# Patient Record
Sex: FEMALE | Race: ASIAN | NOT HISPANIC OR LATINO | URBAN - METROPOLITAN AREA
[De-identification: names, ages, dates, MRNs, and addresses within clinical notes are randomized per-mention and may not be internally consistent; named-entity substitution may affect disease eponyms.]

---

## 2021-01-11 RX ORDER — NYSTATIN 100000 [USP'U]/ML
100000 SUSPENSION ORAL 3 TIMES DAILY
Qty: 1 | Refills: 0 | Status: ACTIVE | COMMUNITY
Start: 2021-01-11 | End: 1900-01-01

## 2021-10-07 ENCOUNTER — OUTPATIENT (OUTPATIENT)
Dept: OUTPATIENT SERVICES | Facility: HOSPITAL | Age: 56
LOS: 1 days | End: 2021-10-07
Payer: COMMERCIAL

## 2021-10-07 VITALS
HEART RATE: 68 BPM | TEMPERATURE: 98 F | OXYGEN SATURATION: 99 % | WEIGHT: 143.96 LBS | RESPIRATION RATE: 20 BRPM | DIASTOLIC BLOOD PRESSURE: 83 MMHG | HEIGHT: 64 IN | SYSTOLIC BLOOD PRESSURE: 138 MMHG

## 2021-10-07 DIAGNOSIS — C55 MALIGNANT NEOPLASM OF UTERUS, PART UNSPECIFIED: ICD-10-CM

## 2021-10-07 DIAGNOSIS — N60.99 UNSPECIFIED BENIGN MAMMARY DYSPLASIA OF UNSPECIFIED BREAST: ICD-10-CM

## 2021-10-07 DIAGNOSIS — N60.91 UNSPECIFIED BENIGN MAMMARY DYSPLASIA OF RIGHT BREAST: ICD-10-CM

## 2021-10-07 DIAGNOSIS — Z98.89 OTHER SPECIFIED POSTPROCEDURAL STATES: Chronic | ICD-10-CM

## 2021-10-07 DIAGNOSIS — Z90.49 ACQUIRED ABSENCE OF OTHER SPECIFIED PARTS OF DIGESTIVE TRACT: Chronic | ICD-10-CM

## 2021-10-07 DIAGNOSIS — Z01.818 ENCOUNTER FOR OTHER PREPROCEDURAL EXAMINATION: ICD-10-CM

## 2021-10-07 DIAGNOSIS — R92.8 OTHER ABNORMAL AND INCONCLUSIVE FINDINGS ON DIAGNOSTIC IMAGING OF BREAST: ICD-10-CM

## 2021-10-07 DIAGNOSIS — Z90.710 ACQUIRED ABSENCE OF BOTH CERVIX AND UTERUS: Chronic | ICD-10-CM

## 2021-10-07 LAB
ALBUMIN SERPL ELPH-MCNC: 4.7 G/DL — SIGNIFICANT CHANGE UP (ref 3.3–5)
ALP SERPL-CCNC: 79 U/L — SIGNIFICANT CHANGE UP (ref 40–120)
ALT FLD-CCNC: 32 U/L — SIGNIFICANT CHANGE UP (ref 10–45)
ANION GAP SERPL CALC-SCNC: 13 MMOL/L — SIGNIFICANT CHANGE UP (ref 5–17)
AST SERPL-CCNC: 34 U/L — SIGNIFICANT CHANGE UP (ref 10–40)
BILIRUB SERPL-MCNC: 0.2 MG/DL — SIGNIFICANT CHANGE UP (ref 0.2–1.2)
BUN SERPL-MCNC: 12 MG/DL — SIGNIFICANT CHANGE UP (ref 7–23)
CALCIUM SERPL-MCNC: 9.9 MG/DL — SIGNIFICANT CHANGE UP (ref 8.4–10.5)
CHLORIDE SERPL-SCNC: 101 MMOL/L — SIGNIFICANT CHANGE UP (ref 96–108)
CO2 SERPL-SCNC: 26 MMOL/L — SIGNIFICANT CHANGE UP (ref 22–31)
CREAT SERPL-MCNC: 0.62 MG/DL — SIGNIFICANT CHANGE UP (ref 0.5–1.3)
GLUCOSE SERPL-MCNC: 96 MG/DL — SIGNIFICANT CHANGE UP (ref 70–99)
HCT VFR BLD CALC: 35.1 % — SIGNIFICANT CHANGE UP (ref 34.5–45)
HGB BLD-MCNC: 11.6 G/DL — SIGNIFICANT CHANGE UP (ref 11.5–15.5)
MCHC RBC-ENTMCNC: 30.6 PG — SIGNIFICANT CHANGE UP (ref 27–34)
MCHC RBC-ENTMCNC: 33 GM/DL — SIGNIFICANT CHANGE UP (ref 32–36)
MCV RBC AUTO: 92.6 FL — SIGNIFICANT CHANGE UP (ref 80–100)
NRBC # BLD: 0 /100 WBCS — SIGNIFICANT CHANGE UP (ref 0–0)
PLATELET # BLD AUTO: 187 K/UL — SIGNIFICANT CHANGE UP (ref 150–400)
POTASSIUM SERPL-MCNC: 3.7 MMOL/L — SIGNIFICANT CHANGE UP (ref 3.5–5.3)
POTASSIUM SERPL-SCNC: 3.7 MMOL/L — SIGNIFICANT CHANGE UP (ref 3.5–5.3)
PROT SERPL-MCNC: 7.7 G/DL — SIGNIFICANT CHANGE UP (ref 6–8.3)
RBC # BLD: 3.79 M/UL — LOW (ref 3.8–5.2)
RBC # FLD: 13.2 % — SIGNIFICANT CHANGE UP (ref 10.3–14.5)
SODIUM SERPL-SCNC: 140 MMOL/L — SIGNIFICANT CHANGE UP (ref 135–145)
WBC # BLD: 4.5 K/UL — SIGNIFICANT CHANGE UP (ref 3.8–10.5)
WBC # FLD AUTO: 4.5 K/UL — SIGNIFICANT CHANGE UP (ref 3.8–10.5)

## 2021-10-07 PROCEDURE — 85027 COMPLETE CBC AUTOMATED: CPT

## 2021-10-07 PROCEDURE — 80053 COMPREHEN METABOLIC PANEL: CPT

## 2021-10-07 PROCEDURE — G0463: CPT

## 2021-10-07 RX ORDER — SODIUM CHLORIDE 9 MG/ML
3 INJECTION INTRAMUSCULAR; INTRAVENOUS; SUBCUTANEOUS EVERY 8 HOURS
Refills: 0 | Status: DISCONTINUED | OUTPATIENT
Start: 2021-10-14 | End: 2021-10-28

## 2021-10-07 RX ORDER — LIDOCAINE HCL 20 MG/ML
0.2 VIAL (ML) INJECTION ONCE
Refills: 0 | Status: DISCONTINUED | OUTPATIENT
Start: 2021-10-14 | End: 2021-10-28

## 2021-10-07 NOTE — H&P PST ADULT - NSICDXPASTSURGICALHX_GEN_ALL_CORE_FT
PAST SURGICAL HISTORY:  H/O abdominal hysterectomy 1/2021 @ [facility]    H/O ovarian cystectomy 2004    History of cholecystectomy 217

## 2021-10-07 NOTE — H&P PST ADULT - NSICDXFAMILYHX_GEN_ALL_CORE_FT
FAMILY HISTORY:  Family history of breast cancer in sister  Family history of diabetes mellitus  Family history of hyperlipidemia  Family history of hypertension

## 2021-10-07 NOTE — H&P PST ADULT - NSICDXPASTMEDICALHX_GEN_ALL_CORE_FT
PAST MEDICAL HISTORY:  Acid reflux     Endometriosis     Hypothyroid     Uterine cancer 1/2021    UTI (urinary tract infection)

## 2021-10-07 NOTE — H&P PST ADULT - HISTORY OF PRESENT ILLNESS
57 y/o female with PMH of hypothyroidism, GERD, uterine cancer dx in 2021 s/p hysterectomy s/p chemotherapy currently on Herceptin every 3 week ( [facility] ) . Pt had a abdominal  mammogram s/p  biopsy . Presents to PST for scheduled Excisional Biopsy Right Breast Post Marimar  Reflector Placement on 10/14/21.    Next dose of Herceptin on 11/12/21     Covid test 10/11/21

## 2021-10-07 NOTE — H&P PST ADULT - PROBLEM SELECTOR PLAN 1
Excisional Biopsy Right Breast Post Marimar  Reflector Placement on 10/14/21  Pre- Op Instructions discussed   Labs sent   Covid test 10/11/21  medical eval

## 2021-10-09 PROBLEM — C55 MALIGNANT NEOPLASM OF UTERUS, PART UNSPECIFIED: Chronic | Status: ACTIVE | Noted: 2021-10-07

## 2021-10-11 ENCOUNTER — OUTPATIENT (OUTPATIENT)
Dept: OUTPATIENT SERVICES | Facility: HOSPITAL | Age: 56
LOS: 1 days | End: 2021-10-11
Payer: COMMERCIAL

## 2021-10-11 DIAGNOSIS — Z90.710 ACQUIRED ABSENCE OF BOTH CERVIX AND UTERUS: Chronic | ICD-10-CM

## 2021-10-11 DIAGNOSIS — Z11.52 ENCOUNTER FOR SCREENING FOR COVID-19: ICD-10-CM

## 2021-10-11 DIAGNOSIS — Z98.89 OTHER SPECIFIED POSTPROCEDURAL STATES: Chronic | ICD-10-CM

## 2021-10-11 DIAGNOSIS — Z90.49 ACQUIRED ABSENCE OF OTHER SPECIFIED PARTS OF DIGESTIVE TRACT: Chronic | ICD-10-CM

## 2021-10-11 LAB — SARS-COV-2 RNA SPEC QL NAA+PROBE: SIGNIFICANT CHANGE UP

## 2021-10-11 PROCEDURE — C9803: CPT

## 2021-10-11 PROCEDURE — U0005: CPT

## 2021-10-11 PROCEDURE — U0003: CPT

## 2021-10-12 ENCOUNTER — RESULT REVIEW (OUTPATIENT)
Age: 56
End: 2021-10-12

## 2021-10-12 ENCOUNTER — OUTPATIENT (OUTPATIENT)
Dept: OUTPATIENT SERVICES | Facility: HOSPITAL | Age: 56
LOS: 1 days | End: 2021-10-12
Payer: COMMERCIAL

## 2021-10-12 DIAGNOSIS — Z90.49 ACQUIRED ABSENCE OF OTHER SPECIFIED PARTS OF DIGESTIVE TRACT: Chronic | ICD-10-CM

## 2021-10-12 DIAGNOSIS — C80.1 MALIGNANT (PRIMARY) NEOPLASM, UNSPECIFIED: ICD-10-CM

## 2021-10-12 DIAGNOSIS — Z98.89 OTHER SPECIFIED POSTPROCEDURAL STATES: Chronic | ICD-10-CM

## 2021-10-12 DIAGNOSIS — Z90.710 ACQUIRED ABSENCE OF BOTH CERVIX AND UTERUS: Chronic | ICD-10-CM

## 2021-10-12 PROCEDURE — 88321 CONSLTJ&REPRT SLD PREP ELSWR: CPT

## 2021-10-13 ENCOUNTER — TRANSCRIPTION ENCOUNTER (OUTPATIENT)
Age: 56
End: 2021-10-13

## 2021-10-13 ENCOUNTER — APPOINTMENT (OUTPATIENT)
Dept: MAMMOGRAPHY | Facility: IMAGING CENTER | Age: 56
End: 2021-10-13
Payer: COMMERCIAL

## 2021-10-13 ENCOUNTER — OUTPATIENT (OUTPATIENT)
Dept: OUTPATIENT SERVICES | Facility: HOSPITAL | Age: 56
LOS: 1 days | End: 2021-10-13
Payer: COMMERCIAL

## 2021-10-13 DIAGNOSIS — Z90.49 ACQUIRED ABSENCE OF OTHER SPECIFIED PARTS OF DIGESTIVE TRACT: Chronic | ICD-10-CM

## 2021-10-13 DIAGNOSIS — Z98.89 OTHER SPECIFIED POSTPROCEDURAL STATES: Chronic | ICD-10-CM

## 2021-10-13 DIAGNOSIS — Z90.710 ACQUIRED ABSENCE OF BOTH CERVIX AND UTERUS: Chronic | ICD-10-CM

## 2021-10-13 DIAGNOSIS — Z00.8 ENCOUNTER FOR OTHER GENERAL EXAMINATION: ICD-10-CM

## 2021-10-13 LAB — SURGICAL PATHOLOGY STUDY: SIGNIFICANT CHANGE UP

## 2021-10-13 PROCEDURE — 19281 PERQ DEVICE BREAST 1ST IMAG: CPT

## 2021-10-13 PROCEDURE — C1739: CPT

## 2021-10-13 PROCEDURE — 19281 PERQ DEVICE BREAST 1ST IMAG: CPT | Mod: RT

## 2021-10-14 ENCOUNTER — OUTPATIENT (OUTPATIENT)
Dept: OUTPATIENT SERVICES | Facility: HOSPITAL | Age: 56
LOS: 1 days | End: 2021-10-14
Payer: COMMERCIAL

## 2021-10-14 ENCOUNTER — RESULT REVIEW (OUTPATIENT)
Age: 56
End: 2021-10-14

## 2021-10-14 VITALS
HEART RATE: 84 BPM | OXYGEN SATURATION: 99 % | DIASTOLIC BLOOD PRESSURE: 64 MMHG | SYSTOLIC BLOOD PRESSURE: 133 MMHG | RESPIRATION RATE: 16 BRPM

## 2021-10-14 VITALS
WEIGHT: 143.96 LBS | HEART RATE: 70 BPM | TEMPERATURE: 98 F | OXYGEN SATURATION: 100 % | HEIGHT: 64 IN | SYSTOLIC BLOOD PRESSURE: 149 MMHG | RESPIRATION RATE: 16 BRPM | DIASTOLIC BLOOD PRESSURE: 72 MMHG

## 2021-10-14 DIAGNOSIS — Z90.49 ACQUIRED ABSENCE OF OTHER SPECIFIED PARTS OF DIGESTIVE TRACT: Chronic | ICD-10-CM

## 2021-10-14 DIAGNOSIS — N60.99 UNSPECIFIED BENIGN MAMMARY DYSPLASIA OF UNSPECIFIED BREAST: ICD-10-CM

## 2021-10-14 DIAGNOSIS — Z98.89 OTHER SPECIFIED POSTPROCEDURAL STATES: Chronic | ICD-10-CM

## 2021-10-14 DIAGNOSIS — C55 MALIGNANT NEOPLASM OF UTERUS, PART UNSPECIFIED: ICD-10-CM

## 2021-10-14 DIAGNOSIS — R92.8 OTHER ABNORMAL AND INCONCLUSIVE FINDINGS ON DIAGNOSTIC IMAGING OF BREAST: ICD-10-CM

## 2021-10-14 DIAGNOSIS — Z90.710 ACQUIRED ABSENCE OF BOTH CERVIX AND UTERUS: Chronic | ICD-10-CM

## 2021-10-14 PROCEDURE — 19125 EXCISION BREAST LESION: CPT | Mod: RT

## 2021-10-14 PROCEDURE — 76098 X-RAY EXAM SURGICAL SPECIMEN: CPT

## 2021-10-14 PROCEDURE — 88307 TISSUE EXAM BY PATHOLOGIST: CPT

## 2021-10-14 PROCEDURE — 88307 TISSUE EXAM BY PATHOLOGIST: CPT | Mod: 26

## 2021-10-14 PROCEDURE — 76098 X-RAY EXAM SURGICAL SPECIMEN: CPT | Mod: 26

## 2021-10-14 RX ORDER — CHLORHEXIDINE GLUCONATE 213 G/1000ML
1 SOLUTION TOPICAL ONCE
Refills: 0 | Status: COMPLETED | OUTPATIENT
Start: 2021-10-14 | End: 2021-10-14

## 2021-10-14 RX ORDER — FAMOTIDINE 10 MG/ML
20 INJECTION INTRAVENOUS ONCE
Refills: 0 | Status: COMPLETED | OUTPATIENT
Start: 2021-10-14 | End: 2021-10-14

## 2021-10-14 RX ORDER — FENTANYL CITRATE 50 UG/ML
25 INJECTION INTRAVENOUS
Refills: 0 | Status: DISCONTINUED | OUTPATIENT
Start: 2021-10-14 | End: 2021-10-14

## 2021-10-14 RX ORDER — OXYCODONE HYDROCHLORIDE 5 MG/1
1 TABLET ORAL
Qty: 15 | Refills: 0
Start: 2021-10-14

## 2021-10-14 RX ORDER — OXYCODONE HYDROCHLORIDE 5 MG/1
5 TABLET ORAL ONCE
Refills: 0 | Status: DISCONTINUED | OUTPATIENT
Start: 2021-10-14 | End: 2021-10-14

## 2021-10-14 RX ORDER — ONDANSETRON 8 MG/1
4 TABLET, FILM COATED ORAL ONCE
Refills: 0 | Status: COMPLETED | OUTPATIENT
Start: 2021-10-14 | End: 2021-10-14

## 2021-10-14 RX ORDER — MORPHINE SULFATE 50 MG/1
2 CAPSULE, EXTENDED RELEASE ORAL ONCE
Refills: 0 | Status: DISCONTINUED | OUTPATIENT
Start: 2021-10-14 | End: 2021-10-14

## 2021-10-14 RX ADMIN — ONDANSETRON 4 MILLIGRAM(S): 8 TABLET, FILM COATED ORAL at 12:04

## 2021-10-14 RX ADMIN — MORPHINE SULFATE 2 MILLIGRAM(S): 50 CAPSULE, EXTENDED RELEASE ORAL at 13:58

## 2021-10-14 RX ADMIN — CHLORHEXIDINE GLUCONATE 1 APPLICATION(S): 213 SOLUTION TOPICAL at 09:18

## 2021-10-14 RX ADMIN — FAMOTIDINE 20 MILLIGRAM(S): 10 INJECTION INTRAVENOUS at 13:58

## 2021-10-14 NOTE — ASU DISCHARGE PLAN (ADULT/PEDIATRIC) - CARE PROVIDER_API CALL
Miriam Pompa)  Surgery  [address], [address]  [place] [address]  Phone: [phone]  Fax: [phone]  Follow Up Time:

## 2021-10-14 NOTE — ASU DISCHARGE PLAN (ADULT/PEDIATRIC) - NURSING INSTRUCTIONS
Next dose of Tylenol will be on or after 4:45 pm ,today/tonight, If needed for pain/cramps. Your first dose of Tylenol was given at 10:45 am_. Do not exceed more than 4000mg of Tylenol in one 24 hour setting.

## 2021-10-14 NOTE — ASU PATIENT PROFILE, ADULT - NS PRO TALK SOMEONE YN
Surgical Oncology Follow Up       [address]  [facility] [place]  [address]  [facility] [id number]    Layla Torres Mock  1962  [id number]  [address]  [facility]  [address] [id number]    Chief Complaint   Patient presents with    Pre-op Exam          Assessment & Plan:   Patient presents for preoperative visit  Her MRIs demonstrated essentially come pleat resolution of her left breast cancer  However because she has lupus erythematosus Radiation Oncology has discourage breast conservation therapy  Consequently I have discussed a mastectomy with her telephonically  We reviewed this today and went through the process of informed consent for left mastectomy in conjunction with lymphatic mapping using blue and radioactive dye as well as sentinel lymph node biopsy and possible axillary dissection  The patient does not want reconstruction  This is consistent with her previous discussions  Cancer History:        Malignant neoplasm of upper-inner quadrant of left breast in female, estrogen receptor negative ([facility] [address] )    2/12/2020 Biopsy     A  & B  Right breast stereotactic biopsy with and without calcs; 11 o'clock, 10 cm from nipple  Benign breast glandular parenchyma with fibroadenomatoid stromal hyperplasia  No atypia and no evidence of malignancy     C  Left breast ultrasound-guided biopsy; 10 o'clock, 5 cm from nipple  Benign breast glandular tissue  No atypia and no evidence of malignancy    D  Left breast ultrasound-guided biopsy; 10 o'clock, 4 cm from nipple  Invasive breast carcinoma of no special type (ductal NST)  Grade 3  ER 0  OR 0  HER2 2+; FISH positive    Concordant  Right breast clear  Malignant mass measures 2 3 cm on mammo  Left axilla US negative  Mammographic abnormality with no US correlate  MRI recommended to further evaluate this finding        2/26/2020 Genetic Testing The following genes were evaluated: LIZZETTE, BRCA1, BRCA2, CDH1, CHEK2, PALB2, PTEN, STK11, TP53  Negative result   No pathogenic sequence variants or deletions/dupllications identified  [employer]      3/24/2020 - 5/25/2020 Chemotherapy     pegfilgrastim (NEULASTA ONPRO) subcutaneous injection kit 6 mg, 6 mg, Subcutaneous, Once, 4 of 6 cycles  Administration: 6 mg (3/24/2020), 6 mg (4/14/2020), 6 mg (5/5/2020)  fosaprepitant (EMEND) 150 mg in sodium chloride 0 9 % 250 mL IVPB, 150 mg, Intravenous, Once, 4 of 6 cycles  Administration: 150 mg (3/24/2020), 150 mg (4/14/2020), 150 mg (5/5/2020)  pertuzumab (PERJETA) 840 mg in sodium chloride 0 9 % 250 mL IVPB, 840 mg, Intravenous, Once, 4 of 6 cycles  Administration: 840 mg (3/24/2020), 420 mg (4/14/2020), 420 mg (5/5/2020)  CARBOplatin (PARAPLATIN) 547 8 mg in sodium chloride 0 9 % 250 mL IVPB, 547 8 mg, Intravenous, Once, 4 of 6 cycles  Administration: 547 8 mg (3/24/2020), 575 4 mg (4/14/2020), 625 8 mg (5/5/2020)  DOCEtaxel (TAXOTERE) 122 2 mg in sodium chloride 0 9 % 250 mL chemo infusion, 75 mg/m2 = 122 2 mg, Intravenous, Once, 4 of 6 cycles  Administration: 122 2 mg (3/24/2020), 122 2 mg (4/14/2020), 122 2 mg (5/5/2020)  trastuzumab (HERCEPTIN) 486 mg in sodium chloride 0 9 % 250 mL chemo infusion, 8 mg/kg = 486 mg, Intravenous, Once, 4 of 18 cycles  Administration: 486 mg (3/24/2020), 364 mg (4/14/2020), 364 mg (5/5/2020)      6/3/2020 -  Chemotherapy     pertuzumab (PERJETA) 420 mg in sodium chloride 0 9 % 250 mL IVPB, 420 mg (50 % of original dose 840 mg), Intravenous, Once, 2 of 6 cycles  Dose modification: 420 mg (original dose 840 mg, Cycle 1, Reason: Dose Not Tolerated)  Administration: 420 mg (6/3/2020), 420 mg (6/25/2020)  PACLitaxel (TAXOL) 127 2 mg in sodium chloride 0 9 % 250 mL chemo IVPB, 80 mg/m2 = 127 2 mg, Intravenous, Once, 2 of 2 cycles  Administration: 127 2 mg (6/3/2020), 127 2 mg (6/10/2020), 127 2 mg (6/17/2020), 127 2 mg (6/25/2020), 127 2 mg (7/1/2020), 127 2 mg (7/7/2020)  trastuzumab (HERCEPTIN) 340 mg in sodium chloride 0 9 % 250 mL chemo infusion, 6 mg/kg = 340 mg (75 % of original dose 8 mg/kg), Intravenous, Once, 2 of 6 cycles  Dose modification: 6 mg/kg (original dose 8 mg/kg, Cycle 1, Reason: Dose Not Tolerated)  Administration: 340 mg (6/3/2020), 340 mg (6/25/2020)      6/4/2020 -  Cancer Staged     Staging form: Breast, AJCC 8th Edition  - Clinical: Stage IIA (cT2, cN0, cM0, G3, ER-, NC-, HER2+) - Signed by Luba Benavidez MD on 6/4/2020  Laterality: Left  Histologic grading system: 3 grade system             Interval History:   Patient is completed her chemotherapy  Her MRI demonstrated resolution of her    Review of Systems:   Review of Systems   Constitutional: Positive for fatigue  All other systems reviewed and are negative        Past Medical History     Patient Active Problem List   Diagnosis    Urinary tract infection    Hypertension    Systemic lupus erythematosus ([address] )    Hypothyroidism    Posttraumatic stress disorder    Adult celiac disease    Anxiety    Depression    Esophagitis    Nephrolithiasis    Vitamin D deficiency    Malignant neoplasm of upper-inner quadrant of left breast in female, estrogen receptor negative ([address] )    Chemotherapy induced neutropenia (HCC)    Leukocytosis    Increased anion gap metabolic acidosis    Port-A-Cath in place    Intractable nausea and vomiting    SIRS (systemic inflammatory response syndrome) (HCC)    JEFERSON (acute kidney injury) ([address] )    Hyponatremia    Hypokalemia     Past Medical History:   Diagnosis Date    Disease of thyroid gland     Hypertension     Lupus ([address] )     Malignant neoplasm of upper-inner quadrant of left breast in female, estrogen receptor negative ([address] ) 2/25/2020    Nephrolithiasis     PTSD (post-traumatic stress disorder)      Past Surgical History:   Procedure Laterality Date    FEMUR FRACTURE SURGERY      FL GUIDED CENTRAL VENOUS ACCESS DEVICE INSERTION  3/17/2020    HYSTERECTOMY      LEFT OOPHORECTOMY      due to torsion     MAMMO STEREOTACTIC BREAST BIOPSY RIGHT (ALL INC) Right 2/12/2020    MRI BREAST BIOPSY LEFT (ALL INCLUSIVE) Left 3/20/2020    MA INSJ TUNNELED CTR VAD W/SUBQ PORT AGE 5 YR/> N/A 3/17/2020    Procedure: INSERTION VENOUS PORT ( PORT-A-CATH) IR;  Surgeon: Kevin Lemus DO;  Location: [facility];  Service: Interventional Radiology    US GUIDANCE BREAST BIOPSY LEFT EACH ADDITIONAL Left 2/12/2020    US GUIDED BREAST BIOPSY LEFT COMPLETE Left 2/12/2020    VAGINA SURGERY       Family History   Problem Relation Age of Onset    Diabetes Mother     Hypertension Mother     Nephrolithiasis Mother     Breast cancer Mother 79    Heart disease Father     Hypertension Father     Diabetes Brother     Nephrolithiasis Brother     Breast cancer Maternal Aunt     No Known Problems Maternal Grandmother     No Known Problems Maternal Grandfather     No Known Problems Paternal Grandmother     No Known Problems Paternal Grandfather      Social History     Socioeconomic History    Marital status: /Civil Union     Spouse name: Not on file    Number of children: Not on file    Years of education: Not on file    Highest education level: Not on file   Occupational History    Not on file   Social Needs    Financial resource strain: Not on file    Food insecurity:     Worry: Not on file     Inability: Not on file    Transportation needs:     Medical: Not on file     Non-medical: Not on file   Tobacco Use    Smoking status: Current Every Day Smoker     Packs/day: 0 25     Types: Cigarettes     Start date: 1990    Smokeless tobacco: Never Used    Tobacco comment:  5 ppd per [employer]   Substance and Sexual Activity    Alcohol use: Not Currently     Alcohol/week: 21 0 standard drinks     Types: 21 Cans of beer per week     Frequency: Never     Comment: pt states she had her last beer 3/22/2020    Drug use: No    Sexual activity: Not on file   Lifestyle    Physical activity:     Days per week: Not on file     Minutes per session: Not on file    Stress: Not on file   Relationships    Social connections:     Talks on phone: Not on file     Gets together: Not on file     Attends Shinto service: Not on file     Active member of club or organization: Not on file     Attends meetings of clubs or organizations: Not on file     Relationship status: Not on file    Intimate partner violence:     Fear of current or ex partner: Not on file     Emotionally abused: Not on file     Physically abused: Not on file     Forced sexual activity: Not on file   Other Topics Concern    Not on file   Social History Narrative    Not on file       Current Outpatient Medications:     ALPRAZolam (XANAX) 1 mg tablet, TAKE ONE TABLET THREE TIMES A DAY AS NEEDED FOR ANXIETY, Disp: 90 tablet, Rfl: 1    Cholecalciferol (VITAMIN D3) 90168 units CAPS, Take 50,000 Units by mouth once a week, Disp: , Rfl:     CRANIAL PROSTHESIS, RX,, One wig as needed, Disp: 1 Piece, Rfl: 0    cyclobenzaprine (FLEXERIL) 10 mg tablet, Take 10 mg by mouth daily at bedtime , Disp: , Rfl:     dexamethasone (DECADRON) 1 mg tablet, Take 1 tablet (1 mg total) by mouth daily with breakfast, Disp: 30 tablet, Rfl: 0    diphenoxylate-atropine (LOMOTIL) 2 5-0 025 mg per tablet, Take 1 tablet by mouth 4 (four) times a day as needed for diarrhea, Disp: 30 tablet, Rfl: 0    hydroxychloroquine (PLAQUENIL) 200 mg tablet, Take 1 tablet in morning and 1/2 tablet in evening, Disp: , Rfl:     levothyroxine 88 mcg tablet, Take 1 tablet (88 mcg total) by mouth daily, Disp: 30 tablet, Rfl: 5    loperamide (IMODIUM) 2 mg capsule, Take 1 capsule (2 mg total) by mouth every 4 (four) hours as needed for diarrhea, Disp: 30 capsule, Rfl: 0    moexipril (UNIVASC) 15 MG tablet, Take 1 tablet (15 mg total) by mouth daily, Disp: 30 tablet, Rfl: 5    Nutritional Supplements (OSTEOPOROSIS SUPPORT PO), Take by mouth, Disp: , Rfl:     ondansetron (ZOFRAN) 4 mg tablet, TAKE ONE TABLET BY MOUTH EVERY 8 HOURS AS NEEDED FOR NAUSEA OR VOMITING, Disp: 30 tablet, Rfl: 1    oxyCODONE (ROXICODONE) 5 mg immediate release tablet, Take 1-2 tablets (5-10 mg total) by mouth every 4 (four) hours as needed (cancer pain  Max of 6 tabs / day  )Max Daily Amount: 30 mg, Disp: 180 tablet, Rfl: 0    potassium chloride (K-DUR,KLOR-CON) 20 mEq tablet, Take 1 tablet (20 mEq total) by mouth every other day, Disp: 20 tablet, Rfl: 0    predniSONE 1 mg tablet, Take 1 mg by mouth 2 (two) times a day as needed (Lupus exacerbations), Disp: , Rfl:     prochlorperazine (COMPAZINE) 5 mg tablet, Take 1 tablet (5 mg total) by mouth every 6 (six) hours as needed for nausea or vomiting, Disp: 30 tablet, Rfl: 0    sertraline (ZOLOFT) 100 mg tablet, TAKE 1 & 1/2 TABLETS BY MOUTH ONCE DAILY, Disp: 45 tablet, Rfl: 5    dicyclomine (BENTYL) 20 mg tablet, Take 1 tablet (20 mg total) by mouth every 6 (six) hours as needed (abdominal pain) for up to 10 days, Disp: 30 tablet, Rfl: 0    magnesium oxide (MAG-OX) 400 mg, Take 1 tablet (400 mg total) by mouth 2 (two) times a day, Disp: 60 tablet, Rfl: 0  No current facility-administered medications for this visit  Allergies   Allergen Reactions    Mobic [Meloxicam] Eye Swelling     Reaction Date: 12Aug2011;     Methotrexate Rash    Sulfa Antibiotics Rash       Physical Exam:     Vitals:    07/08/20 1032   BP: 112/70   Pulse: (!) 112   Resp: 16   Temp: 98 2 °F (36 8 °C)     Physical Exam   Constitutional: She is oriented to person, place, and time  She appears well-developed and well-nourished  HENT:   Head: Normocephalic and atraumatic  Mouth/Throat: Oropharynx is clear and moist    Eyes: Pupils are equal, round, and reactive to light  EOM are normal    Neck: Normal range of motion  Neck supple  No JVD present  No tracheal deviation present  No thyromegaly present     Cardiovascular: Normal rate, regular rhythm, normal heart sounds and intact distal pulses  Exam reveals no gallop and no friction rub  No murmur heard  Pulmonary/Chest: Effort normal and breath sounds normal  No respiratory distress  She has no wheezes  She has no rales  Both breasts were examined in the sitting and supine position  There are no worrisome skin lesions, no nipple retraction and no nipple discharge  There are no dominant masses, axillary adenopathy or supraclavicular adenopathy on either side  Abdominal: Soft  She exhibits no distension and no mass  There is no hepatomegaly  There is no tenderness  There is no rebound and no guarding  Musculoskeletal: Normal range of motion  She exhibits no edema or tenderness  Lymphadenopathy:     She has no cervical adenopathy  Neurological: She is alert and oriented to person, place, and time  No cranial nerve deficit  Skin: Skin is warm and dry  No rash noted  No erythema  Psychiatric: She has a normal mood and affect  Her behavior is normal    Vitals reviewed  Results & Discussion:   The patient's MRI showed no abnormalities on the right side  Her left breast cancer has essentially resolved  Her original ultrasound of the axilla showed no worrisome findings  We went through the process of informed consent for a left mastectomy in conjunction with lymphatic mapping using blue and radioactive dye, sentinel lymph node biopsy and possible axillary dissection  All questions were answered the patient's satisfaction  We will coordinate the surgery next mutual convenience  Advance Care Planning/Advance Directives:  I discussed the disease status, treatment plans and follow-up with the patient  no

## 2021-10-14 NOTE — ASU PATIENT PROFILE, ADULT - NSICDXPASTSURGICALHX_GEN_ALL_CORE_FT
PAST SURGICAL HISTORY:  H/O abdominal hysterectomy 1/2021 @ [facility]    H/O ovarian cystectomy 2004    History of cholecystectomy 217

## 2021-10-14 NOTE — ASU DISCHARGE PLAN (ADULT/PEDIATRIC) - ASU DC SPECIAL INSTRUCTIONSFT
Breast Biopsy/Lumpectomy Discharge Instructions    1.	Follow-up Appointment- Please call the office to schedule your appointment ([phone]) for 2 weeks after surgery. Please call upon discharge in order to set up this appointment.     2.	Bruising/Bleeding/ Swelling – It is normal for there to be some bruising and swelling at the breast biopsy site. Some discomfort at the surgical site is also normal. If your symptoms seem excessive, or if you have any questions or concerns, please call the office.    3.	Supportive Bra- Please wear the bra and binder provided continuously, apart from showering, for 2 weeks/until your follow up appointment. The bra will provide support, decrease the amount of swelling at the biopsy site and make your recovery more comfortable.    4.	Wound Dressing – The incision(s) will be covered with a special type of surgery glue called Dermabond. It has a purplish hue and looks like plastic. It should stay on the skin until it flakes off naturally over the following 1-2 weeks. Please do NOT peel off the Dermabond. All of the stitches are “internal” and will dissolve naturally.     5.	Ice- You may apply ice to the surgical sites off and on as needed for symptomatic relief.     6.	Showering/Bathing- You may shower 72 hours after surgery, at which point you may removed outer dressings. Please sponge bathe until then.     7.	Activity Level- You may resume most normal daily activity as tolerated, but avoid strenuous activities such as aerobics, jogging, exercising, or heavy lifting for at least 1 week after surgery. You may return to work in 1-2 days after surgery. You may drive as long as you are not taking any prescription pain medication.     8.	Pain Medication- The office has sent pain prescriptions for you already to your pharmacy. You may take the prescribed medication, or you may take extra strength Tylenol as needed. Please do NOT take aspirin, Motrin, Advil, or other anti-inflammatory medications, as these medications may cause bleeding and bruising.

## 2021-10-25 LAB — SURGICAL PATHOLOGY STUDY: SIGNIFICANT CHANGE UP

## 2022-10-21 ENCOUNTER — APPOINTMENT (OUTPATIENT)
Dept: HEPATOLOGY | Facility: CLINIC | Age: 57
End: 2022-10-21

## 2022-11-02 ENCOUNTER — OUTPATIENT (OUTPATIENT)
Dept: OUTPATIENT SERVICES | Facility: HOSPITAL | Age: 57
LOS: 1 days | Discharge: HOME | End: 2022-11-02

## 2022-11-02 DIAGNOSIS — Z98.89 OTHER SPECIFIED POSTPROCEDURAL STATES: Chronic | ICD-10-CM

## 2022-11-02 DIAGNOSIS — C55 MALIGNANT NEOPLASM OF UTERUS, PART UNSPECIFIED: ICD-10-CM

## 2022-11-02 DIAGNOSIS — Z90.49 ACQUIRED ABSENCE OF OTHER SPECIFIED PARTS OF DIGESTIVE TRACT: Chronic | ICD-10-CM

## 2022-11-02 DIAGNOSIS — Z90.710 ACQUIRED ABSENCE OF BOTH CERVIX AND UTERUS: Chronic | ICD-10-CM

## 2022-11-08 ENCOUNTER — APPOINTMENT (OUTPATIENT)
Dept: HEMATOLOGY ONCOLOGY | Facility: CLINIC | Age: 57
End: 2022-11-08
Payer: COMMERCIAL

## 2022-11-08 VITALS
HEART RATE: 75 BPM | RESPIRATION RATE: 16 BRPM | WEIGHT: 147.93 LBS | BODY MASS INDEX: 25.25 KG/M2 | OXYGEN SATURATION: 99 % | HEIGHT: 63.98 IN | TEMPERATURE: 98.1 F | SYSTOLIC BLOOD PRESSURE: 161 MMHG | DIASTOLIC BLOOD PRESSURE: 80 MMHG

## 2022-11-08 DIAGNOSIS — Z78.9 OTHER SPECIFIED HEALTH STATUS: ICD-10-CM

## 2022-11-08 PROCEDURE — 99204 OFFICE O/P NEW MOD 45 MIN: CPT

## 2022-11-08 PROCEDURE — 99205 OFFICE O/P NEW HI 60 MIN: CPT

## 2022-11-09 PROBLEM — Z78.9 DENIES ALCOHOL CONSUMPTION: Status: ACTIVE | Noted: 2022-11-09

## 2022-11-09 NOTE — ASSESSMENT
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 \par \par - Previously was receiving are at [facility] and [facility]. She underwent Carbo/Taxol, but had a reaction to Taxol after the first cycle so was switched to Abraxane. This was in combo with Herceptin. After completion of 6 cycles of chemo she continued on Herceptin maintenance. \par - CT Scan of the abdomen revealed a large solitary liver lesion concerning for metastatic disease. Will refer to IR for bx of the lesion to confirm metastasis. \par - Recommended PET/CT to assess for other areas that may be involved with cancer, Rx given\par - Await tissue results to see if she qualifies for the folate receptor trial at [facility]. If she does, then would recommend pursuing this trial. If she does not qualify, then will start Pembrolizumab and Lenvatinib.

## 2022-11-09 NOTE — HISTORY OF PRESENT ILLNESS
[Disease: _____________________] : Disease: [unfilled] [AJCC Stage: ____] : AJCC Stage: [unfilled] [de-identified] : Patient diagnosed with stage III MMT  in 1/2021. She saw gyn and had imaging. She was seen by Dr. Bowen, who did the surgery. Final path showed STAGE III MMT.\par Dr. Nichols at [place]. She was treated with Carbo/ Taxol- s/p one cycle , had reaction and switched to Carbo/ doxil x one does. She transferred her care to Dr. Nichols at [place].and then Carbo/ Abraxane/ Herceptin x 6 completed, last dose was in August, 2021\par She was started on Herceptin maintenance, last dose was in Sept, 2022.\par Scan showed liver lesion, consistent with POD.\par Ct C/A/P: 10/6/2022:  A new 4.6x5.7 cm low attenuation lesion in seen in the liver , concerning for metastatic disease.\par \par She has some RUQ pain, no nausea or vomiting. She has some constipation. Takes prune juice, on laxatives. No bleeding. No RT in the past.\par \par PMH: NONE\par PSH: WINNIE, BSO, GB, Endometriosis surgery in 2004, right breast biopsy.( Dr. Chasity Pompa)\par All: sulfa, metronidazole, taxol\par FH: Sister breast cancer 40. 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 Abdominal pain.

## 2022-11-27 ENCOUNTER — APPOINTMENT (OUTPATIENT)
Dept: NUCLEAR MEDICINE | Facility: IMAGING CENTER | Age: 57
End: 2022-11-27

## 2023-01-25 ENCOUNTER — OUTPATIENT (OUTPATIENT)
Dept: OUTPATIENT SERVICES | Facility: HOSPITAL | Age: 58
LOS: 1 days | Discharge: HOME | End: 2023-01-25
Payer: COMMERCIAL

## 2023-01-25 DIAGNOSIS — Z90.49 ACQUIRED ABSENCE OF OTHER SPECIFIED PARTS OF DIGESTIVE TRACT: Chronic | ICD-10-CM

## 2023-01-25 DIAGNOSIS — Z90.710 ACQUIRED ABSENCE OF BOTH CERVIX AND UTERUS: Chronic | ICD-10-CM

## 2023-01-25 DIAGNOSIS — Z98.89 OTHER SPECIFIED POSTPROCEDURAL STATES: Chronic | ICD-10-CM

## 2023-01-25 DIAGNOSIS — C55 MALIGNANT NEOPLASM OF UTERUS, PART UNSPECIFIED: ICD-10-CM

## 2023-02-01 ENCOUNTER — APPOINTMENT (OUTPATIENT)
Dept: HEMATOLOGY ONCOLOGY | Facility: CLINIC | Age: 58
End: 2023-02-01
Payer: COMMERCIAL

## 2023-02-01 VITALS
OXYGEN SATURATION: 97 % | WEIGHT: 141.51 LBS | DIASTOLIC BLOOD PRESSURE: 84 MMHG | RESPIRATION RATE: 16 BRPM | HEART RATE: 88 BPM | BODY MASS INDEX: 24.16 KG/M2 | SYSTOLIC BLOOD PRESSURE: 135 MMHG | TEMPERATURE: 97.3 F | HEIGHT: 63.98 IN

## 2023-02-01 PROCEDURE — 99214 OFFICE O/P EST MOD 30 MIN: CPT

## 2023-02-02 NOTE — HISTORY OF PRESENT ILLNESS
[Disease: _____________________] : Disease: [unfilled] [AJCC Stage: ____] : AJCC Stage: [unfilled] [de-identified] : Patient diagnosed with stage III MMT  in 1/2021. She saw gyn and had imaging. She was seen by Dr. Bowen, who did the surgery. Final path showed STAGE III MMT.\par Dr. Nichols at [place]. She was treated with Carbo/ Taxol- s/p one cycle , had reaction and switched to Carbo/ doxil x one does. She transferred her care to Dr. Nichols at [place].and then Carbo/ Abraxane/ Herceptin x 6 completed, last dose was in August, 2021\par She was started on Herceptin maintenance, last dose was in Sept, 2022.\par Scan showed liver lesion, consistent with POD.\par Ct C/A/P: 10/6/2022:  A new 4.6x5.7 cm low attenuation lesion in seen in the liver , concerning for metastatic disease.\par \par She has some RUQ pain, no nausea or vomiting. She has some constipation. Takes prune juice, on laxatives. No bleeding. No RT in the past.\par \par PMH: NONE\par PSH: WINNIE, BSO, GB, Endometriosis surgery in 2004, right breast biopsy.( Dr. Chasity Pompa)\par All: sulfa, metronidazole, taxol\par FH: Sister breast cancer 40. 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  After four cycles, scan from 1/10/23 showed POD.\par \par 1/10/2023: ct C/A/P :  No evidence of metastatic disease in the thorax. New subtle ground-glass opacities which may be infectious or inflammatory in etiology.\par Progression of disease in the liver , 5.8 x 5.1 cm mass slightly increased previously measuring 5.7 x 4.6 cm\par  [de-identified] : [facility] [de-identified] : Patient has mild abdominal distension and intermittent RUQ pain.\par She denies any bowel or bladder issues.\par

## 2023-02-06 RX ORDER — PROCHLORPERAZINE MALEATE 10 MG/1
10 TABLET ORAL EVERY 6 HOURS
Qty: 20 | Refills: 6 | Status: ACTIVE | COMMUNITY
Start: 2023-02-06 | End: 1900-01-01

## 2023-02-07 ENCOUNTER — TRANSCRIPTION ENCOUNTER (OUTPATIENT)
Age: 58
End: 2023-02-07

## 2023-02-07 ENCOUNTER — RESULT REVIEW (OUTPATIENT)
Age: 58
End: 2023-02-07

## 2023-02-07 ENCOUNTER — NON-APPOINTMENT (OUTPATIENT)
Age: 58
End: 2023-02-07

## 2023-02-07 ENCOUNTER — APPOINTMENT (OUTPATIENT)
Dept: INFUSION THERAPY | Facility: HOSPITAL | Age: 58
End: 2023-02-07

## 2023-02-07 LAB
ALBUMIN SERPL ELPH-MCNC: 4.7 G/DL — SIGNIFICANT CHANGE UP (ref 3.3–5)
ALP SERPL-CCNC: 91 U/L — SIGNIFICANT CHANGE UP (ref 40–120)
ALT FLD-CCNC: 27 U/L — SIGNIFICANT CHANGE UP (ref 10–45)
ANION GAP SERPL CALC-SCNC: 12 MMOL/L — SIGNIFICANT CHANGE UP (ref 5–17)
APPEARANCE UR: CLEAR — SIGNIFICANT CHANGE UP
AST SERPL-CCNC: 32 U/L — SIGNIFICANT CHANGE UP (ref 10–40)
BASOPHILS # BLD AUTO: 0.02 K/UL — SIGNIFICANT CHANGE UP (ref 0–0.2)
BASOPHILS NFR BLD AUTO: 0.4 % — SIGNIFICANT CHANGE UP (ref 0–2)
BILIRUB SERPL-MCNC: <0.2 MG/DL — SIGNIFICANT CHANGE UP (ref 0.2–1.2)
BILIRUB UR-MCNC: NEGATIVE — SIGNIFICANT CHANGE UP
BUN SERPL-MCNC: 15 MG/DL — SIGNIFICANT CHANGE UP (ref 7–23)
CALCIUM SERPL-MCNC: 9.8 MG/DL — SIGNIFICANT CHANGE UP (ref 8.4–10.5)
CANCER AG125 SERPL-ACNC: 13 U/ML — SIGNIFICANT CHANGE UP
CEA SERPL-MCNC: 16.9 NG/ML — HIGH (ref 0–3.8)
CHLORIDE SERPL-SCNC: 102 MMOL/L — SIGNIFICANT CHANGE UP (ref 96–108)
CO2 SERPL-SCNC: 29 MMOL/L — SIGNIFICANT CHANGE UP (ref 22–31)
COLOR SPEC: SIGNIFICANT CHANGE UP
CREAT SERPL-MCNC: 0.73 MG/DL — SIGNIFICANT CHANGE UP (ref 0.5–1.3)
DIFF PNL FLD: NEGATIVE — SIGNIFICANT CHANGE UP
EGFR: 96 ML/MIN/1.73M2 — SIGNIFICANT CHANGE UP
EOSINOPHIL # BLD AUTO: 0.1 K/UL — SIGNIFICANT CHANGE UP (ref 0–0.5)
EOSINOPHIL NFR BLD AUTO: 1.8 % — SIGNIFICANT CHANGE UP (ref 0–6)
GLUCOSE SERPL-MCNC: 115 MG/DL — HIGH (ref 70–99)
GLUCOSE UR QL: NEGATIVE — SIGNIFICANT CHANGE UP
HCT VFR BLD CALC: 35.9 % — SIGNIFICANT CHANGE UP (ref 34.5–45)
HGB BLD-MCNC: 11.5 G/DL — SIGNIFICANT CHANGE UP (ref 11.5–15.5)
IMM GRANULOCYTES NFR BLD AUTO: 0.2 % — SIGNIFICANT CHANGE UP (ref 0–0.9)
INR BLD: 1 RATIO — SIGNIFICANT CHANGE UP (ref 0.88–1.16)
KETONES UR-MCNC: NEGATIVE — SIGNIFICANT CHANGE UP
LEUKOCYTE ESTERASE UR-ACNC: NEGATIVE — SIGNIFICANT CHANGE UP
LYMPHOCYTES # BLD AUTO: 1.24 K/UL — SIGNIFICANT CHANGE UP (ref 1–3.3)
LYMPHOCYTES # BLD AUTO: 22.6 % — SIGNIFICANT CHANGE UP (ref 13–44)
MAGNESIUM SERPL-MCNC: 2.2 MG/DL — SIGNIFICANT CHANGE UP (ref 1.6–2.6)
MCHC RBC-ENTMCNC: 27.6 PG — SIGNIFICANT CHANGE UP (ref 27–34)
MCHC RBC-ENTMCNC: 32 G/DL — SIGNIFICANT CHANGE UP (ref 32–36)
MCV RBC AUTO: 86.3 FL — SIGNIFICANT CHANGE UP (ref 80–100)
MONOCYTES # BLD AUTO: 0.41 K/UL — SIGNIFICANT CHANGE UP (ref 0–0.9)
MONOCYTES NFR BLD AUTO: 7.5 % — SIGNIFICANT CHANGE UP (ref 2–14)
NEUTROPHILS # BLD AUTO: 3.7 K/UL — SIGNIFICANT CHANGE UP (ref 1.8–7.4)
NEUTROPHILS NFR BLD AUTO: 67.5 % — SIGNIFICANT CHANGE UP (ref 43–77)
NITRITE UR-MCNC: NEGATIVE — SIGNIFICANT CHANGE UP
NRBC # BLD: 0 /100 WBCS — SIGNIFICANT CHANGE UP (ref 0–0)
PH UR: 6.5 — SIGNIFICANT CHANGE UP (ref 5–8)
PLATELET # BLD AUTO: 241 K/UL — SIGNIFICANT CHANGE UP (ref 150–400)
POTASSIUM SERPL-MCNC: 4.8 MMOL/L — SIGNIFICANT CHANGE UP (ref 3.5–5.3)
POTASSIUM SERPL-SCNC: 4.8 MMOL/L — SIGNIFICANT CHANGE UP (ref 3.5–5.3)
PROT SERPL-MCNC: 7.7 G/DL — SIGNIFICANT CHANGE UP (ref 6–8.3)
PROT UR-MCNC: NEGATIVE — SIGNIFICANT CHANGE UP
PROTHROM AB SERPL-ACNC: 11.6 SEC — SIGNIFICANT CHANGE UP (ref 10.5–13.4)
RBC # BLD: 4.16 M/UL — SIGNIFICANT CHANGE UP (ref 3.8–5.2)
RBC # FLD: 13.2 % — SIGNIFICANT CHANGE UP (ref 10.3–14.5)
SODIUM SERPL-SCNC: 143 MMOL/L — SIGNIFICANT CHANGE UP (ref 135–145)
SP GR SPEC: 1 — LOW (ref 1.01–1.02)
UROBILINOGEN FLD QL: SIGNIFICANT CHANGE UP
WBC # BLD: 5.48 K/UL — SIGNIFICANT CHANGE UP (ref 3.8–10.5)
WBC # FLD AUTO: 5.48 K/UL — SIGNIFICANT CHANGE UP (ref 3.8–10.5)

## 2023-02-07 PROCEDURE — 93010 ELECTROCARDIOGRAM REPORT: CPT

## 2023-02-07 RX ORDER — OMEPRAZOLE 10 MG/1
1 CAPSULE, DELAYED RELEASE ORAL
Qty: 0 | Refills: 0 | DISCHARGE

## 2023-02-07 RX ORDER — GABAPENTIN 400 MG/1
1 CAPSULE ORAL
Qty: 0 | Refills: 0 | DISCHARGE

## 2023-02-07 RX ORDER — LINACLOTIDE 145 UG/1
1 CAPSULE, GELATIN COATED ORAL
Qty: 0 | Refills: 0 | DISCHARGE

## 2023-02-08 ENCOUNTER — NON-APPOINTMENT (OUTPATIENT)
Age: 58
End: 2023-02-08

## 2023-02-08 DIAGNOSIS — C54.1 MALIGNANT NEOPLASM OF ENDOMETRIUM: ICD-10-CM

## 2023-02-08 DIAGNOSIS — Z51.11 ENCOUNTER FOR ANTINEOPLASTIC CHEMOTHERAPY: ICD-10-CM

## 2023-02-08 LAB
HAV IGM SER-ACNC: SIGNIFICANT CHANGE UP
HBV CORE AB SER-ACNC: SIGNIFICANT CHANGE UP
HBV CORE IGM SER-ACNC: SIGNIFICANT CHANGE UP
HBV SURFACE AB SER-ACNC: SIGNIFICANT CHANGE UP
HBV SURFACE AG SER-ACNC: SIGNIFICANT CHANGE UP
HBV SURFACE AG SER-ACNC: SIGNIFICANT CHANGE UP
HCV AB S/CO SERPL IA: 0.22 S/CO — SIGNIFICANT CHANGE UP (ref 0–0.99)
HCV AB SERPL-IMP: SIGNIFICANT CHANGE UP
TSH SERPL-MCNC: 1.95 UIU/ML — SIGNIFICANT CHANGE UP (ref 0.27–4.2)

## 2023-02-10 ENCOUNTER — NON-APPOINTMENT (OUTPATIENT)
Age: 58
End: 2023-02-10

## 2023-02-24 ENCOUNTER — NON-APPOINTMENT (OUTPATIENT)
Age: 58
End: 2023-02-24

## 2023-02-28 ENCOUNTER — RESULT REVIEW (OUTPATIENT)
Age: 58
End: 2023-02-28

## 2023-02-28 ENCOUNTER — APPOINTMENT (OUTPATIENT)
Dept: INFUSION THERAPY | Facility: HOSPITAL | Age: 58
End: 2023-02-28

## 2023-02-28 ENCOUNTER — APPOINTMENT (OUTPATIENT)
Dept: HEMATOLOGY ONCOLOGY | Facility: CLINIC | Age: 58
End: 2023-02-28
Payer: COMMERCIAL

## 2023-02-28 VITALS
RESPIRATION RATE: 16 BRPM | BODY MASS INDEX: 24.16 KG/M2 | DIASTOLIC BLOOD PRESSURE: 83 MMHG | HEART RATE: 86 BPM | OXYGEN SATURATION: 98 % | WEIGHT: 140.63 LBS | SYSTOLIC BLOOD PRESSURE: 135 MMHG | TEMPERATURE: 98.1 F

## 2023-02-28 LAB
ALBUMIN SERPL ELPH-MCNC: 4.2 G/DL — SIGNIFICANT CHANGE UP (ref 3.3–5)
ALP SERPL-CCNC: 103 U/L — SIGNIFICANT CHANGE UP (ref 40–120)
ALT FLD-CCNC: 20 U/L — SIGNIFICANT CHANGE UP (ref 10–45)
ANION GAP SERPL CALC-SCNC: 10 MMOL/L — SIGNIFICANT CHANGE UP (ref 5–17)
AST SERPL-CCNC: 52 U/L — HIGH (ref 10–40)
BASOPHILS # BLD AUTO: 0.02 K/UL — SIGNIFICANT CHANGE UP (ref 0–0.2)
BASOPHILS NFR BLD AUTO: 0.3 % — SIGNIFICANT CHANGE UP (ref 0–2)
BILIRUB SERPL-MCNC: 0.2 MG/DL — SIGNIFICANT CHANGE UP (ref 0.2–1.2)
BUN SERPL-MCNC: 9 MG/DL — SIGNIFICANT CHANGE UP (ref 7–23)
CALCIUM SERPL-MCNC: 9.9 MG/DL — SIGNIFICANT CHANGE UP (ref 8.4–10.5)
CANCER AG125 SERPL-ACNC: 15 U/ML — SIGNIFICANT CHANGE UP
CEA SERPL-MCNC: 15.3 NG/ML — HIGH (ref 0–3.8)
CHLORIDE SERPL-SCNC: 103 MMOL/L — SIGNIFICANT CHANGE UP (ref 96–108)
CO2 SERPL-SCNC: 26 MMOL/L — SIGNIFICANT CHANGE UP (ref 22–31)
CREAT SERPL-MCNC: 0.62 MG/DL — SIGNIFICANT CHANGE UP (ref 0.5–1.3)
EGFR: 104 ML/MIN/1.73M2 — SIGNIFICANT CHANGE UP
EOSINOPHIL # BLD AUTO: 0.09 K/UL — SIGNIFICANT CHANGE UP (ref 0–0.5)
EOSINOPHIL NFR BLD AUTO: 1.3 % — SIGNIFICANT CHANGE UP (ref 0–6)
GLUCOSE SERPL-MCNC: 88 MG/DL — SIGNIFICANT CHANGE UP (ref 70–99)
HCT VFR BLD CALC: 36 % — SIGNIFICANT CHANGE UP (ref 34.5–45)
HGB BLD-MCNC: 11.9 G/DL — SIGNIFICANT CHANGE UP (ref 11.5–15.5)
IMM GRANULOCYTES NFR BLD AUTO: 0.3 % — SIGNIFICANT CHANGE UP (ref 0–0.9)
LYMPHOCYTES # BLD AUTO: 1.28 K/UL — SIGNIFICANT CHANGE UP (ref 1–3.3)
LYMPHOCYTES # BLD AUTO: 18.2 % — SIGNIFICANT CHANGE UP (ref 13–44)
MCHC RBC-ENTMCNC: 28 PG — SIGNIFICANT CHANGE UP (ref 27–34)
MCHC RBC-ENTMCNC: 33.1 G/DL — SIGNIFICANT CHANGE UP (ref 32–36)
MCV RBC AUTO: 84.7 FL — SIGNIFICANT CHANGE UP (ref 80–100)
MONOCYTES # BLD AUTO: 0.65 K/UL — SIGNIFICANT CHANGE UP (ref 0–0.9)
MONOCYTES NFR BLD AUTO: 9.2 % — SIGNIFICANT CHANGE UP (ref 2–14)
NEUTROPHILS # BLD AUTO: 4.97 K/UL — SIGNIFICANT CHANGE UP (ref 1.8–7.4)
NEUTROPHILS NFR BLD AUTO: 70.7 % — SIGNIFICANT CHANGE UP (ref 43–77)
NRBC # BLD: 0 /100 WBCS — SIGNIFICANT CHANGE UP (ref 0–0)
PLATELET # BLD AUTO: 211 K/UL — SIGNIFICANT CHANGE UP (ref 150–400)
POTASSIUM SERPL-MCNC: 5.4 MMOL/L — HIGH (ref 3.5–5.3)
POTASSIUM SERPL-SCNC: 5.4 MMOL/L — HIGH (ref 3.5–5.3)
PROT SERPL-MCNC: 7.3 G/DL — SIGNIFICANT CHANGE UP (ref 6–8.3)
RBC # BLD: 4.25 M/UL — SIGNIFICANT CHANGE UP (ref 3.8–5.2)
RBC # FLD: 13.6 % — SIGNIFICANT CHANGE UP (ref 10.3–14.5)
SODIUM SERPL-SCNC: 138 MMOL/L — SIGNIFICANT CHANGE UP (ref 135–145)
TSH SERPL-MCNC: 2.37 UIU/ML — SIGNIFICANT CHANGE UP (ref 0.27–4.2)
WBC # BLD: 7.03 K/UL — SIGNIFICANT CHANGE UP (ref 3.8–10.5)
WBC # FLD AUTO: 7.03 K/UL — SIGNIFICANT CHANGE UP (ref 3.8–10.5)

## 2023-02-28 PROCEDURE — 99213 OFFICE O/P EST LOW 20 MIN: CPT

## 2023-02-28 NOTE — HISTORY OF PRESENT ILLNESS
[Disease: _____________________] : Disease: [unfilled] [AJCC Stage: ____] : AJCC Stage: [unfilled] [de-identified] : Patient diagnosed with stage III MMT  in 1/2021. She saw gyn and had imaging. She was seen by Dr. Bowen, who did the surgery. Final path showed STAGE III MMT.\par Dr. Nichols at [place]. She was treated with Carbo/ Taxol- s/p one cycle , had reaction and switched to Carbo/ doxil x one does. She transferred her care to Dr. Nichols at [place].and then Carbo/ Abraxane/ Herceptin x 6 completed, last dose was in August, 2021\par She was started on Herceptin maintenance, last dose was in Sept, 2022.\par Scan showed liver lesion, consistent with POD.\par Ct C/A/P: 10/6/2022:  A new 4.6x5.7 cm low attenuation lesion in seen in the liver , concerning for metastatic disease.\par \par She has some RUQ pain, no nausea or vomiting. She has some constipation. Takes prune juice, on laxatives. No bleeding. No RT in the past.\par \par PMH: NONE\par PSH: WINNIE, BSO, GB, Endometriosis surgery in 2004, right breast biopsy.( Dr. Chasity Pompa)\par All: sulfa, metronidazole, taxol\par FH: Sister breast cancer 40. 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  After four cycles, scan from 1/10/23 showed POD.\par \par 1/10/2023: ct C/A/P :  No evidence of metastatic disease in the thorax. New subtle ground-glass opacities which may be infectious or inflammatory in etiology.\par Progression of disease in the liver , 5.8 x 5.1 cm mass slightly increased previously measuring 5.7 x 4.6 cm\par  [de-identified] : [facility] [FreeTextEntry1] : Keytruda/Lenvima - cycle #2 today 2/28/23 [de-identified] : Patient is here for follow up and treatment.\par She reported high BP up to 190/110 last week.  She stopped taking Lenvima on Thursday 2/23/22 and BP has slowly decreased to normal.\par She has some right sided pelvic pain which has been ongoing and is stable, not taking any pain medication except she has been taking meloxicam for her feet.\par She has had b/l foot pain since a few days prior to her first Keytruda, was started on gabapentin and took 300mg daily for 2 weeks with no relief then PCP changed to Meloxicam which is given more relief.  Pain is described as burning/numbness and increased with walking, no blisters, some redness.  She has had no falls.  \par She describes increased weakness over past 2 weeks, gets more fatigue with daily activities.  She has chronic mild constipation, not taking any laxatives or stool softeners.\par Denies chest pain, SOB, cough, nausea, vomiting, diarrhea, bleeding, swelling, urinary symptoms.\par \par

## 2023-02-28 NOTE — REASON FOR VISIT
[Follow-Up Visit] : a follow-up [Other: _____] : [unfilled] [Pre-Treatment Visit] : a pre-treatment [FreeTextEntry2] : uterine MMT

## 2023-02-28 NOTE — REVIEW OF SYSTEMS
[Negative] : Allergic/Immunologic [Fatigue] : fatigue [FreeTextEntry7] : right pelvic pain - stable [FreeTextEntry9] : b/l foot pain with walking [de-identified] : numbness to b/l toes

## 2023-03-01 LAB
APPEARANCE: CLEAR
BACTERIA: NEGATIVE
BILIRUBIN URINE: NEGATIVE
BLOOD URINE: NEGATIVE
COLOR: COLORLESS
GLUCOSE QUALITATIVE U: NEGATIVE
HYALINE CASTS: 0 /LPF
KETONES URINE: NEGATIVE
LEUKOCYTE ESTERASE URINE: NEGATIVE
MICROSCOPIC-UA: NORMAL
NITRITE URINE: NEGATIVE
PH URINE: 6.5
PROTEIN URINE: NEGATIVE
RED BLOOD CELLS URINE: 0 /HPF
SPECIFIC GRAVITY URINE: 1
SQUAMOUS EPITHELIAL CELLS: 1 /HPF
UROBILINOGEN URINE: NORMAL
WHITE BLOOD CELLS URINE: 0 /HPF

## 2023-03-21 ENCOUNTER — RESULT REVIEW (OUTPATIENT)
Age: 58
End: 2023-03-21

## 2023-03-21 ENCOUNTER — APPOINTMENT (OUTPATIENT)
Dept: HEMATOLOGY ONCOLOGY | Facility: CLINIC | Age: 58
End: 2023-03-21
Payer: COMMERCIAL

## 2023-03-21 ENCOUNTER — APPOINTMENT (OUTPATIENT)
Dept: INFUSION THERAPY | Facility: HOSPITAL | Age: 58
End: 2023-03-21

## 2023-03-21 VITALS
WEIGHT: 136.66 LBS | RESPIRATION RATE: 16 BRPM | HEART RATE: 66 BPM | DIASTOLIC BLOOD PRESSURE: 93 MMHG | SYSTOLIC BLOOD PRESSURE: 161 MMHG | OXYGEN SATURATION: 97 % | BODY MASS INDEX: 23.33 KG/M2 | TEMPERATURE: 97.2 F | HEIGHT: 63.98 IN

## 2023-03-21 LAB
ALBUMIN SERPL ELPH-MCNC: 4 G/DL — SIGNIFICANT CHANGE UP (ref 3.3–5)
ALP SERPL-CCNC: 100 U/L — SIGNIFICANT CHANGE UP (ref 40–120)
ALT FLD-CCNC: 29 U/L — SIGNIFICANT CHANGE UP (ref 10–45)
ANION GAP SERPL CALC-SCNC: 11 MMOL/L — SIGNIFICANT CHANGE UP (ref 5–17)
AST SERPL-CCNC: 36 U/L — SIGNIFICANT CHANGE UP (ref 10–40)
BASOPHILS # BLD AUTO: 0.04 K/UL — SIGNIFICANT CHANGE UP (ref 0–0.2)
BASOPHILS NFR BLD AUTO: 0.7 % — SIGNIFICANT CHANGE UP (ref 0–2)
BILIRUB SERPL-MCNC: <0.2 MG/DL — SIGNIFICANT CHANGE UP (ref 0.2–1.2)
BUN SERPL-MCNC: 11 MG/DL — SIGNIFICANT CHANGE UP (ref 7–23)
CALCIUM SERPL-MCNC: 9.2 MG/DL — SIGNIFICANT CHANGE UP (ref 8.4–10.5)
CANCER AG125 SERPL-ACNC: 21 U/ML — SIGNIFICANT CHANGE UP
CEA SERPL-MCNC: 14.2 NG/ML — HIGH (ref 0–3.8)
CHLORIDE SERPL-SCNC: 100 MMOL/L — SIGNIFICANT CHANGE UP (ref 96–108)
CO2 SERPL-SCNC: 24 MMOL/L — SIGNIFICANT CHANGE UP (ref 22–31)
CREAT SERPL-MCNC: 0.71 MG/DL — SIGNIFICANT CHANGE UP (ref 0.5–1.3)
EGFR: 100 ML/MIN/1.73M2 — SIGNIFICANT CHANGE UP
EOSINOPHIL # BLD AUTO: 0.16 K/UL — SIGNIFICANT CHANGE UP (ref 0–0.5)
EOSINOPHIL NFR BLD AUTO: 2.7 % — SIGNIFICANT CHANGE UP (ref 0–6)
GLUCOSE SERPL-MCNC: 96 MG/DL — SIGNIFICANT CHANGE UP (ref 70–99)
HCT VFR BLD CALC: 36.9 % — SIGNIFICANT CHANGE UP (ref 34.5–45)
HGB BLD-MCNC: 11.9 G/DL — SIGNIFICANT CHANGE UP (ref 11.5–15.5)
IMM GRANULOCYTES NFR BLD AUTO: 0.2 % — SIGNIFICANT CHANGE UP (ref 0–0.9)
LYMPHOCYTES # BLD AUTO: 1.68 K/UL — SIGNIFICANT CHANGE UP (ref 1–3.3)
LYMPHOCYTES # BLD AUTO: 28.2 % — SIGNIFICANT CHANGE UP (ref 13–44)
MCHC RBC-ENTMCNC: 27.6 PG — SIGNIFICANT CHANGE UP (ref 27–34)
MCHC RBC-ENTMCNC: 32.2 G/DL — SIGNIFICANT CHANGE UP (ref 32–36)
MCV RBC AUTO: 85.6 FL — SIGNIFICANT CHANGE UP (ref 80–100)
MONOCYTES # BLD AUTO: 0.51 K/UL — SIGNIFICANT CHANGE UP (ref 0–0.9)
MONOCYTES NFR BLD AUTO: 8.6 % — SIGNIFICANT CHANGE UP (ref 2–14)
NEUTROPHILS # BLD AUTO: 3.55 K/UL — SIGNIFICANT CHANGE UP (ref 1.8–7.4)
NEUTROPHILS NFR BLD AUTO: 59.6 % — SIGNIFICANT CHANGE UP (ref 43–77)
NRBC # BLD: 0 /100 WBCS — SIGNIFICANT CHANGE UP (ref 0–0)
PLATELET # BLD AUTO: 203 K/UL — SIGNIFICANT CHANGE UP (ref 150–400)
POTASSIUM SERPL-MCNC: 5.2 MMOL/L — SIGNIFICANT CHANGE UP (ref 3.5–5.3)
POTASSIUM SERPL-SCNC: 5.2 MMOL/L — SIGNIFICANT CHANGE UP (ref 3.5–5.3)
PROT SERPL-MCNC: 7 G/DL — SIGNIFICANT CHANGE UP (ref 6–8.3)
RBC # BLD: 4.31 M/UL — SIGNIFICANT CHANGE UP (ref 3.8–5.2)
RBC # FLD: 13 % — SIGNIFICANT CHANGE UP (ref 10.3–14.5)
SODIUM SERPL-SCNC: 136 MMOL/L — SIGNIFICANT CHANGE UP (ref 135–145)
TSH SERPL-MCNC: 0.58 UIU/ML — SIGNIFICANT CHANGE UP (ref 0.27–4.2)
WBC # BLD: 5.95 K/UL — SIGNIFICANT CHANGE UP (ref 3.8–10.5)
WBC # FLD AUTO: 5.95 K/UL — SIGNIFICANT CHANGE UP (ref 3.8–10.5)

## 2023-03-21 PROCEDURE — 99214 OFFICE O/P EST MOD 30 MIN: CPT

## 2023-03-21 NOTE — REASON FOR VISIT
[Follow-Up Visit] : a follow-up [Spouse] : spouse [Other: _____] : [unfilled] [FreeTextEntry2] : Relapsed UPSC

## 2023-03-21 NOTE — HISTORY OF PRESENT ILLNESS
[Disease: _____________________] : Disease: [unfilled] [AJCC Stage: ____] : AJCC Stage: [unfilled] [de-identified] : Patient diagnosed with stage III MMT  in 1/2021. She saw gyn and had imaging. She was seen by Dr. Bowen, who did the surgery. Final path showed STAGE III MMT.\par Dr. Nichols at [place]. She was treated with Carbo/ Taxol- s/p one cycle , had reaction and switched to Carbo/ doxil x one does. She transferred her care to Dr. Nichols at [place].and then Carbo/ Abraxane/ Herceptin x 6 completed, last dose was in August, 2021\par She was started on Herceptin maintenance, last dose was in Sept, 2022.\par Scan showed liver lesion, consistent with POD.\par Ct C/A/P: 10/6/2022:  A new 4.6x5.7 cm low attenuation lesion in seen in the liver , concerning for metastatic disease.\par \par She has some RUQ pain, no nausea or vomiting. She has some constipation. Takes prune juice, on laxatives. No bleeding. No RT in the past.\par \par PMH: NONE\par PSH: WINNIE, BSO, GB, Endometriosis surgery in 2004, right breast biopsy.( Dr. Chasity Pompa)\par All: sulfa, metronidazole, taxol\par FH: Sister breast cancer 40. 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  After four cycles, scan from 1/10/23 showed POD.\par \par 1/10/2023: ct C/A/P :  No evidence of metastatic disease in the thorax. New subtle ground-glass opacities which may be infectious or inflammatory in etiology.\par Progression of disease in the liver , 5.8 x 5.1 cm mass slightly increased previously measuring 5.7 x 4.6 cm\par  [de-identified] : [facility] [de-identified] : Patient is here for a f/u visit. She was started on a BP medication , she did not start it yet.\par She feels well, appetite is normal Denies any bowel or bladder issues.\par She has no bleeding

## 2023-03-22 LAB
APPEARANCE UR: CLEAR — SIGNIFICANT CHANGE UP
BILIRUB UR-MCNC: NEGATIVE — SIGNIFICANT CHANGE UP
COLOR SPEC: COLORLESS — SIGNIFICANT CHANGE UP
DIFF PNL FLD: NEGATIVE — SIGNIFICANT CHANGE UP
GLUCOSE UR QL: NEGATIVE — SIGNIFICANT CHANGE UP
KETONES UR-MCNC: NEGATIVE — SIGNIFICANT CHANGE UP
LEUKOCYTE ESTERASE UR-ACNC: NEGATIVE — SIGNIFICANT CHANGE UP
NITRITE UR-MCNC: NEGATIVE — SIGNIFICANT CHANGE UP
PH UR: 7.5 — SIGNIFICANT CHANGE UP (ref 5–8)
PROT UR-MCNC: NEGATIVE — SIGNIFICANT CHANGE UP
SP GR SPEC: 1 — LOW (ref 1.01–1.02)
UROBILINOGEN FLD QL: SIGNIFICANT CHANGE UP

## 2023-03-23 ENCOUNTER — RESULT REVIEW (OUTPATIENT)
Age: 58
End: 2023-03-23

## 2023-04-04 ENCOUNTER — OUTPATIENT (OUTPATIENT)
Dept: OUTPATIENT SERVICES | Facility: HOSPITAL | Age: 58
LOS: 1 days | Discharge: HOME | End: 2023-04-04

## 2023-04-04 ENCOUNTER — RESULT REVIEW (OUTPATIENT)
Age: 58
End: 2023-04-04

## 2023-04-04 ENCOUNTER — APPOINTMENT (OUTPATIENT)
Dept: HEMATOLOGY ONCOLOGY | Facility: CLINIC | Age: 58
End: 2023-04-04
Payer: COMMERCIAL

## 2023-04-04 ENCOUNTER — OUTPATIENT (OUTPATIENT)
Dept: OUTPATIENT SERVICES | Facility: HOSPITAL | Age: 58
LOS: 1 days | End: 2023-04-04
Payer: COMMERCIAL

## 2023-04-04 ENCOUNTER — APPOINTMENT (OUTPATIENT)
Dept: CT IMAGING | Facility: IMAGING CENTER | Age: 58
End: 2023-04-04
Payer: COMMERCIAL

## 2023-04-04 VITALS
OXYGEN SATURATION: 97 % | BODY MASS INDEX: 23.6 KG/M2 | DIASTOLIC BLOOD PRESSURE: 89 MMHG | SYSTOLIC BLOOD PRESSURE: 162 MMHG | HEIGHT: 63.98 IN | TEMPERATURE: 97.2 F | RESPIRATION RATE: 16 BRPM | WEIGHT: 138.23 LBS | HEART RATE: 63 BPM

## 2023-04-04 DIAGNOSIS — Z90.710 ACQUIRED ABSENCE OF BOTH CERVIX AND UTERUS: Chronic | ICD-10-CM

## 2023-04-04 DIAGNOSIS — Z90.49 ACQUIRED ABSENCE OF OTHER SPECIFIED PARTS OF DIGESTIVE TRACT: Chronic | ICD-10-CM

## 2023-04-04 DIAGNOSIS — Z98.89 OTHER SPECIFIED POSTPROCEDURAL STATES: Chronic | ICD-10-CM

## 2023-04-04 DIAGNOSIS — C55 MALIGNANT NEOPLASM OF UTERUS, PART UNSPECIFIED: ICD-10-CM

## 2023-04-04 PROCEDURE — 71260 CT THORAX DX C+: CPT

## 2023-04-04 PROCEDURE — 71260 CT THORAX DX C+: CPT | Mod: 26

## 2023-04-04 PROCEDURE — 74177 CT ABD & PELVIS W/CONTRAST: CPT | Mod: 26

## 2023-04-04 PROCEDURE — 74177 CT ABD & PELVIS W/CONTRAST: CPT

## 2023-04-04 PROCEDURE — 99213 OFFICE O/P EST LOW 20 MIN: CPT

## 2023-04-04 NOTE — HISTORY OF PRESENT ILLNESS
[Disease: _____________________] : Disease: [unfilled] [AJCC Stage: ____] : AJCC Stage: [unfilled] [de-identified] : Patient diagnosed with stage III MMT  in 1/2021. She saw gyn and had imaging. She was seen by Dr. Bowen, who did the surgery. Final path showed STAGE III MMT.\par Dr. Nichols at [place]. She was treated with Carbo/ Taxol- s/p one cycle , had reaction and switched to Carbo/ doxil x one does. She transferred her care to Dr. Nichols at [place].and then Carbo/ Abraxane/ Herceptin x 6 completed, last dose was in August, 2021\par She was started on Herceptin maintenance, last dose was in Sept, 2022.\par Scan showed liver lesion, consistent with POD.\par Ct C/A/P: 10/6/2022:  A new 4.6x5.7 cm low attenuation lesion in seen in the liver , concerning for metastatic disease.\par \par She has some RUQ pain, no nausea or vomiting. She has some constipation. Takes prune juice, on laxatives. No bleeding. No RT in the past.\par \par PMH: NONE\par PSH: WINNIE, BSO, GB, Endometriosis surgery in 2004, right breast biopsy.( Dr. Chasity Pompa)\par All: sulfa, metronidazole, taxol\par FH: Sister breast cancer 40. 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  After four cycles, scan from 1/10/23 showed POD.\par \par 1/10/2023: ct C/A/P :  No evidence of metastatic disease in the thorax. New subtle ground-glass opacities which may be infectious or inflammatory in etiology.\par Progression of disease in the liver , 5.8 x 5.1 cm mass slightly increased previously measuring 5.7 x 4.6 cm\par  [de-identified] : [facility] [FreeTextEntry1] : Keytruda/Lenvima [de-identified] : Patient is here for follow up to assess hand and foot pain.\par She has had new pain to hands and feet with jose elias tender "bumps" that started about 4 days.\par This is different than the foot pain that she previously had before starting this treatment, that has resolved.\par She has been using gloves on her hands but has difficulty using her hands for different tasks,\par BP is still fluctuating, started losartan last week.\par Otherwise she is feeling well, better energy.

## 2023-04-11 ENCOUNTER — RESULT REVIEW (OUTPATIENT)
Age: 58
End: 2023-04-11

## 2023-04-11 ENCOUNTER — APPOINTMENT (OUTPATIENT)
Dept: HEMATOLOGY ONCOLOGY | Facility: CLINIC | Age: 58
End: 2023-04-11
Payer: COMMERCIAL

## 2023-04-11 ENCOUNTER — APPOINTMENT (OUTPATIENT)
Dept: INFUSION THERAPY | Facility: HOSPITAL | Age: 58
End: 2023-04-11

## 2023-04-11 VITALS
TEMPERATURE: 97.2 F | WEIGHT: 138.89 LBS | SYSTOLIC BLOOD PRESSURE: 148 MMHG | RESPIRATION RATE: 16 BRPM | OXYGEN SATURATION: 98 % | HEART RATE: 71 BPM | BODY MASS INDEX: 23.86 KG/M2 | DIASTOLIC BLOOD PRESSURE: 76 MMHG

## 2023-04-11 LAB
BASOPHILS # BLD AUTO: 0.02 K/UL — SIGNIFICANT CHANGE UP (ref 0–0.2)
BASOPHILS NFR BLD AUTO: 0.4 % — SIGNIFICANT CHANGE UP (ref 0–2)
EOSINOPHIL # BLD AUTO: 0.13 K/UL — SIGNIFICANT CHANGE UP (ref 0–0.5)
EOSINOPHIL NFR BLD AUTO: 2.5 % — SIGNIFICANT CHANGE UP (ref 0–6)
HCT VFR BLD CALC: 34.2 % — LOW (ref 34.5–45)
HGB BLD-MCNC: 11.2 G/DL — LOW (ref 11.5–15.5)
IMM GRANULOCYTES NFR BLD AUTO: 0 % — SIGNIFICANT CHANGE UP (ref 0–0.9)
LYMPHOCYTES # BLD AUTO: 1.58 K/UL — SIGNIFICANT CHANGE UP (ref 1–3.3)
LYMPHOCYTES # BLD AUTO: 30.4 % — SIGNIFICANT CHANGE UP (ref 13–44)
MCHC RBC-ENTMCNC: 28.1 PG — SIGNIFICANT CHANGE UP (ref 27–34)
MCHC RBC-ENTMCNC: 32.7 G/DL — SIGNIFICANT CHANGE UP (ref 32–36)
MCV RBC AUTO: 85.9 FL — SIGNIFICANT CHANGE UP (ref 80–100)
MONOCYTES # BLD AUTO: 0.39 K/UL — SIGNIFICANT CHANGE UP (ref 0–0.9)
MONOCYTES NFR BLD AUTO: 7.5 % — SIGNIFICANT CHANGE UP (ref 2–14)
NEUTROPHILS # BLD AUTO: 3.08 K/UL — SIGNIFICANT CHANGE UP (ref 1.8–7.4)
NEUTROPHILS NFR BLD AUTO: 59.2 % — SIGNIFICANT CHANGE UP (ref 43–77)
NRBC # BLD: 0 /100 WBCS — SIGNIFICANT CHANGE UP (ref 0–0)
PLATELET # BLD AUTO: 215 K/UL — SIGNIFICANT CHANGE UP (ref 150–400)
RBC # BLD: 3.98 M/UL — SIGNIFICANT CHANGE UP (ref 3.8–5.2)
RBC # FLD: 13.7 % — SIGNIFICANT CHANGE UP (ref 10.3–14.5)
WBC # BLD: 5.2 K/UL — SIGNIFICANT CHANGE UP (ref 3.8–10.5)
WBC # FLD AUTO: 5.2 K/UL — SIGNIFICANT CHANGE UP (ref 3.8–10.5)

## 2023-04-11 PROCEDURE — 99214 OFFICE O/P EST MOD 30 MIN: CPT

## 2023-04-12 DIAGNOSIS — Z51.11 ENCOUNTER FOR ANTINEOPLASTIC CHEMOTHERAPY: ICD-10-CM

## 2023-04-12 DIAGNOSIS — C54.1 MALIGNANT NEOPLASM OF ENDOMETRIUM: ICD-10-CM

## 2023-04-12 LAB
ALBUMIN SERPL ELPH-MCNC: 4.2 G/DL — SIGNIFICANT CHANGE UP (ref 3.3–5)
ALP SERPL-CCNC: 89 U/L — SIGNIFICANT CHANGE UP (ref 40–120)
ALT FLD-CCNC: 25 U/L — SIGNIFICANT CHANGE UP (ref 10–45)
ANION GAP SERPL CALC-SCNC: 10 MMOL/L — SIGNIFICANT CHANGE UP (ref 5–17)
APPEARANCE UR: CLEAR — SIGNIFICANT CHANGE UP
AST SERPL-CCNC: 37 U/L — SIGNIFICANT CHANGE UP (ref 10–40)
BILIRUB SERPL-MCNC: <0.2 MG/DL — SIGNIFICANT CHANGE UP (ref 0.2–1.2)
BILIRUB UR-MCNC: NEGATIVE — SIGNIFICANT CHANGE UP
BUN SERPL-MCNC: 13 MG/DL — SIGNIFICANT CHANGE UP (ref 7–23)
CALCIUM SERPL-MCNC: 9.3 MG/DL — SIGNIFICANT CHANGE UP (ref 8.4–10.5)
CANCER AG125 SERPL-ACNC: 12 U/ML — SIGNIFICANT CHANGE UP
CEA SERPL-MCNC: 12 NG/ML — HIGH (ref 0–3.8)
CHLORIDE SERPL-SCNC: 102 MMOL/L — SIGNIFICANT CHANGE UP (ref 96–108)
CO2 SERPL-SCNC: 29 MMOL/L — SIGNIFICANT CHANGE UP (ref 22–31)
COLOR SPEC: COLORLESS — SIGNIFICANT CHANGE UP
CREAT SERPL-MCNC: 0.67 MG/DL — SIGNIFICANT CHANGE UP (ref 0.5–1.3)
DIFF PNL FLD: NEGATIVE — SIGNIFICANT CHANGE UP
EGFR: 103 ML/MIN/1.73M2 — SIGNIFICANT CHANGE UP
GLUCOSE SERPL-MCNC: 88 MG/DL — SIGNIFICANT CHANGE UP (ref 70–99)
GLUCOSE UR QL: NEGATIVE — SIGNIFICANT CHANGE UP
KETONES UR-MCNC: NEGATIVE — SIGNIFICANT CHANGE UP
LEUKOCYTE ESTERASE UR-ACNC: NEGATIVE — SIGNIFICANT CHANGE UP
NITRITE UR-MCNC: NEGATIVE — SIGNIFICANT CHANGE UP
PH UR: 7 — SIGNIFICANT CHANGE UP (ref 5–8)
POTASSIUM SERPL-MCNC: 4.9 MMOL/L — SIGNIFICANT CHANGE UP (ref 3.5–5.3)
POTASSIUM SERPL-SCNC: 4.9 MMOL/L — SIGNIFICANT CHANGE UP (ref 3.5–5.3)
PROT SERPL-MCNC: 7.1 G/DL — SIGNIFICANT CHANGE UP (ref 6–8.3)
PROT UR-MCNC: NEGATIVE — SIGNIFICANT CHANGE UP
SODIUM SERPL-SCNC: 140 MMOL/L — SIGNIFICANT CHANGE UP (ref 135–145)
SP GR SPEC: 1 — LOW (ref 1.01–1.02)
TSH SERPL-MCNC: 2.77 UIU/ML — SIGNIFICANT CHANGE UP (ref 0.27–4.2)
UROBILINOGEN FLD QL: SIGNIFICANT CHANGE UP

## 2023-04-12 NOTE — HISTORY OF PRESENT ILLNESS
[Disease: _____________________] : Disease: [unfilled] [AJCC Stage: ____] : AJCC Stage: [unfilled] [de-identified] : Patient diagnosed with stage III MMT  in 1/2021. She saw gyn and had imaging. She was seen by Dr. Bowen, who did the surgery. Final path showed STAGE III MMT.\par Dr. Nichols at [place]. She was treated with Carbo/ Taxol- s/p one cycle , had reaction and switched to Carbo/ doxil x one does. She transferred her care to Dr. Nichols at [place].and then Carbo/ Abraxane/ Herceptin x 6 completed, last dose was in August, 2021\par She was started on Herceptin maintenance, last dose was in Sept, 2022.\par Scan showed liver lesion, consistent with POD.\par Ct C/A/P: 10/6/2022:  A new 4.6x5.7 cm low attenuation lesion in seen in the liver , concerning for metastatic disease.\par \par She has some RUQ pain, no nausea or vomiting. She has some constipation. Takes prune juice, on laxatives. No bleeding. No RT in the past.\par \par PMH: NONE\par PSH: WINNIE, BSO, GB, Endometriosis surgery in 2004, right breast biopsy.( Dr. Chasity Pompa)\par All: sulfa, metronidazole, taxol\par FH: Sister breast cancer 40. 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)\par \par Mammogram: 11/7/2022\par Colonoscopy: 2 yrs ago\par \par 2/1/23: Scans on 10/6/2022 demonstrated a new lesion on the liver( 4.6x5.7 cm mass consistent with recurrent disease and not present on 4/25/22 scans)\par IHC testing results for TROP-2 and Folic acid done by [facility] are reported to be positive.\par Patient was enrolled in a phase II IMMUNOGEN study, and got Mirvetuximab.  After four cycles, scan from 1/10/23 showed POD.\par \par 1/10/2023: ct C/A/P :  No evidence of metastatic disease in the thorax. New subtle ground-glass opacities which may be infectious or inflammatory in etiology.\par Progression of disease in the liver , 5.8 x 5.1 cm mass slightly increased previously measuring 5.7 x 4.6 cm\par  [de-identified] : [facility] [FreeTextEntry1] : Keytruda and Lenvima [de-identified] : Patient here for a f/u visit. She feels well. Recent scan showed stable disease.\par Lenvima on hold for skin toxicity.

## 2023-05-02 ENCOUNTER — RESULT REVIEW (OUTPATIENT)
Age: 58
End: 2023-05-02

## 2023-05-02 ENCOUNTER — APPOINTMENT (OUTPATIENT)
Dept: INFUSION THERAPY | Facility: HOSPITAL | Age: 58
End: 2023-05-02

## 2023-05-02 ENCOUNTER — APPOINTMENT (OUTPATIENT)
Dept: HEMATOLOGY ONCOLOGY | Facility: CLINIC | Age: 58
End: 2023-05-02
Payer: COMMERCIAL

## 2023-05-02 VITALS
RESPIRATION RATE: 16 BRPM | BODY MASS INDEX: 24.05 KG/M2 | TEMPERATURE: 97.2 F | OXYGEN SATURATION: 99 % | WEIGHT: 139.97 LBS | SYSTOLIC BLOOD PRESSURE: 163 MMHG | HEART RATE: 68 BPM | DIASTOLIC BLOOD PRESSURE: 93 MMHG

## 2023-05-02 LAB
ALBUMIN SERPL ELPH-MCNC: 4.4 G/DL — SIGNIFICANT CHANGE UP (ref 3.3–5)
ALP SERPL-CCNC: 90 U/L — SIGNIFICANT CHANGE UP (ref 40–120)
ALT FLD-CCNC: 17 U/L — SIGNIFICANT CHANGE UP (ref 10–45)
ANION GAP SERPL CALC-SCNC: 13 MMOL/L — SIGNIFICANT CHANGE UP (ref 5–17)
APPEARANCE UR: CLEAR — SIGNIFICANT CHANGE UP
APTT BLD: 31.7 SEC — SIGNIFICANT CHANGE UP (ref 27.5–35.5)
AST SERPL-CCNC: 26 U/L — SIGNIFICANT CHANGE UP (ref 10–40)
BASOPHILS # BLD AUTO: 0.01 K/UL — SIGNIFICANT CHANGE UP (ref 0–0.2)
BASOPHILS NFR BLD AUTO: 0.2 % — SIGNIFICANT CHANGE UP (ref 0–2)
BILIRUB SERPL-MCNC: <0.2 MG/DL — SIGNIFICANT CHANGE UP (ref 0.2–1.2)
BILIRUB UR-MCNC: NEGATIVE — SIGNIFICANT CHANGE UP
BUN SERPL-MCNC: 11 MG/DL — SIGNIFICANT CHANGE UP (ref 7–23)
CALCIUM SERPL-MCNC: 9.6 MG/DL — SIGNIFICANT CHANGE UP (ref 8.4–10.5)
CANCER AG125 SERPL-ACNC: 7 U/ML — SIGNIFICANT CHANGE UP
CEA SERPL-MCNC: 13.6 NG/ML — HIGH (ref 0–3.8)
CHLORIDE SERPL-SCNC: 97 MMOL/L — SIGNIFICANT CHANGE UP (ref 96–108)
CO2 SERPL-SCNC: 25 MMOL/L — SIGNIFICANT CHANGE UP (ref 22–31)
COLOR SPEC: YELLOW — SIGNIFICANT CHANGE UP
CREAT SERPL-MCNC: 0.69 MG/DL — SIGNIFICANT CHANGE UP (ref 0.5–1.3)
DIFF PNL FLD: NEGATIVE — SIGNIFICANT CHANGE UP
EGFR: 102 ML/MIN/1.73M2 — SIGNIFICANT CHANGE UP
EOSINOPHIL # BLD AUTO: 0.06 K/UL — SIGNIFICANT CHANGE UP (ref 0–0.5)
EOSINOPHIL NFR BLD AUTO: 1.3 % — SIGNIFICANT CHANGE UP (ref 0–6)
GLUCOSE SERPL-MCNC: 82 MG/DL — SIGNIFICANT CHANGE UP (ref 70–99)
GLUCOSE UR QL: NEGATIVE MG/DL — SIGNIFICANT CHANGE UP
HCT VFR BLD CALC: 36 % — SIGNIFICANT CHANGE UP (ref 34.5–45)
HGB BLD-MCNC: 12 G/DL — SIGNIFICANT CHANGE UP (ref 11.5–15.5)
IMM GRANULOCYTES NFR BLD AUTO: 0.2 % — SIGNIFICANT CHANGE UP (ref 0–0.9)
INR BLD: 1.09 RATIO — SIGNIFICANT CHANGE UP (ref 0.88–1.16)
KETONES UR-MCNC: NEGATIVE MG/DL — SIGNIFICANT CHANGE UP
LEUKOCYTE ESTERASE UR-ACNC: NEGATIVE — SIGNIFICANT CHANGE UP
LYMPHOCYTES # BLD AUTO: 1.25 K/UL — SIGNIFICANT CHANGE UP (ref 1–3.3)
LYMPHOCYTES # BLD AUTO: 27.2 % — SIGNIFICANT CHANGE UP (ref 13–44)
MCHC RBC-ENTMCNC: 28.1 PG — SIGNIFICANT CHANGE UP (ref 27–34)
MCHC RBC-ENTMCNC: 33.3 G/DL — SIGNIFICANT CHANGE UP (ref 32–36)
MCV RBC AUTO: 84.3 FL — SIGNIFICANT CHANGE UP (ref 80–100)
MONOCYTES # BLD AUTO: 0.4 K/UL — SIGNIFICANT CHANGE UP (ref 0–0.9)
MONOCYTES NFR BLD AUTO: 8.7 % — SIGNIFICANT CHANGE UP (ref 2–14)
NEUTROPHILS # BLD AUTO: 2.87 K/UL — SIGNIFICANT CHANGE UP (ref 1.8–7.4)
NEUTROPHILS NFR BLD AUTO: 62.4 % — SIGNIFICANT CHANGE UP (ref 43–77)
NITRITE UR-MCNC: NEGATIVE — SIGNIFICANT CHANGE UP
NRBC # BLD: 0 /100 WBCS — SIGNIFICANT CHANGE UP (ref 0–0)
PH UR: 7.5 — SIGNIFICANT CHANGE UP (ref 5–8)
PLATELET # BLD AUTO: 208 K/UL — SIGNIFICANT CHANGE UP (ref 150–400)
POTASSIUM SERPL-MCNC: 3.9 MMOL/L — SIGNIFICANT CHANGE UP (ref 3.5–5.3)
POTASSIUM SERPL-SCNC: 3.9 MMOL/L — SIGNIFICANT CHANGE UP (ref 3.5–5.3)
PROT SERPL-MCNC: 7.3 G/DL — SIGNIFICANT CHANGE UP (ref 6–8.3)
PROT UR-MCNC: NEGATIVE MG/DL — SIGNIFICANT CHANGE UP
PROTHROM AB SERPL-ACNC: 12.6 SEC — SIGNIFICANT CHANGE UP (ref 10.5–13.4)
RBC # BLD: 4.27 M/UL — SIGNIFICANT CHANGE UP (ref 3.8–5.2)
RBC # FLD: 13.2 % — SIGNIFICANT CHANGE UP (ref 10.3–14.5)
SODIUM SERPL-SCNC: 135 MMOL/L — SIGNIFICANT CHANGE UP (ref 135–145)
SP GR SPEC: 1 — LOW (ref 1–1.03)
TSH SERPL-MCNC: 9.07 UIU/ML — HIGH (ref 0.27–4.2)
UROBILINOGEN FLD QL: 0.2 MG/DL — SIGNIFICANT CHANGE UP (ref 0.2–1)
WBC # BLD: 4.6 K/UL — SIGNIFICANT CHANGE UP (ref 3.8–10.5)
WBC # FLD AUTO: 4.6 K/UL — SIGNIFICANT CHANGE UP (ref 3.8–10.5)

## 2023-05-02 PROCEDURE — 99213 OFFICE O/P EST LOW 20 MIN: CPT

## 2023-05-02 NOTE — REASON FOR VISIT
[Follow-Up Visit] : a follow-up [Pre-Treatment Visit] : a pre-treatment [Other: _____] : [unfilled] [FreeTextEntry2] : relapsed Uterine MMT

## 2023-05-02 NOTE — REVIEW OF SYSTEMS
[Constipation] : constipation [Negative] : Allergic/Immunologic [FreeTextEntry7] : stable mild LUQ pain, dyspepsia

## 2023-05-02 NOTE — HISTORY OF PRESENT ILLNESS
[Disease: _____________________] : Disease: [unfilled] [AJCC Stage: ____] : AJCC Stage: [unfilled] [de-identified] : Patient diagnosed with stage III MMT  in 1/2021. She saw gyn and had imaging. She was seen by Dr. Bowen, who did the surgery. Final path showed STAGE III MMT.\par Dr. Nichols at [place]. She was treated with Carbo/ Taxol- s/p one cycle , had reaction and switched to Carbo/ doxil x one does. She transferred her care to Dr. Nichols at [place].and then Carbo/ Abraxane/ Herceptin x 6 completed, last dose was in August, 2021\par She was started on Herceptin maintenance, last dose was in Sept, 2022.\par Scan showed liver lesion, consistent with POD.\par Ct C/A/P: 10/6/2022:  A new 4.6x5.7 cm low attenuation lesion in seen in the liver , concerning for metastatic disease.\par \par She has some RUQ pain, no nausea or vomiting. She has some constipation. Takes prune juice, on laxatives. No bleeding. No RT in the past.\par \par PMH: NONE\par PSH: WINNIE, BSO, GB, Endometriosis surgery in 2004, right breast biopsy.( Dr. Chasity Pompa)\par All: sulfa, metronidazole, taxol\par FH: Sister breast cancer 40. 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  After four cycles, scan from 1/10/23 showed POD.\par \par 1/10/2023: ct C/A/P :  No evidence of metastatic disease in the thorax. New subtle ground-glass opacities which may be infectious or inflammatory in etiology.\par Progression of disease in the liver , 5.8 x 5.1 cm mass slightly increased previously measuring 5.7 x 4.6 cm\par  [de-identified] : [facility] [FreeTextEntry1] : Keytruda and Lenvima [de-identified] : Patient is here for follow up and treatment, overall doing well.\par Tolerated reduced dose much better.\par Hand foot syndrome as resolve.\par Still has persistent 3-4/10 RUQ which has been stable since prior to treatment, does not require pain meds.\par Appetite is good, eating well.\par has occasional dyspepsia, using Tums and Pepcid for relief.\par has occasional constipation, no diarrhea. Denies bleeding, swelling.

## 2023-05-02 NOTE — H&P PST ADULT - CARDIOVASCULAR DETAILS
· Continue coreg/spironolactone  · SBP on the lower end; hold ACEi for now in case we will need to increase coreg positive S1/positive S2

## 2023-05-23 ENCOUNTER — RESULT REVIEW (OUTPATIENT)
Age: 58
End: 2023-05-23

## 2023-05-23 ENCOUNTER — APPOINTMENT (OUTPATIENT)
Dept: HEMATOLOGY ONCOLOGY | Facility: CLINIC | Age: 58
End: 2023-05-23
Payer: COMMERCIAL

## 2023-05-23 ENCOUNTER — APPOINTMENT (OUTPATIENT)
Dept: INFUSION THERAPY | Facility: HOSPITAL | Age: 58
End: 2023-05-23

## 2023-05-23 VITALS
HEART RATE: 73 BPM | RESPIRATION RATE: 17 BRPM | HEIGHT: 63.98 IN | WEIGHT: 138.89 LBS | DIASTOLIC BLOOD PRESSURE: 76 MMHG | SYSTOLIC BLOOD PRESSURE: 119 MMHG | BODY MASS INDEX: 23.71 KG/M2 | OXYGEN SATURATION: 97 %

## 2023-05-23 LAB
ALBUMIN SERPL ELPH-MCNC: 4.6 G/DL — SIGNIFICANT CHANGE UP (ref 3.3–5)
ALP SERPL-CCNC: 97 U/L — SIGNIFICANT CHANGE UP (ref 40–120)
ALT FLD-CCNC: 22 U/L — SIGNIFICANT CHANGE UP (ref 10–45)
ANION GAP SERPL CALC-SCNC: 12 MMOL/L — SIGNIFICANT CHANGE UP (ref 5–17)
APPEARANCE UR: CLEAR — SIGNIFICANT CHANGE UP
AST SERPL-CCNC: 30 U/L — SIGNIFICANT CHANGE UP (ref 10–40)
BASOPHILS # BLD AUTO: 0.02 K/UL — SIGNIFICANT CHANGE UP (ref 0–0.2)
BASOPHILS NFR BLD AUTO: 0.4 % — SIGNIFICANT CHANGE UP (ref 0–2)
BILIRUB SERPL-MCNC: 0.2 MG/DL — SIGNIFICANT CHANGE UP (ref 0.2–1.2)
BILIRUB UR-MCNC: NEGATIVE — SIGNIFICANT CHANGE UP
BUN SERPL-MCNC: 15 MG/DL — SIGNIFICANT CHANGE UP (ref 7–23)
CALCIUM SERPL-MCNC: 9.5 MG/DL — SIGNIFICANT CHANGE UP (ref 8.4–10.5)
CANCER AG125 SERPL-ACNC: 7 U/ML — SIGNIFICANT CHANGE UP
CEA SERPL-MCNC: 22.2 NG/ML — HIGH (ref 0–3.8)
CHLORIDE SERPL-SCNC: 100 MMOL/L — SIGNIFICANT CHANGE UP (ref 96–108)
CO2 SERPL-SCNC: 28 MMOL/L — SIGNIFICANT CHANGE UP (ref 22–31)
COLOR SPEC: YELLOW — SIGNIFICANT CHANGE UP
CREAT SERPL-MCNC: 0.86 MG/DL — SIGNIFICANT CHANGE UP (ref 0.5–1.3)
DIFF PNL FLD: NEGATIVE — SIGNIFICANT CHANGE UP
EGFR: 79 ML/MIN/1.73M2 — SIGNIFICANT CHANGE UP
EOSINOPHIL # BLD AUTO: 0.07 K/UL — SIGNIFICANT CHANGE UP (ref 0–0.5)
EOSINOPHIL NFR BLD AUTO: 1.4 % — SIGNIFICANT CHANGE UP (ref 0–6)
GLUCOSE SERPL-MCNC: 90 MG/DL — SIGNIFICANT CHANGE UP (ref 70–99)
GLUCOSE UR QL: NEGATIVE MG/DL — SIGNIFICANT CHANGE UP
HCT VFR BLD CALC: 39.3 % — SIGNIFICANT CHANGE UP (ref 34.5–45)
HGB BLD-MCNC: 12.8 G/DL — SIGNIFICANT CHANGE UP (ref 11.5–15.5)
IMM GRANULOCYTES NFR BLD AUTO: 0.4 % — SIGNIFICANT CHANGE UP (ref 0–0.9)
KETONES UR-MCNC: NEGATIVE MG/DL — SIGNIFICANT CHANGE UP
LEUKOCYTE ESTERASE UR-ACNC: NEGATIVE — SIGNIFICANT CHANGE UP
LYMPHOCYTES # BLD AUTO: 1.43 K/UL — SIGNIFICANT CHANGE UP (ref 1–3.3)
LYMPHOCYTES # BLD AUTO: 29.4 % — SIGNIFICANT CHANGE UP (ref 13–44)
MCHC RBC-ENTMCNC: 27.7 PG — SIGNIFICANT CHANGE UP (ref 27–34)
MCHC RBC-ENTMCNC: 32.6 G/DL — SIGNIFICANT CHANGE UP (ref 32–36)
MCV RBC AUTO: 85.1 FL — SIGNIFICANT CHANGE UP (ref 80–100)
MONOCYTES # BLD AUTO: 0.46 K/UL — SIGNIFICANT CHANGE UP (ref 0–0.9)
MONOCYTES NFR BLD AUTO: 9.4 % — SIGNIFICANT CHANGE UP (ref 2–14)
NEUTROPHILS # BLD AUTO: 2.87 K/UL — SIGNIFICANT CHANGE UP (ref 1.8–7.4)
NEUTROPHILS NFR BLD AUTO: 59 % — SIGNIFICANT CHANGE UP (ref 43–77)
NITRITE UR-MCNC: NEGATIVE — SIGNIFICANT CHANGE UP
NRBC # BLD: 0 /100 WBCS — SIGNIFICANT CHANGE UP (ref 0–0)
PH UR: 7.5 — SIGNIFICANT CHANGE UP (ref 5–8)
PLATELET # BLD AUTO: 227 K/UL — SIGNIFICANT CHANGE UP (ref 150–400)
POTASSIUM SERPL-MCNC: 4.5 MMOL/L — SIGNIFICANT CHANGE UP (ref 3.5–5.3)
POTASSIUM SERPL-SCNC: 4.5 MMOL/L — SIGNIFICANT CHANGE UP (ref 3.5–5.3)
PROT SERPL-MCNC: 7.6 G/DL — SIGNIFICANT CHANGE UP (ref 6–8.3)
PROT UR-MCNC: NEGATIVE MG/DL — SIGNIFICANT CHANGE UP
RBC # BLD: 4.62 M/UL — SIGNIFICANT CHANGE UP (ref 3.8–5.2)
RBC # FLD: 13.2 % — SIGNIFICANT CHANGE UP (ref 10.3–14.5)
SODIUM SERPL-SCNC: 141 MMOL/L — SIGNIFICANT CHANGE UP (ref 135–145)
SP GR SPEC: 1.01 — SIGNIFICANT CHANGE UP (ref 1–1.03)
TSH SERPL-MCNC: 6.66 UIU/ML — HIGH (ref 0.27–4.2)
UROBILINOGEN FLD QL: 0.2 MG/DL — SIGNIFICANT CHANGE UP (ref 0.2–1)
WBC # BLD: 4.87 K/UL — SIGNIFICANT CHANGE UP (ref 3.8–10.5)
WBC # FLD AUTO: 4.87 K/UL — SIGNIFICANT CHANGE UP (ref 3.8–10.5)

## 2023-05-23 PROCEDURE — 99214 OFFICE O/P EST MOD 30 MIN: CPT

## 2023-05-23 NOTE — HISTORY OF PRESENT ILLNESS
[Disease: _____________________] : Disease: [unfilled] [AJCC Stage: ____] : AJCC Stage: [unfilled] [de-identified] : Patient diagnosed with stage III MMT  in 1/2021. She saw gyn and had imaging. She was seen by Dr. Bowen, who did the surgery. Final path showed STAGE III MMT.\par Dr. Nichols at [place]. She was treated with Carbo/ Taxol- s/p one cycle , had reaction and switched to Carbo/ doxil x one does. She transferred her care to Dr. Nichols at [place].and then Carbo/ Abraxane/ Herceptin x 6 completed, last dose was in August, 2021\par She was started on Herceptin maintenance, last dose was in Sept, 2022.\par Scan showed liver lesion, consistent with POD.\par Ct C/A/P: 10/6/2022:  A new 4.6x5.7 cm low attenuation lesion in seen in the liver , concerning for metastatic disease.\par \par She has some RUQ pain, no nausea or vomiting. She has some constipation. Takes prune juice, on laxatives. No bleeding. No RT in the past.\par \par PMH: NONE\par PSH: WINNIE, BSO, GB, Endometriosis surgery in 2004, right breast biopsy.( Dr. Chasity Pompa)\par All: sulfa, metronidazole, taxol\par FH: Sister breast cancer 40. 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  After four cycles, scan from 1/10/23 showed POD.\par \par 1/10/2023: ct C/A/P :  No evidence of metastatic disease in the thorax. New subtle ground-glass opacities which may be infectious or inflammatory in etiology.\par Progression of disease in the liver , 5.8 x 5.1 cm mass slightly increased previously measuring 5.7 x 4.6 cm\par  [de-identified] : [facility] [de-identified] : Patient here for a f/u visit. She feels well. She held Lenvima for two days for joint pain and sore throat. She restarted it and has no new symptoms.\par

## 2023-05-31 ENCOUNTER — OUTPATIENT (OUTPATIENT)
Dept: OUTPATIENT SERVICES | Facility: HOSPITAL | Age: 58
LOS: 1 days | Discharge: HOME | End: 2023-05-31

## 2023-05-31 DIAGNOSIS — Z90.710 ACQUIRED ABSENCE OF BOTH CERVIX AND UTERUS: Chronic | ICD-10-CM

## 2023-05-31 DIAGNOSIS — C55 MALIGNANT NEOPLASM OF UTERUS, PART UNSPECIFIED: ICD-10-CM

## 2023-05-31 DIAGNOSIS — Z98.89 OTHER SPECIFIED POSTPROCEDURAL STATES: Chronic | ICD-10-CM

## 2023-05-31 DIAGNOSIS — Z90.49 ACQUIRED ABSENCE OF OTHER SPECIFIED PARTS OF DIGESTIVE TRACT: Chronic | ICD-10-CM

## 2023-06-02 ENCOUNTER — RESULT REVIEW (OUTPATIENT)
Age: 58
End: 2023-06-02

## 2023-06-05 ENCOUNTER — APPOINTMENT (OUTPATIENT)
Dept: CT IMAGING | Facility: IMAGING CENTER | Age: 58
End: 2023-06-05
Payer: COMMERCIAL

## 2023-06-05 ENCOUNTER — OUTPATIENT (OUTPATIENT)
Dept: OUTPATIENT SERVICES | Facility: HOSPITAL | Age: 58
LOS: 1 days | End: 2023-06-05
Payer: COMMERCIAL

## 2023-06-05 DIAGNOSIS — C55 MALIGNANT NEOPLASM OF UTERUS, PART UNSPECIFIED: ICD-10-CM

## 2023-06-05 DIAGNOSIS — Z90.49 ACQUIRED ABSENCE OF OTHER SPECIFIED PARTS OF DIGESTIVE TRACT: Chronic | ICD-10-CM

## 2023-06-05 DIAGNOSIS — Z90.710 ACQUIRED ABSENCE OF BOTH CERVIX AND UTERUS: Chronic | ICD-10-CM

## 2023-06-05 DIAGNOSIS — Z98.89 OTHER SPECIFIED POSTPROCEDURAL STATES: Chronic | ICD-10-CM

## 2023-06-05 PROCEDURE — 71260 CT THORAX DX C+: CPT | Mod: 26

## 2023-06-05 PROCEDURE — 74177 CT ABD & PELVIS W/CONTRAST: CPT

## 2023-06-05 PROCEDURE — 71260 CT THORAX DX C+: CPT

## 2023-06-05 PROCEDURE — 74177 CT ABD & PELVIS W/CONTRAST: CPT | Mod: 26

## 2023-06-12 ENCOUNTER — NON-APPOINTMENT (OUTPATIENT)
Age: 58
End: 2023-06-12

## 2023-06-13 ENCOUNTER — RESULT REVIEW (OUTPATIENT)
Age: 58
End: 2023-06-13

## 2023-06-13 ENCOUNTER — APPOINTMENT (OUTPATIENT)
Dept: INFUSION THERAPY | Facility: HOSPITAL | Age: 58
End: 2023-06-13

## 2023-06-13 ENCOUNTER — APPOINTMENT (OUTPATIENT)
Dept: HEMATOLOGY ONCOLOGY | Facility: CLINIC | Age: 58
End: 2023-06-13
Payer: COMMERCIAL

## 2023-06-13 LAB
ALBUMIN SERPL ELPH-MCNC: 4.6 G/DL — SIGNIFICANT CHANGE UP (ref 3.3–5)
ALP SERPL-CCNC: 93 U/L — SIGNIFICANT CHANGE UP (ref 40–120)
ALT FLD-CCNC: 21 U/L — SIGNIFICANT CHANGE UP (ref 10–45)
ANION GAP SERPL CALC-SCNC: 10 MMOL/L — SIGNIFICANT CHANGE UP (ref 5–17)
APPEARANCE UR: CLEAR — SIGNIFICANT CHANGE UP
AST SERPL-CCNC: 31 U/L — SIGNIFICANT CHANGE UP (ref 10–40)
BACTERIA # UR AUTO: NEGATIVE /HPF — SIGNIFICANT CHANGE UP
BASOPHILS # BLD AUTO: 0.03 K/UL — SIGNIFICANT CHANGE UP (ref 0–0.2)
BASOPHILS NFR BLD AUTO: 0.5 % — SIGNIFICANT CHANGE UP (ref 0–2)
BILIRUB SERPL-MCNC: 0.2 MG/DL — SIGNIFICANT CHANGE UP (ref 0.2–1.2)
BILIRUB UR-MCNC: NEGATIVE — SIGNIFICANT CHANGE UP
BUN SERPL-MCNC: 11 MG/DL — SIGNIFICANT CHANGE UP (ref 7–23)
CALCIUM SERPL-MCNC: 9.2 MG/DL — SIGNIFICANT CHANGE UP (ref 8.4–10.5)
CANCER AG125 SERPL-ACNC: 7 U/ML — SIGNIFICANT CHANGE UP
CAST: 0 /LPF — SIGNIFICANT CHANGE UP (ref 0–4)
CEA SERPL-MCNC: 37 NG/ML — HIGH (ref 0–3.8)
CHLORIDE SERPL-SCNC: 103 MMOL/L — SIGNIFICANT CHANGE UP (ref 96–108)
CO2 SERPL-SCNC: 28 MMOL/L — SIGNIFICANT CHANGE UP (ref 22–31)
COLOR SPEC: YELLOW — SIGNIFICANT CHANGE UP
CREAT SERPL-MCNC: 0.77 MG/DL — SIGNIFICANT CHANGE UP (ref 0.5–1.3)
DIFF PNL FLD: NEGATIVE — SIGNIFICANT CHANGE UP
EGFR: 90 ML/MIN/1.73M2 — SIGNIFICANT CHANGE UP
EOSINOPHIL # BLD AUTO: 0.05 K/UL — SIGNIFICANT CHANGE UP (ref 0–0.5)
EOSINOPHIL NFR BLD AUTO: 0.8 % — SIGNIFICANT CHANGE UP (ref 0–6)
GLUCOSE SERPL-MCNC: 97 MG/DL — SIGNIFICANT CHANGE UP (ref 70–99)
GLUCOSE UR QL: NEGATIVE MG/DL — SIGNIFICANT CHANGE UP
HCT VFR BLD CALC: 40.4 % — SIGNIFICANT CHANGE UP (ref 34.5–45)
HGB BLD-MCNC: 12.9 G/DL — SIGNIFICANT CHANGE UP (ref 11.5–15.5)
IMM GRANULOCYTES NFR BLD AUTO: 0.2 % — SIGNIFICANT CHANGE UP (ref 0–0.9)
KETONES UR-MCNC: NEGATIVE MG/DL — SIGNIFICANT CHANGE UP
LEUKOCYTE ESTERASE UR-ACNC: ABNORMAL
LYMPHOCYTES # BLD AUTO: 1.53 K/UL — SIGNIFICANT CHANGE UP (ref 1–3.3)
LYMPHOCYTES # BLD AUTO: 25.7 % — SIGNIFICANT CHANGE UP (ref 13–44)
MCHC RBC-ENTMCNC: 27.7 PG — SIGNIFICANT CHANGE UP (ref 27–34)
MCHC RBC-ENTMCNC: 31.9 G/DL — LOW (ref 32–36)
MCV RBC AUTO: 86.7 FL — SIGNIFICANT CHANGE UP (ref 80–100)
MONOCYTES # BLD AUTO: 0.49 K/UL — SIGNIFICANT CHANGE UP (ref 0–0.9)
MONOCYTES NFR BLD AUTO: 8.2 % — SIGNIFICANT CHANGE UP (ref 2–14)
NEUTROPHILS # BLD AUTO: 3.84 K/UL — SIGNIFICANT CHANGE UP (ref 1.8–7.4)
NEUTROPHILS NFR BLD AUTO: 64.6 % — SIGNIFICANT CHANGE UP (ref 43–77)
NITRITE UR-MCNC: NEGATIVE — SIGNIFICANT CHANGE UP
NRBC # BLD: 0 /100 WBCS — SIGNIFICANT CHANGE UP (ref 0–0)
PH UR: 7 — SIGNIFICANT CHANGE UP (ref 5–8)
PLATELET # BLD AUTO: 219 K/UL — SIGNIFICANT CHANGE UP (ref 150–400)
POTASSIUM SERPL-MCNC: 4.3 MMOL/L — SIGNIFICANT CHANGE UP (ref 3.5–5.3)
POTASSIUM SERPL-SCNC: 4.3 MMOL/L — SIGNIFICANT CHANGE UP (ref 3.5–5.3)
PROT SERPL-MCNC: 7.6 G/DL — SIGNIFICANT CHANGE UP (ref 6–8.3)
PROT UR-MCNC: NEGATIVE MG/DL — SIGNIFICANT CHANGE UP
RBC # BLD: 4.66 M/UL — SIGNIFICANT CHANGE UP (ref 3.8–5.2)
RBC # FLD: 13.5 % — SIGNIFICANT CHANGE UP (ref 10.3–14.5)
RBC CASTS # UR COMP ASSIST: 0 /HPF — SIGNIFICANT CHANGE UP (ref 0–4)
SODIUM SERPL-SCNC: 141 MMOL/L — SIGNIFICANT CHANGE UP (ref 135–145)
SP GR SPEC: 1 — LOW (ref 1–1.03)
SQUAMOUS # UR AUTO: 1 /HPF — SIGNIFICANT CHANGE UP (ref 0–5)
TSH SERPL-MCNC: 6.04 UIU/ML — HIGH (ref 0.27–4.2)
UROBILINOGEN FLD QL: 0.2 MG/DL — SIGNIFICANT CHANGE UP (ref 0.2–1)
WBC # BLD: 5.95 K/UL — SIGNIFICANT CHANGE UP (ref 3.8–10.5)
WBC # FLD AUTO: 5.95 K/UL — SIGNIFICANT CHANGE UP (ref 3.8–10.5)
WBC UR QL: 2 /HPF — SIGNIFICANT CHANGE UP (ref 0–5)

## 2023-06-13 PROCEDURE — 99214 OFFICE O/P EST MOD 30 MIN: CPT

## 2023-06-13 NOTE — HISTORY OF PRESENT ILLNESS
[Disease: _____________________] : Disease: [unfilled] [AJCC Stage: ____] : AJCC Stage: [unfilled] [de-identified] : Patient diagnosed with stage III MMT  in 1/2021. She saw gyn and had imaging. She was seen by Dr. Bowen, who did the surgery. Final path showed STAGE III MMT.\par Dr. Nichols at [place]. She was treated with Carbo/ Taxol- s/p one cycle , had reaction and switched to Carbo/ doxil x one does. She transferred her care to Dr. Nichols at [place].and then Carbo/ Abraxane/ Herceptin x 6 completed, last dose was in August, 2021\par She was started on Herceptin maintenance, last dose was in Sept, 2022.\par Scan showed liver lesion, consistent with POD.\par Ct C/A/P: 10/6/2022:  A new 4.6x5.7 cm low attenuation lesion in seen in the liver , concerning for metastatic disease.\par \par She has some RUQ pain, no nausea or vomiting. She has some constipation. Takes prune juice, on laxatives. No bleeding. No RT in the past.\par \par PMH: NONE\par PSH: WINNIE, BSO, GB, Endometriosis surgery in 2004, right breast biopsy.( Dr. Chasity Pompa)\par All: sulfa, metronidazole, taxol\par FH: Sister breast cancer 40. 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  After four cycles, scan from 1/10/23 showed POD.\par \par 1/10/2023: ct C/A/P :  No evidence of metastatic disease in the thorax. New subtle ground-glass opacities which may be infectious or inflammatory in etiology.\par Progression of disease in the liver , 5.8 x 5.1 cm mass slightly increased previously measuring 5.7 x 4.6 cm\par  [de-identified] : [facility] [de-identified] : Patient here for a f/u visit. She feels well. Recent scan showed response to treatment.

## 2023-06-14 DIAGNOSIS — Z51.11 ENCOUNTER FOR ANTINEOPLASTIC CHEMOTHERAPY: ICD-10-CM

## 2023-06-14 DIAGNOSIS — C54.1 MALIGNANT NEOPLASM OF ENDOMETRIUM: ICD-10-CM

## 2023-06-23 ENCOUNTER — NON-APPOINTMENT (OUTPATIENT)
Age: 58
End: 2023-06-23

## 2023-07-03 ENCOUNTER — RESULT REVIEW (OUTPATIENT)
Age: 58
End: 2023-07-03

## 2023-07-03 ENCOUNTER — APPOINTMENT (OUTPATIENT)
Dept: HEMATOLOGY ONCOLOGY | Facility: CLINIC | Age: 58
End: 2023-07-03
Payer: COMMERCIAL

## 2023-07-03 ENCOUNTER — APPOINTMENT (OUTPATIENT)
Dept: INFUSION THERAPY | Facility: HOSPITAL | Age: 58
End: 2023-07-03

## 2023-07-03 LAB
BASOPHILS # BLD AUTO: 0.02 K/UL — SIGNIFICANT CHANGE UP (ref 0–0.2)
BASOPHILS NFR BLD AUTO: 0.5 % — SIGNIFICANT CHANGE UP (ref 0–2)
EOSINOPHIL # BLD AUTO: 0.07 K/UL — SIGNIFICANT CHANGE UP (ref 0–0.5)
EOSINOPHIL NFR BLD AUTO: 1.6 % — SIGNIFICANT CHANGE UP (ref 0–6)
HCT VFR BLD CALC: 34.2 % — LOW (ref 34.5–45)
HGB BLD-MCNC: 11.3 G/DL — LOW (ref 11.5–15.5)
IMM GRANULOCYTES NFR BLD AUTO: 0.2 % — SIGNIFICANT CHANGE UP (ref 0–0.9)
LYMPHOCYTES # BLD AUTO: 1.27 K/UL — SIGNIFICANT CHANGE UP (ref 1–3.3)
LYMPHOCYTES # BLD AUTO: 28.7 % — SIGNIFICANT CHANGE UP (ref 13–44)
MCHC RBC-ENTMCNC: 27.6 PG — SIGNIFICANT CHANGE UP (ref 27–34)
MCHC RBC-ENTMCNC: 33 G/DL — SIGNIFICANT CHANGE UP (ref 32–36)
MCV RBC AUTO: 83.6 FL — SIGNIFICANT CHANGE UP (ref 80–100)
MONOCYTES # BLD AUTO: 0.41 K/UL — SIGNIFICANT CHANGE UP (ref 0–0.9)
MONOCYTES NFR BLD AUTO: 9.3 % — SIGNIFICANT CHANGE UP (ref 2–14)
NEUTROPHILS # BLD AUTO: 2.65 K/UL — SIGNIFICANT CHANGE UP (ref 1.8–7.4)
NEUTROPHILS NFR BLD AUTO: 59.7 % — SIGNIFICANT CHANGE UP (ref 43–77)
NRBC # BLD: 0 /100 WBCS — SIGNIFICANT CHANGE UP (ref 0–0)
PLATELET # BLD AUTO: 180 K/UL — SIGNIFICANT CHANGE UP (ref 150–400)
RBC # BLD: 4.09 M/UL — SIGNIFICANT CHANGE UP (ref 3.8–5.2)
RBC # FLD: 13.2 % — SIGNIFICANT CHANGE UP (ref 10.3–14.5)
WBC # BLD: 4.43 K/UL — SIGNIFICANT CHANGE UP (ref 3.8–10.5)
WBC # FLD AUTO: 4.43 K/UL — SIGNIFICANT CHANGE UP (ref 3.8–10.5)

## 2023-07-03 PROCEDURE — 99213 OFFICE O/P EST LOW 20 MIN: CPT

## 2023-07-03 NOTE — HISTORY OF PRESENT ILLNESS
[Disease: _____________________] : Disease: [unfilled] [AJCC Stage: ____] : AJCC Stage: [unfilled] [de-identified] : Patient diagnosed with stage III MMT  in 1/2021. She saw gyn and had imaging. She was seen by Dr. Bowen, who did the surgery. Final path showed STAGE III MMT.\par Dr. Nichols at [place]. She was treated with Carbo/ Taxol- s/p one cycle , had reaction and switched to Carbo/ doxil x one does. She transferred her care to Dr. Nichols at [place].and then Carbo/ Abraxane/ Herceptin x 6 completed, last dose was in August, 2021\par She was started on Herceptin maintenance, last dose was in Sept, 2022.\par Scan showed liver lesion, consistent with POD.\par Ct C/A/P: 10/6/2022:  A new 4.6x5.7 cm low attenuation lesion in seen in the liver , concerning for metastatic disease.\par \par She has some RUQ pain, no nausea or vomiting. She has some constipation. Takes prune juice, on laxatives. No bleeding. No RT in the past.\par \par PMH: NONE\par PSH: WINNIE, BSO, GB, Endometriosis surgery in 2004, right breast biopsy.( Dr. Chasity Pompa)\par All: sulfa, metronidazole, taxol\par FH: Sister breast cancer 40. 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  After four cycles, scan from 1/10/23 showed POD.\par \par 1/10/2023: ct C/A/P :  No evidence of metastatic disease in the thorax. New subtle ground-glass opacities which may be infectious or inflammatory in etiology.\par Progression of disease in the liver , 5.8 x 5.1 cm mass slightly increased previously measuring 5.7 x 4.6 cm\par  [de-identified] : [facility] [FreeTextEntry1] : Keytruda/Lenvima [de-identified] : Patient is here for follow up and treatment.\par She is feeling well.  She still has occasional hand and foot pain, intermittent and tolerable.\par Appetite is good.\par She has occasional constipation, usually a week after tx.\par Chronic right sided abdominal pain is stable and worsened with constipation.\par She denies fever, chills, chest pain, SOB, nausea, vomiting, diarrhea, bleeding, swelling.

## 2023-07-03 NOTE — REASON FOR VISIT
[Follow-Up Visit] : a follow-up [Pre-Treatment Visit] : a pre-treatment [FreeTextEntry2] : Relapsed EMCA

## 2023-07-04 LAB
ALBUMIN SERPL ELPH-MCNC: 4.2 G/DL — SIGNIFICANT CHANGE UP (ref 3.3–5)
ALP SERPL-CCNC: 91 U/L — SIGNIFICANT CHANGE UP (ref 40–120)
ALT FLD-CCNC: 17 U/L — SIGNIFICANT CHANGE UP (ref 10–45)
ANION GAP SERPL CALC-SCNC: 11 MMOL/L — SIGNIFICANT CHANGE UP (ref 5–17)
APPEARANCE UR: CLEAR — SIGNIFICANT CHANGE UP
AST SERPL-CCNC: 26 U/L — SIGNIFICANT CHANGE UP (ref 10–40)
BILIRUB SERPL-MCNC: <0.2 MG/DL — SIGNIFICANT CHANGE UP (ref 0.2–1.2)
BILIRUB UR-MCNC: NEGATIVE — SIGNIFICANT CHANGE UP
BUN SERPL-MCNC: 18 MG/DL — SIGNIFICANT CHANGE UP (ref 7–23)
CALCIUM SERPL-MCNC: 9.1 MG/DL — SIGNIFICANT CHANGE UP (ref 8.4–10.5)
CANCER AG125 SERPL-ACNC: 7 U/ML — SIGNIFICANT CHANGE UP
CEA SERPL-MCNC: 37.9 NG/ML — HIGH (ref 0–3.8)
CHLORIDE SERPL-SCNC: 101 MMOL/L — SIGNIFICANT CHANGE UP (ref 96–108)
CO2 SERPL-SCNC: 26 MMOL/L — SIGNIFICANT CHANGE UP (ref 22–31)
COLOR SPEC: YELLOW — SIGNIFICANT CHANGE UP
CREAT SERPL-MCNC: 0.78 MG/DL — SIGNIFICANT CHANGE UP (ref 0.5–1.3)
DIFF PNL FLD: NEGATIVE — SIGNIFICANT CHANGE UP
EGFR: 89 ML/MIN/1.73M2 — SIGNIFICANT CHANGE UP
GLUCOSE SERPL-MCNC: 63 MG/DL — LOW (ref 70–99)
GLUCOSE UR QL: NEGATIVE MG/DL — SIGNIFICANT CHANGE UP
KETONES UR-MCNC: NEGATIVE MG/DL — SIGNIFICANT CHANGE UP
LEUKOCYTE ESTERASE UR-ACNC: NEGATIVE — SIGNIFICANT CHANGE UP
NITRITE UR-MCNC: NEGATIVE — SIGNIFICANT CHANGE UP
PH UR: 6.5 — SIGNIFICANT CHANGE UP (ref 5–8)
POTASSIUM SERPL-MCNC: 4 MMOL/L — SIGNIFICANT CHANGE UP (ref 3.5–5.3)
POTASSIUM SERPL-SCNC: 4 MMOL/L — SIGNIFICANT CHANGE UP (ref 3.5–5.3)
PROT SERPL-MCNC: 7.2 G/DL — SIGNIFICANT CHANGE UP (ref 6–8.3)
PROT UR-MCNC: NEGATIVE MG/DL — SIGNIFICANT CHANGE UP
SODIUM SERPL-SCNC: 138 MMOL/L — SIGNIFICANT CHANGE UP (ref 135–145)
SP GR SPEC: 1.01 — SIGNIFICANT CHANGE UP (ref 1–1.03)
TSH SERPL-MCNC: 0.85 UIU/ML — SIGNIFICANT CHANGE UP (ref 0.27–4.2)
UROBILINOGEN FLD QL: 0.2 MG/DL — SIGNIFICANT CHANGE UP (ref 0.2–1)

## 2023-07-25 ENCOUNTER — APPOINTMENT (OUTPATIENT)
Dept: HEMATOLOGY ONCOLOGY | Facility: CLINIC | Age: 58
End: 2023-07-25
Payer: COMMERCIAL

## 2023-07-25 ENCOUNTER — RESULT REVIEW (OUTPATIENT)
Age: 58
End: 2023-07-25

## 2023-07-25 ENCOUNTER — APPOINTMENT (OUTPATIENT)
Dept: INFUSION THERAPY | Facility: HOSPITAL | Age: 58
End: 2023-07-25

## 2023-07-25 VITALS
HEIGHT: 63.98 IN | TEMPERATURE: 97.2 F | OXYGEN SATURATION: 98 % | RESPIRATION RATE: 69 BRPM | DIASTOLIC BLOOD PRESSURE: 80 MMHG | HEART RATE: 70 BPM | WEIGHT: 138.67 LBS | BODY MASS INDEX: 23.67 KG/M2 | SYSTOLIC BLOOD PRESSURE: 127 MMHG

## 2023-07-25 PROCEDURE — 99214 OFFICE O/P EST MOD 30 MIN: CPT

## 2023-07-26 LAB
ALBUMIN SERPL ELPH-MCNC: 4.3 G/DL — SIGNIFICANT CHANGE UP (ref 3.3–5)
ALP SERPL-CCNC: 97 U/L — SIGNIFICANT CHANGE UP (ref 40–120)
ALT FLD-CCNC: 15 U/L — SIGNIFICANT CHANGE UP (ref 10–45)
ANION GAP SERPL CALC-SCNC: 15 MMOL/L — SIGNIFICANT CHANGE UP (ref 5–17)
APPEARANCE UR: CLEAR — SIGNIFICANT CHANGE UP
AST SERPL-CCNC: 22 U/L — SIGNIFICANT CHANGE UP (ref 10–40)
BILIRUB SERPL-MCNC: <0.2 MG/DL — SIGNIFICANT CHANGE UP (ref 0.2–1.2)
BILIRUB UR-MCNC: NEGATIVE — SIGNIFICANT CHANGE UP
BUN SERPL-MCNC: 18 MG/DL — SIGNIFICANT CHANGE UP (ref 7–23)
CALCIUM SERPL-MCNC: 9.7 MG/DL — SIGNIFICANT CHANGE UP (ref 8.4–10.5)
CANCER AG125 SERPL-ACNC: 6 U/ML — SIGNIFICANT CHANGE UP
CEA SERPL-MCNC: 43.2 NG/ML — HIGH (ref 0–3.8)
CHLORIDE SERPL-SCNC: 99 MMOL/L — SIGNIFICANT CHANGE UP (ref 96–108)
CO2 SERPL-SCNC: 24 MMOL/L — SIGNIFICANT CHANGE UP (ref 22–31)
COLOR SPEC: YELLOW — SIGNIFICANT CHANGE UP
CREAT SERPL-MCNC: 0.83 MG/DL — SIGNIFICANT CHANGE UP (ref 0.5–1.3)
DIFF PNL FLD: NEGATIVE — SIGNIFICANT CHANGE UP
EGFR: 83 ML/MIN/1.73M2 — SIGNIFICANT CHANGE UP
GLUCOSE SERPL-MCNC: 71 MG/DL — SIGNIFICANT CHANGE UP (ref 70–99)
GLUCOSE UR QL: NEGATIVE MG/DL — SIGNIFICANT CHANGE UP
HCT VFR BLD CALC: 37 % — SIGNIFICANT CHANGE UP (ref 34.5–45)
HGB BLD-MCNC: 11.9 G/DL — SIGNIFICANT CHANGE UP (ref 11.5–15.5)
KETONES UR-MCNC: NEGATIVE MG/DL — SIGNIFICANT CHANGE UP
LEUKOCYTE ESTERASE UR-ACNC: NEGATIVE — SIGNIFICANT CHANGE UP
MCHC RBC-ENTMCNC: 27.7 PG — SIGNIFICANT CHANGE UP (ref 27–34)
MCHC RBC-ENTMCNC: 32.2 GM/DL — SIGNIFICANT CHANGE UP (ref 32–36)
MCV RBC AUTO: 86 FL — SIGNIFICANT CHANGE UP (ref 80–100)
NITRITE UR-MCNC: NEGATIVE — SIGNIFICANT CHANGE UP
PH UR: 6.5 — SIGNIFICANT CHANGE UP (ref 5–8)
PLATELET # BLD AUTO: 207 K/UL — SIGNIFICANT CHANGE UP (ref 150–400)
POTASSIUM SERPL-MCNC: 3.2 MMOL/L — LOW (ref 3.5–5.3)
POTASSIUM SERPL-SCNC: 3.2 MMOL/L — LOW (ref 3.5–5.3)
PROT SERPL-MCNC: 7.7 G/DL — SIGNIFICANT CHANGE UP (ref 6–8.3)
PROT UR-MCNC: NEGATIVE MG/DL — SIGNIFICANT CHANGE UP
RBC # BLD: 4.3 M/UL — SIGNIFICANT CHANGE UP (ref 3.8–5.2)
RBC # FLD: 13.2 % — SIGNIFICANT CHANGE UP (ref 10.3–14.5)
SODIUM SERPL-SCNC: 138 MMOL/L — SIGNIFICANT CHANGE UP (ref 135–145)
SP GR SPEC: 1.01 — SIGNIFICANT CHANGE UP (ref 1–1.03)
TSH SERPL-MCNC: 0.77 UIU/ML — SIGNIFICANT CHANGE UP (ref 0.27–4.2)
UROBILINOGEN FLD QL: 0.2 MG/DL — SIGNIFICANT CHANGE UP (ref 0.2–1)
WBC # BLD: 4.97 K/UL — SIGNIFICANT CHANGE UP (ref 3.8–10.5)
WBC # FLD AUTO: 4.97 K/UL — SIGNIFICANT CHANGE UP (ref 3.8–10.5)

## 2023-07-26 RX ORDER — POTASSIUM CHLORIDE 1500 MG/1
20 TABLET, FILM COATED, EXTENDED RELEASE ORAL
Qty: 5 | Refills: 0 | Status: ACTIVE | COMMUNITY
Start: 2023-07-26 | End: 1900-01-01

## 2023-07-26 NOTE — HISTORY OF PRESENT ILLNESS
[Disease: _____________________] : Disease: [unfilled] [AJCC Stage: ____] : AJCC Stage: [unfilled] [de-identified] : Patient diagnosed with stage III MMT  in 1/2021. She saw gyn and had imaging. She was seen by Dr. Bowen, who did the surgery. Final path showed STAGE III MMT.\par Dr. Nichols at [place]. She was treated with Carbo/ Taxol- s/p one cycle , had reaction and switched to Carbo/ doxil x one does. She transferred her care to Dr. Nichols at [place].and then Carbo/ Abraxane/ Herceptin x 6 completed, last dose was in August, 2021\par She was started on Herceptin maintenance, last dose was in Sept, 2022.\par Scan showed liver lesion, consistent with POD.\par Ct C/A/P: 10/6/2022:  A new 4.6x5.7 cm low attenuation lesion in seen in the liver , concerning for metastatic disease.\par \par She has some RUQ pain, no nausea or vomiting. She has some constipation. Takes prune juice, on laxatives. No bleeding. No RT in the past.\par \par PMH: NONE\par PSH: WINNIE, BSO, GB, Endometriosis surgery in 2004, right breast biopsy.( Dr. Chasity Pompa)\par All: sulfa, metronidazole, taxol\par FH: Sister breast cancer 40. 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  After four cycles, scan from 1/10/23 showed POD.\par \par 1/10/2023: ct C/A/P :  No evidence of metastatic disease in the thorax. New subtle ground-glass opacities which may be infectious or inflammatory in etiology.\par Progression of disease in the liver , 5.8 x 5.1 cm mass slightly increased previously measuring 5.7 x 4.6 cm\par  [de-identified] : [facility] [FreeTextEntry1] : Keytruda and Lenvima [de-identified] : Patient here for a f/u visit. She feels well. Tolerating treatment well.

## 2023-08-07 ENCOUNTER — OUTPATIENT (OUTPATIENT)
Dept: OUTPATIENT SERVICES | Facility: HOSPITAL | Age: 58
LOS: 1 days | Discharge: HOME | End: 2023-08-07

## 2023-08-07 DIAGNOSIS — Z90.49 ACQUIRED ABSENCE OF OTHER SPECIFIED PARTS OF DIGESTIVE TRACT: Chronic | ICD-10-CM

## 2023-08-07 DIAGNOSIS — Z90.710 ACQUIRED ABSENCE OF BOTH CERVIX AND UTERUS: Chronic | ICD-10-CM

## 2023-08-07 DIAGNOSIS — Z98.89 OTHER SPECIFIED POSTPROCEDURAL STATES: Chronic | ICD-10-CM

## 2023-08-07 DIAGNOSIS — C55 MALIGNANT NEOPLASM OF UTERUS, PART UNSPECIFIED: ICD-10-CM

## 2023-08-15 ENCOUNTER — RESULT REVIEW (OUTPATIENT)
Age: 58
End: 2023-08-15

## 2023-08-15 ENCOUNTER — APPOINTMENT (OUTPATIENT)
Dept: HEMATOLOGY ONCOLOGY | Facility: CLINIC | Age: 58
End: 2023-08-15
Payer: COMMERCIAL

## 2023-08-15 ENCOUNTER — APPOINTMENT (OUTPATIENT)
Dept: INFUSION THERAPY | Facility: HOSPITAL | Age: 58
End: 2023-08-15

## 2023-08-15 LAB
ALBUMIN SERPL ELPH-MCNC: 4.3 G/DL — SIGNIFICANT CHANGE UP (ref 3.3–5)
ALP SERPL-CCNC: 80 U/L — SIGNIFICANT CHANGE UP (ref 40–120)
ALT FLD-CCNC: 15 U/L — SIGNIFICANT CHANGE UP (ref 10–45)
ANION GAP SERPL CALC-SCNC: 12 MMOL/L — SIGNIFICANT CHANGE UP (ref 5–17)
APPEARANCE UR: CLEAR — SIGNIFICANT CHANGE UP
AST SERPL-CCNC: 33 U/L — SIGNIFICANT CHANGE UP (ref 10–40)
BASOPHILS # BLD AUTO: 0.01 K/UL — SIGNIFICANT CHANGE UP (ref 0–0.2)
BASOPHILS NFR BLD AUTO: 0.2 % — SIGNIFICANT CHANGE UP (ref 0–2)
BILIRUB SERPL-MCNC: 0.3 MG/DL — SIGNIFICANT CHANGE UP (ref 0.2–1.2)
BILIRUB UR-MCNC: NEGATIVE — SIGNIFICANT CHANGE UP
BUN SERPL-MCNC: 10 MG/DL — SIGNIFICANT CHANGE UP (ref 7–23)
CALCIUM SERPL-MCNC: 9.2 MG/DL — SIGNIFICANT CHANGE UP (ref 8.4–10.5)
CANCER AG125 SERPL-ACNC: 6 U/ML — SIGNIFICANT CHANGE UP
CEA SERPL-MCNC: 31.9 NG/ML — HIGH (ref 0–3.8)
CHLORIDE SERPL-SCNC: 101 MMOL/L — SIGNIFICANT CHANGE UP (ref 96–108)
CO2 SERPL-SCNC: 26 MMOL/L — SIGNIFICANT CHANGE UP (ref 22–31)
COLOR SPEC: YELLOW — SIGNIFICANT CHANGE UP
CREAT SERPL-MCNC: 0.77 MG/DL — SIGNIFICANT CHANGE UP (ref 0.5–1.3)
DIFF PNL FLD: NEGATIVE — SIGNIFICANT CHANGE UP
EGFR: 90 ML/MIN/1.73M2 — SIGNIFICANT CHANGE UP
EOSINOPHIL # BLD AUTO: 0.06 K/UL — SIGNIFICANT CHANGE UP (ref 0–0.5)
EOSINOPHIL NFR BLD AUTO: 1.3 % — SIGNIFICANT CHANGE UP (ref 0–6)
GLUCOSE SERPL-MCNC: 124 MG/DL — HIGH (ref 70–99)
GLUCOSE UR QL: NEGATIVE MG/DL — SIGNIFICANT CHANGE UP
HCT VFR BLD CALC: 36.3 % — SIGNIFICANT CHANGE UP (ref 34.5–45)
HGB BLD-MCNC: 12.1 G/DL — SIGNIFICANT CHANGE UP (ref 11.5–15.5)
IMM GRANULOCYTES NFR BLD AUTO: 0.2 % — SIGNIFICANT CHANGE UP (ref 0–0.9)
KETONES UR-MCNC: NEGATIVE MG/DL — SIGNIFICANT CHANGE UP
LEUKOCYTE ESTERASE UR-ACNC: NEGATIVE — SIGNIFICANT CHANGE UP
LYMPHOCYTES # BLD AUTO: 1.19 K/UL — SIGNIFICANT CHANGE UP (ref 1–3.3)
LYMPHOCYTES # BLD AUTO: 25.4 % — SIGNIFICANT CHANGE UP (ref 13–44)
MCHC RBC-ENTMCNC: 28.3 PG — SIGNIFICANT CHANGE UP (ref 27–34)
MCHC RBC-ENTMCNC: 33.3 G/DL — SIGNIFICANT CHANGE UP (ref 32–36)
MCV RBC AUTO: 84.8 FL — SIGNIFICANT CHANGE UP (ref 80–100)
MONOCYTES # BLD AUTO: 0.3 K/UL — SIGNIFICANT CHANGE UP (ref 0–0.9)
MONOCYTES NFR BLD AUTO: 6.4 % — SIGNIFICANT CHANGE UP (ref 2–14)
NEUTROPHILS # BLD AUTO: 3.11 K/UL — SIGNIFICANT CHANGE UP (ref 1.8–7.4)
NEUTROPHILS NFR BLD AUTO: 66.5 % — SIGNIFICANT CHANGE UP (ref 43–77)
NITRITE UR-MCNC: NEGATIVE — SIGNIFICANT CHANGE UP
NRBC # BLD: 0 /100 WBCS — SIGNIFICANT CHANGE UP (ref 0–0)
PH UR: 7 — SIGNIFICANT CHANGE UP (ref 5–8)
PLATELET # BLD AUTO: 172 K/UL — SIGNIFICANT CHANGE UP (ref 150–400)
POTASSIUM SERPL-MCNC: 3.8 MMOL/L — SIGNIFICANT CHANGE UP (ref 3.5–5.3)
POTASSIUM SERPL-SCNC: 3.8 MMOL/L — SIGNIFICANT CHANGE UP (ref 3.5–5.3)
PROT SERPL-MCNC: 7.3 G/DL — SIGNIFICANT CHANGE UP (ref 6–8.3)
PROT UR-MCNC: NEGATIVE MG/DL — SIGNIFICANT CHANGE UP
RBC # BLD: 4.28 M/UL — SIGNIFICANT CHANGE UP (ref 3.8–5.2)
RBC # FLD: 13 % — SIGNIFICANT CHANGE UP (ref 10.3–14.5)
SODIUM SERPL-SCNC: 139 MMOL/L — SIGNIFICANT CHANGE UP (ref 135–145)
SP GR SPEC: 1.01 — SIGNIFICANT CHANGE UP (ref 1–1.03)
TSH SERPL-MCNC: 1.88 UIU/ML — SIGNIFICANT CHANGE UP (ref 0.27–4.2)
UROBILINOGEN FLD QL: 0.2 MG/DL — SIGNIFICANT CHANGE UP (ref 0.2–1)
WBC # BLD: 4.68 K/UL — SIGNIFICANT CHANGE UP (ref 3.8–10.5)
WBC # FLD AUTO: 4.68 K/UL — SIGNIFICANT CHANGE UP (ref 3.8–10.5)

## 2023-08-15 PROCEDURE — 99213 OFFICE O/P EST LOW 20 MIN: CPT

## 2023-08-15 NOTE — REVIEW OF SYSTEMS
[Diarrhea: Grade 0] : Diarrhea: Grade 0 [Negative] : Allergic/Immunologic [FreeTextEntry7] : right sided abdominal pain - stable

## 2023-08-15 NOTE — REASON FOR VISIT
[Follow-Up Visit] : a follow-up [Pre-Treatment Visit] : a pre-treatment [FreeTextEntry2] : relapsed uterine cancer.

## 2023-08-16 DIAGNOSIS — Z51.11 ENCOUNTER FOR ANTINEOPLASTIC CHEMOTHERAPY: ICD-10-CM

## 2023-08-16 DIAGNOSIS — C54.1 MALIGNANT NEOPLASM OF ENDOMETRIUM: ICD-10-CM

## 2023-08-24 ENCOUNTER — APPOINTMENT (OUTPATIENT)
Dept: CT IMAGING | Facility: IMAGING CENTER | Age: 58
End: 2023-08-24
Payer: COMMERCIAL

## 2023-08-24 ENCOUNTER — OUTPATIENT (OUTPATIENT)
Dept: OUTPATIENT SERVICES | Facility: HOSPITAL | Age: 58
LOS: 1 days | End: 2023-08-24
Payer: COMMERCIAL

## 2023-08-24 DIAGNOSIS — Z90.710 ACQUIRED ABSENCE OF BOTH CERVIX AND UTERUS: Chronic | ICD-10-CM

## 2023-08-24 DIAGNOSIS — C55 MALIGNANT NEOPLASM OF UTERUS, PART UNSPECIFIED: ICD-10-CM

## 2023-08-24 DIAGNOSIS — Z90.49 ACQUIRED ABSENCE OF OTHER SPECIFIED PARTS OF DIGESTIVE TRACT: Chronic | ICD-10-CM

## 2023-08-24 DIAGNOSIS — Z98.89 OTHER SPECIFIED POSTPROCEDURAL STATES: Chronic | ICD-10-CM

## 2023-08-24 PROCEDURE — 71260 CT THORAX DX C+: CPT | Mod: 26

## 2023-08-24 PROCEDURE — 74177 CT ABD & PELVIS W/CONTRAST: CPT | Mod: 26

## 2023-08-24 PROCEDURE — 74177 CT ABD & PELVIS W/CONTRAST: CPT

## 2023-08-24 PROCEDURE — 71260 CT THORAX DX C+: CPT

## 2023-09-05 ENCOUNTER — RESULT REVIEW (OUTPATIENT)
Age: 58
End: 2023-09-05

## 2023-09-05 ENCOUNTER — APPOINTMENT (OUTPATIENT)
Dept: HEMATOLOGY ONCOLOGY | Facility: CLINIC | Age: 58
End: 2023-09-05
Payer: COMMERCIAL

## 2023-09-05 ENCOUNTER — APPOINTMENT (OUTPATIENT)
Dept: INFUSION THERAPY | Facility: HOSPITAL | Age: 58
End: 2023-09-05

## 2023-09-05 VITALS
DIASTOLIC BLOOD PRESSURE: 96 MMHG | RESPIRATION RATE: 17 BRPM | WEIGHT: 139.77 LBS | OXYGEN SATURATION: 99 % | BODY MASS INDEX: 23.86 KG/M2 | TEMPERATURE: 97.2 F | SYSTOLIC BLOOD PRESSURE: 172 MMHG | HEART RATE: 60 BPM | HEIGHT: 63.98 IN

## 2023-09-05 LAB
ALBUMIN SERPL ELPH-MCNC: 4.6 G/DL — SIGNIFICANT CHANGE UP (ref 3.3–5)
ALP SERPL-CCNC: 83 U/L — SIGNIFICANT CHANGE UP (ref 40–120)
ALT FLD-CCNC: 15 U/L — SIGNIFICANT CHANGE UP (ref 10–45)
ANION GAP SERPL CALC-SCNC: 10 MMOL/L — SIGNIFICANT CHANGE UP (ref 5–17)
APPEARANCE UR: CLEAR — SIGNIFICANT CHANGE UP
AST SERPL-CCNC: 35 U/L — SIGNIFICANT CHANGE UP (ref 10–40)
BASOPHILS # BLD AUTO: 0.03 K/UL — SIGNIFICANT CHANGE UP (ref 0–0.2)
BASOPHILS NFR BLD AUTO: 0.6 % — SIGNIFICANT CHANGE UP (ref 0–2)
BILIRUB SERPL-MCNC: 0.2 MG/DL — SIGNIFICANT CHANGE UP (ref 0.2–1.2)
BILIRUB UR-MCNC: NEGATIVE — SIGNIFICANT CHANGE UP
BUN SERPL-MCNC: 14 MG/DL — SIGNIFICANT CHANGE UP (ref 7–23)
CALCIUM SERPL-MCNC: 9.5 MG/DL — SIGNIFICANT CHANGE UP (ref 8.4–10.5)
CANCER AG125 SERPL-ACNC: 6 U/ML — SIGNIFICANT CHANGE UP
CEA SERPL-MCNC: 21.7 NG/ML — HIGH (ref 0–3.8)
CHLORIDE SERPL-SCNC: 98 MMOL/L — SIGNIFICANT CHANGE UP (ref 96–108)
CO2 SERPL-SCNC: 29 MMOL/L — SIGNIFICANT CHANGE UP (ref 22–31)
COLOR SPEC: YELLOW — SIGNIFICANT CHANGE UP
CREAT SERPL-MCNC: 0.87 MG/DL — SIGNIFICANT CHANGE UP (ref 0.5–1.3)
DIFF PNL FLD: NEGATIVE — SIGNIFICANT CHANGE UP
EGFR: 78 ML/MIN/1.73M2 — SIGNIFICANT CHANGE UP
EOSINOPHIL # BLD AUTO: 0.07 K/UL — SIGNIFICANT CHANGE UP (ref 0–0.5)
EOSINOPHIL NFR BLD AUTO: 1.4 % — SIGNIFICANT CHANGE UP (ref 0–6)
GLUCOSE SERPL-MCNC: 95 MG/DL — SIGNIFICANT CHANGE UP (ref 70–99)
GLUCOSE UR QL: NEGATIVE MG/DL — SIGNIFICANT CHANGE UP
HCT VFR BLD CALC: 36.6 % — SIGNIFICANT CHANGE UP (ref 34.5–45)
HGB BLD-MCNC: 12.2 G/DL — SIGNIFICANT CHANGE UP (ref 11.5–15.5)
IMM GRANULOCYTES NFR BLD AUTO: 0.2 % — SIGNIFICANT CHANGE UP (ref 0–0.9)
KETONES UR-MCNC: NEGATIVE MG/DL — SIGNIFICANT CHANGE UP
LEUKOCYTE ESTERASE UR-ACNC: NEGATIVE — SIGNIFICANT CHANGE UP
LYMPHOCYTES # BLD AUTO: 1.58 K/UL — SIGNIFICANT CHANGE UP (ref 1–3.3)
LYMPHOCYTES # BLD AUTO: 32.2 % — SIGNIFICANT CHANGE UP (ref 13–44)
MCHC RBC-ENTMCNC: 28.4 PG — SIGNIFICANT CHANGE UP (ref 27–34)
MCHC RBC-ENTMCNC: 33.3 G/DL — SIGNIFICANT CHANGE UP (ref 32–36)
MCV RBC AUTO: 85.3 FL — SIGNIFICANT CHANGE UP (ref 80–100)
MONOCYTES # BLD AUTO: 0.44 K/UL — SIGNIFICANT CHANGE UP (ref 0–0.9)
MONOCYTES NFR BLD AUTO: 9 % — SIGNIFICANT CHANGE UP (ref 2–14)
NEUTROPHILS # BLD AUTO: 2.78 K/UL — SIGNIFICANT CHANGE UP (ref 1.8–7.4)
NEUTROPHILS NFR BLD AUTO: 56.6 % — SIGNIFICANT CHANGE UP (ref 43–77)
NITRITE UR-MCNC: NEGATIVE — SIGNIFICANT CHANGE UP
NRBC # BLD: 0 /100 WBCS — SIGNIFICANT CHANGE UP (ref 0–0)
PH UR: 7 — SIGNIFICANT CHANGE UP (ref 5–8)
PLATELET # BLD AUTO: 205 K/UL — SIGNIFICANT CHANGE UP (ref 150–400)
POTASSIUM SERPL-MCNC: 4.3 MMOL/L — SIGNIFICANT CHANGE UP (ref 3.5–5.3)
POTASSIUM SERPL-SCNC: 4.3 MMOL/L — SIGNIFICANT CHANGE UP (ref 3.5–5.3)
PROT SERPL-MCNC: 7.7 G/DL — SIGNIFICANT CHANGE UP (ref 6–8.3)
PROT UR-MCNC: NEGATIVE MG/DL — SIGNIFICANT CHANGE UP
RBC # BLD: 4.29 M/UL — SIGNIFICANT CHANGE UP (ref 3.8–5.2)
RBC # FLD: 13.2 % — SIGNIFICANT CHANGE UP (ref 10.3–14.5)
SODIUM SERPL-SCNC: 137 MMOL/L — SIGNIFICANT CHANGE UP (ref 135–145)
SP GR SPEC: 1.01 — SIGNIFICANT CHANGE UP (ref 1–1.03)
TSH SERPL-MCNC: 21.3 UIU/ML — HIGH (ref 0.27–4.2)
UROBILINOGEN FLD QL: 0.2 MG/DL — SIGNIFICANT CHANGE UP (ref 0.2–1)
WBC # BLD: 4.91 K/UL — SIGNIFICANT CHANGE UP (ref 3.8–10.5)
WBC # FLD AUTO: 4.91 K/UL — SIGNIFICANT CHANGE UP (ref 3.8–10.5)

## 2023-09-05 PROCEDURE — 99214 OFFICE O/P EST MOD 30 MIN: CPT

## 2023-09-06 NOTE — PHYSICAL EXAM
[de-identified] : Several foot corns/calluses, particularly around the heel and the ball of the foot

## 2023-09-06 NOTE — REVIEW OF SYSTEMS
[Fever] : no fever [Chills] : no chills [FreeTextEntry2] : Occasional high blood pressure at home [FreeTextEntry7] : right sided abdominal pain - stable

## 2023-09-06 NOTE — HISTORY OF PRESENT ILLNESS
[de-identified] : Patient diagnosed with stage III MMT  in 1/2021. She saw gyn and had imaging. She was seen by Dr. Bowen, who did the surgery. Final path showed STAGE III MMT. Dr. Nichols at [place]. She was treated with Carbo/ Taxol- s/p one cycle , had reaction and switched to Carbo/ doxil x one does. She transferred her care to Dr. Nichols at [place].and then Carbo/ Abraxane/ Herceptin x 6 completed, last dose was in August, 2021 She was started on Herceptin maintenance, last dose was in Sept, 2022. Scan showed liver lesion, consistent with POD. Ct C/A/P: 10/6/2022:  A new 4.6x5.7 cm low attenuation lesion in seen in the liver , concerning for metastatic disease.  She has some RUQ pain, no nausea or vomiting. She has some constipation. Takes prune juice, on laxatives. No bleeding. No RT in the past.  PMH: NONE PSH: WINNIE, BSO, GB, Endometriosis surgery in 2004, right breast biopsy.( Dr. Chasity Pompa) All: sulfa, metronidazole, taxol FH: Sister breast cancer 40.  SH: , one daughter. 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)  Mammogram: 11/7/2022 Colonoscopy: 2 yrs ago  2/1/23: Scans on 10/6/2022 demonstrated a new lesion on the liver( 4.6x5.7 cm mass consistent with recurrent disease and not present on 4/25/22 scans) IHC testing results for TROP-2 and Folic acid done by [facility] are reported to be positive. Patient was enrolled in a phase II IMMUNOGEN study, and got Mirvetuximab.  After four cycles, scan from 1/10/23 showed POD.  1/10/2023: ct C/A/P :  No evidence of metastatic disease in the thorax. New subtle ground-glass opacities which may be infectious or inflammatory in etiology. Progression of disease in the liver , 5.8 x 5.1 cm mass slightly increased previously measuring 5.7 x 4.6 cm  [de-identified] : [facility] [FreeTextEntry1] : Keytruda and Lenvima [de-identified] : Patient is here for follow up and treatment. She has foot corns/calluses on her right foot. She measures her blood pressure at home and when it gets too high, she holds off on taking the Lenvima. Also reports some mild constipation.

## 2023-09-06 NOTE — ASSESSMENT
[FreeTextEntry1] : Patient seen and examined with Dr. Matos.  Tino Sarah M.D. Hematology/Oncology Fellow PGY-5 [facility]

## 2023-09-26 ENCOUNTER — APPOINTMENT (OUTPATIENT)
Dept: INFUSION THERAPY | Facility: HOSPITAL | Age: 58
End: 2023-09-26

## 2023-09-26 ENCOUNTER — RESULT REVIEW (OUTPATIENT)
Age: 58
End: 2023-09-26

## 2023-09-26 ENCOUNTER — APPOINTMENT (OUTPATIENT)
Dept: HEMATOLOGY ONCOLOGY | Facility: CLINIC | Age: 58
End: 2023-09-26
Payer: COMMERCIAL

## 2023-09-26 DIAGNOSIS — E03.9 HYPOTHYROIDISM, UNSPECIFIED: ICD-10-CM

## 2023-09-26 LAB
ALBUMIN SERPL ELPH-MCNC: 4.4 G/DL — SIGNIFICANT CHANGE UP (ref 3.3–5)
ALP SERPL-CCNC: 81 U/L — SIGNIFICANT CHANGE UP (ref 40–120)
ALT FLD-CCNC: 17 U/L — SIGNIFICANT CHANGE UP (ref 10–45)
ANION GAP SERPL CALC-SCNC: 10 MMOL/L — SIGNIFICANT CHANGE UP (ref 5–17)
APPEARANCE UR: CLEAR — SIGNIFICANT CHANGE UP
AST SERPL-CCNC: 28 U/L — SIGNIFICANT CHANGE UP (ref 10–40)
BASOPHILS # BLD AUTO: 0.03 K/UL — SIGNIFICANT CHANGE UP (ref 0–0.2)
BASOPHILS NFR BLD AUTO: 0.6 % — SIGNIFICANT CHANGE UP (ref 0–2)
BILIRUB SERPL-MCNC: 0.2 MG/DL — SIGNIFICANT CHANGE UP (ref 0.2–1.2)
BILIRUB UR-MCNC: NEGATIVE — SIGNIFICANT CHANGE UP
BUN SERPL-MCNC: 12 MG/DL — SIGNIFICANT CHANGE UP (ref 7–23)
CALCIUM SERPL-MCNC: 9.7 MG/DL — SIGNIFICANT CHANGE UP (ref 8.4–10.5)
CANCER AG125 SERPL-ACNC: 7 U/ML — SIGNIFICANT CHANGE UP
CEA SERPL-MCNC: 16.9 NG/ML — HIGH (ref 0–3.8)
CHLORIDE SERPL-SCNC: 100 MMOL/L — SIGNIFICANT CHANGE UP (ref 96–108)
CO2 SERPL-SCNC: 27 MMOL/L — SIGNIFICANT CHANGE UP (ref 22–31)
COLOR SPEC: YELLOW — SIGNIFICANT CHANGE UP
CREAT SERPL-MCNC: 0.75 MG/DL — SIGNIFICANT CHANGE UP (ref 0.5–1.3)
DIFF PNL FLD: NEGATIVE — SIGNIFICANT CHANGE UP
EGFR: 93 ML/MIN/1.73M2 — SIGNIFICANT CHANGE UP
EOSINOPHIL # BLD AUTO: 0.1 K/UL — SIGNIFICANT CHANGE UP (ref 0–0.5)
EOSINOPHIL NFR BLD AUTO: 2 % — SIGNIFICANT CHANGE UP (ref 0–6)
GLUCOSE SERPL-MCNC: 82 MG/DL — SIGNIFICANT CHANGE UP (ref 70–99)
GLUCOSE UR QL: NEGATIVE MG/DL — SIGNIFICANT CHANGE UP
HCT VFR BLD CALC: 37.1 % — SIGNIFICANT CHANGE UP (ref 34.5–45)
HGB BLD-MCNC: 12 G/DL — SIGNIFICANT CHANGE UP (ref 11.5–15.5)
IMM GRANULOCYTES NFR BLD AUTO: 0.2 % — SIGNIFICANT CHANGE UP (ref 0–0.9)
KETONES UR-MCNC: NEGATIVE MG/DL — SIGNIFICANT CHANGE UP
LEUKOCYTE ESTERASE UR-ACNC: NEGATIVE — SIGNIFICANT CHANGE UP
LYMPHOCYTES # BLD AUTO: 1.19 K/UL — SIGNIFICANT CHANGE UP (ref 1–3.3)
LYMPHOCYTES # BLD AUTO: 23.6 % — SIGNIFICANT CHANGE UP (ref 13–44)
MCHC RBC-ENTMCNC: 28.2 PG — SIGNIFICANT CHANGE UP (ref 27–34)
MCHC RBC-ENTMCNC: 32.3 G/DL — SIGNIFICANT CHANGE UP (ref 32–36)
MCV RBC AUTO: 87.1 FL — SIGNIFICANT CHANGE UP (ref 80–100)
MONOCYTES # BLD AUTO: 0.4 K/UL — SIGNIFICANT CHANGE UP (ref 0–0.9)
MONOCYTES NFR BLD AUTO: 7.9 % — SIGNIFICANT CHANGE UP (ref 2–14)
NEUTROPHILS # BLD AUTO: 3.31 K/UL — SIGNIFICANT CHANGE UP (ref 1.8–7.4)
NEUTROPHILS NFR BLD AUTO: 65.7 % — SIGNIFICANT CHANGE UP (ref 43–77)
NITRITE UR-MCNC: NEGATIVE — SIGNIFICANT CHANGE UP
NRBC # BLD: 0 /100 WBCS — SIGNIFICANT CHANGE UP (ref 0–0)
PH UR: 7 — SIGNIFICANT CHANGE UP (ref 5–8)
PLATELET # BLD AUTO: 205 K/UL — SIGNIFICANT CHANGE UP (ref 150–400)
POTASSIUM SERPL-MCNC: 3.6 MMOL/L — SIGNIFICANT CHANGE UP (ref 3.5–5.3)
POTASSIUM SERPL-SCNC: 3.6 MMOL/L — SIGNIFICANT CHANGE UP (ref 3.5–5.3)
PROT SERPL-MCNC: 7.6 G/DL — SIGNIFICANT CHANGE UP (ref 6–8.3)
PROT UR-MCNC: NEGATIVE MG/DL — SIGNIFICANT CHANGE UP
RBC # BLD: 4.26 M/UL — SIGNIFICANT CHANGE UP (ref 3.8–5.2)
RBC # FLD: 13.3 % — SIGNIFICANT CHANGE UP (ref 10.3–14.5)
SODIUM SERPL-SCNC: 137 MMOL/L — SIGNIFICANT CHANGE UP (ref 135–145)
SP GR SPEC: 1.01 — SIGNIFICANT CHANGE UP (ref 1–1.03)
TSH SERPL-MCNC: 27.3 UIU/ML — HIGH (ref 0.27–4.2)
UROBILINOGEN FLD QL: 0.2 MG/DL — SIGNIFICANT CHANGE UP (ref 0.2–1)
WBC # BLD: 5.04 K/UL — SIGNIFICANT CHANGE UP (ref 3.8–10.5)
WBC # FLD AUTO: 5.04 K/UL — SIGNIFICANT CHANGE UP (ref 3.8–10.5)

## 2023-09-26 PROCEDURE — 99213 OFFICE O/P EST LOW 20 MIN: CPT

## 2023-09-26 RX ORDER — LEVOTHYROXINE SODIUM 88 UG/1
88 CAPSULE ORAL DAILY
Qty: 30 | Refills: 0 | Status: ACTIVE | COMMUNITY
Start: 2023-09-26

## 2023-10-15 ENCOUNTER — OUTPATIENT (OUTPATIENT)
Dept: OUTPATIENT SERVICES | Facility: HOSPITAL | Age: 58
LOS: 1 days | Discharge: HOME | End: 2023-10-15

## 2023-10-15 DIAGNOSIS — Z90.710 ACQUIRED ABSENCE OF BOTH CERVIX AND UTERUS: Chronic | ICD-10-CM

## 2023-10-15 DIAGNOSIS — C55 MALIGNANT NEOPLASM OF UTERUS, PART UNSPECIFIED: ICD-10-CM

## 2023-10-15 DIAGNOSIS — Z90.49 ACQUIRED ABSENCE OF OTHER SPECIFIED PARTS OF DIGESTIVE TRACT: Chronic | ICD-10-CM

## 2023-10-15 DIAGNOSIS — Z98.89 OTHER SPECIFIED POSTPROCEDURAL STATES: Chronic | ICD-10-CM

## 2023-10-17 ENCOUNTER — RESULT REVIEW (OUTPATIENT)
Age: 58
End: 2023-10-17

## 2023-10-17 ENCOUNTER — APPOINTMENT (OUTPATIENT)
Dept: HEMATOLOGY ONCOLOGY | Facility: CLINIC | Age: 58
End: 2023-10-17
Payer: COMMERCIAL

## 2023-10-17 ENCOUNTER — APPOINTMENT (OUTPATIENT)
Dept: INFUSION THERAPY | Facility: HOSPITAL | Age: 58
End: 2023-10-17

## 2023-10-17 LAB
ALBUMIN SERPL ELPH-MCNC: 4.4 G/DL — SIGNIFICANT CHANGE UP (ref 3.3–5)
ALBUMIN SERPL ELPH-MCNC: 4.4 G/DL — SIGNIFICANT CHANGE UP (ref 3.3–5)
ALP SERPL-CCNC: 91 U/L — SIGNIFICANT CHANGE UP (ref 40–120)
ALP SERPL-CCNC: 91 U/L — SIGNIFICANT CHANGE UP (ref 40–120)
ALT FLD-CCNC: 29 U/L — SIGNIFICANT CHANGE UP (ref 10–45)
ALT FLD-CCNC: 29 U/L — SIGNIFICANT CHANGE UP (ref 10–45)
ANION GAP SERPL CALC-SCNC: 9 MMOL/L — SIGNIFICANT CHANGE UP (ref 5–17)
ANION GAP SERPL CALC-SCNC: 9 MMOL/L — SIGNIFICANT CHANGE UP (ref 5–17)
AST SERPL-CCNC: 70 U/L — HIGH (ref 10–40)
AST SERPL-CCNC: 70 U/L — HIGH (ref 10–40)
BASOPHILS # BLD AUTO: 0.02 K/UL — SIGNIFICANT CHANGE UP (ref 0–0.2)
BASOPHILS # BLD AUTO: 0.02 K/UL — SIGNIFICANT CHANGE UP (ref 0–0.2)
BASOPHILS NFR BLD AUTO: 0.4 % — SIGNIFICANT CHANGE UP (ref 0–2)
BASOPHILS NFR BLD AUTO: 0.4 % — SIGNIFICANT CHANGE UP (ref 0–2)
BILIRUB SERPL-MCNC: 0.2 MG/DL — SIGNIFICANT CHANGE UP (ref 0.2–1.2)
BILIRUB SERPL-MCNC: 0.2 MG/DL — SIGNIFICANT CHANGE UP (ref 0.2–1.2)
BUN SERPL-MCNC: 11 MG/DL — SIGNIFICANT CHANGE UP (ref 7–23)
BUN SERPL-MCNC: 11 MG/DL — SIGNIFICANT CHANGE UP (ref 7–23)
CALCIUM SERPL-MCNC: 9.9 MG/DL — SIGNIFICANT CHANGE UP (ref 8.4–10.5)
CALCIUM SERPL-MCNC: 9.9 MG/DL — SIGNIFICANT CHANGE UP (ref 8.4–10.5)
CANCER AG125 SERPL-ACNC: 7 U/ML — SIGNIFICANT CHANGE UP
CANCER AG125 SERPL-ACNC: 7 U/ML — SIGNIFICANT CHANGE UP
CEA SERPL-MCNC: 16.4 NG/ML — HIGH (ref 0–3.8)
CEA SERPL-MCNC: 16.4 NG/ML — HIGH (ref 0–3.8)
CHLORIDE SERPL-SCNC: 101 MMOL/L — SIGNIFICANT CHANGE UP (ref 96–108)
CHLORIDE SERPL-SCNC: 101 MMOL/L — SIGNIFICANT CHANGE UP (ref 96–108)
CO2 SERPL-SCNC: 30 MMOL/L — SIGNIFICANT CHANGE UP (ref 22–31)
CO2 SERPL-SCNC: 30 MMOL/L — SIGNIFICANT CHANGE UP (ref 22–31)
CREAT SERPL-MCNC: 0.79 MG/DL — SIGNIFICANT CHANGE UP (ref 0.5–1.3)
CREAT SERPL-MCNC: 0.79 MG/DL — SIGNIFICANT CHANGE UP (ref 0.5–1.3)
EGFR: 87 ML/MIN/1.73M2 — SIGNIFICANT CHANGE UP
EGFR: 87 ML/MIN/1.73M2 — SIGNIFICANT CHANGE UP
EOSINOPHIL # BLD AUTO: 0.4 K/UL — SIGNIFICANT CHANGE UP (ref 0–0.5)
EOSINOPHIL # BLD AUTO: 0.4 K/UL — SIGNIFICANT CHANGE UP (ref 0–0.5)
EOSINOPHIL NFR BLD AUTO: 7.6 % — HIGH (ref 0–6)
EOSINOPHIL NFR BLD AUTO: 7.6 % — HIGH (ref 0–6)
GLUCOSE SERPL-MCNC: 95 MG/DL — SIGNIFICANT CHANGE UP (ref 70–99)
GLUCOSE SERPL-MCNC: 95 MG/DL — SIGNIFICANT CHANGE UP (ref 70–99)
HCT VFR BLD CALC: 37.4 % — SIGNIFICANT CHANGE UP (ref 34.5–45)
HCT VFR BLD CALC: 37.4 % — SIGNIFICANT CHANGE UP (ref 34.5–45)
HGB BLD-MCNC: 12.3 G/DL — SIGNIFICANT CHANGE UP (ref 11.5–15.5)
HGB BLD-MCNC: 12.3 G/DL — SIGNIFICANT CHANGE UP (ref 11.5–15.5)
IMM GRANULOCYTES NFR BLD AUTO: 0.2 % — SIGNIFICANT CHANGE UP (ref 0–0.9)
IMM GRANULOCYTES NFR BLD AUTO: 0.2 % — SIGNIFICANT CHANGE UP (ref 0–0.9)
LYMPHOCYTES # BLD AUTO: 1.25 K/UL — SIGNIFICANT CHANGE UP (ref 1–3.3)
LYMPHOCYTES # BLD AUTO: 1.25 K/UL — SIGNIFICANT CHANGE UP (ref 1–3.3)
LYMPHOCYTES # BLD AUTO: 23.8 % — SIGNIFICANT CHANGE UP (ref 13–44)
LYMPHOCYTES # BLD AUTO: 23.8 % — SIGNIFICANT CHANGE UP (ref 13–44)
MCHC RBC-ENTMCNC: 28.5 PG — SIGNIFICANT CHANGE UP (ref 27–34)
MCHC RBC-ENTMCNC: 28.5 PG — SIGNIFICANT CHANGE UP (ref 27–34)
MCHC RBC-ENTMCNC: 32.9 G/DL — SIGNIFICANT CHANGE UP (ref 32–36)
MCHC RBC-ENTMCNC: 32.9 G/DL — SIGNIFICANT CHANGE UP (ref 32–36)
MCV RBC AUTO: 86.6 FL — SIGNIFICANT CHANGE UP (ref 80–100)
MCV RBC AUTO: 86.6 FL — SIGNIFICANT CHANGE UP (ref 80–100)
MONOCYTES # BLD AUTO: 0.35 K/UL — SIGNIFICANT CHANGE UP (ref 0–0.9)
MONOCYTES # BLD AUTO: 0.35 K/UL — SIGNIFICANT CHANGE UP (ref 0–0.9)
MONOCYTES NFR BLD AUTO: 6.7 % — SIGNIFICANT CHANGE UP (ref 2–14)
MONOCYTES NFR BLD AUTO: 6.7 % — SIGNIFICANT CHANGE UP (ref 2–14)
NEUTROPHILS # BLD AUTO: 3.22 K/UL — SIGNIFICANT CHANGE UP (ref 1.8–7.4)
NEUTROPHILS # BLD AUTO: 3.22 K/UL — SIGNIFICANT CHANGE UP (ref 1.8–7.4)
NEUTROPHILS NFR BLD AUTO: 61.3 % — SIGNIFICANT CHANGE UP (ref 43–77)
NEUTROPHILS NFR BLD AUTO: 61.3 % — SIGNIFICANT CHANGE UP (ref 43–77)
NRBC # BLD: 0 /100 WBCS — SIGNIFICANT CHANGE UP (ref 0–0)
NRBC # BLD: 0 /100 WBCS — SIGNIFICANT CHANGE UP (ref 0–0)
PLATELET # BLD AUTO: 195 K/UL — SIGNIFICANT CHANGE UP (ref 150–400)
PLATELET # BLD AUTO: 195 K/UL — SIGNIFICANT CHANGE UP (ref 150–400)
POTASSIUM SERPL-MCNC: 4.7 MMOL/L — SIGNIFICANT CHANGE UP (ref 3.5–5.3)
POTASSIUM SERPL-MCNC: 4.7 MMOL/L — SIGNIFICANT CHANGE UP (ref 3.5–5.3)
POTASSIUM SERPL-SCNC: 4.7 MMOL/L — SIGNIFICANT CHANGE UP (ref 3.5–5.3)
POTASSIUM SERPL-SCNC: 4.7 MMOL/L — SIGNIFICANT CHANGE UP (ref 3.5–5.3)
PROT SERPL-MCNC: 8 G/DL — SIGNIFICANT CHANGE UP (ref 6–8.3)
PROT SERPL-MCNC: 8 G/DL — SIGNIFICANT CHANGE UP (ref 6–8.3)
RBC # BLD: 4.32 M/UL — SIGNIFICANT CHANGE UP (ref 3.8–5.2)
RBC # BLD: 4.32 M/UL — SIGNIFICANT CHANGE UP (ref 3.8–5.2)
RBC # FLD: 13.2 % — SIGNIFICANT CHANGE UP (ref 10.3–14.5)
RBC # FLD: 13.2 % — SIGNIFICANT CHANGE UP (ref 10.3–14.5)
SODIUM SERPL-SCNC: 139 MMOL/L — SIGNIFICANT CHANGE UP (ref 135–145)
SODIUM SERPL-SCNC: 139 MMOL/L — SIGNIFICANT CHANGE UP (ref 135–145)
TSH SERPL-MCNC: 3.15 UIU/ML — SIGNIFICANT CHANGE UP (ref 0.27–4.2)
TSH SERPL-MCNC: 3.15 UIU/ML — SIGNIFICANT CHANGE UP (ref 0.27–4.2)
WBC # BLD: 5.25 K/UL — SIGNIFICANT CHANGE UP (ref 3.8–10.5)
WBC # BLD: 5.25 K/UL — SIGNIFICANT CHANGE UP (ref 3.8–10.5)
WBC # FLD AUTO: 5.25 K/UL — SIGNIFICANT CHANGE UP (ref 3.8–10.5)
WBC # FLD AUTO: 5.25 K/UL — SIGNIFICANT CHANGE UP (ref 3.8–10.5)

## 2023-10-17 PROCEDURE — 99214 OFFICE O/P EST MOD 30 MIN: CPT

## 2023-10-18 DIAGNOSIS — Z51.11 ENCOUNTER FOR ANTINEOPLASTIC CHEMOTHERAPY: ICD-10-CM

## 2023-10-18 DIAGNOSIS — C54.1 MALIGNANT NEOPLASM OF ENDOMETRIUM: ICD-10-CM

## 2023-10-18 DIAGNOSIS — Z23 ENCOUNTER FOR IMMUNIZATION: ICD-10-CM

## 2023-10-18 LAB
APPEARANCE UR: CLEAR — SIGNIFICANT CHANGE UP
APPEARANCE UR: CLEAR — SIGNIFICANT CHANGE UP
BACTERIA # UR AUTO: NEGATIVE /HPF — SIGNIFICANT CHANGE UP
BACTERIA # UR AUTO: NEGATIVE /HPF — SIGNIFICANT CHANGE UP
BILIRUB UR-MCNC: NEGATIVE — SIGNIFICANT CHANGE UP
BILIRUB UR-MCNC: NEGATIVE — SIGNIFICANT CHANGE UP
CAST: 0 /LPF — SIGNIFICANT CHANGE UP (ref 0–4)
CAST: 0 /LPF — SIGNIFICANT CHANGE UP (ref 0–4)
COLOR SPEC: YELLOW — SIGNIFICANT CHANGE UP
COLOR SPEC: YELLOW — SIGNIFICANT CHANGE UP
DIFF PNL FLD: NEGATIVE — SIGNIFICANT CHANGE UP
DIFF PNL FLD: NEGATIVE — SIGNIFICANT CHANGE UP
GLUCOSE UR QL: NEGATIVE MG/DL — SIGNIFICANT CHANGE UP
GLUCOSE UR QL: NEGATIVE MG/DL — SIGNIFICANT CHANGE UP
KETONES UR-MCNC: NEGATIVE MG/DL — SIGNIFICANT CHANGE UP
KETONES UR-MCNC: NEGATIVE MG/DL — SIGNIFICANT CHANGE UP
LEUKOCYTE ESTERASE UR-ACNC: ABNORMAL
LEUKOCYTE ESTERASE UR-ACNC: ABNORMAL
NITRITE UR-MCNC: NEGATIVE — SIGNIFICANT CHANGE UP
NITRITE UR-MCNC: NEGATIVE — SIGNIFICANT CHANGE UP
PH UR: 7.5 — SIGNIFICANT CHANGE UP (ref 5–8)
PH UR: 7.5 — SIGNIFICANT CHANGE UP (ref 5–8)
PROT UR-MCNC: NEGATIVE MG/DL — SIGNIFICANT CHANGE UP
PROT UR-MCNC: NEGATIVE MG/DL — SIGNIFICANT CHANGE UP
RBC CASTS # UR COMP ASSIST: 0 /HPF — SIGNIFICANT CHANGE UP (ref 0–4)
RBC CASTS # UR COMP ASSIST: 0 /HPF — SIGNIFICANT CHANGE UP (ref 0–4)
SP GR SPEC: 1 — SIGNIFICANT CHANGE UP (ref 1–1.03)
SP GR SPEC: 1 — SIGNIFICANT CHANGE UP (ref 1–1.03)
SQUAMOUS # UR AUTO: 0 /HPF — SIGNIFICANT CHANGE UP (ref 0–5)
SQUAMOUS # UR AUTO: 0 /HPF — SIGNIFICANT CHANGE UP (ref 0–5)
UROBILINOGEN FLD QL: 0.2 MG/DL — SIGNIFICANT CHANGE UP (ref 0.2–1)
UROBILINOGEN FLD QL: 0.2 MG/DL — SIGNIFICANT CHANGE UP (ref 0.2–1)
WBC UR QL: 0 /HPF — SIGNIFICANT CHANGE UP (ref 0–5)
WBC UR QL: 0 /HPF — SIGNIFICANT CHANGE UP (ref 0–5)

## 2023-11-07 ENCOUNTER — APPOINTMENT (OUTPATIENT)
Dept: INFUSION THERAPY | Facility: HOSPITAL | Age: 58
End: 2023-11-07

## 2023-11-07 ENCOUNTER — APPOINTMENT (OUTPATIENT)
Dept: HEMATOLOGY ONCOLOGY | Facility: CLINIC | Age: 58
End: 2023-11-07
Payer: COMMERCIAL

## 2023-11-07 ENCOUNTER — RESULT REVIEW (OUTPATIENT)
Age: 58
End: 2023-11-07

## 2023-11-07 LAB
ALBUMIN SERPL ELPH-MCNC: 4.2 G/DL — SIGNIFICANT CHANGE UP (ref 3.3–5)
ALBUMIN SERPL ELPH-MCNC: 4.2 G/DL — SIGNIFICANT CHANGE UP (ref 3.3–5)
ALP SERPL-CCNC: 78 U/L — SIGNIFICANT CHANGE UP (ref 40–120)
ALP SERPL-CCNC: 78 U/L — SIGNIFICANT CHANGE UP (ref 40–120)
ALT FLD-CCNC: 23 U/L — SIGNIFICANT CHANGE UP (ref 10–45)
ALT FLD-CCNC: 23 U/L — SIGNIFICANT CHANGE UP (ref 10–45)
ANION GAP SERPL CALC-SCNC: 8 MMOL/L — SIGNIFICANT CHANGE UP (ref 5–17)
ANION GAP SERPL CALC-SCNC: 8 MMOL/L — SIGNIFICANT CHANGE UP (ref 5–17)
APPEARANCE UR: CLEAR — SIGNIFICANT CHANGE UP
APPEARANCE UR: CLEAR — SIGNIFICANT CHANGE UP
AST SERPL-CCNC: 32 U/L — SIGNIFICANT CHANGE UP (ref 10–40)
AST SERPL-CCNC: 32 U/L — SIGNIFICANT CHANGE UP (ref 10–40)
BASOPHILS # BLD AUTO: 0.02 K/UL — SIGNIFICANT CHANGE UP (ref 0–0.2)
BASOPHILS # BLD AUTO: 0.02 K/UL — SIGNIFICANT CHANGE UP (ref 0–0.2)
BASOPHILS NFR BLD AUTO: 0.5 % — SIGNIFICANT CHANGE UP (ref 0–2)
BASOPHILS NFR BLD AUTO: 0.5 % — SIGNIFICANT CHANGE UP (ref 0–2)
BILIRUB SERPL-MCNC: 0.2 MG/DL — SIGNIFICANT CHANGE UP (ref 0.2–1.2)
BILIRUB SERPL-MCNC: 0.2 MG/DL — SIGNIFICANT CHANGE UP (ref 0.2–1.2)
BILIRUB UR-MCNC: NEGATIVE — SIGNIFICANT CHANGE UP
BILIRUB UR-MCNC: NEGATIVE — SIGNIFICANT CHANGE UP
BUN SERPL-MCNC: 12 MG/DL — SIGNIFICANT CHANGE UP (ref 7–23)
BUN SERPL-MCNC: 12 MG/DL — SIGNIFICANT CHANGE UP (ref 7–23)
CALCIUM SERPL-MCNC: 9.5 MG/DL — SIGNIFICANT CHANGE UP (ref 8.4–10.5)
CALCIUM SERPL-MCNC: 9.5 MG/DL — SIGNIFICANT CHANGE UP (ref 8.4–10.5)
CANCER AG125 SERPL-ACNC: 5 U/ML — SIGNIFICANT CHANGE UP
CANCER AG125 SERPL-ACNC: 5 U/ML — SIGNIFICANT CHANGE UP
CEA SERPL-MCNC: 13 NG/ML — HIGH (ref 0–3.8)
CEA SERPL-MCNC: 13 NG/ML — HIGH (ref 0–3.8)
CHLORIDE SERPL-SCNC: 104 MMOL/L — SIGNIFICANT CHANGE UP (ref 96–108)
CHLORIDE SERPL-SCNC: 104 MMOL/L — SIGNIFICANT CHANGE UP (ref 96–108)
CO2 SERPL-SCNC: 29 MMOL/L — SIGNIFICANT CHANGE UP (ref 22–31)
CO2 SERPL-SCNC: 29 MMOL/L — SIGNIFICANT CHANGE UP (ref 22–31)
COLOR SPEC: YELLOW — SIGNIFICANT CHANGE UP
COLOR SPEC: YELLOW — SIGNIFICANT CHANGE UP
CREAT SERPL-MCNC: 0.76 MG/DL — SIGNIFICANT CHANGE UP (ref 0.5–1.3)
CREAT SERPL-MCNC: 0.76 MG/DL — SIGNIFICANT CHANGE UP (ref 0.5–1.3)
DIFF PNL FLD: NEGATIVE — SIGNIFICANT CHANGE UP
DIFF PNL FLD: NEGATIVE — SIGNIFICANT CHANGE UP
EGFR: 91 ML/MIN/1.73M2 — SIGNIFICANT CHANGE UP
EGFR: 91 ML/MIN/1.73M2 — SIGNIFICANT CHANGE UP
EOSINOPHIL # BLD AUTO: 0.28 K/UL — SIGNIFICANT CHANGE UP (ref 0–0.5)
EOSINOPHIL # BLD AUTO: 0.28 K/UL — SIGNIFICANT CHANGE UP (ref 0–0.5)
EOSINOPHIL NFR BLD AUTO: 6.5 % — HIGH (ref 0–6)
EOSINOPHIL NFR BLD AUTO: 6.5 % — HIGH (ref 0–6)
GLUCOSE SERPL-MCNC: 106 MG/DL — HIGH (ref 70–99)
GLUCOSE SERPL-MCNC: 106 MG/DL — HIGH (ref 70–99)
GLUCOSE UR QL: NEGATIVE MG/DL — SIGNIFICANT CHANGE UP
GLUCOSE UR QL: NEGATIVE MG/DL — SIGNIFICANT CHANGE UP
HCT VFR BLD CALC: 35.9 % — SIGNIFICANT CHANGE UP (ref 34.5–45)
HCT VFR BLD CALC: 35.9 % — SIGNIFICANT CHANGE UP (ref 34.5–45)
HGB BLD-MCNC: 12.2 G/DL — SIGNIFICANT CHANGE UP (ref 11.5–15.5)
HGB BLD-MCNC: 12.2 G/DL — SIGNIFICANT CHANGE UP (ref 11.5–15.5)
IMM GRANULOCYTES NFR BLD AUTO: 0.2 % — SIGNIFICANT CHANGE UP (ref 0–0.9)
IMM GRANULOCYTES NFR BLD AUTO: 0.2 % — SIGNIFICANT CHANGE UP (ref 0–0.9)
KETONES UR-MCNC: NEGATIVE MG/DL — SIGNIFICANT CHANGE UP
KETONES UR-MCNC: NEGATIVE MG/DL — SIGNIFICANT CHANGE UP
LEUKOCYTE ESTERASE UR-ACNC: NEGATIVE — SIGNIFICANT CHANGE UP
LEUKOCYTE ESTERASE UR-ACNC: NEGATIVE — SIGNIFICANT CHANGE UP
LYMPHOCYTES # BLD AUTO: 1.47 K/UL — SIGNIFICANT CHANGE UP (ref 1–3.3)
LYMPHOCYTES # BLD AUTO: 1.47 K/UL — SIGNIFICANT CHANGE UP (ref 1–3.3)
LYMPHOCYTES # BLD AUTO: 33.9 % — SIGNIFICANT CHANGE UP (ref 13–44)
LYMPHOCYTES # BLD AUTO: 33.9 % — SIGNIFICANT CHANGE UP (ref 13–44)
MCHC RBC-ENTMCNC: 29.2 PG — SIGNIFICANT CHANGE UP (ref 27–34)
MCHC RBC-ENTMCNC: 29.2 PG — SIGNIFICANT CHANGE UP (ref 27–34)
MCHC RBC-ENTMCNC: 34 G/DL — SIGNIFICANT CHANGE UP (ref 32–36)
MCHC RBC-ENTMCNC: 34 G/DL — SIGNIFICANT CHANGE UP (ref 32–36)
MCV RBC AUTO: 85.9 FL — SIGNIFICANT CHANGE UP (ref 80–100)
MCV RBC AUTO: 85.9 FL — SIGNIFICANT CHANGE UP (ref 80–100)
MONOCYTES # BLD AUTO: 0.29 K/UL — SIGNIFICANT CHANGE UP (ref 0–0.9)
MONOCYTES # BLD AUTO: 0.29 K/UL — SIGNIFICANT CHANGE UP (ref 0–0.9)
MONOCYTES NFR BLD AUTO: 6.7 % — SIGNIFICANT CHANGE UP (ref 2–14)
MONOCYTES NFR BLD AUTO: 6.7 % — SIGNIFICANT CHANGE UP (ref 2–14)
NEUTROPHILS # BLD AUTO: 2.27 K/UL — SIGNIFICANT CHANGE UP (ref 1.8–7.4)
NEUTROPHILS # BLD AUTO: 2.27 K/UL — SIGNIFICANT CHANGE UP (ref 1.8–7.4)
NEUTROPHILS NFR BLD AUTO: 52.2 % — SIGNIFICANT CHANGE UP (ref 43–77)
NEUTROPHILS NFR BLD AUTO: 52.2 % — SIGNIFICANT CHANGE UP (ref 43–77)
NITRITE UR-MCNC: NEGATIVE — SIGNIFICANT CHANGE UP
NITRITE UR-MCNC: NEGATIVE — SIGNIFICANT CHANGE UP
NRBC # BLD: 0 /100 WBCS — SIGNIFICANT CHANGE UP (ref 0–0)
NRBC # BLD: 0 /100 WBCS — SIGNIFICANT CHANGE UP (ref 0–0)
PH UR: 7 — SIGNIFICANT CHANGE UP (ref 5–8)
PH UR: 7 — SIGNIFICANT CHANGE UP (ref 5–8)
PLATELET # BLD AUTO: 222 K/UL — SIGNIFICANT CHANGE UP (ref 150–400)
PLATELET # BLD AUTO: 222 K/UL — SIGNIFICANT CHANGE UP (ref 150–400)
POTASSIUM SERPL-MCNC: 4.2 MMOL/L — SIGNIFICANT CHANGE UP (ref 3.5–5.3)
POTASSIUM SERPL-MCNC: 4.2 MMOL/L — SIGNIFICANT CHANGE UP (ref 3.5–5.3)
POTASSIUM SERPL-SCNC: 4.2 MMOL/L — SIGNIFICANT CHANGE UP (ref 3.5–5.3)
POTASSIUM SERPL-SCNC: 4.2 MMOL/L — SIGNIFICANT CHANGE UP (ref 3.5–5.3)
PROT SERPL-MCNC: 7.3 G/DL — SIGNIFICANT CHANGE UP (ref 6–8.3)
PROT SERPL-MCNC: 7.3 G/DL — SIGNIFICANT CHANGE UP (ref 6–8.3)
PROT UR-MCNC: NEGATIVE MG/DL — SIGNIFICANT CHANGE UP
PROT UR-MCNC: NEGATIVE MG/DL — SIGNIFICANT CHANGE UP
RBC # BLD: 4.18 M/UL — SIGNIFICANT CHANGE UP (ref 3.8–5.2)
RBC # BLD: 4.18 M/UL — SIGNIFICANT CHANGE UP (ref 3.8–5.2)
RBC # FLD: 13.1 % — SIGNIFICANT CHANGE UP (ref 10.3–14.5)
RBC # FLD: 13.1 % — SIGNIFICANT CHANGE UP (ref 10.3–14.5)
SODIUM SERPL-SCNC: 141 MMOL/L — SIGNIFICANT CHANGE UP (ref 135–145)
SODIUM SERPL-SCNC: 141 MMOL/L — SIGNIFICANT CHANGE UP (ref 135–145)
SP GR SPEC: 1.01 — SIGNIFICANT CHANGE UP (ref 1–1.03)
SP GR SPEC: 1.01 — SIGNIFICANT CHANGE UP (ref 1–1.03)
TSH SERPL-MCNC: 0.28 UIU/ML — SIGNIFICANT CHANGE UP (ref 0.27–4.2)
TSH SERPL-MCNC: 0.28 UIU/ML — SIGNIFICANT CHANGE UP (ref 0.27–4.2)
UROBILINOGEN FLD QL: 0.2 MG/DL — SIGNIFICANT CHANGE UP (ref 0.2–1)
UROBILINOGEN FLD QL: 0.2 MG/DL — SIGNIFICANT CHANGE UP (ref 0.2–1)
WBC # BLD: 4.34 K/UL — SIGNIFICANT CHANGE UP (ref 3.8–10.5)
WBC # BLD: 4.34 K/UL — SIGNIFICANT CHANGE UP (ref 3.8–10.5)
WBC # FLD AUTO: 4.34 K/UL — SIGNIFICANT CHANGE UP (ref 3.8–10.5)
WBC # FLD AUTO: 4.34 K/UL — SIGNIFICANT CHANGE UP (ref 3.8–10.5)

## 2023-11-07 PROCEDURE — 99213 OFFICE O/P EST LOW 20 MIN: CPT

## 2023-11-28 ENCOUNTER — APPOINTMENT (OUTPATIENT)
Dept: INFUSION THERAPY | Facility: HOSPITAL | Age: 58
End: 2023-11-28

## 2023-11-28 ENCOUNTER — RESULT REVIEW (OUTPATIENT)
Age: 58
End: 2023-11-28

## 2023-11-28 ENCOUNTER — APPOINTMENT (OUTPATIENT)
Dept: HEMATOLOGY ONCOLOGY | Facility: CLINIC | Age: 58
End: 2023-11-28
Payer: COMMERCIAL

## 2023-11-28 LAB
ALBUMIN SERPL ELPH-MCNC: 4.2 G/DL — SIGNIFICANT CHANGE UP (ref 3.3–5)
ALBUMIN SERPL ELPH-MCNC: 4.2 G/DL — SIGNIFICANT CHANGE UP (ref 3.3–5)
ALP SERPL-CCNC: 77 U/L — SIGNIFICANT CHANGE UP (ref 40–120)
ALP SERPL-CCNC: 77 U/L — SIGNIFICANT CHANGE UP (ref 40–120)
ALT FLD-CCNC: 19 U/L — SIGNIFICANT CHANGE UP (ref 10–45)
ALT FLD-CCNC: 19 U/L — SIGNIFICANT CHANGE UP (ref 10–45)
ANION GAP SERPL CALC-SCNC: 9 MMOL/L — SIGNIFICANT CHANGE UP (ref 5–17)
ANION GAP SERPL CALC-SCNC: 9 MMOL/L — SIGNIFICANT CHANGE UP (ref 5–17)
APPEARANCE UR: CLEAR — SIGNIFICANT CHANGE UP
APPEARANCE UR: CLEAR — SIGNIFICANT CHANGE UP
AST SERPL-CCNC: 26 U/L — SIGNIFICANT CHANGE UP (ref 10–40)
AST SERPL-CCNC: 26 U/L — SIGNIFICANT CHANGE UP (ref 10–40)
BASOPHILS # BLD AUTO: 0.04 K/UL — SIGNIFICANT CHANGE UP (ref 0–0.2)
BASOPHILS # BLD AUTO: 0.04 K/UL — SIGNIFICANT CHANGE UP (ref 0–0.2)
BASOPHILS NFR BLD AUTO: 0.7 % — SIGNIFICANT CHANGE UP (ref 0–2)
BASOPHILS NFR BLD AUTO: 0.7 % — SIGNIFICANT CHANGE UP (ref 0–2)
BILIRUB SERPL-MCNC: 0.2 MG/DL — SIGNIFICANT CHANGE UP (ref 0.2–1.2)
BILIRUB SERPL-MCNC: 0.2 MG/DL — SIGNIFICANT CHANGE UP (ref 0.2–1.2)
BILIRUB UR-MCNC: NEGATIVE — SIGNIFICANT CHANGE UP
BILIRUB UR-MCNC: NEGATIVE — SIGNIFICANT CHANGE UP
BUN SERPL-MCNC: 14 MG/DL — SIGNIFICANT CHANGE UP (ref 7–23)
BUN SERPL-MCNC: 14 MG/DL — SIGNIFICANT CHANGE UP (ref 7–23)
CALCIUM SERPL-MCNC: 9.6 MG/DL — SIGNIFICANT CHANGE UP (ref 8.4–10.5)
CALCIUM SERPL-MCNC: 9.6 MG/DL — SIGNIFICANT CHANGE UP (ref 8.4–10.5)
CANCER AG125 SERPL-ACNC: 5 U/ML — SIGNIFICANT CHANGE UP
CANCER AG125 SERPL-ACNC: 5 U/ML — SIGNIFICANT CHANGE UP
CEA SERPL-MCNC: 14.1 NG/ML — HIGH (ref 0–3.8)
CEA SERPL-MCNC: 14.1 NG/ML — HIGH (ref 0–3.8)
CHLORIDE SERPL-SCNC: 103 MMOL/L — SIGNIFICANT CHANGE UP (ref 96–108)
CHLORIDE SERPL-SCNC: 103 MMOL/L — SIGNIFICANT CHANGE UP (ref 96–108)
CO2 SERPL-SCNC: 29 MMOL/L — SIGNIFICANT CHANGE UP (ref 22–31)
CO2 SERPL-SCNC: 29 MMOL/L — SIGNIFICANT CHANGE UP (ref 22–31)
COLOR SPEC: YELLOW — SIGNIFICANT CHANGE UP
COLOR SPEC: YELLOW — SIGNIFICANT CHANGE UP
CREAT SERPL-MCNC: 0.73 MG/DL — SIGNIFICANT CHANGE UP (ref 0.5–1.3)
CREAT SERPL-MCNC: 0.73 MG/DL — SIGNIFICANT CHANGE UP (ref 0.5–1.3)
DIFF PNL FLD: NEGATIVE — SIGNIFICANT CHANGE UP
DIFF PNL FLD: NEGATIVE — SIGNIFICANT CHANGE UP
EGFR: 96 ML/MIN/1.73M2 — SIGNIFICANT CHANGE UP
EGFR: 96 ML/MIN/1.73M2 — SIGNIFICANT CHANGE UP
EOSINOPHIL # BLD AUTO: 0.44 K/UL — SIGNIFICANT CHANGE UP (ref 0–0.5)
EOSINOPHIL # BLD AUTO: 0.44 K/UL — SIGNIFICANT CHANGE UP (ref 0–0.5)
EOSINOPHIL NFR BLD AUTO: 7.7 % — HIGH (ref 0–6)
EOSINOPHIL NFR BLD AUTO: 7.7 % — HIGH (ref 0–6)
GLUCOSE SERPL-MCNC: 101 MG/DL — HIGH (ref 70–99)
GLUCOSE SERPL-MCNC: 101 MG/DL — HIGH (ref 70–99)
GLUCOSE UR QL: NEGATIVE MG/DL — SIGNIFICANT CHANGE UP
GLUCOSE UR QL: NEGATIVE MG/DL — SIGNIFICANT CHANGE UP
HCT VFR BLD CALC: 38.4 % — SIGNIFICANT CHANGE UP (ref 34.5–45)
HCT VFR BLD CALC: 38.4 % — SIGNIFICANT CHANGE UP (ref 34.5–45)
HGB BLD-MCNC: 12.7 G/DL — SIGNIFICANT CHANGE UP (ref 11.5–15.5)
HGB BLD-MCNC: 12.7 G/DL — SIGNIFICANT CHANGE UP (ref 11.5–15.5)
IMM GRANULOCYTES NFR BLD AUTO: 0.2 % — SIGNIFICANT CHANGE UP (ref 0–0.9)
IMM GRANULOCYTES NFR BLD AUTO: 0.2 % — SIGNIFICANT CHANGE UP (ref 0–0.9)
KETONES UR-MCNC: NEGATIVE MG/DL — SIGNIFICANT CHANGE UP
KETONES UR-MCNC: NEGATIVE MG/DL — SIGNIFICANT CHANGE UP
LEUKOCYTE ESTERASE UR-ACNC: NEGATIVE — SIGNIFICANT CHANGE UP
LEUKOCYTE ESTERASE UR-ACNC: NEGATIVE — SIGNIFICANT CHANGE UP
LYMPHOCYTES # BLD AUTO: 1.37 K/UL — SIGNIFICANT CHANGE UP (ref 1–3.3)
LYMPHOCYTES # BLD AUTO: 1.37 K/UL — SIGNIFICANT CHANGE UP (ref 1–3.3)
LYMPHOCYTES # BLD AUTO: 24.1 % — SIGNIFICANT CHANGE UP (ref 13–44)
LYMPHOCYTES # BLD AUTO: 24.1 % — SIGNIFICANT CHANGE UP (ref 13–44)
MCHC RBC-ENTMCNC: 29.2 PG — SIGNIFICANT CHANGE UP (ref 27–34)
MCHC RBC-ENTMCNC: 29.2 PG — SIGNIFICANT CHANGE UP (ref 27–34)
MCHC RBC-ENTMCNC: 33.1 G/DL — SIGNIFICANT CHANGE UP (ref 32–36)
MCHC RBC-ENTMCNC: 33.1 G/DL — SIGNIFICANT CHANGE UP (ref 32–36)
MCV RBC AUTO: 88.3 FL — SIGNIFICANT CHANGE UP (ref 80–100)
MCV RBC AUTO: 88.3 FL — SIGNIFICANT CHANGE UP (ref 80–100)
MONOCYTES # BLD AUTO: 0.36 K/UL — SIGNIFICANT CHANGE UP (ref 0–0.9)
MONOCYTES # BLD AUTO: 0.36 K/UL — SIGNIFICANT CHANGE UP (ref 0–0.9)
MONOCYTES NFR BLD AUTO: 6.3 % — SIGNIFICANT CHANGE UP (ref 2–14)
MONOCYTES NFR BLD AUTO: 6.3 % — SIGNIFICANT CHANGE UP (ref 2–14)
NEUTROPHILS # BLD AUTO: 3.46 K/UL — SIGNIFICANT CHANGE UP (ref 1.8–7.4)
NEUTROPHILS # BLD AUTO: 3.46 K/UL — SIGNIFICANT CHANGE UP (ref 1.8–7.4)
NEUTROPHILS NFR BLD AUTO: 61 % — SIGNIFICANT CHANGE UP (ref 43–77)
NEUTROPHILS NFR BLD AUTO: 61 % — SIGNIFICANT CHANGE UP (ref 43–77)
NITRITE UR-MCNC: NEGATIVE — SIGNIFICANT CHANGE UP
NITRITE UR-MCNC: NEGATIVE — SIGNIFICANT CHANGE UP
NRBC # BLD: 0 /100 WBCS — SIGNIFICANT CHANGE UP (ref 0–0)
NRBC # BLD: 0 /100 WBCS — SIGNIFICANT CHANGE UP (ref 0–0)
PH UR: 6.5 — SIGNIFICANT CHANGE UP (ref 5–8)
PH UR: 6.5 — SIGNIFICANT CHANGE UP (ref 5–8)
PLATELET # BLD AUTO: 208 K/UL — SIGNIFICANT CHANGE UP (ref 150–400)
PLATELET # BLD AUTO: 208 K/UL — SIGNIFICANT CHANGE UP (ref 150–400)
POTASSIUM SERPL-MCNC: 4.1 MMOL/L — SIGNIFICANT CHANGE UP (ref 3.5–5.3)
POTASSIUM SERPL-MCNC: 4.1 MMOL/L — SIGNIFICANT CHANGE UP (ref 3.5–5.3)
POTASSIUM SERPL-SCNC: 4.1 MMOL/L — SIGNIFICANT CHANGE UP (ref 3.5–5.3)
POTASSIUM SERPL-SCNC: 4.1 MMOL/L — SIGNIFICANT CHANGE UP (ref 3.5–5.3)
PROT SERPL-MCNC: 7.2 G/DL — SIGNIFICANT CHANGE UP (ref 6–8.3)
PROT SERPL-MCNC: 7.2 G/DL — SIGNIFICANT CHANGE UP (ref 6–8.3)
PROT UR-MCNC: NEGATIVE MG/DL — SIGNIFICANT CHANGE UP
PROT UR-MCNC: NEGATIVE MG/DL — SIGNIFICANT CHANGE UP
RBC # BLD: 4.35 M/UL — SIGNIFICANT CHANGE UP (ref 3.8–5.2)
RBC # BLD: 4.35 M/UL — SIGNIFICANT CHANGE UP (ref 3.8–5.2)
RBC # FLD: 12.7 % — SIGNIFICANT CHANGE UP (ref 10.3–14.5)
RBC # FLD: 12.7 % — SIGNIFICANT CHANGE UP (ref 10.3–14.5)
SODIUM SERPL-SCNC: 141 MMOL/L — SIGNIFICANT CHANGE UP (ref 135–145)
SODIUM SERPL-SCNC: 141 MMOL/L — SIGNIFICANT CHANGE UP (ref 135–145)
SP GR SPEC: 1.01 — SIGNIFICANT CHANGE UP (ref 1–1.03)
SP GR SPEC: 1.01 — SIGNIFICANT CHANGE UP (ref 1–1.03)
TSH SERPL-MCNC: 1.91 UIU/ML — SIGNIFICANT CHANGE UP (ref 0.27–4.2)
TSH SERPL-MCNC: 1.91 UIU/ML — SIGNIFICANT CHANGE UP (ref 0.27–4.2)
UROBILINOGEN FLD QL: 0.2 MG/DL — SIGNIFICANT CHANGE UP (ref 0.2–1)
UROBILINOGEN FLD QL: 0.2 MG/DL — SIGNIFICANT CHANGE UP (ref 0.2–1)
WBC # BLD: 5.68 K/UL — SIGNIFICANT CHANGE UP (ref 3.8–10.5)
WBC # BLD: 5.68 K/UL — SIGNIFICANT CHANGE UP (ref 3.8–10.5)
WBC # FLD AUTO: 5.68 K/UL — SIGNIFICANT CHANGE UP (ref 3.8–10.5)
WBC # FLD AUTO: 5.68 K/UL — SIGNIFICANT CHANGE UP (ref 3.8–10.5)

## 2023-11-28 PROCEDURE — 99214 OFFICE O/P EST MOD 30 MIN: CPT

## 2023-11-29 ENCOUNTER — NON-APPOINTMENT (OUTPATIENT)
Age: 58
End: 2023-11-29

## 2023-11-29 ENCOUNTER — APPOINTMENT (OUTPATIENT)
Dept: CT IMAGING | Facility: IMAGING CENTER | Age: 58
End: 2023-11-29

## 2023-12-12 ENCOUNTER — OUTPATIENT (OUTPATIENT)
Dept: OUTPATIENT SERVICES | Facility: HOSPITAL | Age: 58
LOS: 1 days | Discharge: HOME | End: 2023-12-12

## 2023-12-12 DIAGNOSIS — Z98.89 OTHER SPECIFIED POSTPROCEDURAL STATES: Chronic | ICD-10-CM

## 2023-12-12 DIAGNOSIS — Z90.710 ACQUIRED ABSENCE OF BOTH CERVIX AND UTERUS: Chronic | ICD-10-CM

## 2023-12-12 DIAGNOSIS — C55 MALIGNANT NEOPLASM OF UTERUS, PART UNSPECIFIED: ICD-10-CM

## 2023-12-12 DIAGNOSIS — Z90.49 ACQUIRED ABSENCE OF OTHER SPECIFIED PARTS OF DIGESTIVE TRACT: Chronic | ICD-10-CM

## 2023-12-19 ENCOUNTER — RESULT REVIEW (OUTPATIENT)
Age: 58
End: 2023-12-19

## 2023-12-19 ENCOUNTER — APPOINTMENT (OUTPATIENT)
Dept: INFUSION THERAPY | Facility: HOSPITAL | Age: 58
End: 2023-12-19

## 2023-12-19 ENCOUNTER — APPOINTMENT (OUTPATIENT)
Dept: HEMATOLOGY ONCOLOGY | Facility: CLINIC | Age: 58
End: 2023-12-19
Payer: COMMERCIAL

## 2023-12-19 LAB
ALBUMIN SERPL ELPH-MCNC: 4.5 G/DL — SIGNIFICANT CHANGE UP (ref 3.3–5)
ALBUMIN SERPL ELPH-MCNC: 4.5 G/DL — SIGNIFICANT CHANGE UP (ref 3.3–5)
ALP SERPL-CCNC: 73 U/L — SIGNIFICANT CHANGE UP (ref 40–120)
ALP SERPL-CCNC: 73 U/L — SIGNIFICANT CHANGE UP (ref 40–120)
ALT FLD-CCNC: 15 U/L — SIGNIFICANT CHANGE UP (ref 10–45)
ALT FLD-CCNC: 15 U/L — SIGNIFICANT CHANGE UP (ref 10–45)
ANION GAP SERPL CALC-SCNC: 13 MMOL/L — SIGNIFICANT CHANGE UP (ref 5–17)
ANION GAP SERPL CALC-SCNC: 13 MMOL/L — SIGNIFICANT CHANGE UP (ref 5–17)
APPEARANCE UR: ABNORMAL
APPEARANCE UR: ABNORMAL
AST SERPL-CCNC: 31 U/L — SIGNIFICANT CHANGE UP (ref 10–40)
AST SERPL-CCNC: 31 U/L — SIGNIFICANT CHANGE UP (ref 10–40)
BACTERIA # UR AUTO: NEGATIVE /HPF — SIGNIFICANT CHANGE UP
BACTERIA # UR AUTO: NEGATIVE /HPF — SIGNIFICANT CHANGE UP
BASOPHILS # BLD AUTO: 0.02 K/UL — SIGNIFICANT CHANGE UP (ref 0–0.2)
BASOPHILS # BLD AUTO: 0.02 K/UL — SIGNIFICANT CHANGE UP (ref 0–0.2)
BASOPHILS NFR BLD AUTO: 0.4 % — SIGNIFICANT CHANGE UP (ref 0–2)
BASOPHILS NFR BLD AUTO: 0.4 % — SIGNIFICANT CHANGE UP (ref 0–2)
BILIRUB SERPL-MCNC: 0.3 MG/DL — SIGNIFICANT CHANGE UP (ref 0.2–1.2)
BILIRUB SERPL-MCNC: 0.3 MG/DL — SIGNIFICANT CHANGE UP (ref 0.2–1.2)
BILIRUB UR-MCNC: NEGATIVE — SIGNIFICANT CHANGE UP
BILIRUB UR-MCNC: NEGATIVE — SIGNIFICANT CHANGE UP
BUN SERPL-MCNC: 14 MG/DL — SIGNIFICANT CHANGE UP (ref 7–23)
BUN SERPL-MCNC: 14 MG/DL — SIGNIFICANT CHANGE UP (ref 7–23)
CALCIUM SERPL-MCNC: 9.8 MG/DL — SIGNIFICANT CHANGE UP (ref 8.4–10.5)
CALCIUM SERPL-MCNC: 9.8 MG/DL — SIGNIFICANT CHANGE UP (ref 8.4–10.5)
CANCER AG125 SERPL-ACNC: 6 U/ML — SIGNIFICANT CHANGE UP
CANCER AG125 SERPL-ACNC: 6 U/ML — SIGNIFICANT CHANGE UP
CAST: 0 /LPF — SIGNIFICANT CHANGE UP (ref 0–4)
CAST: 0 /LPF — SIGNIFICANT CHANGE UP (ref 0–4)
CEA SERPL-MCNC: 18.3 NG/ML — HIGH (ref 0–3.8)
CEA SERPL-MCNC: 18.3 NG/ML — HIGH (ref 0–3.8)
CHLORIDE SERPL-SCNC: 100 MMOL/L — SIGNIFICANT CHANGE UP (ref 96–108)
CHLORIDE SERPL-SCNC: 100 MMOL/L — SIGNIFICANT CHANGE UP (ref 96–108)
CO2 SERPL-SCNC: 30 MMOL/L — SIGNIFICANT CHANGE UP (ref 22–31)
CO2 SERPL-SCNC: 30 MMOL/L — SIGNIFICANT CHANGE UP (ref 22–31)
COLOR SPEC: YELLOW — SIGNIFICANT CHANGE UP
COLOR SPEC: YELLOW — SIGNIFICANT CHANGE UP
CREAT SERPL-MCNC: 0.78 MG/DL — SIGNIFICANT CHANGE UP (ref 0.5–1.3)
CREAT SERPL-MCNC: 0.78 MG/DL — SIGNIFICANT CHANGE UP (ref 0.5–1.3)
DIFF PNL FLD: NEGATIVE — SIGNIFICANT CHANGE UP
DIFF PNL FLD: NEGATIVE — SIGNIFICANT CHANGE UP
EGFR: 89 ML/MIN/1.73M2 — SIGNIFICANT CHANGE UP
EGFR: 89 ML/MIN/1.73M2 — SIGNIFICANT CHANGE UP
EOSINOPHIL # BLD AUTO: 0.2 K/UL — SIGNIFICANT CHANGE UP (ref 0–0.5)
EOSINOPHIL # BLD AUTO: 0.2 K/UL — SIGNIFICANT CHANGE UP (ref 0–0.5)
EOSINOPHIL NFR BLD AUTO: 4.3 % — SIGNIFICANT CHANGE UP (ref 0–6)
EOSINOPHIL NFR BLD AUTO: 4.3 % — SIGNIFICANT CHANGE UP (ref 0–6)
GLUCOSE SERPL-MCNC: 101 MG/DL — HIGH (ref 70–99)
GLUCOSE SERPL-MCNC: 101 MG/DL — HIGH (ref 70–99)
GLUCOSE UR QL: NEGATIVE MG/DL — SIGNIFICANT CHANGE UP
GLUCOSE UR QL: NEGATIVE MG/DL — SIGNIFICANT CHANGE UP
HCT VFR BLD CALC: 38.1 % — SIGNIFICANT CHANGE UP (ref 34.5–45)
HCT VFR BLD CALC: 38.1 % — SIGNIFICANT CHANGE UP (ref 34.5–45)
HGB BLD-MCNC: 12.5 G/DL — SIGNIFICANT CHANGE UP (ref 11.5–15.5)
HGB BLD-MCNC: 12.5 G/DL — SIGNIFICANT CHANGE UP (ref 11.5–15.5)
IMM GRANULOCYTES NFR BLD AUTO: 0.2 % — SIGNIFICANT CHANGE UP (ref 0–0.9)
IMM GRANULOCYTES NFR BLD AUTO: 0.2 % — SIGNIFICANT CHANGE UP (ref 0–0.9)
KETONES UR-MCNC: NEGATIVE MG/DL — SIGNIFICANT CHANGE UP
KETONES UR-MCNC: NEGATIVE MG/DL — SIGNIFICANT CHANGE UP
LEUKOCYTE ESTERASE UR-ACNC: NEGATIVE — SIGNIFICANT CHANGE UP
LEUKOCYTE ESTERASE UR-ACNC: NEGATIVE — SIGNIFICANT CHANGE UP
LYMPHOCYTES # BLD AUTO: 1.23 K/UL — SIGNIFICANT CHANGE UP (ref 1–3.3)
LYMPHOCYTES # BLD AUTO: 1.23 K/UL — SIGNIFICANT CHANGE UP (ref 1–3.3)
LYMPHOCYTES # BLD AUTO: 26.5 % — SIGNIFICANT CHANGE UP (ref 13–44)
LYMPHOCYTES # BLD AUTO: 26.5 % — SIGNIFICANT CHANGE UP (ref 13–44)
MCHC RBC-ENTMCNC: 29.1 PG — SIGNIFICANT CHANGE UP (ref 27–34)
MCHC RBC-ENTMCNC: 29.1 PG — SIGNIFICANT CHANGE UP (ref 27–34)
MCHC RBC-ENTMCNC: 32.8 G/DL — SIGNIFICANT CHANGE UP (ref 32–36)
MCHC RBC-ENTMCNC: 32.8 G/DL — SIGNIFICANT CHANGE UP (ref 32–36)
MCV RBC AUTO: 88.8 FL — SIGNIFICANT CHANGE UP (ref 80–100)
MCV RBC AUTO: 88.8 FL — SIGNIFICANT CHANGE UP (ref 80–100)
MONOCYTES # BLD AUTO: 0.33 K/UL — SIGNIFICANT CHANGE UP (ref 0–0.9)
MONOCYTES # BLD AUTO: 0.33 K/UL — SIGNIFICANT CHANGE UP (ref 0–0.9)
MONOCYTES NFR BLD AUTO: 7.1 % — SIGNIFICANT CHANGE UP (ref 2–14)
MONOCYTES NFR BLD AUTO: 7.1 % — SIGNIFICANT CHANGE UP (ref 2–14)
NEUTROPHILS # BLD AUTO: 2.86 K/UL — SIGNIFICANT CHANGE UP (ref 1.8–7.4)
NEUTROPHILS # BLD AUTO: 2.86 K/UL — SIGNIFICANT CHANGE UP (ref 1.8–7.4)
NEUTROPHILS NFR BLD AUTO: 61.5 % — SIGNIFICANT CHANGE UP (ref 43–77)
NEUTROPHILS NFR BLD AUTO: 61.5 % — SIGNIFICANT CHANGE UP (ref 43–77)
NITRITE UR-MCNC: NEGATIVE — SIGNIFICANT CHANGE UP
NITRITE UR-MCNC: NEGATIVE — SIGNIFICANT CHANGE UP
NRBC # BLD: 0 /100 WBCS — SIGNIFICANT CHANGE UP (ref 0–0)
NRBC # BLD: 0 /100 WBCS — SIGNIFICANT CHANGE UP (ref 0–0)
PH UR: 7.5 — SIGNIFICANT CHANGE UP (ref 5–8)
PH UR: 7.5 — SIGNIFICANT CHANGE UP (ref 5–8)
PLATELET # BLD AUTO: 188 K/UL — SIGNIFICANT CHANGE UP (ref 150–400)
PLATELET # BLD AUTO: 188 K/UL — SIGNIFICANT CHANGE UP (ref 150–400)
POTASSIUM SERPL-MCNC: 3.9 MMOL/L — SIGNIFICANT CHANGE UP (ref 3.5–5.3)
POTASSIUM SERPL-MCNC: 3.9 MMOL/L — SIGNIFICANT CHANGE UP (ref 3.5–5.3)
POTASSIUM SERPL-SCNC: 3.9 MMOL/L — SIGNIFICANT CHANGE UP (ref 3.5–5.3)
POTASSIUM SERPL-SCNC: 3.9 MMOL/L — SIGNIFICANT CHANGE UP (ref 3.5–5.3)
PROT SERPL-MCNC: 7.5 G/DL — SIGNIFICANT CHANGE UP (ref 6–8.3)
PROT SERPL-MCNC: 7.5 G/DL — SIGNIFICANT CHANGE UP (ref 6–8.3)
PROT UR-MCNC: NEGATIVE MG/DL — SIGNIFICANT CHANGE UP
PROT UR-MCNC: NEGATIVE MG/DL — SIGNIFICANT CHANGE UP
RBC # BLD: 4.29 M/UL — SIGNIFICANT CHANGE UP (ref 3.8–5.2)
RBC # BLD: 4.29 M/UL — SIGNIFICANT CHANGE UP (ref 3.8–5.2)
RBC # FLD: 12.8 % — SIGNIFICANT CHANGE UP (ref 10.3–14.5)
RBC # FLD: 12.8 % — SIGNIFICANT CHANGE UP (ref 10.3–14.5)
RBC CASTS # UR COMP ASSIST: 0 /HPF — SIGNIFICANT CHANGE UP (ref 0–4)
RBC CASTS # UR COMP ASSIST: 0 /HPF — SIGNIFICANT CHANGE UP (ref 0–4)
SODIUM SERPL-SCNC: 143 MMOL/L — SIGNIFICANT CHANGE UP (ref 135–145)
SODIUM SERPL-SCNC: 143 MMOL/L — SIGNIFICANT CHANGE UP (ref 135–145)
SP GR SPEC: 1 — SIGNIFICANT CHANGE UP (ref 1–1.03)
SP GR SPEC: 1 — SIGNIFICANT CHANGE UP (ref 1–1.03)
SQUAMOUS # UR AUTO: 1 /HPF — SIGNIFICANT CHANGE UP (ref 0–5)
SQUAMOUS # UR AUTO: 1 /HPF — SIGNIFICANT CHANGE UP (ref 0–5)
TSH SERPL-MCNC: 12.3 UIU/ML — HIGH (ref 0.27–4.2)
TSH SERPL-MCNC: 12.3 UIU/ML — HIGH (ref 0.27–4.2)
UROBILINOGEN FLD QL: 0.2 MG/DL — SIGNIFICANT CHANGE UP (ref 0.2–1)
UROBILINOGEN FLD QL: 0.2 MG/DL — SIGNIFICANT CHANGE UP (ref 0.2–1)
WBC # BLD: 4.65 K/UL — SIGNIFICANT CHANGE UP (ref 3.8–10.5)
WBC # BLD: 4.65 K/UL — SIGNIFICANT CHANGE UP (ref 3.8–10.5)
WBC # FLD AUTO: 4.65 K/UL — SIGNIFICANT CHANGE UP (ref 3.8–10.5)
WBC # FLD AUTO: 4.65 K/UL — SIGNIFICANT CHANGE UP (ref 3.8–10.5)
WBC UR QL: 0 /HPF — SIGNIFICANT CHANGE UP (ref 0–5)
WBC UR QL: 0 /HPF — SIGNIFICANT CHANGE UP (ref 0–5)

## 2023-12-19 PROCEDURE — 99213 OFFICE O/P EST LOW 20 MIN: CPT

## 2023-12-19 NOTE — HISTORY OF PRESENT ILLNESS
[Disease: _____________________] : Disease: [unfilled] [AJCC Stage: ____] : AJCC Stage: [unfilled] [de-identified] : Patient diagnosed with stage III MMT  in 1/2021. She saw gyn and had imaging. She was seen by Dr. Bowen, who did the surgery. Final path showed STAGE III MMT.\par  Dr. Nichols at [place]. She was treated with Carbo/ Taxol- s/p one cycle , had reaction and switched to Carbo/ doxil x one does. She transferred her care to Dr. Nichols at [place].and then Carbo/ Abraxane/ Herceptin x 6 completed, last dose was in August, 2021\par  She was started on Herceptin maintenance, last dose was in Sept, 2022.\par  Scan showed liver lesion, consistent with POD.\par  Ct C/A/P: 10/6/2022:  A new 4.6x5.7 cm low attenuation lesion in seen in the liver , concerning for metastatic disease.\par  \par  She has some RUQ pain, no nausea or vomiting. She has some constipation. Takes prune juice, on laxatives. No bleeding. No RT in the past.\par  \par  PMH: NONE\par  PSH: WINNIE, BSO, GB, Endometriosis surgery in 2004, right breast biopsy.( Dr. Chasity Pompa)\par  All: sulfa, metronidazole, taxol\par  FH: Sister breast cancer 40. 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  After four cycles, scan from 1/10/23 showed POD.\par  \par  1/10/2023: ct C/A/P :  No evidence of metastatic disease in the thorax. New subtle ground-glass opacities which may be infectious or inflammatory in etiology.\par  Progression of disease in the liver , 5.8 x 5.1 cm mass slightly increased previously measuring 5.7 x 4.6 cm\par   [de-identified] : [facility] [FreeTextEntry1] : Keytruda/Lenvima [de-identified] : Patient is here for follow up and treatment. No new complaints. Saw ophtho for eye symptoms, diagnosed with dry eyes, got plugs and now using drops, feeling better. Skin is good, no rash,  Appetite is good. Abdominal pain is much better and intermittent. Constipation well controlled with bowel regimen. Denies chest pain, SOB, abdominal pain, nausea, vomiting, diarrhea, bleeding, swelling.

## 2023-12-20 DIAGNOSIS — Z51.11 ENCOUNTER FOR ANTINEOPLASTIC CHEMOTHERAPY: ICD-10-CM

## 2023-12-20 DIAGNOSIS — C54.1 MALIGNANT NEOPLASM OF ENDOMETRIUM: ICD-10-CM

## 2024-01-09 ENCOUNTER — APPOINTMENT (OUTPATIENT)
Dept: INFUSION THERAPY | Facility: HOSPITAL | Age: 59
End: 2024-01-09

## 2024-01-09 ENCOUNTER — APPOINTMENT (OUTPATIENT)
Dept: HEMATOLOGY ONCOLOGY | Facility: CLINIC | Age: 59
End: 2024-01-09
Payer: COMMERCIAL

## 2024-01-09 ENCOUNTER — RESULT REVIEW (OUTPATIENT)
Age: 59
End: 2024-01-09

## 2024-01-09 VITALS
HEART RATE: 75 BPM | RESPIRATION RATE: 16 BRPM | OXYGEN SATURATION: 98 % | DIASTOLIC BLOOD PRESSURE: 84 MMHG | TEMPERATURE: 97.1 F | BODY MASS INDEX: 23.48 KG/M2 | WEIGHT: 136.66 LBS | SYSTOLIC BLOOD PRESSURE: 135 MMHG

## 2024-01-09 LAB
ALBUMIN SERPL ELPH-MCNC: 4.2 G/DL — SIGNIFICANT CHANGE UP (ref 3.3–5)
ALBUMIN SERPL ELPH-MCNC: 4.2 G/DL — SIGNIFICANT CHANGE UP (ref 3.3–5)
ALP SERPL-CCNC: 67 U/L — SIGNIFICANT CHANGE UP (ref 40–120)
ALP SERPL-CCNC: 67 U/L — SIGNIFICANT CHANGE UP (ref 40–120)
ALT FLD-CCNC: 15 U/L — SIGNIFICANT CHANGE UP (ref 10–45)
ALT FLD-CCNC: 15 U/L — SIGNIFICANT CHANGE UP (ref 10–45)
ANION GAP SERPL CALC-SCNC: 9 MMOL/L — SIGNIFICANT CHANGE UP (ref 5–17)
ANION GAP SERPL CALC-SCNC: 9 MMOL/L — SIGNIFICANT CHANGE UP (ref 5–17)
AST SERPL-CCNC: 42 U/L — HIGH (ref 10–40)
AST SERPL-CCNC: 42 U/L — HIGH (ref 10–40)
BASOPHILS # BLD AUTO: 0.02 K/UL — SIGNIFICANT CHANGE UP (ref 0–0.2)
BASOPHILS # BLD AUTO: 0.02 K/UL — SIGNIFICANT CHANGE UP (ref 0–0.2)
BASOPHILS NFR BLD AUTO: 0.5 % — SIGNIFICANT CHANGE UP (ref 0–2)
BASOPHILS NFR BLD AUTO: 0.5 % — SIGNIFICANT CHANGE UP (ref 0–2)
BILIRUB SERPL-MCNC: 0.2 MG/DL — SIGNIFICANT CHANGE UP (ref 0.2–1.2)
BILIRUB SERPL-MCNC: 0.2 MG/DL — SIGNIFICANT CHANGE UP (ref 0.2–1.2)
BUN SERPL-MCNC: 13 MG/DL — SIGNIFICANT CHANGE UP (ref 7–23)
BUN SERPL-MCNC: 13 MG/DL — SIGNIFICANT CHANGE UP (ref 7–23)
CALCIUM SERPL-MCNC: 9.7 MG/DL — SIGNIFICANT CHANGE UP (ref 8.4–10.5)
CALCIUM SERPL-MCNC: 9.7 MG/DL — SIGNIFICANT CHANGE UP (ref 8.4–10.5)
CANCER AG125 SERPL-ACNC: 5 U/ML — SIGNIFICANT CHANGE UP
CANCER AG125 SERPL-ACNC: 5 U/ML — SIGNIFICANT CHANGE UP
CEA SERPL-MCNC: 19 NG/ML — HIGH (ref 0–3.8)
CEA SERPL-MCNC: 19 NG/ML — HIGH (ref 0–3.8)
CHLORIDE SERPL-SCNC: 101 MMOL/L — SIGNIFICANT CHANGE UP (ref 96–108)
CHLORIDE SERPL-SCNC: 101 MMOL/L — SIGNIFICANT CHANGE UP (ref 96–108)
CO2 SERPL-SCNC: 27 MMOL/L — SIGNIFICANT CHANGE UP (ref 22–31)
CO2 SERPL-SCNC: 27 MMOL/L — SIGNIFICANT CHANGE UP (ref 22–31)
CREAT SERPL-MCNC: 0.75 MG/DL — SIGNIFICANT CHANGE UP (ref 0.5–1.3)
CREAT SERPL-MCNC: 0.75 MG/DL — SIGNIFICANT CHANGE UP (ref 0.5–1.3)
EGFR: 93 ML/MIN/1.73M2 — SIGNIFICANT CHANGE UP
EGFR: 93 ML/MIN/1.73M2 — SIGNIFICANT CHANGE UP
EOSINOPHIL # BLD AUTO: 0.15 K/UL — SIGNIFICANT CHANGE UP (ref 0–0.5)
EOSINOPHIL # BLD AUTO: 0.15 K/UL — SIGNIFICANT CHANGE UP (ref 0–0.5)
EOSINOPHIL NFR BLD AUTO: 3.5 % — SIGNIFICANT CHANGE UP (ref 0–6)
EOSINOPHIL NFR BLD AUTO: 3.5 % — SIGNIFICANT CHANGE UP (ref 0–6)
GLUCOSE SERPL-MCNC: 70 MG/DL — SIGNIFICANT CHANGE UP (ref 70–99)
GLUCOSE SERPL-MCNC: 70 MG/DL — SIGNIFICANT CHANGE UP (ref 70–99)
HCT VFR BLD CALC: 35.9 % — SIGNIFICANT CHANGE UP (ref 34.5–45)
HCT VFR BLD CALC: 35.9 % — SIGNIFICANT CHANGE UP (ref 34.5–45)
HGB BLD-MCNC: 12.1 G/DL — SIGNIFICANT CHANGE UP (ref 11.5–15.5)
HGB BLD-MCNC: 12.1 G/DL — SIGNIFICANT CHANGE UP (ref 11.5–15.5)
IMM GRANULOCYTES NFR BLD AUTO: 0.2 % — SIGNIFICANT CHANGE UP (ref 0–0.9)
IMM GRANULOCYTES NFR BLD AUTO: 0.2 % — SIGNIFICANT CHANGE UP (ref 0–0.9)
LYMPHOCYTES # BLD AUTO: 1.24 K/UL — SIGNIFICANT CHANGE UP (ref 1–3.3)
LYMPHOCYTES # BLD AUTO: 1.24 K/UL — SIGNIFICANT CHANGE UP (ref 1–3.3)
LYMPHOCYTES # BLD AUTO: 29.2 % — SIGNIFICANT CHANGE UP (ref 13–44)
LYMPHOCYTES # BLD AUTO: 29.2 % — SIGNIFICANT CHANGE UP (ref 13–44)
MCHC RBC-ENTMCNC: 29.2 PG — SIGNIFICANT CHANGE UP (ref 27–34)
MCHC RBC-ENTMCNC: 29.2 PG — SIGNIFICANT CHANGE UP (ref 27–34)
MCHC RBC-ENTMCNC: 33.7 G/DL — SIGNIFICANT CHANGE UP (ref 32–36)
MCHC RBC-ENTMCNC: 33.7 G/DL — SIGNIFICANT CHANGE UP (ref 32–36)
MCV RBC AUTO: 86.7 FL — SIGNIFICANT CHANGE UP (ref 80–100)
MCV RBC AUTO: 86.7 FL — SIGNIFICANT CHANGE UP (ref 80–100)
MONOCYTES # BLD AUTO: 0.37 K/UL — SIGNIFICANT CHANGE UP (ref 0–0.9)
MONOCYTES # BLD AUTO: 0.37 K/UL — SIGNIFICANT CHANGE UP (ref 0–0.9)
MONOCYTES NFR BLD AUTO: 8.7 % — SIGNIFICANT CHANGE UP (ref 2–14)
MONOCYTES NFR BLD AUTO: 8.7 % — SIGNIFICANT CHANGE UP (ref 2–14)
NEUTROPHILS # BLD AUTO: 2.46 K/UL — SIGNIFICANT CHANGE UP (ref 1.8–7.4)
NEUTROPHILS # BLD AUTO: 2.46 K/UL — SIGNIFICANT CHANGE UP (ref 1.8–7.4)
NEUTROPHILS NFR BLD AUTO: 57.9 % — SIGNIFICANT CHANGE UP (ref 43–77)
NEUTROPHILS NFR BLD AUTO: 57.9 % — SIGNIFICANT CHANGE UP (ref 43–77)
NRBC # BLD: 0 /100 WBCS — SIGNIFICANT CHANGE UP (ref 0–0)
NRBC # BLD: 0 /100 WBCS — SIGNIFICANT CHANGE UP (ref 0–0)
PLATELET # BLD AUTO: 197 K/UL — SIGNIFICANT CHANGE UP (ref 150–400)
PLATELET # BLD AUTO: 197 K/UL — SIGNIFICANT CHANGE UP (ref 150–400)
POTASSIUM SERPL-MCNC: 4.3 MMOL/L — SIGNIFICANT CHANGE UP (ref 3.5–5.3)
POTASSIUM SERPL-MCNC: 4.3 MMOL/L — SIGNIFICANT CHANGE UP (ref 3.5–5.3)
POTASSIUM SERPL-SCNC: 4.3 MMOL/L — SIGNIFICANT CHANGE UP (ref 3.5–5.3)
POTASSIUM SERPL-SCNC: 4.3 MMOL/L — SIGNIFICANT CHANGE UP (ref 3.5–5.3)
PROT SERPL-MCNC: 7.2 G/DL — SIGNIFICANT CHANGE UP (ref 6–8.3)
PROT SERPL-MCNC: 7.2 G/DL — SIGNIFICANT CHANGE UP (ref 6–8.3)
RBC # BLD: 4.14 M/UL — SIGNIFICANT CHANGE UP (ref 3.8–5.2)
RBC # BLD: 4.14 M/UL — SIGNIFICANT CHANGE UP (ref 3.8–5.2)
RBC # FLD: 12.5 % — SIGNIFICANT CHANGE UP (ref 10.3–14.5)
RBC # FLD: 12.5 % — SIGNIFICANT CHANGE UP (ref 10.3–14.5)
SODIUM SERPL-SCNC: 138 MMOL/L — SIGNIFICANT CHANGE UP (ref 135–145)
SODIUM SERPL-SCNC: 138 MMOL/L — SIGNIFICANT CHANGE UP (ref 135–145)
TSH SERPL-MCNC: 0.49 UIU/ML — SIGNIFICANT CHANGE UP (ref 0.27–4.2)
TSH SERPL-MCNC: 0.49 UIU/ML — SIGNIFICANT CHANGE UP (ref 0.27–4.2)
WBC # BLD: 4.25 K/UL — SIGNIFICANT CHANGE UP (ref 3.8–10.5)
WBC # BLD: 4.25 K/UL — SIGNIFICANT CHANGE UP (ref 3.8–10.5)
WBC # FLD AUTO: 4.25 K/UL — SIGNIFICANT CHANGE UP (ref 3.8–10.5)
WBC # FLD AUTO: 4.25 K/UL — SIGNIFICANT CHANGE UP (ref 3.8–10.5)

## 2024-01-09 PROCEDURE — 99214 OFFICE O/P EST MOD 30 MIN: CPT

## 2024-01-10 LAB
APPEARANCE UR: CLEAR — SIGNIFICANT CHANGE UP
APPEARANCE UR: CLEAR — SIGNIFICANT CHANGE UP
BACTERIA # UR AUTO: NEGATIVE /HPF — SIGNIFICANT CHANGE UP
BACTERIA # UR AUTO: NEGATIVE /HPF — SIGNIFICANT CHANGE UP
BILIRUB UR-MCNC: NEGATIVE — SIGNIFICANT CHANGE UP
BILIRUB UR-MCNC: NEGATIVE — SIGNIFICANT CHANGE UP
CAST: 0 /LPF — SIGNIFICANT CHANGE UP (ref 0–4)
CAST: 0 /LPF — SIGNIFICANT CHANGE UP (ref 0–4)
COLOR SPEC: YELLOW — SIGNIFICANT CHANGE UP
COLOR SPEC: YELLOW — SIGNIFICANT CHANGE UP
DIFF PNL FLD: NEGATIVE — SIGNIFICANT CHANGE UP
DIFF PNL FLD: NEGATIVE — SIGNIFICANT CHANGE UP
GLUCOSE UR QL: NEGATIVE MG/DL — SIGNIFICANT CHANGE UP
GLUCOSE UR QL: NEGATIVE MG/DL — SIGNIFICANT CHANGE UP
KETONES UR-MCNC: NEGATIVE MG/DL — SIGNIFICANT CHANGE UP
KETONES UR-MCNC: NEGATIVE MG/DL — SIGNIFICANT CHANGE UP
LEUKOCYTE ESTERASE UR-ACNC: ABNORMAL
LEUKOCYTE ESTERASE UR-ACNC: ABNORMAL
NITRITE UR-MCNC: NEGATIVE — SIGNIFICANT CHANGE UP
NITRITE UR-MCNC: NEGATIVE — SIGNIFICANT CHANGE UP
PH UR: 7.5 — SIGNIFICANT CHANGE UP (ref 5–8)
PH UR: 7.5 — SIGNIFICANT CHANGE UP (ref 5–8)
PROT UR-MCNC: NEGATIVE MG/DL — SIGNIFICANT CHANGE UP
PROT UR-MCNC: NEGATIVE MG/DL — SIGNIFICANT CHANGE UP
RBC CASTS # UR COMP ASSIST: 0 /HPF — SIGNIFICANT CHANGE UP (ref 0–4)
RBC CASTS # UR COMP ASSIST: 0 /HPF — SIGNIFICANT CHANGE UP (ref 0–4)
SP GR SPEC: 1.01 — SIGNIFICANT CHANGE UP (ref 1–1.03)
SP GR SPEC: 1.01 — SIGNIFICANT CHANGE UP (ref 1–1.03)
SQUAMOUS # UR AUTO: 2 /HPF — SIGNIFICANT CHANGE UP (ref 0–5)
SQUAMOUS # UR AUTO: 2 /HPF — SIGNIFICANT CHANGE UP (ref 0–5)
UROBILINOGEN FLD QL: 0.2 MG/DL — SIGNIFICANT CHANGE UP (ref 0.2–1)
UROBILINOGEN FLD QL: 0.2 MG/DL — SIGNIFICANT CHANGE UP (ref 0.2–1)
WBC UR QL: 4 /HPF — SIGNIFICANT CHANGE UP (ref 0–5)
WBC UR QL: 4 /HPF — SIGNIFICANT CHANGE UP (ref 0–5)

## 2024-01-11 NOTE — HISTORY OF PRESENT ILLNESS
[Disease: _____________________] : Disease: [unfilled] [AJCC Stage: ____] : AJCC Stage: [unfilled] [de-identified] : Patient diagnosed with stage III MMT  in 1/2021. She saw gyn and had imaging. She was seen by Dr. Bowen, who did the surgery. Final path showed STAGE III MMT.\par  Dr. Nichols at [place]. She was treated with Carbo/ Taxol- s/p one cycle , had reaction and switched to Carbo/ doxil x one does. She transferred her care to Dr. Nichols at [place].and then Carbo/ Abraxane/ Herceptin x 6 completed, last dose was in August, 2021\par  She was started on Herceptin maintenance, last dose was in Sept, 2022.\par  Scan showed liver lesion, consistent with POD.\par  Ct C/A/P: 10/6/2022:  A new 4.6x5.7 cm low attenuation lesion in seen in the liver , concerning for metastatic disease.\par  \par  She has some RUQ pain, no nausea or vomiting. She has some constipation. Takes prune juice, on laxatives. No bleeding. No RT in the past.\par  \par  PMH: NONE\par  PSH: WINNIE, BSO, GB, Endometriosis surgery in 2004, right breast biopsy.( Dr. Chasity Pompa)\par  All: sulfa, metronidazole, taxol\par  FH: Sister breast cancer 40. 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  After four cycles, scan from 1/10/23 showed POD.\par  \par  1/10/2023: ct C/A/P :  No evidence of metastatic disease in the thorax. New subtle ground-glass opacities which may be infectious or inflammatory in etiology.\par  Progression of disease in the liver , 5.8 x 5.1 cm mass slightly increased previously measuring 5.7 x 4.6 cm\par   [de-identified] : [facility] [de-identified] : Patient here for a follow up. She feels well. She has chronic constipation

## 2024-01-30 ENCOUNTER — APPOINTMENT (OUTPATIENT)
Dept: INFUSION THERAPY | Facility: HOSPITAL | Age: 59
End: 2024-01-30

## 2024-01-30 ENCOUNTER — RESULT REVIEW (OUTPATIENT)
Age: 59
End: 2024-01-30

## 2024-01-30 ENCOUNTER — APPOINTMENT (OUTPATIENT)
Dept: HEMATOLOGY ONCOLOGY | Facility: CLINIC | Age: 59
End: 2024-01-30
Payer: COMMERCIAL

## 2024-01-30 LAB
ALBUMIN SERPL ELPH-MCNC: 4.4 G/DL — SIGNIFICANT CHANGE UP (ref 3.3–5)
ALP SERPL-CCNC: 66 U/L — SIGNIFICANT CHANGE UP (ref 40–120)
ALT FLD-CCNC: 19 U/L — SIGNIFICANT CHANGE UP (ref 10–45)
ANION GAP SERPL CALC-SCNC: 9 MMOL/L — SIGNIFICANT CHANGE UP (ref 5–17)
APPEARANCE UR: CLEAR — SIGNIFICANT CHANGE UP
AST SERPL-CCNC: 27 U/L — SIGNIFICANT CHANGE UP (ref 10–40)
BASOPHILS # BLD AUTO: 0.02 K/UL — SIGNIFICANT CHANGE UP (ref 0–0.2)
BASOPHILS NFR BLD AUTO: 0.5 % — SIGNIFICANT CHANGE UP (ref 0–2)
BILIRUB SERPL-MCNC: 0.2 MG/DL — SIGNIFICANT CHANGE UP (ref 0.2–1.2)
BILIRUB UR-MCNC: NEGATIVE — SIGNIFICANT CHANGE UP
BUN SERPL-MCNC: 12 MG/DL — SIGNIFICANT CHANGE UP (ref 7–23)
CALCIUM SERPL-MCNC: 9.4 MG/DL — SIGNIFICANT CHANGE UP (ref 8.4–10.5)
CANCER AG125 SERPL-ACNC: 5 U/ML — SIGNIFICANT CHANGE UP
CEA SERPL-MCNC: 22 NG/ML — HIGH (ref 0–3.8)
CHLORIDE SERPL-SCNC: 102 MMOL/L — SIGNIFICANT CHANGE UP (ref 96–108)
CO2 SERPL-SCNC: 28 MMOL/L — SIGNIFICANT CHANGE UP (ref 22–31)
COLOR SPEC: YELLOW — SIGNIFICANT CHANGE UP
CREAT SERPL-MCNC: 0.69 MG/DL — SIGNIFICANT CHANGE UP (ref 0.5–1.3)
DIFF PNL FLD: NEGATIVE — SIGNIFICANT CHANGE UP
EGFR: 101 ML/MIN/1.73M2 — SIGNIFICANT CHANGE UP
EOSINOPHIL # BLD AUTO: 0.24 K/UL — SIGNIFICANT CHANGE UP (ref 0–0.5)
EOSINOPHIL NFR BLD AUTO: 6.6 % — HIGH (ref 0–6)
GLUCOSE SERPL-MCNC: 96 MG/DL — SIGNIFICANT CHANGE UP (ref 70–99)
GLUCOSE UR QL: NEGATIVE MG/DL — SIGNIFICANT CHANGE UP
HCT VFR BLD CALC: 36.8 % — SIGNIFICANT CHANGE UP (ref 34.5–45)
HGB BLD-MCNC: 12 G/DL — SIGNIFICANT CHANGE UP (ref 11.5–15.5)
IMM GRANULOCYTES NFR BLD AUTO: 0.3 % — SIGNIFICANT CHANGE UP (ref 0–0.9)
KETONES UR-MCNC: NEGATIVE MG/DL — SIGNIFICANT CHANGE UP
LEUKOCYTE ESTERASE UR-ACNC: NEGATIVE — SIGNIFICANT CHANGE UP
LYMPHOCYTES # BLD AUTO: 1.02 K/UL — SIGNIFICANT CHANGE UP (ref 1–3.3)
LYMPHOCYTES # BLD AUTO: 27.9 % — SIGNIFICANT CHANGE UP (ref 13–44)
MCHC RBC-ENTMCNC: 29 PG — SIGNIFICANT CHANGE UP (ref 27–34)
MCHC RBC-ENTMCNC: 32.6 G/DL — SIGNIFICANT CHANGE UP (ref 32–36)
MCV RBC AUTO: 88.9 FL — SIGNIFICANT CHANGE UP (ref 80–100)
MONOCYTES # BLD AUTO: 0.3 K/UL — SIGNIFICANT CHANGE UP (ref 0–0.9)
MONOCYTES NFR BLD AUTO: 8.2 % — SIGNIFICANT CHANGE UP (ref 2–14)
NEUTROPHILS # BLD AUTO: 2.06 K/UL — SIGNIFICANT CHANGE UP (ref 1.8–7.4)
NEUTROPHILS NFR BLD AUTO: 56.5 % — SIGNIFICANT CHANGE UP (ref 43–77)
NITRITE UR-MCNC: NEGATIVE — SIGNIFICANT CHANGE UP
NRBC # BLD: 0 /100 WBCS — SIGNIFICANT CHANGE UP (ref 0–0)
PH UR: 7 — SIGNIFICANT CHANGE UP (ref 5–8)
PLATELET # BLD AUTO: 195 K/UL — SIGNIFICANT CHANGE UP (ref 150–400)
POTASSIUM SERPL-MCNC: 4.6 MMOL/L — SIGNIFICANT CHANGE UP (ref 3.5–5.3)
POTASSIUM SERPL-SCNC: 4.6 MMOL/L — SIGNIFICANT CHANGE UP (ref 3.5–5.3)
PROT SERPL-MCNC: 7.3 G/DL — SIGNIFICANT CHANGE UP (ref 6–8.3)
PROT UR-MCNC: NEGATIVE MG/DL — SIGNIFICANT CHANGE UP
RBC # BLD: 4.14 M/UL — SIGNIFICANT CHANGE UP (ref 3.8–5.2)
RBC # FLD: 12.3 % — SIGNIFICANT CHANGE UP (ref 10.3–14.5)
SODIUM SERPL-SCNC: 139 MMOL/L — SIGNIFICANT CHANGE UP (ref 135–145)
SP GR SPEC: 1.01 — SIGNIFICANT CHANGE UP (ref 1–1.03)
TSH SERPL-MCNC: 1.24 UIU/ML — SIGNIFICANT CHANGE UP (ref 0.27–4.2)
UROBILINOGEN FLD QL: 0.2 MG/DL — SIGNIFICANT CHANGE UP (ref 0.2–1)
WBC # BLD: 3.65 K/UL — LOW (ref 3.8–10.5)
WBC # FLD AUTO: 3.65 K/UL — LOW (ref 3.8–10.5)

## 2024-01-30 PROCEDURE — 99213 OFFICE O/P EST LOW 20 MIN: CPT

## 2024-01-30 NOTE — HISTORY OF PRESENT ILLNESS
[Disease: _____________________] : Disease: [unfilled] [AJCC Stage: ____] : AJCC Stage: [unfilled] [de-identified] : Patient diagnosed with stage III MMT  in 1/2021. She saw gyn and had imaging. She was seen by Dr. Bowen, who did the surgery. Final path showed STAGE III MMT.\par  Dr. Nichols at [place]. She was treated with Carbo/ Taxol- s/p one cycle , had reaction and switched to Carbo/ doxil x one does. She transferred her care to Dr. Nichols at [place].and then Carbo/ Abraxane/ Herceptin x 6 completed, last dose was in August, 2021\par  She was started on Herceptin maintenance, last dose was in Sept, 2022.\par  Scan showed liver lesion, consistent with POD.\par  Ct C/A/P: 10/6/2022:  A new 4.6x5.7 cm low attenuation lesion in seen in the liver , concerning for metastatic disease.\par  \par  She has some RUQ pain, no nausea or vomiting. She has some constipation. Takes prune juice, on laxatives. No bleeding. No RT in the past.\par  \par  PMH: NONE\par  PSH: WINNIE, BSO, GB, Endometriosis surgery in 2004, right breast biopsy.( Dr. Chasity Pompa)\par  All: sulfa, metronidazole, taxol\par  FH: Sister breast cancer 40. 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  After four cycles, scan from 1/10/23 showed POD.\par  \par  1/10/2023: ct C/A/P :  No evidence of metastatic disease in the thorax. New subtle ground-glass opacities which may be infectious or inflammatory in etiology.\par  Progression of disease in the liver , 5.8 x 5.1 cm mass slightly increased previously measuring 5.7 x 4.6 cm\par   [de-identified] : [facility] [FreeTextEntry1] : Keytruda/Lenvima  started 2/7/23 [de-identified] : Patient is here for follow up and treatment.  She continues to tolerate treatment well.  She complains of intermittent 4/10 periumbilical pain for about 5 days, worst at night.  Has had this before but usually lasts a day or two. She does have chronic constipation which has been worse and thinks it may be related, takes miralax as needed. Denies chest pain, SOB, nausea, vomiting, diarrhea, rash, bleeding.

## 2024-02-07 ENCOUNTER — OUTPATIENT (OUTPATIENT)
Dept: OUTPATIENT SERVICES | Facility: HOSPITAL | Age: 59
LOS: 1 days | Discharge: HOME | End: 2024-02-07

## 2024-02-07 DIAGNOSIS — Z90.710 ACQUIRED ABSENCE OF BOTH CERVIX AND UTERUS: Chronic | ICD-10-CM

## 2024-02-07 DIAGNOSIS — Z98.89 OTHER SPECIFIED POSTPROCEDURAL STATES: Chronic | ICD-10-CM

## 2024-02-07 DIAGNOSIS — Z90.49 ACQUIRED ABSENCE OF OTHER SPECIFIED PARTS OF DIGESTIVE TRACT: Chronic | ICD-10-CM

## 2024-02-07 DIAGNOSIS — C55 MALIGNANT NEOPLASM OF UTERUS, PART UNSPECIFIED: ICD-10-CM

## 2024-02-20 ENCOUNTER — RESULT REVIEW (OUTPATIENT)
Age: 59
End: 2024-02-20

## 2024-02-20 ENCOUNTER — APPOINTMENT (OUTPATIENT)
Dept: INFUSION THERAPY | Facility: HOSPITAL | Age: 59
End: 2024-02-20

## 2024-02-20 ENCOUNTER — APPOINTMENT (OUTPATIENT)
Dept: HEMATOLOGY ONCOLOGY | Facility: CLINIC | Age: 59
End: 2024-02-20
Payer: COMMERCIAL

## 2024-02-20 LAB
ALBUMIN SERPL ELPH-MCNC: 4.5 G/DL — SIGNIFICANT CHANGE UP (ref 3.3–5)
ALP SERPL-CCNC: 76 U/L — SIGNIFICANT CHANGE UP (ref 40–120)
ALT FLD-CCNC: 20 U/L — SIGNIFICANT CHANGE UP (ref 10–45)
ANION GAP SERPL CALC-SCNC: 7 MMOL/L — SIGNIFICANT CHANGE UP (ref 5–17)
APPEARANCE UR: CLEAR — SIGNIFICANT CHANGE UP
AST SERPL-CCNC: 30 U/L — SIGNIFICANT CHANGE UP (ref 10–40)
BASOPHILS # BLD AUTO: 0.02 K/UL — SIGNIFICANT CHANGE UP (ref 0–0.2)
BASOPHILS NFR BLD AUTO: 0.4 % — SIGNIFICANT CHANGE UP (ref 0–2)
BILIRUB SERPL-MCNC: 0.2 MG/DL — SIGNIFICANT CHANGE UP (ref 0.2–1.2)
BILIRUB UR-MCNC: NEGATIVE — SIGNIFICANT CHANGE UP
BUN SERPL-MCNC: 13 MG/DL — SIGNIFICANT CHANGE UP (ref 7–23)
CALCIUM SERPL-MCNC: 10 MG/DL — SIGNIFICANT CHANGE UP (ref 8.4–10.5)
CANCER AG125 SERPL-ACNC: 6 U/ML — SIGNIFICANT CHANGE UP
CEA SERPL-MCNC: 26 NG/ML — HIGH (ref 0–3.8)
CHLORIDE SERPL-SCNC: 101 MMOL/L — SIGNIFICANT CHANGE UP (ref 96–108)
CO2 SERPL-SCNC: 32 MMOL/L — HIGH (ref 22–31)
COLOR SPEC: YELLOW — SIGNIFICANT CHANGE UP
CREAT SERPL-MCNC: 0.79 MG/DL — SIGNIFICANT CHANGE UP (ref 0.5–1.3)
DIFF PNL FLD: NEGATIVE — SIGNIFICANT CHANGE UP
EGFR: 87 ML/MIN/1.73M2 — SIGNIFICANT CHANGE UP
EOSINOPHIL # BLD AUTO: 0.12 K/UL — SIGNIFICANT CHANGE UP (ref 0–0.5)
EOSINOPHIL NFR BLD AUTO: 2.4 % — SIGNIFICANT CHANGE UP (ref 0–6)
GLUCOSE SERPL-MCNC: 72 MG/DL — SIGNIFICANT CHANGE UP (ref 70–99)
GLUCOSE UR QL: NEGATIVE MG/DL — SIGNIFICANT CHANGE UP
HCT VFR BLD CALC: 39 % — SIGNIFICANT CHANGE UP (ref 34.5–45)
HGB BLD-MCNC: 12.8 G/DL — SIGNIFICANT CHANGE UP (ref 11.5–15.5)
IMM GRANULOCYTES NFR BLD AUTO: 0.2 % — SIGNIFICANT CHANGE UP (ref 0–0.9)
KETONES UR-MCNC: NEGATIVE MG/DL — SIGNIFICANT CHANGE UP
LEUKOCYTE ESTERASE UR-ACNC: NEGATIVE — SIGNIFICANT CHANGE UP
LYMPHOCYTES # BLD AUTO: 1.31 K/UL — SIGNIFICANT CHANGE UP (ref 1–3.3)
LYMPHOCYTES # BLD AUTO: 26.6 % — SIGNIFICANT CHANGE UP (ref 13–44)
MCHC RBC-ENTMCNC: 28.8 PG — SIGNIFICANT CHANGE UP (ref 27–34)
MCHC RBC-ENTMCNC: 32.8 G/DL — SIGNIFICANT CHANGE UP (ref 32–36)
MCV RBC AUTO: 87.8 FL — SIGNIFICANT CHANGE UP (ref 80–100)
MONOCYTES # BLD AUTO: 0.41 K/UL — SIGNIFICANT CHANGE UP (ref 0–0.9)
MONOCYTES NFR BLD AUTO: 8.3 % — SIGNIFICANT CHANGE UP (ref 2–14)
NEUTROPHILS # BLD AUTO: 3.06 K/UL — SIGNIFICANT CHANGE UP (ref 1.8–7.4)
NEUTROPHILS NFR BLD AUTO: 62.1 % — SIGNIFICANT CHANGE UP (ref 43–77)
NITRITE UR-MCNC: NEGATIVE — SIGNIFICANT CHANGE UP
NRBC # BLD: 0 /100 WBCS — SIGNIFICANT CHANGE UP (ref 0–0)
PH UR: 7 — SIGNIFICANT CHANGE UP (ref 5–8)
PLATELET # BLD AUTO: 196 K/UL — SIGNIFICANT CHANGE UP (ref 150–400)
POTASSIUM SERPL-MCNC: 4.8 MMOL/L — SIGNIFICANT CHANGE UP (ref 3.5–5.3)
POTASSIUM SERPL-SCNC: 4.8 MMOL/L — SIGNIFICANT CHANGE UP (ref 3.5–5.3)
PROT SERPL-MCNC: 7.5 G/DL — SIGNIFICANT CHANGE UP (ref 6–8.3)
PROT UR-MCNC: NEGATIVE MG/DL — SIGNIFICANT CHANGE UP
RBC # BLD: 4.44 M/UL — SIGNIFICANT CHANGE UP (ref 3.8–5.2)
RBC # FLD: 12.6 % — SIGNIFICANT CHANGE UP (ref 10.3–14.5)
SODIUM SERPL-SCNC: 139 MMOL/L — SIGNIFICANT CHANGE UP (ref 135–145)
SP GR SPEC: <1.005 — LOW (ref 1–1.03)
TSH SERPL-MCNC: 21.4 UIU/ML — HIGH (ref 0.27–4.2)
UROBILINOGEN FLD QL: 0.2 MG/DL — SIGNIFICANT CHANGE UP (ref 0.2–1)
WBC # BLD: 4.93 K/UL — SIGNIFICANT CHANGE UP (ref 3.8–10.5)
WBC # FLD AUTO: 4.93 K/UL — SIGNIFICANT CHANGE UP (ref 3.8–10.5)

## 2024-02-20 PROCEDURE — 99214 OFFICE O/P EST MOD 30 MIN: CPT

## 2024-02-21 DIAGNOSIS — Z51.11 ENCOUNTER FOR ANTINEOPLASTIC CHEMOTHERAPY: ICD-10-CM

## 2024-02-21 DIAGNOSIS — C54.1 MALIGNANT NEOPLASM OF ENDOMETRIUM: ICD-10-CM

## 2024-02-22 NOTE — HISTORY OF PRESENT ILLNESS
[Disease: _____________________] : Disease: [unfilled] [AJCC Stage: ____] : AJCC Stage: [unfilled] [de-identified] : [facility] [de-identified] : Patient diagnosed with stage III MMT  in 1/2021. She saw gyn and had imaging. She was seen by Dr. Bowen, who did the surgery. Final path showed STAGE III MMT.\par  Dr. Nichols at [place]. She was treated with Carbo/ Taxol- s/p one cycle , had reaction and switched to Carbo/ doxil x one does. She transferred her care to Dr. Nichols at [place].and then Carbo/ Abraxane/ Herceptin x 6 completed, last dose was in August, 2021\par  She was started on Herceptin maintenance, last dose was in Sept, 2022.\par  Scan showed liver lesion, consistent with POD.\par  Ct C/A/P: 10/6/2022:  A new 4.6x5.7 cm low attenuation lesion in seen in the liver , concerning for metastatic disease.\par  \par  She has some RUQ pain, no nausea or vomiting. She has some constipation. Takes prune juice, on laxatives. No bleeding. No RT in the past.\par  \par  PMH: NONE\par  PSH: WINNIE, BSO, GB, Endometriosis surgery in 2004, right breast biopsy.( Dr. Chasity Pompa)\par  All: sulfa, metronidazole, taxol\par  FH: Sister breast cancer 40. 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  After four cycles, scan from 1/10/23 showed POD.\par  \par  1/10/2023: ct C/A/P :  No evidence of metastatic disease in the thorax. New subtle ground-glass opacities which may be infectious or inflammatory in etiology.\par  Progression of disease in the liver , 5.8 x 5.1 cm mass slightly increased previously measuring 5.7 x 4.6 cm\par   [de-identified] : Patient here for a follow up. She feels well. She has skin rash and itching. When she develops rash , she hold Lenvima and when rash resolved, she starts back on the Lenvima. She has chronic constipation. Appetite is good.

## 2024-03-05 ENCOUNTER — NON-APPOINTMENT (OUTPATIENT)
Age: 59
End: 2024-03-05

## 2024-03-12 ENCOUNTER — RESULT REVIEW (OUTPATIENT)
Age: 59
End: 2024-03-12

## 2024-03-12 ENCOUNTER — APPOINTMENT (OUTPATIENT)
Dept: INFUSION THERAPY | Facility: HOSPITAL | Age: 59
End: 2024-03-12

## 2024-03-12 LAB
ALBUMIN SERPL ELPH-MCNC: 4.4 G/DL — SIGNIFICANT CHANGE UP (ref 3.3–5)
ALP SERPL-CCNC: 72 U/L — SIGNIFICANT CHANGE UP (ref 40–120)
ALT FLD-CCNC: 14 U/L — SIGNIFICANT CHANGE UP (ref 10–45)
ANION GAP SERPL CALC-SCNC: 14 MMOL/L — SIGNIFICANT CHANGE UP (ref 5–17)
APPEARANCE UR: CLEAR — SIGNIFICANT CHANGE UP
AST SERPL-CCNC: 30 U/L — SIGNIFICANT CHANGE UP (ref 10–40)
BASOPHILS # BLD AUTO: 0.01 K/UL — SIGNIFICANT CHANGE UP (ref 0–0.2)
BASOPHILS NFR BLD AUTO: 0.2 % — SIGNIFICANT CHANGE UP (ref 0–2)
BILIRUB SERPL-MCNC: 0.2 MG/DL — SIGNIFICANT CHANGE UP (ref 0.2–1.2)
BILIRUB UR-MCNC: NEGATIVE — SIGNIFICANT CHANGE UP
BUN SERPL-MCNC: 10 MG/DL — SIGNIFICANT CHANGE UP (ref 7–23)
CALCIUM SERPL-MCNC: 9.6 MG/DL — SIGNIFICANT CHANGE UP (ref 8.4–10.5)
CHLORIDE SERPL-SCNC: 100 MMOL/L — SIGNIFICANT CHANGE UP (ref 96–108)
CO2 SERPL-SCNC: 25 MMOL/L — SIGNIFICANT CHANGE UP (ref 22–31)
COLOR SPEC: YELLOW — SIGNIFICANT CHANGE UP
CREAT SERPL-MCNC: 0.68 MG/DL — SIGNIFICANT CHANGE UP (ref 0.5–1.3)
DIFF PNL FLD: NEGATIVE — SIGNIFICANT CHANGE UP
EGFR: 102 ML/MIN/1.73M2 — SIGNIFICANT CHANGE UP
EOSINOPHIL # BLD AUTO: 0.14 K/UL — SIGNIFICANT CHANGE UP (ref 0–0.5)
EOSINOPHIL NFR BLD AUTO: 3.1 % — SIGNIFICANT CHANGE UP (ref 0–6)
GLUCOSE SERPL-MCNC: 121 MG/DL — HIGH (ref 70–99)
GLUCOSE UR QL: NEGATIVE MG/DL — SIGNIFICANT CHANGE UP
HCT VFR BLD CALC: 35.8 % — SIGNIFICANT CHANGE UP (ref 34.5–45)
HGB BLD-MCNC: 11.8 G/DL — SIGNIFICANT CHANGE UP (ref 11.5–15.5)
IMM GRANULOCYTES NFR BLD AUTO: 0.2 % — SIGNIFICANT CHANGE UP (ref 0–0.9)
KETONES UR-MCNC: NEGATIVE MG/DL — SIGNIFICANT CHANGE UP
LEUKOCYTE ESTERASE UR-ACNC: NEGATIVE — SIGNIFICANT CHANGE UP
LYMPHOCYTES # BLD AUTO: 1.09 K/UL — SIGNIFICANT CHANGE UP (ref 1–3.3)
LYMPHOCYTES # BLD AUTO: 24.2 % — SIGNIFICANT CHANGE UP (ref 13–44)
MCHC RBC-ENTMCNC: 29.1 PG — SIGNIFICANT CHANGE UP (ref 27–34)
MCHC RBC-ENTMCNC: 33 G/DL — SIGNIFICANT CHANGE UP (ref 32–36)
MCV RBC AUTO: 88.4 FL — SIGNIFICANT CHANGE UP (ref 80–100)
MONOCYTES # BLD AUTO: 0.32 K/UL — SIGNIFICANT CHANGE UP (ref 0–0.9)
MONOCYTES NFR BLD AUTO: 7.1 % — SIGNIFICANT CHANGE UP (ref 2–14)
NEUTROPHILS # BLD AUTO: 2.94 K/UL — SIGNIFICANT CHANGE UP (ref 1.8–7.4)
NEUTROPHILS NFR BLD AUTO: 65.2 % — SIGNIFICANT CHANGE UP (ref 43–77)
NITRITE UR-MCNC: NEGATIVE — SIGNIFICANT CHANGE UP
NRBC # BLD: 0 /100 WBCS — SIGNIFICANT CHANGE UP (ref 0–0)
PH UR: 7.5 — SIGNIFICANT CHANGE UP (ref 5–8)
PLATELET # BLD AUTO: 186 K/UL — SIGNIFICANT CHANGE UP (ref 150–400)
POTASSIUM SERPL-MCNC: 3.9 MMOL/L — SIGNIFICANT CHANGE UP (ref 3.5–5.3)
POTASSIUM SERPL-SCNC: 3.9 MMOL/L — SIGNIFICANT CHANGE UP (ref 3.5–5.3)
PROT SERPL-MCNC: 7.2 G/DL — SIGNIFICANT CHANGE UP (ref 6–8.3)
PROT UR-MCNC: NEGATIVE MG/DL — SIGNIFICANT CHANGE UP
RBC # BLD: 4.05 M/UL — SIGNIFICANT CHANGE UP (ref 3.8–5.2)
RBC # FLD: 12.7 % — SIGNIFICANT CHANGE UP (ref 10.3–14.5)
SODIUM SERPL-SCNC: 139 MMOL/L — SIGNIFICANT CHANGE UP (ref 135–145)
SP GR SPEC: <1.005 — LOW (ref 1–1.03)
UROBILINOGEN FLD QL: 0.2 MG/DL — SIGNIFICANT CHANGE UP (ref 0.2–1)
WBC # BLD: 4.51 K/UL — SIGNIFICANT CHANGE UP (ref 3.8–10.5)
WBC # FLD AUTO: 4.51 K/UL — SIGNIFICANT CHANGE UP (ref 3.8–10.5)

## 2024-03-13 LAB
CANCER AG125 SERPL-ACNC: 6 U/ML — SIGNIFICANT CHANGE UP
CEA SERPL-MCNC: 26.9 NG/ML — HIGH (ref 0–3.8)
TSH SERPL-MCNC: 4.41 UIU/ML — HIGH (ref 0.27–4.2)

## 2024-04-02 ENCOUNTER — RESULT REVIEW (OUTPATIENT)
Age: 59
End: 2024-04-02

## 2024-04-02 ENCOUNTER — APPOINTMENT (OUTPATIENT)
Dept: HEMATOLOGY ONCOLOGY | Facility: CLINIC | Age: 59
End: 2024-04-02
Payer: COMMERCIAL

## 2024-04-02 ENCOUNTER — APPOINTMENT (OUTPATIENT)
Dept: INFUSION THERAPY | Facility: HOSPITAL | Age: 59
End: 2024-04-02

## 2024-04-02 LAB
ALBUMIN SERPL ELPH-MCNC: 4.7 G/DL — SIGNIFICANT CHANGE UP (ref 3.3–5)
ALP SERPL-CCNC: 76 U/L — SIGNIFICANT CHANGE UP (ref 40–120)
ALT FLD-CCNC: 10 U/L — SIGNIFICANT CHANGE UP (ref 10–45)
ANION GAP SERPL CALC-SCNC: 7 MMOL/L — SIGNIFICANT CHANGE UP (ref 5–17)
APPEARANCE UR: CLEAR — SIGNIFICANT CHANGE UP
AST SERPL-CCNC: 29 U/L — SIGNIFICANT CHANGE UP (ref 10–40)
BASOPHILS # BLD AUTO: 0.02 K/UL — SIGNIFICANT CHANGE UP (ref 0–0.2)
BASOPHILS NFR BLD AUTO: 0.4 % — SIGNIFICANT CHANGE UP (ref 0–2)
BILIRUB SERPL-MCNC: 0.3 MG/DL — SIGNIFICANT CHANGE UP (ref 0.2–1.2)
BILIRUB UR-MCNC: NEGATIVE — SIGNIFICANT CHANGE UP
BUN SERPL-MCNC: 15 MG/DL — SIGNIFICANT CHANGE UP (ref 7–23)
CALCIUM SERPL-MCNC: 9.9 MG/DL — SIGNIFICANT CHANGE UP (ref 8.4–10.5)
CANCER AG125 SERPL-ACNC: 6 U/ML — SIGNIFICANT CHANGE UP
CEA SERPL-MCNC: 29.4 NG/ML — HIGH (ref 0–3.8)
CHLORIDE SERPL-SCNC: 105 MMOL/L — SIGNIFICANT CHANGE UP (ref 96–108)
CO2 SERPL-SCNC: 30 MMOL/L — SIGNIFICANT CHANGE UP (ref 22–31)
COLOR SPEC: YELLOW — SIGNIFICANT CHANGE UP
CREAT SERPL-MCNC: 0.75 MG/DL — SIGNIFICANT CHANGE UP (ref 0.5–1.3)
DIFF PNL FLD: NEGATIVE — SIGNIFICANT CHANGE UP
EGFR: 93 ML/MIN/1.73M2 — SIGNIFICANT CHANGE UP
EOSINOPHIL # BLD AUTO: 0.23 K/UL — SIGNIFICANT CHANGE UP (ref 0–0.5)
EOSINOPHIL NFR BLD AUTO: 4.4 % — SIGNIFICANT CHANGE UP (ref 0–6)
GLUCOSE SERPL-MCNC: 90 MG/DL — SIGNIFICANT CHANGE UP (ref 70–99)
GLUCOSE UR QL: NEGATIVE MG/DL — SIGNIFICANT CHANGE UP
HCT VFR BLD CALC: 37.8 % — SIGNIFICANT CHANGE UP (ref 34.5–45)
HGB BLD-MCNC: 12.6 G/DL — SIGNIFICANT CHANGE UP (ref 11.5–15.5)
IMM GRANULOCYTES NFR BLD AUTO: 0.2 % — SIGNIFICANT CHANGE UP (ref 0–0.9)
KETONES UR-MCNC: NEGATIVE MG/DL — SIGNIFICANT CHANGE UP
LEUKOCYTE ESTERASE UR-ACNC: NEGATIVE — SIGNIFICANT CHANGE UP
LYMPHOCYTES # BLD AUTO: 1.3 K/UL — SIGNIFICANT CHANGE UP (ref 1–3.3)
LYMPHOCYTES # BLD AUTO: 24.9 % — SIGNIFICANT CHANGE UP (ref 13–44)
MCHC RBC-ENTMCNC: 29.3 PG — SIGNIFICANT CHANGE UP (ref 27–34)
MCHC RBC-ENTMCNC: 33.3 G/DL — SIGNIFICANT CHANGE UP (ref 32–36)
MCV RBC AUTO: 87.9 FL — SIGNIFICANT CHANGE UP (ref 80–100)
MONOCYTES # BLD AUTO: 0.44 K/UL — SIGNIFICANT CHANGE UP (ref 0–0.9)
MONOCYTES NFR BLD AUTO: 8.4 % — SIGNIFICANT CHANGE UP (ref 2–14)
NEUTROPHILS # BLD AUTO: 3.23 K/UL — SIGNIFICANT CHANGE UP (ref 1.8–7.4)
NEUTROPHILS NFR BLD AUTO: 61.7 % — SIGNIFICANT CHANGE UP (ref 43–77)
NITRITE UR-MCNC: NEGATIVE — SIGNIFICANT CHANGE UP
NRBC # BLD: 0 /100 WBCS — SIGNIFICANT CHANGE UP (ref 0–0)
PH UR: 7.5 — SIGNIFICANT CHANGE UP (ref 5–8)
PLATELET # BLD AUTO: 195 K/UL — SIGNIFICANT CHANGE UP (ref 150–400)
POTASSIUM SERPL-MCNC: 5 MMOL/L — SIGNIFICANT CHANGE UP (ref 3.5–5.3)
POTASSIUM SERPL-SCNC: 5 MMOL/L — SIGNIFICANT CHANGE UP (ref 3.5–5.3)
PROT SERPL-MCNC: 7.9 G/DL — SIGNIFICANT CHANGE UP (ref 6–8.3)
PROT UR-MCNC: NEGATIVE MG/DL — SIGNIFICANT CHANGE UP
RBC # BLD: 4.3 M/UL — SIGNIFICANT CHANGE UP (ref 3.8–5.2)
RBC # FLD: 12.6 % — SIGNIFICANT CHANGE UP (ref 10.3–14.5)
SODIUM SERPL-SCNC: 142 MMOL/L — SIGNIFICANT CHANGE UP (ref 135–145)
SP GR SPEC: <1.005 — LOW (ref 1–1.03)
TSH SERPL-MCNC: 0.81 UIU/ML — SIGNIFICANT CHANGE UP (ref 0.27–4.2)
UROBILINOGEN FLD QL: 0.2 MG/DL — SIGNIFICANT CHANGE UP (ref 0.2–1)
WBC # BLD: 5.23 K/UL — SIGNIFICANT CHANGE UP (ref 3.8–10.5)
WBC # FLD AUTO: 5.23 K/UL — SIGNIFICANT CHANGE UP (ref 3.8–10.5)

## 2024-04-02 PROCEDURE — 99213 OFFICE O/P EST LOW 20 MIN: CPT

## 2024-04-02 PROCEDURE — G2211 COMPLEX E/M VISIT ADD ON: CPT

## 2024-04-02 NOTE — HISTORY OF PRESENT ILLNESS
[Disease: _____________________] : Disease: [unfilled] [AJCC Stage: ____] : AJCC Stage: [unfilled] [de-identified] : Patient diagnosed with stage III MMT  in 1/2021. She saw gyn and had imaging. She was seen by Dr. Bowen, who did the surgery. Final path showed STAGE III MMT.\par  Dr. Nichols at [place]. She was treated with Carbo/ Taxol- s/p one cycle , had reaction and switched to Carbo/ doxil x one does. She transferred her care to Dr. Nichols at [place].and then Carbo/ Abraxane/ Herceptin x 6 completed, last dose was in August, 2021\par  She was started on Herceptin maintenance, last dose was in Sept, 2022.\par  Scan showed liver lesion, consistent with POD.\par  Ct C/A/P: 10/6/2022:  A new 4.6x5.7 cm low attenuation lesion in seen in the liver , concerning for metastatic disease.\par  \par  She has some RUQ pain, no nausea or vomiting. She has some constipation. Takes prune juice, on laxatives. No bleeding. No RT in the past.\par  \par  PMH: NONE\par  PSH: WINNIE, BSO, GB, Endometriosis surgery in 2004, right breast biopsy.( Dr. Chasity Pompa)\par  All: sulfa, metronidazole, taxol\par  FH: Sister breast cancer 40. 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  After four cycles, scan from 1/10/23 showed POD.\par  \par  1/10/2023: ct C/A/P :  No evidence of metastatic disease in the thorax. New subtle ground-glass opacities which may be infectious or inflammatory in etiology.\par  Progression of disease in the liver , 5.8 x 5.1 cm mass slightly increased previously measuring 5.7 x 4.6 cm\par   [de-identified] : [facility] [FreeTextEntry1] : Keytruda/Lenvima [de-identified] : Patient is here for follow up and treatment. She continues to feel well overall. She has occasional right sided abdominal pain, stable and usually happens with constipation. BP has been well controlled as per patient although slightly high today at visit (150/85), says she has been taking BP med as needed and not daily. Appetite is good. She denies chest pain, SOB, nausea, vomiting, rash, bleeding.

## 2024-04-02 NOTE — REASON FOR VISIT
[Follow-Up Visit] : a follow-up [Pre-Treatment Visit] : a pre-treatment [FreeTextEntry2] : Relapsed UPSC

## 2024-04-02 NOTE — REVIEW OF SYSTEMS
[Diarrhea: Grade 0] : Diarrhea: Grade 0 [Negative] : Allergic/Immunologic [Constipation] : constipation

## 2024-04-15 ENCOUNTER — OUTPATIENT (OUTPATIENT)
Dept: OUTPATIENT SERVICES | Facility: HOSPITAL | Age: 59
LOS: 1 days | Discharge: HOME | End: 2024-04-15

## 2024-04-15 DIAGNOSIS — Z98.89 OTHER SPECIFIED POSTPROCEDURAL STATES: Chronic | ICD-10-CM

## 2024-04-15 DIAGNOSIS — C55 MALIGNANT NEOPLASM OF UTERUS, PART UNSPECIFIED: ICD-10-CM

## 2024-04-15 DIAGNOSIS — Z90.49 ACQUIRED ABSENCE OF OTHER SPECIFIED PARTS OF DIGESTIVE TRACT: Chronic | ICD-10-CM

## 2024-04-15 DIAGNOSIS — Z90.710 ACQUIRED ABSENCE OF BOTH CERVIX AND UTERUS: Chronic | ICD-10-CM

## 2024-04-23 ENCOUNTER — RESULT REVIEW (OUTPATIENT)
Age: 59
End: 2024-04-23

## 2024-04-23 ENCOUNTER — APPOINTMENT (OUTPATIENT)
Dept: HEMATOLOGY ONCOLOGY | Facility: CLINIC | Age: 59
End: 2024-04-23
Payer: COMMERCIAL

## 2024-04-23 ENCOUNTER — APPOINTMENT (OUTPATIENT)
Dept: INFUSION THERAPY | Facility: HOSPITAL | Age: 59
End: 2024-04-23

## 2024-04-23 LAB
ALBUMIN SERPL ELPH-MCNC: 4.5 G/DL — SIGNIFICANT CHANGE UP (ref 3.3–5)
ALP SERPL-CCNC: 72 U/L — SIGNIFICANT CHANGE UP (ref 40–120)
ALT FLD-CCNC: 21 U/L — SIGNIFICANT CHANGE UP (ref 10–45)
ANION GAP SERPL CALC-SCNC: 9 MMOL/L — SIGNIFICANT CHANGE UP (ref 5–17)
APPEARANCE UR: CLEAR — SIGNIFICANT CHANGE UP
AST SERPL-CCNC: 23 U/L — SIGNIFICANT CHANGE UP (ref 10–40)
BASOPHILS # BLD AUTO: 0.03 K/UL — SIGNIFICANT CHANGE UP (ref 0–0.2)
BASOPHILS NFR BLD AUTO: 0.7 % — SIGNIFICANT CHANGE UP (ref 0–2)
BILIRUB SERPL-MCNC: 0.3 MG/DL — SIGNIFICANT CHANGE UP (ref 0.2–1.2)
BILIRUB UR-MCNC: NEGATIVE — SIGNIFICANT CHANGE UP
BUN SERPL-MCNC: 12 MG/DL — SIGNIFICANT CHANGE UP (ref 7–23)
CALCIUM SERPL-MCNC: 10.3 MG/DL — SIGNIFICANT CHANGE UP (ref 8.4–10.5)
CANCER AG125 SERPL-ACNC: 6 U/ML — SIGNIFICANT CHANGE UP
CEA SERPL-MCNC: 29.9 NG/ML — HIGH (ref 0–3.8)
CHLORIDE SERPL-SCNC: 102 MMOL/L — SIGNIFICANT CHANGE UP (ref 96–108)
CO2 SERPL-SCNC: 31 MMOL/L — SIGNIFICANT CHANGE UP (ref 22–31)
COLOR SPEC: YELLOW — SIGNIFICANT CHANGE UP
CREAT SERPL-MCNC: 0.76 MG/DL — SIGNIFICANT CHANGE UP (ref 0.5–1.3)
DIFF PNL FLD: NEGATIVE — SIGNIFICANT CHANGE UP
EGFR: 91 ML/MIN/1.73M2 — SIGNIFICANT CHANGE UP
EOSINOPHIL # BLD AUTO: 0.11 K/UL — SIGNIFICANT CHANGE UP (ref 0–0.5)
EOSINOPHIL NFR BLD AUTO: 2.5 % — SIGNIFICANT CHANGE UP (ref 0–6)
GLUCOSE SERPL-MCNC: 91 MG/DL — SIGNIFICANT CHANGE UP (ref 70–99)
GLUCOSE UR QL: NEGATIVE MG/DL — SIGNIFICANT CHANGE UP
HCT VFR BLD CALC: 37.8 % — SIGNIFICANT CHANGE UP (ref 34.5–45)
HGB BLD-MCNC: 12.2 G/DL — SIGNIFICANT CHANGE UP (ref 11.5–15.5)
IMM GRANULOCYTES NFR BLD AUTO: 0.2 % — SIGNIFICANT CHANGE UP (ref 0–0.9)
KETONES UR-MCNC: NEGATIVE MG/DL — SIGNIFICANT CHANGE UP
LEUKOCYTE ESTERASE UR-ACNC: NEGATIVE — SIGNIFICANT CHANGE UP
LYMPHOCYTES # BLD AUTO: 1.17 K/UL — SIGNIFICANT CHANGE UP (ref 1–3.3)
LYMPHOCYTES # BLD AUTO: 26.7 % — SIGNIFICANT CHANGE UP (ref 13–44)
MCHC RBC-ENTMCNC: 29.5 PG — SIGNIFICANT CHANGE UP (ref 27–34)
MCHC RBC-ENTMCNC: 32.3 G/DL — SIGNIFICANT CHANGE UP (ref 32–36)
MCV RBC AUTO: 91.5 FL — SIGNIFICANT CHANGE UP (ref 80–100)
MONOCYTES # BLD AUTO: 0.33 K/UL — SIGNIFICANT CHANGE UP (ref 0–0.9)
MONOCYTES NFR BLD AUTO: 7.5 % — SIGNIFICANT CHANGE UP (ref 2–14)
NEUTROPHILS # BLD AUTO: 2.74 K/UL — SIGNIFICANT CHANGE UP (ref 1.8–7.4)
NEUTROPHILS NFR BLD AUTO: 62.4 % — SIGNIFICANT CHANGE UP (ref 43–77)
NITRITE UR-MCNC: NEGATIVE — SIGNIFICANT CHANGE UP
NRBC # BLD: 0 /100 WBCS — SIGNIFICANT CHANGE UP (ref 0–0)
PH UR: 7.5 — SIGNIFICANT CHANGE UP (ref 5–8)
PLATELET # BLD AUTO: 198 K/UL — SIGNIFICANT CHANGE UP (ref 150–400)
POTASSIUM SERPL-MCNC: 4.6 MMOL/L — SIGNIFICANT CHANGE UP (ref 3.5–5.3)
POTASSIUM SERPL-SCNC: 4.6 MMOL/L — SIGNIFICANT CHANGE UP (ref 3.5–5.3)
PROT SERPL-MCNC: 7.4 G/DL — SIGNIFICANT CHANGE UP (ref 6–8.3)
PROT UR-MCNC: NEGATIVE MG/DL — SIGNIFICANT CHANGE UP
RBC # BLD: 4.13 M/UL — SIGNIFICANT CHANGE UP (ref 3.8–5.2)
RBC # FLD: 12.4 % — SIGNIFICANT CHANGE UP (ref 10.3–14.5)
SODIUM SERPL-SCNC: 142 MMOL/L — SIGNIFICANT CHANGE UP (ref 135–145)
SP GR SPEC: 1.01 — SIGNIFICANT CHANGE UP (ref 1–1.03)
TSH SERPL-MCNC: 0.98 UIU/ML — SIGNIFICANT CHANGE UP (ref 0.27–4.2)
UROBILINOGEN FLD QL: 0.2 MG/DL — SIGNIFICANT CHANGE UP (ref 0.2–1)
WBC # BLD: 4.39 K/UL — SIGNIFICANT CHANGE UP (ref 3.8–10.5)
WBC # FLD AUTO: 4.39 K/UL — SIGNIFICANT CHANGE UP (ref 3.8–10.5)

## 2024-04-23 PROCEDURE — 99214 OFFICE O/P EST MOD 30 MIN: CPT

## 2024-04-23 NOTE — HISTORY OF PRESENT ILLNESS
[Disease: _____________________] : Disease: [unfilled] [AJCC Stage: ____] : AJCC Stage: [unfilled] [de-identified] : Patient diagnosed with stage III MMT  in 1/2021. She saw gyn and had imaging. She was seen by Dr. Bowen, who did the surgery. Final path showed STAGE III MMT.\par  Dr. Nichols at [place]. She was treated with Carbo/ Taxol- s/p one cycle , had reaction and switched to Carbo/ doxil x one does. She transferred her care to Dr. Nichols at [place].and then Carbo/ Abraxane/ Herceptin x 6 completed, last dose was in August, 2021\par  She was started on Herceptin maintenance, last dose was in Sept, 2022.\par  Scan showed liver lesion, consistent with POD.\par  Ct C/A/P: 10/6/2022:  A new 4.6x5.7 cm low attenuation lesion in seen in the liver , concerning for metastatic disease.\par  \par  She has some RUQ pain, no nausea or vomiting. She has some constipation. Takes prune juice, on laxatives. No bleeding. No RT in the past.\par  \par  PMH: NONE\par  PSH: WINNIE, BSO, GB, Endometriosis surgery in 2004, right breast biopsy.( Dr. Chasity Pompa)\par  All: sulfa, metronidazole, taxol\par  FH: Sister breast cancer 40. 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  After four cycles, scan from 1/10/23 showed POD.\par  \par  1/10/2023: ct C/A/P :  No evidence of metastatic disease in the thorax. New subtle ground-glass opacities which may be infectious or inflammatory in etiology.\par  Progression of disease in the liver , 5.8 x 5.1 cm mass slightly increased previously measuring 5.7 x 4.6 cm\par   [de-identified] : [facility] [FreeTextEntry1] : Keytruda/Lenvima [de-identified] : Patient here for a follow up. She has chronic constipation, relieved with fiber. She is running high BP at home, taking Medications.

## 2024-04-24 DIAGNOSIS — Z51.11 ENCOUNTER FOR ANTINEOPLASTIC CHEMOTHERAPY: ICD-10-CM

## 2024-04-24 DIAGNOSIS — C54.1 MALIGNANT NEOPLASM OF ENDOMETRIUM: ICD-10-CM

## 2024-05-06 ENCOUNTER — NON-APPOINTMENT (OUTPATIENT)
Age: 59
End: 2024-05-06

## 2024-05-21 ENCOUNTER — RESULT REVIEW (OUTPATIENT)
Age: 59
End: 2024-05-21

## 2024-05-21 ENCOUNTER — APPOINTMENT (OUTPATIENT)
Dept: HEMATOLOGY ONCOLOGY | Facility: CLINIC | Age: 59
End: 2024-05-21
Payer: COMMERCIAL

## 2024-05-21 ENCOUNTER — APPOINTMENT (OUTPATIENT)
Dept: INFUSION THERAPY | Facility: HOSPITAL | Age: 59
End: 2024-05-21

## 2024-05-21 LAB
APPEARANCE UR: CLEAR — SIGNIFICANT CHANGE UP
BASOPHILS # BLD AUTO: 0.03 K/UL — SIGNIFICANT CHANGE UP (ref 0–0.2)
BASOPHILS NFR BLD AUTO: 0.6 % — SIGNIFICANT CHANGE UP (ref 0–2)
BILIRUB UR-MCNC: NEGATIVE — SIGNIFICANT CHANGE UP
CANCER AG125 SERPL-ACNC: 6 U/ML — SIGNIFICANT CHANGE UP
CEA SERPL-MCNC: 28 NG/ML — HIGH (ref 0–3.8)
COLOR SPEC: YELLOW — SIGNIFICANT CHANGE UP
DIFF PNL FLD: NEGATIVE — SIGNIFICANT CHANGE UP
EOSINOPHIL # BLD AUTO: 0.11 K/UL — SIGNIFICANT CHANGE UP (ref 0–0.5)
EOSINOPHIL NFR BLD AUTO: 2.1 % — SIGNIFICANT CHANGE UP (ref 0–6)
GLUCOSE UR QL: NEGATIVE MG/DL — SIGNIFICANT CHANGE UP
HCT VFR BLD CALC: 36.5 % — SIGNIFICANT CHANGE UP (ref 34.5–45)
HGB BLD-MCNC: 12.4 G/DL — SIGNIFICANT CHANGE UP (ref 11.5–15.5)
IMM GRANULOCYTES NFR BLD AUTO: 0.2 % — SIGNIFICANT CHANGE UP (ref 0–0.9)
KETONES UR-MCNC: NEGATIVE MG/DL — SIGNIFICANT CHANGE UP
LEUKOCYTE ESTERASE UR-ACNC: NEGATIVE — SIGNIFICANT CHANGE UP
LYMPHOCYTES # BLD AUTO: 1.38 K/UL — SIGNIFICANT CHANGE UP (ref 1–3.3)
LYMPHOCYTES # BLD AUTO: 26.6 % — SIGNIFICANT CHANGE UP (ref 13–44)
MCHC RBC-ENTMCNC: 29.5 PG — SIGNIFICANT CHANGE UP (ref 27–34)
MCHC RBC-ENTMCNC: 34 G/DL — SIGNIFICANT CHANGE UP (ref 32–36)
MCV RBC AUTO: 86.7 FL — SIGNIFICANT CHANGE UP (ref 80–100)
MONOCYTES # BLD AUTO: 0.41 K/UL — SIGNIFICANT CHANGE UP (ref 0–0.9)
MONOCYTES NFR BLD AUTO: 7.9 % — SIGNIFICANT CHANGE UP (ref 2–14)
NEUTROPHILS # BLD AUTO: 3.24 K/UL — SIGNIFICANT CHANGE UP (ref 1.8–7.4)
NEUTROPHILS NFR BLD AUTO: 62.6 % — SIGNIFICANT CHANGE UP (ref 43–77)
NITRITE UR-MCNC: NEGATIVE — SIGNIFICANT CHANGE UP
NRBC # BLD: 0 /100 WBCS — SIGNIFICANT CHANGE UP (ref 0–0)
PH UR: 7 — SIGNIFICANT CHANGE UP (ref 5–8)
PLATELET # BLD AUTO: 200 K/UL — SIGNIFICANT CHANGE UP (ref 150–400)
PROT UR-MCNC: NEGATIVE MG/DL — SIGNIFICANT CHANGE UP
RBC # BLD: 4.21 M/UL — SIGNIFICANT CHANGE UP (ref 3.8–5.2)
RBC # FLD: 12.1 % — SIGNIFICANT CHANGE UP (ref 10.3–14.5)
SP GR SPEC: 1.01 — SIGNIFICANT CHANGE UP (ref 1–1.03)
TSH SERPL-MCNC: 4.82 UIU/ML — HIGH (ref 0.27–4.2)
UROBILINOGEN FLD QL: 0.2 MG/DL — SIGNIFICANT CHANGE UP (ref 0.2–1)
WBC # BLD: 5.18 K/UL — SIGNIFICANT CHANGE UP (ref 3.8–10.5)
WBC # FLD AUTO: 5.18 K/UL — SIGNIFICANT CHANGE UP (ref 3.8–10.5)

## 2024-05-21 PROCEDURE — 99213 OFFICE O/P EST LOW 20 MIN: CPT

## 2024-05-21 NOTE — HISTORY OF PRESENT ILLNESS
[Disease: _____________________] : Disease: [unfilled] [AJCC Stage: ____] : AJCC Stage: [unfilled] [de-identified] : Patient diagnosed with stage III MMT  in 1/2021. She saw gyn and had imaging. She was seen by Dr. Bowen, who did the surgery. Final path showed STAGE III MMT.\par  Dr. Nichols at [place]. She was treated with Carbo/ Taxol- s/p one cycle , had reaction and switched to Carbo/ doxil x one does. She transferred her care to Dr. Nichols at [place].and then Carbo/ Abraxane/ Herceptin x 6 completed, last dose was in August, 2021\par  She was started on Herceptin maintenance, last dose was in Sept, 2022.\par  Scan showed liver lesion, consistent with POD.\par  Ct C/A/P: 10/6/2022:  A new 4.6x5.7 cm low attenuation lesion in seen in the liver , concerning for metastatic disease.\par  \par  She has some RUQ pain, no nausea or vomiting. She has some constipation. Takes prune juice, on laxatives. No bleeding. No RT in the past.\par  \par  PMH: NONE\par  PSH: IWNNIE, BSO, GB, Endometriosis surgery in 2004, right breast biopsy.( Dr. Chasity Pompa)\par  All: sulfa, metronidazole, taxol\par  FH: Sister breast cancer 40. 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  After four cycles, scan from 1/10/23 showed POD.\par  \par  1/10/2023: ct C/A/P :  No evidence of metastatic disease in the thorax. New subtle ground-glass opacities which may be infectious or inflammatory in etiology.\par  Progression of disease in the liver , 5.8 x 5.1 cm mass slightly increased previously measuring 5.7 x 4.6 cm\par   [de-identified] : [facility] [FreeTextEntry1] : Keytruda/Lenvima [de-identified] : Patient is here for follow up and treatment. She is feeling well overall. Abdominal pain is less. Has occasional dyspepsia which can affect eating, had EGD years ago, unsure of results. BP has been well controlled. Denies chest pain, SOB, nausea, vomiting, bowel/bladder issues, bleeding, rash.

## 2024-05-21 NOTE — REASON FOR VISIT
[Follow-Up Visit] : a follow-up [Pre-Treatment Visit] : a pre-treatment [FreeTextEntry2] : relapsed uterine MMT

## 2024-05-22 LAB
ALBUMIN SERPL ELPH-MCNC: 4.4 G/DL — SIGNIFICANT CHANGE UP (ref 3.3–5)
ALP SERPL-CCNC: 76 U/L — SIGNIFICANT CHANGE UP (ref 40–120)
ALT FLD-CCNC: 18 U/L — SIGNIFICANT CHANGE UP (ref 10–45)
ANION GAP SERPL CALC-SCNC: 16 MMOL/L — SIGNIFICANT CHANGE UP (ref 5–17)
AST SERPL-CCNC: 27 U/L — SIGNIFICANT CHANGE UP (ref 10–40)
BILIRUB SERPL-MCNC: 0.2 MG/DL — SIGNIFICANT CHANGE UP (ref 0.2–1.2)
BUN SERPL-MCNC: 16 MG/DL — SIGNIFICANT CHANGE UP (ref 7–23)
CALCIUM SERPL-MCNC: 9.9 MG/DL — SIGNIFICANT CHANGE UP (ref 8.4–10.5)
CHLORIDE SERPL-SCNC: 97 MMOL/L — SIGNIFICANT CHANGE UP (ref 96–108)
CO2 SERPL-SCNC: 26 MMOL/L — SIGNIFICANT CHANGE UP (ref 22–31)
CREAT SERPL-MCNC: 0.78 MG/DL — SIGNIFICANT CHANGE UP (ref 0.5–1.3)
EGFR: 89 ML/MIN/1.73M2 — SIGNIFICANT CHANGE UP
GLUCOSE SERPL-MCNC: 89 MG/DL — SIGNIFICANT CHANGE UP (ref 70–99)
POTASSIUM SERPL-MCNC: 3.7 MMOL/L — SIGNIFICANT CHANGE UP (ref 3.5–5.3)
POTASSIUM SERPL-SCNC: 3.7 MMOL/L — SIGNIFICANT CHANGE UP (ref 3.5–5.3)
PROT SERPL-MCNC: 7.5 G/DL — SIGNIFICANT CHANGE UP (ref 6–8.3)
SODIUM SERPL-SCNC: 139 MMOL/L — SIGNIFICANT CHANGE UP (ref 135–145)

## 2024-06-11 ENCOUNTER — APPOINTMENT (OUTPATIENT)
Dept: INFUSION THERAPY | Facility: HOSPITAL | Age: 59
End: 2024-06-11

## 2024-06-11 ENCOUNTER — RESULT REVIEW (OUTPATIENT)
Age: 59
End: 2024-06-11

## 2024-06-11 ENCOUNTER — APPOINTMENT (OUTPATIENT)
Dept: HEMATOLOGY ONCOLOGY | Facility: CLINIC | Age: 59
End: 2024-06-11
Payer: COMMERCIAL

## 2024-06-11 VITALS
TEMPERATURE: 97.1 F | OXYGEN SATURATION: 99 % | RESPIRATION RATE: 16 BRPM | HEART RATE: 62 BPM | DIASTOLIC BLOOD PRESSURE: 78 MMHG | WEIGHT: 138.89 LBS | BODY MASS INDEX: 23.86 KG/M2 | SYSTOLIC BLOOD PRESSURE: 153 MMHG

## 2024-06-11 DIAGNOSIS — C55 MALIGNANT NEOPLASM OF UTERUS, PART UNSPECIFIED: ICD-10-CM

## 2024-06-11 LAB
BASOPHILS # BLD AUTO: 0.02 K/UL — SIGNIFICANT CHANGE UP (ref 0–0.2)
BASOPHILS NFR BLD AUTO: 0.4 % — SIGNIFICANT CHANGE UP (ref 0–2)
EOSINOPHIL # BLD AUTO: 0.11 K/UL — SIGNIFICANT CHANGE UP (ref 0–0.5)
EOSINOPHIL NFR BLD AUTO: 2.4 % — SIGNIFICANT CHANGE UP (ref 0–6)
HCT VFR BLD CALC: 36 % — SIGNIFICANT CHANGE UP (ref 34.5–45)
HGB BLD-MCNC: 11.7 G/DL — SIGNIFICANT CHANGE UP (ref 11.5–15.5)
IMM GRANULOCYTES NFR BLD AUTO: 0.2 % — SIGNIFICANT CHANGE UP (ref 0–0.9)
LYMPHOCYTES # BLD AUTO: 1.41 K/UL — SIGNIFICANT CHANGE UP (ref 1–3.3)
LYMPHOCYTES # BLD AUTO: 30.5 % — SIGNIFICANT CHANGE UP (ref 13–44)
MCHC RBC-ENTMCNC: 29 PG — SIGNIFICANT CHANGE UP (ref 27–34)
MCHC RBC-ENTMCNC: 32.5 G/DL — SIGNIFICANT CHANGE UP (ref 32–36)
MCV RBC AUTO: 89.3 FL — SIGNIFICANT CHANGE UP (ref 80–100)
MONOCYTES # BLD AUTO: 0.39 K/UL — SIGNIFICANT CHANGE UP (ref 0–0.9)
MONOCYTES NFR BLD AUTO: 8.4 % — SIGNIFICANT CHANGE UP (ref 2–14)
NEUTROPHILS # BLD AUTO: 2.69 K/UL — SIGNIFICANT CHANGE UP (ref 1.8–7.4)
NEUTROPHILS NFR BLD AUTO: 58.1 % — SIGNIFICANT CHANGE UP (ref 43–77)
NRBC # BLD: 0 /100 WBCS — SIGNIFICANT CHANGE UP (ref 0–0)
PLATELET # BLD AUTO: 199 K/UL — SIGNIFICANT CHANGE UP (ref 150–400)
RBC # BLD: 4.03 M/UL — SIGNIFICANT CHANGE UP (ref 3.8–5.2)
RBC # FLD: 12.3 % — SIGNIFICANT CHANGE UP (ref 10.3–14.5)
WBC # BLD: 4.63 K/UL — SIGNIFICANT CHANGE UP (ref 3.8–10.5)
WBC # FLD AUTO: 4.63 K/UL — SIGNIFICANT CHANGE UP (ref 3.8–10.5)

## 2024-06-11 PROCEDURE — 99214 OFFICE O/P EST MOD 30 MIN: CPT

## 2024-06-11 RX ORDER — LEVOTHYROXINE SODIUM 125 MCG
1 TABLET ORAL
Qty: 0 | Refills: 0 | DISCHARGE

## 2024-06-11 NOTE — HISTORY OF PRESENT ILLNESS
[Disease: _____________________] : Disease: [unfilled] [AJCC Stage: ____] : AJCC Stage: [unfilled] [de-identified] : Patient diagnosed with stage III MMT  in 1/2021. She saw gyn and had imaging. She was seen by Dr. Bowne, who did the surgery. Final path showed STAGE III MMT.\par  Dr. Nichols at [place]. She was treated with Carbo/ Taxol- s/p one cycle , had reaction and switched to Carbo/ doxil x one does. She transferred her care to Dr. Nichols at [place].and then Carbo/ Abraxane/ Herceptin x 6 completed, last dose was in August, 2021\par  She was started on Herceptin maintenance, last dose was in Sept, 2022.\par  Scan showed liver lesion, consistent with POD.\par  Ct C/A/P: 10/6/2022:  A new 4.6x5.7 cm low attenuation lesion in seen in the liver , concerning for metastatic disease.\par  \par  She has some RUQ pain, no nausea or vomiting. She has some constipation. Takes prune juice, on laxatives. No bleeding. No RT in the past.\par  \par  PMH: NONE\par  PSH: WINNIE, BSO, GB, Endometriosis surgery in 2004, right breast biopsy.( Dr. Chasity Pompa)\par  All: sulfa, metronidazole, taxol\par  FH: Sister breast cancer 40. 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  After four cycles, scan from 1/10/23 showed POD.\par  \par  1/10/2023: ct C/A/P :  No evidence of metastatic disease in the thorax. New subtle ground-glass opacities which may be infectious or inflammatory in etiology.\par  Progression of disease in the liver , 5.8 x 5.1 cm mass slightly increased previously measuring 5.7 x 4.6 cm\par   [de-identified] : [facility] [Treatment Protocol] : Treatment Protocol [FreeTextEntry1] : Keytruda  [de-identified] : Leonel is here for a follow up visit She is experiencing more upset stomach when she takes Lenvima. Denies any diarrhea.

## 2024-06-12 LAB
ALBUMIN SERPL ELPH-MCNC: 4.6 G/DL — SIGNIFICANT CHANGE UP (ref 3.3–5)
ALP SERPL-CCNC: 74 U/L — SIGNIFICANT CHANGE UP (ref 40–120)
ALT FLD-CCNC: 18 U/L — SIGNIFICANT CHANGE UP (ref 10–45)
ANION GAP SERPL CALC-SCNC: 13 MMOL/L — SIGNIFICANT CHANGE UP (ref 5–17)
APPEARANCE UR: CLEAR — SIGNIFICANT CHANGE UP
AST SERPL-CCNC: 24 U/L — SIGNIFICANT CHANGE UP (ref 10–40)
BILIRUB SERPL-MCNC: 0.2 MG/DL — SIGNIFICANT CHANGE UP (ref 0.2–1.2)
BILIRUB UR-MCNC: NEGATIVE — SIGNIFICANT CHANGE UP
BUN SERPL-MCNC: 12 MG/DL — SIGNIFICANT CHANGE UP (ref 7–23)
CALCIUM SERPL-MCNC: 9.4 MG/DL — SIGNIFICANT CHANGE UP (ref 8.4–10.5)
CANCER AG125 SERPL-ACNC: 6 U/ML — SIGNIFICANT CHANGE UP
CEA SERPL-MCNC: 30.8 NG/ML — HIGH (ref 0–3.8)
CHLORIDE SERPL-SCNC: 100 MMOL/L — SIGNIFICANT CHANGE UP (ref 96–108)
CO2 SERPL-SCNC: 28 MMOL/L — SIGNIFICANT CHANGE UP (ref 22–31)
COLOR SPEC: YELLOW — SIGNIFICANT CHANGE UP
CREAT SERPL-MCNC: 0.67 MG/DL — SIGNIFICANT CHANGE UP (ref 0.5–1.3)
DIFF PNL FLD: NEGATIVE — SIGNIFICANT CHANGE UP
EGFR: 102 ML/MIN/1.73M2 — SIGNIFICANT CHANGE UP
GLUCOSE SERPL-MCNC: 54 MG/DL — CRITICAL LOW (ref 70–99)
GLUCOSE UR QL: NEGATIVE MG/DL — SIGNIFICANT CHANGE UP
KETONES UR-MCNC: NEGATIVE MG/DL — SIGNIFICANT CHANGE UP
LEUKOCYTE ESTERASE UR-ACNC: NEGATIVE — SIGNIFICANT CHANGE UP
NITRITE UR-MCNC: NEGATIVE — SIGNIFICANT CHANGE UP
PH UR: 7.5 — SIGNIFICANT CHANGE UP (ref 5–8)
POTASSIUM SERPL-MCNC: 4 MMOL/L — SIGNIFICANT CHANGE UP (ref 3.5–5.3)
POTASSIUM SERPL-SCNC: 4 MMOL/L — SIGNIFICANT CHANGE UP (ref 3.5–5.3)
PROT SERPL-MCNC: 7.7 G/DL — SIGNIFICANT CHANGE UP (ref 6–8.3)
PROT UR-MCNC: NEGATIVE MG/DL — SIGNIFICANT CHANGE UP
SODIUM SERPL-SCNC: 141 MMOL/L — SIGNIFICANT CHANGE UP (ref 135–145)
SP GR SPEC: 1 — SIGNIFICANT CHANGE UP (ref 1–1.03)
TSH SERPL-MCNC: 4.15 UIU/ML — SIGNIFICANT CHANGE UP (ref 0.27–4.2)
UROBILINOGEN FLD QL: 0.2 MG/DL — SIGNIFICANT CHANGE UP (ref 0.2–1)

## 2024-06-18 ENCOUNTER — RX RENEWAL (OUTPATIENT)
Age: 59
End: 2024-06-18

## 2024-06-18 RX ORDER — LENVATINIB 4 MG/1
4 CAPSULE ORAL
Qty: 15 | Refills: 0 | Status: ACTIVE | COMMUNITY
Start: 2023-02-01 | End: 1900-01-01

## 2024-06-25 ENCOUNTER — OUTPATIENT (OUTPATIENT)
Dept: OUTPATIENT SERVICES | Facility: HOSPITAL | Age: 59
LOS: 1 days | Discharge: HOME | End: 2024-06-25

## 2024-06-25 DIAGNOSIS — Z90.710 ACQUIRED ABSENCE OF BOTH CERVIX AND UTERUS: Chronic | ICD-10-CM

## 2024-06-25 DIAGNOSIS — C55 MALIGNANT NEOPLASM OF UTERUS, PART UNSPECIFIED: ICD-10-CM

## 2024-06-25 DIAGNOSIS — Z98.89 OTHER SPECIFIED POSTPROCEDURAL STATES: Chronic | ICD-10-CM

## 2024-06-25 DIAGNOSIS — Z90.49 ACQUIRED ABSENCE OF OTHER SPECIFIED PARTS OF DIGESTIVE TRACT: Chronic | ICD-10-CM

## 2024-07-02 ENCOUNTER — RESULT REVIEW (OUTPATIENT)
Age: 59
End: 2024-07-02

## 2024-07-02 ENCOUNTER — APPOINTMENT (OUTPATIENT)
Dept: INFUSION THERAPY | Facility: HOSPITAL | Age: 59
End: 2024-07-02

## 2024-07-02 ENCOUNTER — APPOINTMENT (OUTPATIENT)
Dept: HEMATOLOGY ONCOLOGY | Facility: CLINIC | Age: 59
End: 2024-07-02
Payer: COMMERCIAL

## 2024-07-02 VITALS
RESPIRATION RATE: 16 BRPM | OXYGEN SATURATION: 97 % | SYSTOLIC BLOOD PRESSURE: 126 MMHG | WEIGHT: 139.55 LBS | BODY MASS INDEX: 23.97 KG/M2 | DIASTOLIC BLOOD PRESSURE: 72 MMHG | TEMPERATURE: 97.3 F | HEART RATE: 77 BPM

## 2024-07-02 DIAGNOSIS — I10 ESSENTIAL (PRIMARY) HYPERTENSION: ICD-10-CM

## 2024-07-02 DIAGNOSIS — C55 MALIGNANT NEOPLASM OF UTERUS, PART UNSPECIFIED: ICD-10-CM

## 2024-07-02 LAB
ALBUMIN SERPL ELPH-MCNC: 4.6 G/DL — SIGNIFICANT CHANGE UP (ref 3.3–5)
ALP SERPL-CCNC: 78 U/L — SIGNIFICANT CHANGE UP (ref 40–120)
ALT FLD-CCNC: 15 U/L — SIGNIFICANT CHANGE UP (ref 10–45)
ANION GAP SERPL CALC-SCNC: 10 MMOL/L — SIGNIFICANT CHANGE UP (ref 5–17)
AST SERPL-CCNC: 34 U/L — SIGNIFICANT CHANGE UP (ref 10–40)
BASOPHILS # BLD AUTO: 0.02 K/UL — SIGNIFICANT CHANGE UP (ref 0–0.2)
BASOPHILS NFR BLD AUTO: 0.4 % — SIGNIFICANT CHANGE UP (ref 0–2)
BILIRUB SERPL-MCNC: 0.2 MG/DL — SIGNIFICANT CHANGE UP (ref 0.2–1.2)
BUN SERPL-MCNC: 19 MG/DL — SIGNIFICANT CHANGE UP (ref 7–23)
CALCIUM SERPL-MCNC: 9.5 MG/DL — SIGNIFICANT CHANGE UP (ref 8.4–10.5)
CHLORIDE SERPL-SCNC: 100 MMOL/L — SIGNIFICANT CHANGE UP (ref 96–108)
CO2 SERPL-SCNC: 29 MMOL/L — SIGNIFICANT CHANGE UP (ref 22–31)
CREAT SERPL-MCNC: 0.76 MG/DL — SIGNIFICANT CHANGE UP (ref 0.5–1.3)
EGFR: 91 ML/MIN/1.73M2 — SIGNIFICANT CHANGE UP
EOSINOPHIL # BLD AUTO: 0.13 K/UL — SIGNIFICANT CHANGE UP (ref 0–0.5)
EOSINOPHIL NFR BLD AUTO: 2.8 % — SIGNIFICANT CHANGE UP (ref 0–6)
GLUCOSE SERPL-MCNC: 107 MG/DL — HIGH (ref 70–99)
HCT VFR BLD CALC: 36.2 % — SIGNIFICANT CHANGE UP (ref 34.5–45)
HGB BLD-MCNC: 12 G/DL — SIGNIFICANT CHANGE UP (ref 11.5–15.5)
IMM GRANULOCYTES NFR BLD AUTO: 0.2 % — SIGNIFICANT CHANGE UP (ref 0–0.9)
LYMPHOCYTES # BLD AUTO: 1.42 K/UL — SIGNIFICANT CHANGE UP (ref 1–3.3)
LYMPHOCYTES # BLD AUTO: 30.2 % — SIGNIFICANT CHANGE UP (ref 13–44)
MCHC RBC-ENTMCNC: 29.3 PG — SIGNIFICANT CHANGE UP (ref 27–34)
MCHC RBC-ENTMCNC: 33.1 G/DL — SIGNIFICANT CHANGE UP (ref 32–36)
MCV RBC AUTO: 88.3 FL — SIGNIFICANT CHANGE UP (ref 80–100)
MONOCYTES # BLD AUTO: 0.45 K/UL — SIGNIFICANT CHANGE UP (ref 0–0.9)
MONOCYTES NFR BLD AUTO: 9.6 % — SIGNIFICANT CHANGE UP (ref 2–14)
NEUTROPHILS # BLD AUTO: 2.67 K/UL — SIGNIFICANT CHANGE UP (ref 1.8–7.4)
NEUTROPHILS NFR BLD AUTO: 56.8 % — SIGNIFICANT CHANGE UP (ref 43–77)
NRBC # BLD: 0 /100 WBCS — SIGNIFICANT CHANGE UP (ref 0–0)
PLATELET # BLD AUTO: 203 K/UL — SIGNIFICANT CHANGE UP (ref 150–400)
POTASSIUM SERPL-MCNC: 4.5 MMOL/L — SIGNIFICANT CHANGE UP (ref 3.5–5.3)
POTASSIUM SERPL-SCNC: 4.5 MMOL/L — SIGNIFICANT CHANGE UP (ref 3.5–5.3)
PROT SERPL-MCNC: 7.5 G/DL — SIGNIFICANT CHANGE UP (ref 6–8.3)
RBC # BLD: 4.1 M/UL — SIGNIFICANT CHANGE UP (ref 3.8–5.2)
RBC # FLD: 12.4 % — SIGNIFICANT CHANGE UP (ref 10.3–14.5)
SODIUM SERPL-SCNC: 138 MMOL/L — SIGNIFICANT CHANGE UP (ref 135–145)
WBC # BLD: 4.7 K/UL — SIGNIFICANT CHANGE UP (ref 3.8–10.5)
WBC # FLD AUTO: 4.7 K/UL — SIGNIFICANT CHANGE UP (ref 3.8–10.5)

## 2024-07-02 PROCEDURE — 99213 OFFICE O/P EST LOW 20 MIN: CPT

## 2024-07-02 PROCEDURE — G2211 COMPLEX E/M VISIT ADD ON: CPT

## 2024-07-03 DIAGNOSIS — C54.1 MALIGNANT NEOPLASM OF ENDOMETRIUM: ICD-10-CM

## 2024-07-03 DIAGNOSIS — Z51.11 ENCOUNTER FOR ANTINEOPLASTIC CHEMOTHERAPY: ICD-10-CM

## 2024-07-03 LAB
APPEARANCE UR: CLEAR — SIGNIFICANT CHANGE UP
BILIRUB UR-MCNC: NEGATIVE — SIGNIFICANT CHANGE UP
CANCER AG125 SERPL-ACNC: 6 U/ML — SIGNIFICANT CHANGE UP
CEA SERPL-MCNC: 28.2 NG/ML — HIGH (ref 0–3.8)
COLOR SPEC: YELLOW — SIGNIFICANT CHANGE UP
DIFF PNL FLD: NEGATIVE — SIGNIFICANT CHANGE UP
GLUCOSE UR QL: NEGATIVE MG/DL — SIGNIFICANT CHANGE UP
KETONES UR-MCNC: NEGATIVE MG/DL — SIGNIFICANT CHANGE UP
LEUKOCYTE ESTERASE UR-ACNC: NEGATIVE — SIGNIFICANT CHANGE UP
NITRITE UR-MCNC: NEGATIVE — SIGNIFICANT CHANGE UP
PH UR: 6.5 — SIGNIFICANT CHANGE UP (ref 5–8)
PROT UR-MCNC: NEGATIVE MG/DL — SIGNIFICANT CHANGE UP
SP GR SPEC: 1.01 — SIGNIFICANT CHANGE UP (ref 1–1.03)
TSH SERPL-MCNC: 0.88 UIU/ML — SIGNIFICANT CHANGE UP (ref 0.27–4.2)
UROBILINOGEN FLD QL: 0.2 MG/DL — SIGNIFICANT CHANGE UP (ref 0.2–1)

## 2024-07-22 ENCOUNTER — NON-APPOINTMENT (OUTPATIENT)
Age: 59
End: 2024-07-22

## 2024-07-23 ENCOUNTER — RESULT REVIEW (OUTPATIENT)
Age: 59
End: 2024-07-23

## 2024-07-23 ENCOUNTER — APPOINTMENT (OUTPATIENT)
Dept: INFUSION THERAPY | Facility: HOSPITAL | Age: 59
End: 2024-07-23

## 2024-07-23 ENCOUNTER — APPOINTMENT (OUTPATIENT)
Dept: HEMATOLOGY ONCOLOGY | Facility: CLINIC | Age: 59
End: 2024-07-23
Payer: COMMERCIAL

## 2024-07-23 VITALS
DIASTOLIC BLOOD PRESSURE: 67 MMHG | BODY MASS INDEX: 23.86 KG/M2 | HEART RATE: 72 BPM | TEMPERATURE: 97.2 F | WEIGHT: 138.89 LBS | RESPIRATION RATE: 16 BRPM | OXYGEN SATURATION: 97 % | SYSTOLIC BLOOD PRESSURE: 109 MMHG

## 2024-07-23 DIAGNOSIS — C55 MALIGNANT NEOPLASM OF UTERUS, PART UNSPECIFIED: ICD-10-CM

## 2024-07-23 LAB
APPEARANCE UR: CLEAR — SIGNIFICANT CHANGE UP
BASOPHILS # BLD AUTO: 0.02 K/UL — SIGNIFICANT CHANGE UP (ref 0–0.2)
BASOPHILS NFR BLD AUTO: 0.4 % — SIGNIFICANT CHANGE UP (ref 0–2)
BILIRUB UR-MCNC: NEGATIVE — SIGNIFICANT CHANGE UP
COLOR SPEC: YELLOW — SIGNIFICANT CHANGE UP
DIFF PNL FLD: NEGATIVE — SIGNIFICANT CHANGE UP
EOSINOPHIL # BLD AUTO: 0.09 K/UL — SIGNIFICANT CHANGE UP (ref 0–0.5)
EOSINOPHIL NFR BLD AUTO: 1.9 % — SIGNIFICANT CHANGE UP (ref 0–6)
GLUCOSE UR QL: NEGATIVE MG/DL — SIGNIFICANT CHANGE UP
HCT VFR BLD CALC: 36.8 % — SIGNIFICANT CHANGE UP (ref 34.5–45)
HGB BLD-MCNC: 12.1 G/DL — SIGNIFICANT CHANGE UP (ref 11.5–15.5)
IMM GRANULOCYTES NFR BLD AUTO: 0.2 % — SIGNIFICANT CHANGE UP (ref 0–0.9)
KETONES UR-MCNC: NEGATIVE MG/DL — SIGNIFICANT CHANGE UP
LEUKOCYTE ESTERASE UR-ACNC: NEGATIVE — SIGNIFICANT CHANGE UP
LYMPHOCYTES # BLD AUTO: 1.45 K/UL — SIGNIFICANT CHANGE UP (ref 1–3.3)
LYMPHOCYTES # BLD AUTO: 30 % — SIGNIFICANT CHANGE UP (ref 13–44)
MCHC RBC-ENTMCNC: 29 PG — SIGNIFICANT CHANGE UP (ref 27–34)
MCHC RBC-ENTMCNC: 32.9 G/DL — SIGNIFICANT CHANGE UP (ref 32–36)
MCV RBC AUTO: 88.2 FL — SIGNIFICANT CHANGE UP (ref 80–100)
MONOCYTES # BLD AUTO: 0.42 K/UL — SIGNIFICANT CHANGE UP (ref 0–0.9)
MONOCYTES NFR BLD AUTO: 8.7 % — SIGNIFICANT CHANGE UP (ref 2–14)
NEUTROPHILS # BLD AUTO: 2.84 K/UL — SIGNIFICANT CHANGE UP (ref 1.8–7.4)
NEUTROPHILS NFR BLD AUTO: 58.8 % — SIGNIFICANT CHANGE UP (ref 43–77)
NITRITE UR-MCNC: NEGATIVE — SIGNIFICANT CHANGE UP
NRBC # BLD: 0 /100 WBCS — SIGNIFICANT CHANGE UP (ref 0–0)
PH UR: 6 — SIGNIFICANT CHANGE UP (ref 5–8)
PLATELET # BLD AUTO: 213 K/UL — SIGNIFICANT CHANGE UP (ref 150–400)
PROT UR-MCNC: NEGATIVE MG/DL — SIGNIFICANT CHANGE UP
RBC # BLD: 4.17 M/UL — SIGNIFICANT CHANGE UP (ref 3.8–5.2)
RBC # FLD: 12.3 % — SIGNIFICANT CHANGE UP (ref 10.3–14.5)
SP GR SPEC: 1.01 — SIGNIFICANT CHANGE UP (ref 1–1.03)
UROBILINOGEN FLD QL: 0.2 MG/DL — SIGNIFICANT CHANGE UP (ref 0.2–1)
WBC # BLD: 4.83 K/UL — SIGNIFICANT CHANGE UP (ref 3.8–10.5)
WBC # FLD AUTO: 4.83 K/UL — SIGNIFICANT CHANGE UP (ref 3.8–10.5)

## 2024-07-23 PROCEDURE — 99213 OFFICE O/P EST LOW 20 MIN: CPT

## 2024-07-24 LAB
CANCER AG125 SERPL-ACNC: 6 U/ML — SIGNIFICANT CHANGE UP
CEA SERPL-MCNC: 23.7 NG/ML — HIGH (ref 0–3.8)
TSH SERPL-MCNC: 0.21 UIU/ML — LOW (ref 0.27–4.2)

## 2024-07-25 LAB
ALBUMIN SERPL ELPH-MCNC: 4.6 G/DL — SIGNIFICANT CHANGE UP (ref 3.3–5)
ALP SERPL-CCNC: 78 U/L — SIGNIFICANT CHANGE UP (ref 40–120)
ALT FLD-CCNC: 19 U/L — SIGNIFICANT CHANGE UP (ref 10–45)
ANION GAP SERPL CALC-SCNC: 22 MMOL/L — HIGH (ref 5–17)
AST SERPL-CCNC: 25 U/L — SIGNIFICANT CHANGE UP (ref 10–40)
BILIRUB SERPL-MCNC: <0.2 MG/DL — SIGNIFICANT CHANGE UP (ref 0.2–1.2)
BUN SERPL-MCNC: 15 MG/DL — SIGNIFICANT CHANGE UP (ref 7–23)
CALCIUM SERPL-MCNC: 9.8 MG/DL — SIGNIFICANT CHANGE UP (ref 8.4–10.5)
CHLORIDE SERPL-SCNC: 98 MMOL/L — SIGNIFICANT CHANGE UP (ref 96–108)
CO2 SERPL-SCNC: 22 MMOL/L — SIGNIFICANT CHANGE UP (ref 22–31)
CREAT SERPL-MCNC: 0.69 MG/DL — SIGNIFICANT CHANGE UP (ref 0.5–1.3)
EGFR: 101 ML/MIN/1.73M2 — SIGNIFICANT CHANGE UP
GLUCOSE SERPL-MCNC: 47 MG/DL — CRITICAL LOW (ref 70–99)
POTASSIUM SERPL-MCNC: 3.7 MMOL/L — SIGNIFICANT CHANGE UP (ref 3.5–5.3)
POTASSIUM SERPL-SCNC: 3.7 MMOL/L — SIGNIFICANT CHANGE UP (ref 3.5–5.3)
PROT SERPL-MCNC: 7.6 G/DL — SIGNIFICANT CHANGE UP (ref 6–8.3)
SODIUM SERPL-SCNC: 142 MMOL/L — SIGNIFICANT CHANGE UP (ref 135–145)

## 2024-08-13 ENCOUNTER — RESULT REVIEW (OUTPATIENT)
Age: 59
End: 2024-08-13

## 2024-08-13 ENCOUNTER — APPOINTMENT (OUTPATIENT)
Dept: HEMATOLOGY ONCOLOGY | Facility: CLINIC | Age: 59
End: 2024-08-13
Payer: COMMERCIAL

## 2024-08-13 ENCOUNTER — APPOINTMENT (OUTPATIENT)
Dept: INFUSION THERAPY | Facility: HOSPITAL | Age: 59
End: 2024-08-13

## 2024-08-13 VITALS
HEART RATE: 67 BPM | DIASTOLIC BLOOD PRESSURE: 68 MMHG | SYSTOLIC BLOOD PRESSURE: 114 MMHG | OXYGEN SATURATION: 97 % | RESPIRATION RATE: 16 BRPM | WEIGHT: 143.3 LBS | TEMPERATURE: 97.2 F | BODY MASS INDEX: 24.62 KG/M2

## 2024-08-13 DIAGNOSIS — C55 MALIGNANT NEOPLASM OF UTERUS, PART UNSPECIFIED: ICD-10-CM

## 2024-08-13 LAB
ALBUMIN SERPL ELPH-MCNC: 4.2 G/DL — SIGNIFICANT CHANGE UP (ref 3.3–5)
ALP SERPL-CCNC: 76 U/L — SIGNIFICANT CHANGE UP (ref 40–120)
ALT FLD-CCNC: 19 U/L — SIGNIFICANT CHANGE UP (ref 10–45)
ANION GAP SERPL CALC-SCNC: 11 MMOL/L — SIGNIFICANT CHANGE UP (ref 5–17)
APPEARANCE UR: CLEAR — SIGNIFICANT CHANGE UP
AST SERPL-CCNC: 30 U/L — SIGNIFICANT CHANGE UP (ref 10–40)
BASOPHILS # BLD AUTO: 0.03 K/UL — SIGNIFICANT CHANGE UP (ref 0–0.2)
BASOPHILS NFR BLD AUTO: 0.6 % — SIGNIFICANT CHANGE UP (ref 0–2)
BILIRUB SERPL-MCNC: 0.2 MG/DL — SIGNIFICANT CHANGE UP (ref 0.2–1.2)
BILIRUB UR-MCNC: NEGATIVE — SIGNIFICANT CHANGE UP
BUN SERPL-MCNC: 16 MG/DL — SIGNIFICANT CHANGE UP (ref 7–23)
CALCIUM SERPL-MCNC: 10.2 MG/DL — SIGNIFICANT CHANGE UP (ref 8.4–10.5)
CANCER AG125 SERPL-ACNC: 6 U/ML — SIGNIFICANT CHANGE UP
CEA SERPL-MCNC: 21.8 NG/ML — HIGH (ref 0–3.8)
CHLORIDE SERPL-SCNC: 102 MMOL/L — SIGNIFICANT CHANGE UP (ref 96–108)
CO2 SERPL-SCNC: 30 MMOL/L — SIGNIFICANT CHANGE UP (ref 22–31)
COLOR SPEC: YELLOW — SIGNIFICANT CHANGE UP
CREAT SERPL-MCNC: 0.72 MG/DL — SIGNIFICANT CHANGE UP (ref 0.5–1.3)
DIFF PNL FLD: NEGATIVE — SIGNIFICANT CHANGE UP
EGFR: 97 ML/MIN/1.73M2 — SIGNIFICANT CHANGE UP
EOSINOPHIL # BLD AUTO: 0.12 K/UL — SIGNIFICANT CHANGE UP (ref 0–0.5)
EOSINOPHIL NFR BLD AUTO: 2.2 % — SIGNIFICANT CHANGE UP (ref 0–6)
GLUCOSE SERPL-MCNC: 108 MG/DL — HIGH (ref 70–99)
GLUCOSE UR QL: NEGATIVE MG/DL — SIGNIFICANT CHANGE UP
HCT VFR BLD CALC: 38 % — SIGNIFICANT CHANGE UP (ref 34.5–45)
HGB BLD-MCNC: 12.3 G/DL — SIGNIFICANT CHANGE UP (ref 11.5–15.5)
IMM GRANULOCYTES NFR BLD AUTO: 0.2 % — SIGNIFICANT CHANGE UP (ref 0–0.9)
KETONES UR-MCNC: NEGATIVE MG/DL — SIGNIFICANT CHANGE UP
LEUKOCYTE ESTERASE UR-ACNC: NEGATIVE — SIGNIFICANT CHANGE UP
LYMPHOCYTES # BLD AUTO: 1.42 K/UL — SIGNIFICANT CHANGE UP (ref 1–3.3)
LYMPHOCYTES # BLD AUTO: 26.1 % — SIGNIFICANT CHANGE UP (ref 13–44)
MCHC RBC-ENTMCNC: 28.7 PG — SIGNIFICANT CHANGE UP (ref 27–34)
MCHC RBC-ENTMCNC: 32.4 G/DL — SIGNIFICANT CHANGE UP (ref 32–36)
MCV RBC AUTO: 88.8 FL — SIGNIFICANT CHANGE UP (ref 80–100)
MONOCYTES # BLD AUTO: 0.52 K/UL — SIGNIFICANT CHANGE UP (ref 0–0.9)
MONOCYTES NFR BLD AUTO: 9.6 % — SIGNIFICANT CHANGE UP (ref 2–14)
NEUTROPHILS # BLD AUTO: 3.34 K/UL — SIGNIFICANT CHANGE UP (ref 1.8–7.4)
NEUTROPHILS NFR BLD AUTO: 61.3 % — SIGNIFICANT CHANGE UP (ref 43–77)
NITRITE UR-MCNC: NEGATIVE — SIGNIFICANT CHANGE UP
NRBC # BLD: 0 /100 WBCS — SIGNIFICANT CHANGE UP (ref 0–0)
PH UR: 6.5 — SIGNIFICANT CHANGE UP (ref 5–8)
PLATELET # BLD AUTO: 223 K/UL — SIGNIFICANT CHANGE UP (ref 150–400)
POTASSIUM SERPL-MCNC: 4.3 MMOL/L — SIGNIFICANT CHANGE UP (ref 3.5–5.3)
POTASSIUM SERPL-SCNC: 4.3 MMOL/L — SIGNIFICANT CHANGE UP (ref 3.5–5.3)
PROT SERPL-MCNC: 7.5 G/DL — SIGNIFICANT CHANGE UP (ref 6–8.3)
PROT UR-MCNC: NEGATIVE MG/DL — SIGNIFICANT CHANGE UP
RBC # BLD: 4.28 M/UL — SIGNIFICANT CHANGE UP (ref 3.8–5.2)
RBC # FLD: 12.6 % — SIGNIFICANT CHANGE UP (ref 10.3–14.5)
SODIUM SERPL-SCNC: 143 MMOL/L — SIGNIFICANT CHANGE UP (ref 135–145)
SP GR SPEC: 1.01 — SIGNIFICANT CHANGE UP (ref 1–1.03)
TSH SERPL-MCNC: 1.13 UIU/ML — SIGNIFICANT CHANGE UP (ref 0.27–4.2)
UROBILINOGEN FLD QL: 0.2 MG/DL — SIGNIFICANT CHANGE UP (ref 0.2–1)
WBC # BLD: 5.44 K/UL — SIGNIFICANT CHANGE UP (ref 3.8–10.5)
WBC # FLD AUTO: 5.44 K/UL — SIGNIFICANT CHANGE UP (ref 3.8–10.5)

## 2024-08-13 PROCEDURE — 99214 OFFICE O/P EST MOD 30 MIN: CPT

## 2024-08-14 NOTE — HISTORY OF PRESENT ILLNESS
[Disease: _____________________] : Disease: [unfilled] [AJCC Stage: ____] : AJCC Stage: [unfilled] [de-identified] : Patient diagnosed with stage III MMT  in 1/2021. She saw gyn and had imaging. She was seen by Dr. Bowen, who did the surgery. Final path showed STAGE III MMT.\par  Dr. Nichols at [place]. She was treated with Carbo/ Taxol- s/p one cycle , had reaction and switched to Carbo/ doxil x one does. She transferred her care to Dr. Nichols at [place].and then Carbo/ Abraxane/ Herceptin x 6 completed, last dose was in August, 2021\par  She was started on Herceptin maintenance, last dose was in Sept, 2022.\par  Scan showed liver lesion, consistent with POD.\par  Ct C/A/P: 10/6/2022:  A new 4.6x5.7 cm low attenuation lesion in seen in the liver , concerning for metastatic disease.\par  \par  She has some RUQ pain, no nausea or vomiting. She has some constipation. Takes prune juice, on laxatives. No bleeding. No RT in the past.\par  \par  PMH: NONE\par  PSH: WINNIE, BSO, GB, Endometriosis surgery in 2004, right breast biopsy.( Dr. Chasity Pompa)\par  All: sulfa, metronidazole, taxol\par  FH: Sister breast cancer 40. 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  After four cycles, scan from 1/10/23 showed POD.\par  \par  1/10/2023: ct C/A/P :  No evidence of metastatic disease in the thorax. New subtle ground-glass opacities which may be infectious or inflammatory in etiology.\par  Progression of disease in the liver , 5.8 x 5.1 cm mass slightly increased previously measuring 5.7 x 4.6 cm\par   [de-identified] : [facility] [de-identified] : Leonel is here for a follow up visit. She feels well. Has the same chronic symptoms of constipation and  joint pain. She hold Lenvima when pain worse. She restarts once pain improves.

## 2024-08-14 NOTE — REASON FOR VISIT
Spoke with mom. She reports patient is breast fed. Inform mom loose stool is normal with breast fed babies. But if fever, vomiting, 6-8 loose stools per day, s/s of stomach pain develops then contact office. Mom acknowledges and understands. Mom denies any questions. [Follow-Up Visit] : a follow-up [FreeTextEntry2] : relapsed UPSC

## 2024-08-20 ENCOUNTER — OUTPATIENT (OUTPATIENT)
Dept: OUTPATIENT SERVICES | Facility: HOSPITAL | Age: 59
LOS: 1 days | Discharge: HOME | End: 2024-08-20

## 2024-08-20 DIAGNOSIS — Z90.710 ACQUIRED ABSENCE OF BOTH CERVIX AND UTERUS: Chronic | ICD-10-CM

## 2024-08-20 DIAGNOSIS — Z98.89 OTHER SPECIFIED POSTPROCEDURAL STATES: Chronic | ICD-10-CM

## 2024-08-20 DIAGNOSIS — Z90.49 ACQUIRED ABSENCE OF OTHER SPECIFIED PARTS OF DIGESTIVE TRACT: Chronic | ICD-10-CM

## 2024-09-03 ENCOUNTER — RESULT REVIEW (OUTPATIENT)
Age: 59
End: 2024-09-03

## 2024-09-03 ENCOUNTER — APPOINTMENT (OUTPATIENT)
Dept: INFUSION THERAPY | Facility: HOSPITAL | Age: 59
End: 2024-09-03

## 2024-09-03 ENCOUNTER — APPOINTMENT (OUTPATIENT)
Dept: HEMATOLOGY ONCOLOGY | Facility: CLINIC | Age: 59
End: 2024-09-03
Payer: COMMERCIAL

## 2024-09-03 VITALS
BODY MASS INDEX: 24.24 KG/M2 | RESPIRATION RATE: 16 BRPM | HEART RATE: 78 BPM | TEMPERATURE: 97.2 F | DIASTOLIC BLOOD PRESSURE: 74 MMHG | SYSTOLIC BLOOD PRESSURE: 126 MMHG | WEIGHT: 141.07 LBS | OXYGEN SATURATION: 99 %

## 2024-09-03 DIAGNOSIS — C55 MALIGNANT NEOPLASM OF UTERUS, PART UNSPECIFIED: ICD-10-CM

## 2024-09-03 LAB
ALBUMIN SERPL ELPH-MCNC: 4.2 G/DL — SIGNIFICANT CHANGE UP (ref 3.3–5)
ALP SERPL-CCNC: 85 U/L — SIGNIFICANT CHANGE UP (ref 40–120)
ALT FLD-CCNC: 19 U/L — SIGNIFICANT CHANGE UP (ref 10–45)
ANION GAP SERPL CALC-SCNC: 12 MMOL/L — SIGNIFICANT CHANGE UP (ref 5–17)
APPEARANCE UR: CLEAR — SIGNIFICANT CHANGE UP
AST SERPL-CCNC: 26 U/L — SIGNIFICANT CHANGE UP (ref 10–40)
BASOPHILS # BLD AUTO: 0.02 K/UL — SIGNIFICANT CHANGE UP (ref 0–0.2)
BASOPHILS NFR BLD AUTO: 0.4 % — SIGNIFICANT CHANGE UP (ref 0–2)
BILIRUB SERPL-MCNC: 0.2 MG/DL — SIGNIFICANT CHANGE UP (ref 0.2–1.2)
BILIRUB UR-MCNC: NEGATIVE — SIGNIFICANT CHANGE UP
BUN SERPL-MCNC: 14 MG/DL — SIGNIFICANT CHANGE UP (ref 7–23)
CALCIUM SERPL-MCNC: 10.1 MG/DL — SIGNIFICANT CHANGE UP (ref 8.4–10.5)
CANCER AG125 SERPL-ACNC: 8 U/ML — SIGNIFICANT CHANGE UP
CEA SERPL-MCNC: 19.1 NG/ML — HIGH (ref 0–3.8)
CHLORIDE SERPL-SCNC: 101 MMOL/L — SIGNIFICANT CHANGE UP (ref 96–108)
CO2 SERPL-SCNC: 27 MMOL/L — SIGNIFICANT CHANGE UP (ref 22–31)
COLOR SPEC: YELLOW — SIGNIFICANT CHANGE UP
CREAT SERPL-MCNC: 0.72 MG/DL — SIGNIFICANT CHANGE UP (ref 0.5–1.3)
DIFF PNL FLD: NEGATIVE — SIGNIFICANT CHANGE UP
EGFR: 97 ML/MIN/1.73M2 — SIGNIFICANT CHANGE UP
EOSINOPHIL # BLD AUTO: 0.1 K/UL — SIGNIFICANT CHANGE UP (ref 0–0.5)
EOSINOPHIL NFR BLD AUTO: 2.1 % — SIGNIFICANT CHANGE UP (ref 0–6)
GLUCOSE SERPL-MCNC: 73 MG/DL — SIGNIFICANT CHANGE UP (ref 70–99)
GLUCOSE UR QL: NEGATIVE MG/DL — SIGNIFICANT CHANGE UP
HCT VFR BLD CALC: 37.2 % — SIGNIFICANT CHANGE UP (ref 34.5–45)
HGB BLD-MCNC: 11.9 G/DL — SIGNIFICANT CHANGE UP (ref 11.5–15.5)
IMM GRANULOCYTES NFR BLD AUTO: 0.4 % — SIGNIFICANT CHANGE UP (ref 0–0.9)
KETONES UR-MCNC: NEGATIVE MG/DL — SIGNIFICANT CHANGE UP
LEUKOCYTE ESTERASE UR-ACNC: NEGATIVE — SIGNIFICANT CHANGE UP
LYMPHOCYTES # BLD AUTO: 1.18 K/UL — SIGNIFICANT CHANGE UP (ref 1–3.3)
LYMPHOCYTES # BLD AUTO: 24.8 % — SIGNIFICANT CHANGE UP (ref 13–44)
MCHC RBC-ENTMCNC: 28.6 PG — SIGNIFICANT CHANGE UP (ref 27–34)
MCHC RBC-ENTMCNC: 32 G/DL — SIGNIFICANT CHANGE UP (ref 32–36)
MCV RBC AUTO: 89.4 FL — SIGNIFICANT CHANGE UP (ref 80–100)
MONOCYTES # BLD AUTO: 0.4 K/UL — SIGNIFICANT CHANGE UP (ref 0–0.9)
MONOCYTES NFR BLD AUTO: 8.4 % — SIGNIFICANT CHANGE UP (ref 2–14)
NEUTROPHILS # BLD AUTO: 3.04 K/UL — SIGNIFICANT CHANGE UP (ref 1.8–7.4)
NEUTROPHILS NFR BLD AUTO: 63.9 % — SIGNIFICANT CHANGE UP (ref 43–77)
NITRITE UR-MCNC: NEGATIVE — SIGNIFICANT CHANGE UP
NRBC # BLD: 0 /100 WBCS — SIGNIFICANT CHANGE UP (ref 0–0)
NRBC BLD-RTO: 0 /100 WBCS — SIGNIFICANT CHANGE UP (ref 0–0)
PH UR: 7 — SIGNIFICANT CHANGE UP (ref 5–8)
PLATELET # BLD AUTO: 267 K/UL — SIGNIFICANT CHANGE UP (ref 150–400)
POTASSIUM SERPL-MCNC: 4.3 MMOL/L — SIGNIFICANT CHANGE UP (ref 3.5–5.3)
POTASSIUM SERPL-SCNC: 4.3 MMOL/L — SIGNIFICANT CHANGE UP (ref 3.5–5.3)
PROT SERPL-MCNC: 7.4 G/DL — SIGNIFICANT CHANGE UP (ref 6–8.3)
PROT UR-MCNC: NEGATIVE MG/DL — SIGNIFICANT CHANGE UP
RBC # BLD: 4.16 M/UL — SIGNIFICANT CHANGE UP (ref 3.8–5.2)
RBC # FLD: 12.6 % — SIGNIFICANT CHANGE UP (ref 10.3–14.5)
SODIUM SERPL-SCNC: 140 MMOL/L — SIGNIFICANT CHANGE UP (ref 135–145)
SP GR SPEC: 1.01 — SIGNIFICANT CHANGE UP (ref 1–1.03)
TSH SERPL-MCNC: 1.15 UIU/ML — SIGNIFICANT CHANGE UP (ref 0.27–4.2)
UROBILINOGEN FLD QL: 0.2 MG/DL — SIGNIFICANT CHANGE UP (ref 0.2–1)
WBC # BLD: 4.76 K/UL — SIGNIFICANT CHANGE UP (ref 3.8–10.5)
WBC # FLD AUTO: 4.76 K/UL — SIGNIFICANT CHANGE UP (ref 3.8–10.5)

## 2024-09-03 PROCEDURE — 99213 OFFICE O/P EST LOW 20 MIN: CPT

## 2024-09-03 NOTE — HISTORY OF PRESENT ILLNESS
[Disease: _____________________] : Disease: [unfilled] [AJCC Stage: ____] : AJCC Stage: [unfilled] [de-identified] : Patient diagnosed with stage III MMT  in 1/2021. She saw gyn and had imaging. She was seen by Dr. Bowen, who did the surgery. Final path showed STAGE III MMT.\par  Dr. Nichols at [place]. She was treated with Carbo/ Taxol- s/p one cycle , had reaction and switched to Carbo/ doxil x one does. She transferred her care to Dr. Nichols at [facility].and then Carbo/ Abraxane/ Herceptin x 6 completed, last dose was in August, 2021\par  She was started on Herceptin maintenance, last dose was in Sept, 2022.\par  Scan showed liver lesion, consistent with POD.\par  Ct C/A/P: 10/6/2022:  A new 4.6x5.7 cm low attenuation lesion in seen in the liver , concerning for metastatic disease.\par  \par  She has some RUQ pain, no nausea or vomiting. She has some constipation. Takes prune juice, on laxatives. No bleeding. No RT in the past.\par  \par  PMH: NONE\par  PSH: WINNIE, BSO, GB, Endometriosis surgery in 2004, right breast biopsy.( Dr. Chasity Pompa)\par  All: sulfa, metronidazole, taxol\par  FH: Sister breast cancer 40. 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  After four cycles, scan from 1/10/23 showed POD.\par  \par  1/10/2023: ct C/A/P :  No evidence of metastatic disease in the thorax. New subtle ground-glass opacities which may be infectious or inflammatory in etiology.\par  Progression of disease in the liver , 5.8 x 5.1 cm mass slightly increased previously measuring 5.7 x 4.6 cm\par   [de-identified] : [facility] [FreeTextEntry1] : Keytruda/Lenvima [de-identified] : Patient is here for follow up and treatment. She is feeling well, no new complaints. She takes Lenvima for 2-3 days in a row then stops when she has side effects for 1-2 days until she is feeling well. Appetite is good. She has constipation, well controlled with bowel regimen. She denies chest pain, SOB, abdominal pain, nausea, vomiting, diarrhea, rash, mucositis.

## 2024-09-04 DIAGNOSIS — C54.1 MALIGNANT NEOPLASM OF ENDOMETRIUM: ICD-10-CM

## 2024-09-04 DIAGNOSIS — Z51.11 ENCOUNTER FOR ANTINEOPLASTIC CHEMOTHERAPY: ICD-10-CM

## 2024-09-18 ENCOUNTER — APPOINTMENT (OUTPATIENT)
Dept: HEMATOLOGY ONCOLOGY | Facility: CLINIC | Age: 59
End: 2024-09-18
Payer: COMMERCIAL

## 2024-09-18 VITALS
WEIGHT: 139.97 LBS | BODY MASS INDEX: 24.05 KG/M2 | RESPIRATION RATE: 16 BRPM | HEART RATE: 70 BPM | OXYGEN SATURATION: 99 % | TEMPERATURE: 97 F | SYSTOLIC BLOOD PRESSURE: 146 MMHG | DIASTOLIC BLOOD PRESSURE: 80 MMHG

## 2024-09-18 DIAGNOSIS — C55 MALIGNANT NEOPLASM OF UTERUS, PART UNSPECIFIED: ICD-10-CM

## 2024-09-18 PROCEDURE — 99214 OFFICE O/P EST MOD 30 MIN: CPT

## 2024-09-22 NOTE — HISTORY OF PRESENT ILLNESS
[Disease: _____________________] : Disease: [unfilled] [AJCC Stage: ____] : AJCC Stage: [unfilled] [de-identified] : Patient diagnosed with stage III MMT  in 1/2021. She saw gyn and had imaging. She was seen by Dr. Bowen, who did the surgery. Final path showed STAGE III MMT.\par  Dr. Nichols at [place]. She was treated with Carbo/ Taxol- s/p one cycle , had reaction and switched to Carbo/ doxil x one does. She transferred her care to Dr. Nichols at [place].and then Carbo/ Abraxane/ Herceptin x 6 completed, last dose was in August, 2021\par  She was started on Herceptin maintenance, last dose was in Sept, 2022.\par  Scan showed liver lesion, consistent with POD.\par  Ct C/A/P: 10/6/2022:  A new 4.6x5.7 cm low attenuation lesion in seen in the liver , concerning for metastatic disease.\par  \par  She has some RUQ pain, no nausea or vomiting. She has some constipation. Takes prune juice, on laxatives. No bleeding. No RT in the past.\par  \par  PMH: NONE\par  PSH: WINNIE, BSO, GB, Endometriosis surgery in 2004, right breast biopsy.( Dr. Chasity Pompa)\par  All: sulfa, metronidazole, taxol\par  FH: Sister breast cancer 40. 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  After four cycles, scan from 1/10/23 showed POD.\par  \par  1/10/2023: ct C/A/P :  No evidence of metastatic disease in the thorax. New subtle ground-glass opacities which may be infectious or inflammatory in etiology.\par  Progression of disease in the liver , 5.8 x 5.1 cm mass slightly increased previously measuring 5.7 x 4.6 cm\par   [de-identified] : [facility] [FreeTextEntry1] : Keytruda/Lenvima [de-identified] : Patient is here for follow up visit, accompanied by  and daughter called via phone. She reports overall feeling well, has a intermittent cough which she attributes to a cold and is improving. She had a telephone discussion with Dr. Mckeon and was informed about starting Trodelvy clinical trial. Appetite and weight stable. She has constipation, well controlled with bowel regimen. Denies chest pain, sob, fever, chills, abdominal pain.

## 2024-09-22 NOTE — HISTORY OF PRESENT ILLNESS
[Disease: _____________________] : Disease: [unfilled] [AJCC Stage: ____] : AJCC Stage: [unfilled] [de-identified] : Patient diagnosed with stage III MMT  in 1/2021. She saw gyn and had imaging. She was seen by Dr. Bowen, who did the surgery. Final path showed STAGE III MMT.\par  Dr. Nichols at [place]. She was treated with Carbo/ Taxol- s/p one cycle , had reaction and switched to Carbo/ doxil x one does. She transferred her care to Dr. Nichols at [place].and then Carbo/ Abraxane/ Herceptin x 6 completed, last dose was in August, 2021\par  She was started on Herceptin maintenance, last dose was in Sept, 2022.\par  Scan showed liver lesion, consistent with POD.\par  Ct C/A/P: 10/6/2022:  A new 4.6x5.7 cm low attenuation lesion in seen in the liver , concerning for metastatic disease.\par  \par  She has some RUQ pain, no nausea or vomiting. She has some constipation. Takes prune juice, on laxatives. No bleeding. No RT in the past.\par  \par  PMH: NONE\par  PSH: WINNIE, BSO, GB, Endometriosis surgery in 2004, right breast biopsy.( Dr. Chasity Pompa)\par  All: sulfa, metronidazole, taxol\par  FH: Sister breast cancer 40. 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  After four cycles, scan from 1/10/23 showed POD.\par  \par  1/10/2023: ct C/A/P :  No evidence of metastatic disease in the thorax. New subtle ground-glass opacities which may be infectious or inflammatory in etiology.\par  Progression of disease in the liver , 5.8 x 5.1 cm mass slightly increased previously measuring 5.7 x 4.6 cm\par   [de-identified] : [facility] [FreeTextEntry1] : Keytruda/Lenvima [de-identified] : Patient is here for follow up visit, accompanied by  and daughter called via phone. She reports overall feeling well, has a intermittent cough which she attributes to a cold and is improving. She had a telephone discussion with Dr. Mckeon and was informed about starting Trodelvy clinical trial. Appetite and weight stable. She has constipation, well controlled with bowel regimen. Denies chest pain, sob, fever, chills, abdominal pain.

## 2024-09-22 NOTE — HISTORY OF PRESENT ILLNESS
[Disease: _____________________] : Disease: [unfilled] [AJCC Stage: ____] : AJCC Stage: [unfilled] [de-identified] : Patient diagnosed with stage III MMT  in 1/2021. She saw gyn and had imaging. She was seen by Dr. Bowen, who did the surgery. Final path showed STAGE III MMT.\par  Dr. Nichols at [place]. She was treated with Carbo/ Taxol- s/p one cycle , had reaction and switched to Carbo/ doxil x one does. She transferred her care to Dr. Nichols at [place].and then Carbo/ Abraxane/ Herceptin x 6 completed, last dose was in August, 2021\par  She was started on Herceptin maintenance, last dose was in Sept, 2022.\par  Scan showed liver lesion, consistent with POD.\par  Ct C/A/P: 10/6/2022:  A new 4.6x5.7 cm low attenuation lesion in seen in the liver , concerning for metastatic disease.\par  \par  She has some RUQ pain, no nausea or vomiting. She has some constipation. Takes prune juice, on laxatives. No bleeding. No RT in the past.\par  \par  PMH: NONE\par  PSH: WINNIE, BSO, GB, Endometriosis surgery in 2004, right breast biopsy.( Dr. Chasity Pompa)\par  All: sulfa, metronidazole, taxol\par  FH: Sister breast cancer 40. 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)\par  \par  Mammogram: 11/7/2022\par  Colonoscopy: 2 yrs ago\par  \par  2/1/23: Scans on 10/6/2022 demonstrated a new lesion on the liver( 4.6x5.7 cm mass consistent with recurrent disease and not present on 4/25/22 scans)\par  IHC testing results for TROP-2 and Folic acid done by [facility] are reported to be positive.\par  Patient was enrolled in a phase II IMMUNOGEN study, and got Mirvetuximab.  After four cycles, scan from 1/10/23 showed POD.\par  \par  1/10/2023: ct C/A/P :  No evidence of metastatic disease in the thorax. New subtle ground-glass opacities which may be infectious or inflammatory in etiology.\par  Progression of disease in the liver , 5.8 x 5.1 cm mass slightly increased previously measuring 5.7 x 4.6 cm\par   [de-identified] : [facility] [FreeTextEntry1] : Keytruda/Lenvima [de-identified] : Patient is here for follow up visit, accompanied by  and daughter called via phone. She reports overall feeling well, has a intermittent cough which she attributes to a cold and is improving. She had a telephone discussion with Dr. Mckeon and was informed about starting Trodelvy clinical trial. Appetite and weight stable. She has constipation, well controlled with bowel regimen. Denies chest pain, sob, fever, chills, abdominal pain.

## 2024-09-24 ENCOUNTER — APPOINTMENT (OUTPATIENT)
Dept: HEMATOLOGY ONCOLOGY | Facility: CLINIC | Age: 59
End: 2024-09-24

## 2024-09-24 ENCOUNTER — APPOINTMENT (OUTPATIENT)
Dept: INFUSION THERAPY | Facility: HOSPITAL | Age: 59
End: 2024-09-24

## 2024-10-01 NOTE — ASU DISCHARGE PLAN (ADULT/PEDIATRIC) - CLICK TO LAUNCH ORM
Note Text (......Xxx Chief Complaint.): This diagnosis correlates with the Other (Free Text): Removed with a 16 gauge needle Render Risk Assessment In Note?: no Detail Level: Zone .

## 2025-01-01 ENCOUNTER — OUTPATIENT (OUTPATIENT)
Dept: OUTPATIENT SERVICES | Facility: HOSPITAL | Age: 60
LOS: 1 days | Discharge: HOME | End: 2025-01-01

## 2025-01-01 ENCOUNTER — INPATIENT (INPATIENT)
Facility: HOSPITAL | Age: 60
LOS: 9 days | Discharge: ANOTHER HOSPITAL | DRG: 374 | End: 2025-07-09
Attending: STUDENT IN AN ORGANIZED HEALTH CARE EDUCATION/TRAINING PROGRAM | Admitting: STUDENT IN AN ORGANIZED HEALTH CARE EDUCATION/TRAINING PROGRAM
Payer: COMMERCIAL

## 2025-01-01 ENCOUNTER — TRANSCRIPTION ENCOUNTER (OUTPATIENT)
Age: 60
End: 2025-01-01

## 2025-01-01 ENCOUNTER — INPATIENT (INPATIENT)
Facility: HOSPITAL | Age: 60
LOS: 17 days | End: 2025-07-27
Attending: STUDENT IN AN ORGANIZED HEALTH CARE EDUCATION/TRAINING PROGRAM | Admitting: STUDENT IN AN ORGANIZED HEALTH CARE EDUCATION/TRAINING PROGRAM
Payer: COMMERCIAL

## 2025-01-01 VITALS
WEIGHT: 132.06 LBS | TEMPERATURE: 98 F | HEIGHT: 65 IN | HEART RATE: 96 BPM | OXYGEN SATURATION: 94 % | RESPIRATION RATE: 20 BRPM | SYSTOLIC BLOOD PRESSURE: 149 MMHG | DIASTOLIC BLOOD PRESSURE: 84 MMHG

## 2025-01-01 VITALS
HEART RATE: 112 BPM | OXYGEN SATURATION: 99 % | RESPIRATION RATE: 18 BRPM | SYSTOLIC BLOOD PRESSURE: 128 MMHG | DIASTOLIC BLOOD PRESSURE: 65 MMHG | TEMPERATURE: 97 F

## 2025-01-01 VITALS
DIASTOLIC BLOOD PRESSURE: 75 MMHG | TEMPERATURE: 98 F | OXYGEN SATURATION: 97 % | HEART RATE: 118 BPM | RESPIRATION RATE: 18 BRPM | SYSTOLIC BLOOD PRESSURE: 131 MMHG

## 2025-01-01 DIAGNOSIS — Z90.49 ACQUIRED ABSENCE OF OTHER SPECIFIED PARTS OF DIGESTIVE TRACT: Chronic | ICD-10-CM

## 2025-01-01 DIAGNOSIS — K62.5 HEMORRHAGE OF ANUS AND RECTUM: ICD-10-CM

## 2025-01-01 DIAGNOSIS — Z51.5 ENCOUNTER FOR PALLIATIVE CARE: ICD-10-CM

## 2025-01-01 DIAGNOSIS — Z90.710 ACQUIRED ABSENCE OF BOTH CERVIX AND UTERUS: Chronic | ICD-10-CM

## 2025-01-01 DIAGNOSIS — C55 MALIGNANT NEOPLASM OF UTERUS, PART UNSPECIFIED: ICD-10-CM

## 2025-01-01 DIAGNOSIS — A41.9 SEPSIS, UNSPECIFIED ORGANISM: ICD-10-CM

## 2025-01-01 DIAGNOSIS — I10 ESSENTIAL (PRIMARY) HYPERTENSION: ICD-10-CM

## 2025-01-01 DIAGNOSIS — Z71.89 OTHER SPECIFIED COUNSELING: ICD-10-CM

## 2025-01-01 DIAGNOSIS — G89.3 NEOPLASM RELATED PAIN (ACUTE) (CHRONIC): ICD-10-CM

## 2025-01-01 DIAGNOSIS — I81 PORTAL VEIN THROMBOSIS: ICD-10-CM

## 2025-01-01 DIAGNOSIS — R63.8 OTHER SYMPTOMS AND SIGNS CONCERNING FOOD AND FLUID INTAKE: ICD-10-CM

## 2025-01-01 DIAGNOSIS — N17.9 ACUTE KIDNEY FAILURE, UNSPECIFIED: ICD-10-CM

## 2025-01-01 DIAGNOSIS — R53.2 FUNCTIONAL QUADRIPLEGIA: ICD-10-CM

## 2025-01-01 DIAGNOSIS — E03.9 HYPOTHYROIDISM, UNSPECIFIED: ICD-10-CM

## 2025-01-01 DIAGNOSIS — D64.9 ANEMIA, UNSPECIFIED: ICD-10-CM

## 2025-01-01 DIAGNOSIS — Z98.89 OTHER SPECIFIED POSTPROCEDURAL STATES: Chronic | ICD-10-CM

## 2025-01-01 DIAGNOSIS — N13.30 UNSPECIFIED HYDRONEPHROSIS: ICD-10-CM

## 2025-01-01 DIAGNOSIS — R60.0 LOCALIZED EDEMA: ICD-10-CM

## 2025-01-01 DIAGNOSIS — K55.9 VASCULAR DISORDER OF INTESTINE, UNSPECIFIED: ICD-10-CM

## 2025-01-01 DIAGNOSIS — R14.0 ABDOMINAL DISTENSION (GASEOUS): ICD-10-CM

## 2025-01-01 DIAGNOSIS — E87.1 HYPO-OSMOLALITY AND HYPONATREMIA: ICD-10-CM

## 2025-01-01 DIAGNOSIS — R74.01 ELEVATION OF LEVELS OF LIVER TRANSAMINASE LEVELS: ICD-10-CM

## 2025-01-01 DIAGNOSIS — L08.9 LOCAL INFECTION OF THE SKIN AND SUBCUTANEOUS TISSUE, UNSPECIFIED: ICD-10-CM

## 2025-01-01 DIAGNOSIS — K62.89 OTHER SPECIFIED DISEASES OF ANUS AND RECTUM: ICD-10-CM

## 2025-01-01 DIAGNOSIS — E78.5 HYPERLIPIDEMIA, UNSPECIFIED: ICD-10-CM

## 2025-01-01 DIAGNOSIS — E87.20 ACIDOSIS, UNSPECIFIED: ICD-10-CM

## 2025-01-01 DIAGNOSIS — D62 ACUTE POSTHEMORRHAGIC ANEMIA: ICD-10-CM

## 2025-01-01 DIAGNOSIS — I82.409 ACUTE EMBOLISM AND THROMBOSIS OF UNSPECIFIED DEEP VEINS OF UNSPECIFIED LOWER EXTREMITY: ICD-10-CM

## 2025-01-01 DIAGNOSIS — R82.90 UNSPECIFIED ABNORMAL FINDINGS IN URINE: ICD-10-CM

## 2025-01-01 DIAGNOSIS — D72.829 ELEVATED WHITE BLOOD CELL COUNT, UNSPECIFIED: ICD-10-CM

## 2025-01-01 DIAGNOSIS — E87.5 HYPERKALEMIA: ICD-10-CM

## 2025-01-01 DIAGNOSIS — E88.09 OTHER DISORDERS OF PLASMA-PROTEIN METABOLISM, NOT ELSEWHERE CLASSIFIED: ICD-10-CM

## 2025-01-01 LAB
-  AMOXICILLIN/CLAVULANIC ACID: SIGNIFICANT CHANGE UP
-  AMOXICILLIN/CLAVULANIC ACID: SIGNIFICANT CHANGE UP
-  AMPICILLIN/SULBACTAM: SIGNIFICANT CHANGE UP
-  AMPICILLIN/SULBACTAM: SIGNIFICANT CHANGE UP
-  AMPICILLIN: SIGNIFICANT CHANGE UP
-  AMPICILLIN: SIGNIFICANT CHANGE UP
-  AZTREONAM: SIGNIFICANT CHANGE UP
-  AZTREONAM: SIGNIFICANT CHANGE UP
-  CEFAZOLIN: SIGNIFICANT CHANGE UP
-  CEFAZOLIN: SIGNIFICANT CHANGE UP
-  CEFEPIME: SIGNIFICANT CHANGE UP
-  CEFEPIME: SIGNIFICANT CHANGE UP
-  CEFOXITIN: SIGNIFICANT CHANGE UP
-  CEFOXITIN: SIGNIFICANT CHANGE UP
-  CEFTRIAXONE: SIGNIFICANT CHANGE UP
-  CEFTRIAXONE: SIGNIFICANT CHANGE UP
-  CEFUROXIME: SIGNIFICANT CHANGE UP
-  CIPROFLOXACIN: SIGNIFICANT CHANGE UP
-  CIPROFLOXACIN: SIGNIFICANT CHANGE UP
-  ERTAPENEM: SIGNIFICANT CHANGE UP
-  ERTAPENEM: SIGNIFICANT CHANGE UP
-  GENTAMICIN: SIGNIFICANT CHANGE UP
-  GENTAMICIN: SIGNIFICANT CHANGE UP
-  IMIPENEM: SIGNIFICANT CHANGE UP
-  IMIPENEM: SIGNIFICANT CHANGE UP
-  LEVOFLOXACIN: SIGNIFICANT CHANGE UP
-  LEVOFLOXACIN: SIGNIFICANT CHANGE UP
-  MEROPENEM: SIGNIFICANT CHANGE UP
-  MEROPENEM: SIGNIFICANT CHANGE UP
-  NITROFURANTOIN: SIGNIFICANT CHANGE UP
-  NITROFURANTOIN: SIGNIFICANT CHANGE UP
-  PIPERACILLIN/TAZOBACTAM: SIGNIFICANT CHANGE UP
-  PIPERACILLIN/TAZOBACTAM: SIGNIFICANT CHANGE UP
-  TIGECYCLINE: SIGNIFICANT CHANGE UP
-  TIGECYCLINE: SIGNIFICANT CHANGE UP
-  TOBRAMYCIN: SIGNIFICANT CHANGE UP
-  TOBRAMYCIN: SIGNIFICANT CHANGE UP
-  TRIMETHOPRIM/SULFAMETHOXAZOLE: SIGNIFICANT CHANGE UP
-  TRIMETHOPRIM/SULFAMETHOXAZOLE: SIGNIFICANT CHANGE UP
ADD ON TEST-SPECIMEN IN LAB: SIGNIFICANT CHANGE UP
ALBUMIN FLD-MCNC: 1.1 G/DL — SIGNIFICANT CHANGE UP
ALBUMIN SERPL ELPH-MCNC: 1.8 G/DL — LOW (ref 3.3–5)
ALBUMIN SERPL ELPH-MCNC: 1.9 G/DL — LOW (ref 3.3–5)
ALBUMIN SERPL ELPH-MCNC: 2.1 G/DL — LOW (ref 3.3–5)
ALBUMIN SERPL ELPH-MCNC: 2.2 G/DL — LOW (ref 3.3–5)
ALBUMIN SERPL ELPH-MCNC: 2.3 G/DL — LOW (ref 3.3–5)
ALBUMIN SERPL ELPH-MCNC: 2.4 G/DL — LOW (ref 3.3–5)
ALBUMIN SERPL ELPH-MCNC: 2.4 G/DL — LOW (ref 3.3–5)
ALBUMIN SERPL ELPH-MCNC: 2.5 G/DL — LOW (ref 3.3–5)
ALBUMIN SERPL ELPH-MCNC: 2.5 G/DL — LOW (ref 3.3–5)
ALBUMIN SERPL ELPH-MCNC: 2.6 G/DL — LOW (ref 3.3–5)
ALBUMIN SERPL ELPH-MCNC: 2.6 G/DL — LOW (ref 3.3–5)
ALBUMIN SERPL ELPH-MCNC: 2.7 G/DL — LOW (ref 3.3–5)
ALBUMIN SERPL ELPH-MCNC: 2.8 G/DL — LOW (ref 3.3–5)
ALBUMIN SERPL ELPH-MCNC: 3 G/DL — LOW (ref 3.3–5)
ALBUMIN SERPL ELPH-MCNC: 3.4 G/DL — SIGNIFICANT CHANGE UP (ref 3.3–5)
ALP SERPL-CCNC: 1114 U/L — HIGH (ref 40–120)
ALP SERPL-CCNC: 1165 U/L — HIGH (ref 40–120)
ALP SERPL-CCNC: 1175 U/L — HIGH (ref 40–120)
ALP SERPL-CCNC: 1177 U/L — HIGH (ref 40–120)
ALP SERPL-CCNC: 1181 U/L — HIGH (ref 40–120)
ALP SERPL-CCNC: 1202 U/L — HIGH (ref 40–120)
ALP SERPL-CCNC: 1288 U/L — HIGH (ref 40–120)
ALP SERPL-CCNC: 1342 U/L — HIGH (ref 40–120)
ALP SERPL-CCNC: 1391 U/L — HIGH (ref 40–120)
ALP SERPL-CCNC: 1392 U/L — HIGH (ref 40–120)
ALP SERPL-CCNC: 1544 U/L — HIGH (ref 40–120)
ALP SERPL-CCNC: 1728 U/L — HIGH (ref 40–120)
ALP SERPL-CCNC: 1817 U/L — HIGH (ref 40–120)
ALP SERPL-CCNC: 2085 U/L — HIGH (ref 40–120)
ALP SERPL-CCNC: 2181 U/L — HIGH (ref 40–120)
ALP SERPL-CCNC: 2315 U/L — HIGH (ref 40–120)
ALP SERPL-CCNC: 2438 U/L — HIGH (ref 40–120)
ALP SERPL-CCNC: 2617 U/L — HIGH (ref 40–120)
ALP SERPL-CCNC: 2703 U/L — HIGH (ref 40–120)
ALP SERPL-CCNC: 2800 U/L — HIGH (ref 40–120)
ALP SERPL-CCNC: 2800 U/L — HIGH (ref 40–120)
ALP SERPL-CCNC: 3154 U/L — HIGH (ref 40–120)
ALP SERPL-CCNC: 3684 U/L — HIGH (ref 40–120)
ALP SERPL-CCNC: 536 U/L — HIGH (ref 40–120)
ALP SERPL-CCNC: 541 U/L — HIGH (ref 40–120)
ALP SERPL-CCNC: 585 U/L — HIGH (ref 40–120)
ALP SERPL-CCNC: 615 U/L — HIGH (ref 40–120)
ALP SERPL-CCNC: 695 U/L — HIGH (ref 40–120)
ALP SERPL-CCNC: 722 U/L — HIGH (ref 40–120)
ALP SERPL-CCNC: 780 U/L — HIGH (ref 40–120)
ALP SERPL-CCNC: 814 U/L — HIGH (ref 40–120)
ALP SERPL-CCNC: 837 U/L — HIGH (ref 40–120)
ALP SERPL-CCNC: 865 U/L — HIGH (ref 40–120)
ALP SERPL-CCNC: 875 U/L — HIGH (ref 40–120)
ALP SERPL-CCNC: 897 U/L — HIGH (ref 40–120)
ALP SERPL-CCNC: 932 U/L — HIGH (ref 40–120)
ALP SERPL-CCNC: 938 U/L — HIGH (ref 40–120)
ALP SERPL-CCNC: 966 U/L — HIGH (ref 40–120)
ALT FLD-CCNC: 100 U/L — HIGH (ref 4–33)
ALT FLD-CCNC: 116 U/L — HIGH (ref 4–33)
ALT FLD-CCNC: 164 U/L — HIGH (ref 4–33)
ALT FLD-CCNC: 167 U/L — HIGH (ref 4–33)
ALT FLD-CCNC: 178 U/L — HIGH (ref 4–33)
ALT FLD-CCNC: 178 U/L — HIGH (ref 4–33)
ALT FLD-CCNC: 211 U/L — HIGH (ref 4–33)
ALT FLD-CCNC: 30 U/L — SIGNIFICANT CHANGE UP (ref 4–33)
ALT FLD-CCNC: 36 U/L — HIGH (ref 4–33)
ALT FLD-CCNC: 36 U/L — SIGNIFICANT CHANGE UP (ref 10–45)
ALT FLD-CCNC: 36 U/L — SIGNIFICANT CHANGE UP (ref 10–45)
ALT FLD-CCNC: 37 U/L — HIGH (ref 4–33)
ALT FLD-CCNC: 38 U/L — HIGH (ref 4–33)
ALT FLD-CCNC: 42 U/L — SIGNIFICANT CHANGE UP (ref 10–45)
ALT FLD-CCNC: 43 U/L — HIGH (ref 4–33)
ALT FLD-CCNC: 43 U/L — SIGNIFICANT CHANGE UP (ref 10–45)
ALT FLD-CCNC: 44 U/L — HIGH (ref 4–33)
ALT FLD-CCNC: 45 U/L — SIGNIFICANT CHANGE UP (ref 10–45)
ALT FLD-CCNC: 47 U/L — HIGH (ref 10–45)
ALT FLD-CCNC: 48 U/L — HIGH (ref 4–33)
ALT FLD-CCNC: 486 U/L — HIGH (ref 4–33)
ALT FLD-CCNC: 49 U/L — HIGH (ref 4–33)
ALT FLD-CCNC: 51 U/L — HIGH (ref 4–33)
ALT FLD-CCNC: 51 U/L — HIGH (ref 4–33)
ALT FLD-CCNC: 53 U/L — HIGH (ref 4–33)
ALT FLD-CCNC: 56 U/L — HIGH (ref 10–45)
ALT FLD-CCNC: 61 U/L — HIGH (ref 10–45)
ALT FLD-CCNC: 66 U/L — HIGH (ref 10–45)
ALT FLD-CCNC: 66 U/L — HIGH (ref 4–33)
ALT FLD-CCNC: 691 U/L — HIGH (ref 4–33)
ALT FLD-CCNC: 75 U/L — HIGH (ref 4–33)
ALT FLD-CCNC: 85 U/L — HIGH (ref 4–33)
ALT FLD-CCNC: 875 U/L — HIGH (ref 4–33)
ALT FLD-CCNC: 88 U/L — HIGH (ref 4–33)
ALT FLD-CCNC: 88 U/L — HIGH (ref 4–33)
ALT FLD-CCNC: 89 U/L — HIGH (ref 4–33)
ALT FLD-CCNC: 92 U/L — HIGH (ref 4–33)
ALT FLD-CCNC: 95 U/L — HIGH (ref 4–33)
ANION GAP SERPL CALC-SCNC: 12 MMOL/L — SIGNIFICANT CHANGE UP (ref 7–14)
ANION GAP SERPL CALC-SCNC: 13 MMOL/L — SIGNIFICANT CHANGE UP (ref 5–17)
ANION GAP SERPL CALC-SCNC: 14 MMOL/L — SIGNIFICANT CHANGE UP (ref 7–14)
ANION GAP SERPL CALC-SCNC: 15 MMOL/L — SIGNIFICANT CHANGE UP (ref 5–17)
ANION GAP SERPL CALC-SCNC: 16 MMOL/L — HIGH (ref 7–14)
ANION GAP SERPL CALC-SCNC: 16 MMOL/L — SIGNIFICANT CHANGE UP (ref 5–17)
ANION GAP SERPL CALC-SCNC: 16 MMOL/L — SIGNIFICANT CHANGE UP (ref 5–17)
ANION GAP SERPL CALC-SCNC: 17 MMOL/L — HIGH (ref 7–14)
ANION GAP SERPL CALC-SCNC: 17 MMOL/L — SIGNIFICANT CHANGE UP (ref 5–17)
ANION GAP SERPL CALC-SCNC: 18 MMOL/L — HIGH (ref 5–17)
ANION GAP SERPL CALC-SCNC: 18 MMOL/L — HIGH (ref 5–17)
ANION GAP SERPL CALC-SCNC: 19 MMOL/L — HIGH (ref 5–17)
ANION GAP SERPL CALC-SCNC: 19 MMOL/L — HIGH (ref 7–14)
ANION GAP SERPL CALC-SCNC: 19 MMOL/L — HIGH (ref 7–14)
ANION GAP SERPL CALC-SCNC: 20 MMOL/L — HIGH (ref 7–14)
ANION GAP SERPL CALC-SCNC: 20 MMOL/L — HIGH (ref 7–14)
ANION GAP SERPL CALC-SCNC: 21 MMOL/L — HIGH (ref 7–14)
ANION GAP SERPL CALC-SCNC: 21 MMOL/L — HIGH (ref 7–14)
ANION GAP SERPL CALC-SCNC: 23 MMOL/L — HIGH (ref 7–14)
ANION GAP SERPL CALC-SCNC: 24 MMOL/L — HIGH (ref 7–14)
ANION GAP SERPL CALC-SCNC: 24 MMOL/L — HIGH (ref 7–14)
ANION GAP SERPL CALC-SCNC: 26 MMOL/L — HIGH (ref 7–14)
ANION GAP SERPL CALC-SCNC: 28 MMOL/L — HIGH (ref 7–14)
ANION GAP SERPL CALC-SCNC: 28 MMOL/L — HIGH (ref 7–14)
ANISOCYTOSIS BLD QL: ABNORMAL
APPEARANCE UR: ABNORMAL
APPEARANCE UR: CLEAR — SIGNIFICANT CHANGE UP
APTT BLD: 28.4 SEC — SIGNIFICANT CHANGE UP (ref 26.1–36.8)
APTT BLD: 30.2 SEC — SIGNIFICANT CHANGE UP (ref 26.1–36.8)
APTT BLD: 30.5 SEC — SIGNIFICANT CHANGE UP (ref 26.1–36.8)
APTT BLD: 31.5 SEC — SIGNIFICANT CHANGE UP (ref 26.1–36.8)
APTT BLD: 32.5 SEC — SIGNIFICANT CHANGE UP (ref 26.1–36.8)
APTT BLD: 35 SEC — SIGNIFICANT CHANGE UP (ref 26.1–36.8)
APTT BLD: 35.5 SEC — SIGNIFICANT CHANGE UP (ref 26.1–36.8)
APTT BLD: 38.5 SEC — HIGH (ref 26.1–36.8)
APTT BLD: 44 SEC — HIGH (ref 26.1–36.8)
AST SERPL-CCNC: 104 U/L — HIGH (ref 4–32)
AST SERPL-CCNC: 121 U/L — HIGH (ref 10–40)
AST SERPL-CCNC: 127 U/L — HIGH (ref 4–32)
AST SERPL-CCNC: 130 U/L — HIGH (ref 10–40)
AST SERPL-CCNC: 133 U/L — HIGH (ref 10–40)
AST SERPL-CCNC: 133 U/L — HIGH (ref 4–32)
AST SERPL-CCNC: 147 U/L — HIGH (ref 10–40)
AST SERPL-CCNC: 148 U/L — HIGH (ref 4–32)
AST SERPL-CCNC: 151 U/L — HIGH (ref 4–32)
AST SERPL-CCNC: 158 U/L — HIGH (ref 4–32)
AST SERPL-CCNC: 164 U/L — HIGH (ref 4–32)
AST SERPL-CCNC: 175 U/L — HIGH (ref 4–32)
AST SERPL-CCNC: 178 U/L — HIGH (ref 4–32)
AST SERPL-CCNC: 180 U/L — HIGH (ref 10–40)
AST SERPL-CCNC: 214 U/L — HIGH (ref 10–40)
AST SERPL-CCNC: 223 U/L — HIGH (ref 10–40)
AST SERPL-CCNC: 226 U/L — HIGH (ref 10–40)
AST SERPL-CCNC: 236 U/L — HIGH (ref 4–32)
AST SERPL-CCNC: 237 U/L — HIGH (ref 10–40)
AST SERPL-CCNC: 2492 U/L — HIGH (ref 4–32)
AST SERPL-CCNC: 267 U/L — HIGH (ref 4–32)
AST SERPL-CCNC: 319 U/L — HIGH (ref 4–32)
AST SERPL-CCNC: 361 U/L — HIGH (ref 4–32)
AST SERPL-CCNC: 399 U/L — HIGH (ref 4–32)
AST SERPL-CCNC: 410 U/L — HIGH (ref 4–32)
AST SERPL-CCNC: 439 U/L — HIGH (ref 4–32)
AST SERPL-CCNC: 445 U/L — HIGH (ref 4–32)
AST SERPL-CCNC: 468 U/L — HIGH (ref 4–32)
AST SERPL-CCNC: 482 U/L — HIGH (ref 4–32)
AST SERPL-CCNC: 495 U/L — HIGH (ref 4–32)
AST SERPL-CCNC: 819 U/L — HIGH (ref 4–32)
AST SERPL-CCNC: 823 U/L — HIGH (ref 4–32)
AST SERPL-CCNC: 832 U/L — HIGH (ref 4–32)
AST SERPL-CCNC: 987 U/L — HIGH (ref 4–32)
AST SERPL-CCNC: 997 U/L — HIGH (ref 4–32)
AST SERPL-CCNC: >7000 U/L — HIGH (ref 4–32)
AST SERPL-CCNC: >7000 U/L — HIGH (ref 4–32)
AST SERPL-CCNC: >7000 U/L — SIGNIFICANT CHANGE UP (ref 4–32)
B PERT IGG+IGM PNL SER: ABNORMAL
BACTERIA # UR AUTO: ABNORMAL /HPF
BACTERIA # UR AUTO: NEGATIVE /HPF — SIGNIFICANT CHANGE UP
BASOPHILS # BLD AUTO: 0.02 K/UL — SIGNIFICANT CHANGE UP (ref 0–0.2)
BASOPHILS # BLD AUTO: 0.03 K/UL — SIGNIFICANT CHANGE UP (ref 0–0.2)
BASOPHILS # BLD AUTO: 0.04 K/UL — SIGNIFICANT CHANGE UP (ref 0–0.2)
BASOPHILS # BLD AUTO: 0.04 K/UL — SIGNIFICANT CHANGE UP (ref 0–0.2)
BASOPHILS # BLD AUTO: 0.05 K/UL — SIGNIFICANT CHANGE UP (ref 0–0.2)
BASOPHILS # BLD AUTO: 0.06 K/UL — SIGNIFICANT CHANGE UP (ref 0–0.2)
BASOPHILS # BLD AUTO: 0.06 K/UL — SIGNIFICANT CHANGE UP (ref 0–0.2)
BASOPHILS # BLD AUTO: 0.07 K/UL — SIGNIFICANT CHANGE UP (ref 0–0.2)
BASOPHILS # BLD AUTO: 0.07 K/UL — SIGNIFICANT CHANGE UP (ref 0–0.2)
BASOPHILS # BLD AUTO: 0.11 K/UL — SIGNIFICANT CHANGE UP (ref 0–0.2)
BASOPHILS # BLD MANUAL: 0 K/UL — SIGNIFICANT CHANGE UP (ref 0–0.2)
BASOPHILS # BLD MANUAL: 0.05 K/UL — SIGNIFICANT CHANGE UP (ref 0–0.2)
BASOPHILS NFR BLD AUTO: 0.1 % — SIGNIFICANT CHANGE UP (ref 0–2)
BASOPHILS NFR BLD AUTO: 0.3 % — SIGNIFICANT CHANGE UP (ref 0–2)
BASOPHILS NFR BLD AUTO: 0.4 % — SIGNIFICANT CHANGE UP (ref 0–2)
BASOPHILS NFR BLD AUTO: 0.5 % — SIGNIFICANT CHANGE UP (ref 0–2)
BASOPHILS NFR BLD MANUAL: 0 % — SIGNIFICANT CHANGE UP (ref 0–2)
BASOPHILS NFR BLD MANUAL: 0.8 % — SIGNIFICANT CHANGE UP (ref 0–2)
BILIRUB DIRECT SERPL-MCNC: 3.5 MG/DL — HIGH (ref 0–0.3)
BILIRUB SERPL-MCNC: 0.4 MG/DL — SIGNIFICANT CHANGE UP (ref 0.2–1.2)
BILIRUB SERPL-MCNC: 0.5 MG/DL — SIGNIFICANT CHANGE UP (ref 0.2–1.2)
BILIRUB SERPL-MCNC: 0.6 MG/DL — SIGNIFICANT CHANGE UP (ref 0.2–1.2)
BILIRUB SERPL-MCNC: 0.7 MG/DL — SIGNIFICANT CHANGE UP (ref 0.2–1.2)
BILIRUB SERPL-MCNC: 0.9 MG/DL — SIGNIFICANT CHANGE UP (ref 0.2–1.2)
BILIRUB SERPL-MCNC: 1 MG/DL — SIGNIFICANT CHANGE UP (ref 0.2–1.2)
BILIRUB SERPL-MCNC: 1 MG/DL — SIGNIFICANT CHANGE UP (ref 0.2–1.2)
BILIRUB SERPL-MCNC: 1.1 MG/DL — SIGNIFICANT CHANGE UP (ref 0.2–1.2)
BILIRUB SERPL-MCNC: 1.2 MG/DL — SIGNIFICANT CHANGE UP (ref 0.2–1.2)
BILIRUB SERPL-MCNC: 1.3 MG/DL — HIGH (ref 0.2–1.2)
BILIRUB SERPL-MCNC: 1.3 MG/DL — HIGH (ref 0.2–1.2)
BILIRUB SERPL-MCNC: 1.4 MG/DL — HIGH (ref 0.2–1.2)
BILIRUB SERPL-MCNC: 1.4 MG/DL — HIGH (ref 0.2–1.2)
BILIRUB SERPL-MCNC: 1.5 MG/DL — HIGH (ref 0.2–1.2)
BILIRUB SERPL-MCNC: 1.6 MG/DL — HIGH (ref 0.2–1.2)
BILIRUB SERPL-MCNC: 1.6 MG/DL — HIGH (ref 0.2–1.2)
BILIRUB SERPL-MCNC: 2 MG/DL — HIGH (ref 0.2–1.2)
BILIRUB SERPL-MCNC: 2.1 MG/DL — HIGH (ref 0.2–1.2)
BILIRUB SERPL-MCNC: 2.2 MG/DL — HIGH (ref 0.2–1.2)
BILIRUB SERPL-MCNC: 3.9 MG/DL — HIGH (ref 0.2–1.2)
BILIRUB SERPL-MCNC: 4.2 MG/DL — HIGH (ref 0.2–1.2)
BILIRUB SERPL-MCNC: 5.3 MG/DL — HIGH (ref 0.2–1.2)
BILIRUB SERPL-MCNC: 5.5 MG/DL — HIGH (ref 0.2–1.2)
BILIRUB SERPL-MCNC: 6.3 MG/DL — HIGH (ref 0.2–1.2)
BILIRUB SERPL-MCNC: 6.6 MG/DL — HIGH (ref 0.2–1.2)
BILIRUB SERPL-MCNC: 6.9 MG/DL — HIGH (ref 0.2–1.2)
BILIRUB SERPL-MCNC: 6.9 MG/DL — HIGH (ref 0.2–1.2)
BILIRUB SERPL-MCNC: 7.4 MG/DL — HIGH (ref 0.2–1.2)
BILIRUB UR-MCNC: NEGATIVE — SIGNIFICANT CHANGE UP
BILIRUB UR-MCNC: NEGATIVE — SIGNIFICANT CHANGE UP
BLD GP AB SCN SERPL QL: NEGATIVE — SIGNIFICANT CHANGE UP
BLOOD GAS ARTERIAL - LYTES,HGB,ICA,LACT RESULT: SIGNIFICANT CHANGE UP
BLOOD GAS VENOUS COMPREHENSIVE RESULT: SIGNIFICANT CHANGE UP
BUN SERPL-MCNC: 10 MG/DL — SIGNIFICANT CHANGE UP (ref 7–23)
BUN SERPL-MCNC: 104 MG/DL — HIGH (ref 7–23)
BUN SERPL-MCNC: 106 MG/DL — HIGH (ref 7–23)
BUN SERPL-MCNC: 11 MG/DL — SIGNIFICANT CHANGE UP (ref 7–23)
BUN SERPL-MCNC: 14 MG/DL — SIGNIFICANT CHANGE UP (ref 7–23)
BUN SERPL-MCNC: 16 MG/DL — SIGNIFICANT CHANGE UP (ref 7–23)
BUN SERPL-MCNC: 16 MG/DL — SIGNIFICANT CHANGE UP (ref 7–23)
BUN SERPL-MCNC: 17 MG/DL — SIGNIFICANT CHANGE UP (ref 7–23)
BUN SERPL-MCNC: 18 MG/DL — SIGNIFICANT CHANGE UP (ref 7–23)
BUN SERPL-MCNC: 19 MG/DL — SIGNIFICANT CHANGE UP (ref 7–23)
BUN SERPL-MCNC: 19 MG/DL — SIGNIFICANT CHANGE UP (ref 7–23)
BUN SERPL-MCNC: 21 MG/DL — SIGNIFICANT CHANGE UP (ref 7–23)
BUN SERPL-MCNC: 21 MG/DL — SIGNIFICANT CHANGE UP (ref 7–23)
BUN SERPL-MCNC: 22 MG/DL — SIGNIFICANT CHANGE UP (ref 7–23)
BUN SERPL-MCNC: 22 MG/DL — SIGNIFICANT CHANGE UP (ref 7–23)
BUN SERPL-MCNC: 23 MG/DL — SIGNIFICANT CHANGE UP (ref 7–23)
BUN SERPL-MCNC: 24 MG/DL — HIGH (ref 7–23)
BUN SERPL-MCNC: 25 MG/DL — HIGH (ref 7–23)
BUN SERPL-MCNC: 26 MG/DL — HIGH (ref 7–23)
BUN SERPL-MCNC: 29 MG/DL — HIGH (ref 7–23)
BUN SERPL-MCNC: 30 MG/DL — HIGH (ref 7–23)
BUN SERPL-MCNC: 31 MG/DL — HIGH (ref 7–23)
BUN SERPL-MCNC: 34 MG/DL — HIGH (ref 7–23)
BUN SERPL-MCNC: 34 MG/DL — HIGH (ref 7–23)
BUN SERPL-MCNC: 37 MG/DL — HIGH (ref 7–23)
BUN SERPL-MCNC: 46 MG/DL — HIGH (ref 7–23)
BUN SERPL-MCNC: 48 MG/DL — HIGH (ref 7–23)
BUN SERPL-MCNC: 50 MG/DL — HIGH (ref 7–23)
BUN SERPL-MCNC: 50 MG/DL — HIGH (ref 7–23)
BUN SERPL-MCNC: 57 MG/DL — HIGH (ref 7–23)
BUN SERPL-MCNC: 59 MG/DL — HIGH (ref 7–23)
BUN SERPL-MCNC: 60 MG/DL — HIGH (ref 7–23)
BUN SERPL-MCNC: 65 MG/DL — HIGH (ref 7–23)
BUN SERPL-MCNC: 71 MG/DL — HIGH (ref 7–23)
BUN SERPL-MCNC: 74 MG/DL — HIGH (ref 7–23)
BUN SERPL-MCNC: 77 MG/DL — HIGH (ref 7–23)
BUN SERPL-MCNC: 79 MG/DL — HIGH (ref 7–23)
BUN SERPL-MCNC: 8 MG/DL — SIGNIFICANT CHANGE UP (ref 7–23)
BUN SERPL-MCNC: 9 MG/DL — SIGNIFICANT CHANGE UP (ref 7–23)
BUN SERPL-MCNC: 9 MG/DL — SIGNIFICANT CHANGE UP (ref 7–23)
BURR CELLS BLD QL SMEAR: SLIGHT — SIGNIFICANT CHANGE UP
CA-I BLD-SCNC: 0.97 MMOL/L — LOW (ref 1.15–1.29)
CA-I BLD-SCNC: 0.99 MMOL/L — LOW (ref 1.15–1.29)
CA-I BLD-SCNC: 1 MMOL/L — LOW (ref 1.15–1.29)
CA-I BLD-SCNC: 1 MMOL/L — LOW (ref 1.15–1.29)
CA-I BLD-SCNC: 1.01 MMOL/L — LOW (ref 1.15–1.29)
CA-I BLD-SCNC: 1.04 MMOL/L — LOW (ref 1.15–1.29)
CA-I BLD-SCNC: 1.07 MMOL/L — LOW (ref 1.15–1.29)
CA-I BLD-SCNC: 1.14 MMOL/L — LOW (ref 1.15–1.29)
CA-I BLD-SCNC: 1.16 MMOL/L — SIGNIFICANT CHANGE UP (ref 1.15–1.29)
CALCIUM SERPL-MCNC: 7.9 MG/DL — LOW (ref 8.4–10.5)
CALCIUM SERPL-MCNC: 8 MG/DL — LOW (ref 8.4–10.5)
CALCIUM SERPL-MCNC: 8 MG/DL — LOW (ref 8.4–10.5)
CALCIUM SERPL-MCNC: 8.1 MG/DL — LOW (ref 8.4–10.5)
CALCIUM SERPL-MCNC: 8.2 MG/DL — LOW (ref 8.4–10.5)
CALCIUM SERPL-MCNC: 8.3 MG/DL — LOW (ref 8.4–10.5)
CALCIUM SERPL-MCNC: 8.3 MG/DL — LOW (ref 8.4–10.5)
CALCIUM SERPL-MCNC: 8.4 MG/DL — SIGNIFICANT CHANGE UP (ref 8.4–10.5)
CALCIUM SERPL-MCNC: 8.4 MG/DL — SIGNIFICANT CHANGE UP (ref 8.4–10.5)
CALCIUM SERPL-MCNC: 8.6 MG/DL — SIGNIFICANT CHANGE UP (ref 8.4–10.5)
CALCIUM SERPL-MCNC: 8.6 MG/DL — SIGNIFICANT CHANGE UP (ref 8.4–10.5)
CALCIUM SERPL-MCNC: 8.7 MG/DL — SIGNIFICANT CHANGE UP (ref 8.4–10.5)
CALCIUM SERPL-MCNC: 8.8 MG/DL — SIGNIFICANT CHANGE UP (ref 8.4–10.5)
CALCIUM SERPL-MCNC: 8.9 MG/DL — SIGNIFICANT CHANGE UP (ref 8.4–10.5)
CALCIUM SERPL-MCNC: 9 MG/DL — SIGNIFICANT CHANGE UP (ref 8.4–10.5)
CALCIUM SERPL-MCNC: 9.1 MG/DL — SIGNIFICANT CHANGE UP (ref 8.4–10.5)
CALCIUM SERPL-MCNC: 9.2 MG/DL — SIGNIFICANT CHANGE UP (ref 8.4–10.5)
CALCIUM SERPL-MCNC: 9.3 MG/DL — SIGNIFICANT CHANGE UP (ref 8.4–10.5)
CALCIUM SERPL-MCNC: 9.3 MG/DL — SIGNIFICANT CHANGE UP (ref 8.4–10.5)
CALCIUM SERPL-MCNC: 9.4 MG/DL — SIGNIFICANT CHANGE UP (ref 8.4–10.5)
CAST: 0 /LPF — SIGNIFICANT CHANGE UP (ref 0–4)
CAST: 2 /LPF — SIGNIFICANT CHANGE UP (ref 0–4)
CHLORIDE SERPL-SCNC: 88 MMOL/L — LOW (ref 96–108)
CHLORIDE SERPL-SCNC: 89 MMOL/L — LOW (ref 96–108)
CHLORIDE SERPL-SCNC: 89 MMOL/L — LOW (ref 96–108)
CHLORIDE SERPL-SCNC: 91 MMOL/L — LOW (ref 96–108)
CHLORIDE SERPL-SCNC: 91 MMOL/L — LOW (ref 96–108)
CHLORIDE SERPL-SCNC: 92 MMOL/L — LOW (ref 96–108)
CHLORIDE SERPL-SCNC: 93 MMOL/L — LOW (ref 98–107)
CHLORIDE SERPL-SCNC: 94 MMOL/L — LOW (ref 96–108)
CHLORIDE SERPL-SCNC: 94 MMOL/L — LOW (ref 98–107)
CHLORIDE SERPL-SCNC: 95 MMOL/L — LOW (ref 96–108)
CHLORIDE SERPL-SCNC: 95 MMOL/L — LOW (ref 96–108)
CHLORIDE SERPL-SCNC: 95 MMOL/L — LOW (ref 98–107)
CHLORIDE SERPL-SCNC: 96 MMOL/L — LOW (ref 98–107)
CHLORIDE SERPL-SCNC: 96 MMOL/L — SIGNIFICANT CHANGE UP (ref 96–108)
CHLORIDE SERPL-SCNC: 97 MMOL/L — LOW (ref 98–107)
CHLORIDE SERPL-SCNC: 98 MMOL/L — SIGNIFICANT CHANGE UP (ref 98–107)
CHLORIDE SERPL-SCNC: 99 MMOL/L — SIGNIFICANT CHANGE UP (ref 96–108)
CHLORIDE SERPL-SCNC: 99 MMOL/L — SIGNIFICANT CHANGE UP (ref 98–107)
CHLORIDE UR-SCNC: <20 MMOL/L — SIGNIFICANT CHANGE UP
CO2 SERPL-SCNC: 10 MMOL/L — CRITICAL LOW (ref 22–31)
CO2 SERPL-SCNC: 12 MMOL/L — LOW (ref 22–31)
CO2 SERPL-SCNC: 13 MMOL/L — LOW (ref 22–31)
CO2 SERPL-SCNC: 14 MMOL/L — LOW (ref 22–31)
CO2 SERPL-SCNC: 15 MMOL/L — LOW (ref 22–31)
CO2 SERPL-SCNC: 16 MMOL/L — LOW (ref 22–31)
CO2 SERPL-SCNC: 17 MMOL/L — LOW (ref 22–31)
CO2 SERPL-SCNC: 18 MMOL/L — LOW (ref 22–31)
CO2 SERPL-SCNC: 19 MMOL/L — LOW (ref 22–31)
CO2 SERPL-SCNC: 20 MMOL/L — LOW (ref 22–31)
CO2 SERPL-SCNC: 21 MMOL/L — LOW (ref 22–31)
CO2 SERPL-SCNC: 22 MMOL/L — SIGNIFICANT CHANGE UP (ref 22–31)
CO2 SERPL-SCNC: 23 MMOL/L — SIGNIFICANT CHANGE UP (ref 22–31)
CO2 SERPL-SCNC: 24 MMOL/L — SIGNIFICANT CHANGE UP (ref 22–31)
COLOR FLD: YELLOW
COLOR SPEC: YELLOW — SIGNIFICANT CHANGE UP
COLOR SPEC: YELLOW — SIGNIFICANT CHANGE UP
CORTICOSTEROID BINDING GLOBULIN RESULT: 1.8 MG/DL — SIGNIFICANT CHANGE UP
CORTIS AM PEAK SERPL-MCNC: 23.3 UG/DL — HIGH (ref 6–18.4)
CORTIS F/TOTAL MFR SERPL: 61 % — SIGNIFICANT CHANGE UP
CORTIS SERPL-MCNC: 31 UG/DL — HIGH
CORTISOL, FREE RESULT: 19 UG/DL — HIGH
CREAT ?TM UR-MCNC: 70 MG/DL — SIGNIFICANT CHANGE UP
CREAT ?TM UR-MCNC: 97 MG/DL — SIGNIFICANT CHANGE UP
CREAT SERPL-MCNC: 0.47 MG/DL — LOW (ref 0.5–1.3)
CREAT SERPL-MCNC: 0.47 MG/DL — LOW (ref 0.5–1.3)
CREAT SERPL-MCNC: 0.49 MG/DL — LOW (ref 0.5–1.3)
CREAT SERPL-MCNC: 0.52 MG/DL — SIGNIFICANT CHANGE UP (ref 0.5–1.3)
CREAT SERPL-MCNC: 0.54 MG/DL — SIGNIFICANT CHANGE UP (ref 0.5–1.3)
CREAT SERPL-MCNC: 0.55 MG/DL — SIGNIFICANT CHANGE UP (ref 0.5–1.3)
CREAT SERPL-MCNC: 0.55 MG/DL — SIGNIFICANT CHANGE UP (ref 0.5–1.3)
CREAT SERPL-MCNC: 0.59 MG/DL — SIGNIFICANT CHANGE UP (ref 0.5–1.3)
CREAT SERPL-MCNC: 0.71 MG/DL — SIGNIFICANT CHANGE UP (ref 0.5–1.3)
CREAT SERPL-MCNC: 0.72 MG/DL — SIGNIFICANT CHANGE UP (ref 0.5–1.3)
CREAT SERPL-MCNC: 0.73 MG/DL — SIGNIFICANT CHANGE UP (ref 0.5–1.3)
CREAT SERPL-MCNC: 0.74 MG/DL — SIGNIFICANT CHANGE UP (ref 0.5–1.3)
CREAT SERPL-MCNC: 0.75 MG/DL — SIGNIFICANT CHANGE UP (ref 0.5–1.3)
CREAT SERPL-MCNC: 0.81 MG/DL — SIGNIFICANT CHANGE UP (ref 0.5–1.3)
CREAT SERPL-MCNC: 0.85 MG/DL — SIGNIFICANT CHANGE UP (ref 0.5–1.3)
CREAT SERPL-MCNC: 0.85 MG/DL — SIGNIFICANT CHANGE UP (ref 0.5–1.3)
CREAT SERPL-MCNC: 0.86 MG/DL — SIGNIFICANT CHANGE UP (ref 0.5–1.3)
CREAT SERPL-MCNC: 0.86 MG/DL — SIGNIFICANT CHANGE UP (ref 0.5–1.3)
CREAT SERPL-MCNC: 0.97 MG/DL — SIGNIFICANT CHANGE UP (ref 0.5–1.3)
CREAT SERPL-MCNC: 0.97 MG/DL — SIGNIFICANT CHANGE UP (ref 0.5–1.3)
CREAT SERPL-MCNC: 0.99 MG/DL — SIGNIFICANT CHANGE UP (ref 0.5–1.3)
CREAT SERPL-MCNC: 1.05 MG/DL — SIGNIFICANT CHANGE UP (ref 0.5–1.3)
CREAT SERPL-MCNC: 1.21 MG/DL — SIGNIFICANT CHANGE UP (ref 0.5–1.3)
CREAT SERPL-MCNC: 1.38 MG/DL — HIGH (ref 0.5–1.3)
CREAT SERPL-MCNC: 1.46 MG/DL — HIGH (ref 0.5–1.3)
CREAT SERPL-MCNC: 1.49 MG/DL — HIGH (ref 0.5–1.3)
CREAT SERPL-MCNC: 1.5 MG/DL — HIGH (ref 0.5–1.3)
CREAT SERPL-MCNC: 1.89 MG/DL — HIGH (ref 0.5–1.3)
CREAT SERPL-MCNC: 1.96 MG/DL — HIGH (ref 0.5–1.3)
CREAT SERPL-MCNC: 2.23 MG/DL — HIGH (ref 0.5–1.3)
CREAT SERPL-MCNC: 2.49 MG/DL — HIGH (ref 0.5–1.3)
CREAT SERPL-MCNC: 2.55 MG/DL — HIGH (ref 0.5–1.3)
CREAT SERPL-MCNC: 2.7 MG/DL — HIGH (ref 0.5–1.3)
CREAT SERPL-MCNC: 3.14 MG/DL — HIGH (ref 0.5–1.3)
CREAT SERPL-MCNC: 3.55 MG/DL — HIGH (ref 0.5–1.3)
CREAT SERPL-MCNC: 3.94 MG/DL — HIGH (ref 0.5–1.3)
CREAT SERPL-MCNC: 3.97 MG/DL — HIGH (ref 0.5–1.3)
CREAT SERPL-MCNC: 4.33 MG/DL — HIGH (ref 0.5–1.3)
CREAT SERPL-MCNC: 5.05 MG/DL — HIGH (ref 0.5–1.3)
CREAT SERPL-MCNC: 5.13 MG/DL — HIGH (ref 0.5–1.3)
CREAT SERPL-MCNC: 5.63 MG/DL — HIGH (ref 0.5–1.3)
CREAT SERPL-MCNC: 5.98 MG/DL — HIGH (ref 0.5–1.3)
CREAT SERPL-MCNC: 7.73 MG/DL — HIGH (ref 0.5–1.3)
CREAT SERPL-MCNC: 7.78 MG/DL — HIGH (ref 0.5–1.3)
CRP SERPL-MCNC: 134 MG/L — HIGH (ref 0–4)
CULTURE RESULTS: ABNORMAL
CULTURE RESULTS: ABNORMAL
CULTURE RESULTS: NO GROWTH — SIGNIFICANT CHANGE UP
CULTURE RESULTS: SIGNIFICANT CHANGE UP
CYSTATIN C SERPL-MCNC: 2.81 MG/L — HIGH (ref 0.64–1.17)
DIALYSIS INSTRUMENT RESULT - HEPATITIS B SURFACE ANTIGEN: NEGATIVE — SIGNIFICANT CHANGE UP
DIFF PNL FLD: ABNORMAL
DIFF PNL FLD: ABNORMAL
EGFR: 104 ML/MIN/1.73M2 — SIGNIFICANT CHANGE UP
EGFR: 104 ML/MIN/1.73M2 — SIGNIFICANT CHANGE UP
EGFR: 106 ML/MIN/1.73M2 — SIGNIFICANT CHANGE UP
EGFR: 107 ML/MIN/1.73M2 — SIGNIFICANT CHANGE UP
EGFR: 107 ML/MIN/1.73M2 — SIGNIFICANT CHANGE UP
EGFR: 108 ML/MIN/1.73M2 — SIGNIFICANT CHANGE UP
EGFR: 108 ML/MIN/1.73M2 — SIGNIFICANT CHANGE UP
EGFR: 109 ML/MIN/1.73M2 — SIGNIFICANT CHANGE UP
EGFR: 109 ML/MIN/1.73M2 — SIGNIFICANT CHANGE UP
EGFR: 11 ML/MIN/1.73M2 — LOW
EGFR: 11 ML/MIN/1.73M2 — LOW
EGFR: 110 ML/MIN/1.73M2 — SIGNIFICANT CHANGE UP
EGFR: 12 ML/MIN/1.73M2 — LOW
EGFR: 12 ML/MIN/1.73M2 — LOW
EGFR: 13 ML/MIN/1.73M2 — LOW
EGFR: 13 ML/MIN/1.73M2 — LOW
EGFR: 14 ML/MIN/1.73M2 — LOW
EGFR: 14 ML/MIN/1.73M2 — LOW
EGFR: 17 ML/MIN/1.73M2 — LOW
EGFR: 17 ML/MIN/1.73M2 — LOW
EGFR: 20 ML/MIN/1.73M2 — LOW
EGFR: 20 ML/MIN/1.73M2 — LOW
EGFR: 21 ML/MIN/1.73M2 — LOW
EGFR: 21 ML/MIN/1.73M2 — LOW
EGFR: 22 ML/MIN/1.73M2 — LOW
EGFR: 22 ML/MIN/1.73M2 — LOW
EGFR: 25 ML/MIN/1.73M2 — LOW
EGFR: 25 ML/MIN/1.73M2 — LOW
EGFR: 29 ML/MIN/1.73M2 — LOW
EGFR: 29 ML/MIN/1.73M2 — LOW
EGFR: 30 ML/MIN/1.73M2 — LOW
EGFR: 30 ML/MIN/1.73M2 — LOW
EGFR: 40 ML/MIN/1.73M2 — LOW
EGFR: 41 ML/MIN/1.73M2 — LOW
EGFR: 41 ML/MIN/1.73M2 — LOW
EGFR: 44 ML/MIN/1.73M2 — LOW
EGFR: 44 ML/MIN/1.73M2 — LOW
EGFR: 52 ML/MIN/1.73M2 — LOW
EGFR: 52 ML/MIN/1.73M2 — LOW
EGFR: 6 ML/MIN/1.73M2 — LOW
EGFR: 62 ML/MIN/1.73M2 — SIGNIFICANT CHANGE UP
EGFR: 62 ML/MIN/1.73M2 — SIGNIFICANT CHANGE UP
EGFR: 66 ML/MIN/1.73M2 — SIGNIFICANT CHANGE UP
EGFR: 66 ML/MIN/1.73M2 — SIGNIFICANT CHANGE UP
EGFR: 68 ML/MIN/1.73M2 — SIGNIFICANT CHANGE UP
EGFR: 78 ML/MIN/1.73M2 — SIGNIFICANT CHANGE UP
EGFR: 79 ML/MIN/1.73M2 — SIGNIFICANT CHANGE UP
EGFR: 8 ML/MIN/1.73M2 — LOW
EGFR: 84 ML/MIN/1.73M2 — SIGNIFICANT CHANGE UP
EGFR: 84 ML/MIN/1.73M2 — SIGNIFICANT CHANGE UP
EGFR: 9 ML/MIN/1.73M2 — LOW
EGFR: 92 ML/MIN/1.73M2 — SIGNIFICANT CHANGE UP
EGFR: 92 ML/MIN/1.73M2 — SIGNIFICANT CHANGE UP
EGFR: 94 ML/MIN/1.73M2 — SIGNIFICANT CHANGE UP
EGFR: 94 ML/MIN/1.73M2 — SIGNIFICANT CHANGE UP
EGFR: 95 ML/MIN/1.73M2 — SIGNIFICANT CHANGE UP
EGFR: 95 ML/MIN/1.73M2 — SIGNIFICANT CHANGE UP
EGFR: 97 ML/MIN/1.73M2 — SIGNIFICANT CHANGE UP
EGFR: 97 ML/MIN/1.73M2 — SIGNIFICANT CHANGE UP
EGFR: 98 ML/MIN/1.73M2 — SIGNIFICANT CHANGE UP
EGFR: 98 ML/MIN/1.73M2 — SIGNIFICANT CHANGE UP
EGFRCR-CYS SERPLBLD CKD-EPI 2021: 26 ML/MIN/1.73M2 — LOW
ELLIPTOCYTES BLD QL SMEAR: SLIGHT — SIGNIFICANT CHANGE UP
ELLIPTOCYTES BLD QL SMEAR: SLIGHT — SIGNIFICANT CHANGE UP
EOSINOPHIL # BLD AUTO: 0.02 K/UL — SIGNIFICANT CHANGE UP (ref 0–0.5)
EOSINOPHIL # BLD AUTO: 0.03 K/UL — SIGNIFICANT CHANGE UP (ref 0–0.5)
EOSINOPHIL # BLD AUTO: 0.06 K/UL — SIGNIFICANT CHANGE UP (ref 0–0.5)
EOSINOPHIL # BLD AUTO: 0.08 K/UL — SIGNIFICANT CHANGE UP (ref 0–0.5)
EOSINOPHIL # BLD AUTO: 0.09 K/UL — SIGNIFICANT CHANGE UP (ref 0–0.5)
EOSINOPHIL # BLD AUTO: 0.11 K/UL — SIGNIFICANT CHANGE UP (ref 0–0.5)
EOSINOPHIL # BLD AUTO: 0.12 K/UL — SIGNIFICANT CHANGE UP (ref 0–0.5)
EOSINOPHIL # BLD AUTO: 0.14 K/UL — SIGNIFICANT CHANGE UP (ref 0–0.5)
EOSINOPHIL # BLD AUTO: 0.15 K/UL — SIGNIFICANT CHANGE UP (ref 0–0.5)
EOSINOPHIL # BLD AUTO: 0.15 K/UL — SIGNIFICANT CHANGE UP (ref 0–0.5)
EOSINOPHIL # BLD AUTO: 0.17 K/UL — SIGNIFICANT CHANGE UP (ref 0–0.5)
EOSINOPHIL # BLD AUTO: 0.17 K/UL — SIGNIFICANT CHANGE UP (ref 0–0.5)
EOSINOPHIL # BLD AUTO: 0.18 K/UL — SIGNIFICANT CHANGE UP (ref 0–0.5)
EOSINOPHIL # BLD AUTO: 0.22 K/UL — SIGNIFICANT CHANGE UP (ref 0–0.5)
EOSINOPHIL # BLD AUTO: 0.22 K/UL — SIGNIFICANT CHANGE UP (ref 0–0.5)
EOSINOPHIL # BLD MANUAL: 0 K/UL — SIGNIFICANT CHANGE UP (ref 0–0.5)
EOSINOPHIL # BLD MANUAL: 0.05 K/UL — SIGNIFICANT CHANGE UP (ref 0–0.5)
EOSINOPHIL NFR BLD AUTO: 0.1 % — SIGNIFICANT CHANGE UP (ref 0–6)
EOSINOPHIL NFR BLD AUTO: 0.2 % — SIGNIFICANT CHANGE UP (ref 0–6)
EOSINOPHIL NFR BLD AUTO: 0.3 % — SIGNIFICANT CHANGE UP (ref 0–6)
EOSINOPHIL NFR BLD AUTO: 0.3 % — SIGNIFICANT CHANGE UP (ref 0–6)
EOSINOPHIL NFR BLD AUTO: 0.5 % — SIGNIFICANT CHANGE UP (ref 0–6)
EOSINOPHIL NFR BLD AUTO: 0.7 % — SIGNIFICANT CHANGE UP (ref 0–6)
EOSINOPHIL NFR BLD AUTO: 0.8 % — SIGNIFICANT CHANGE UP (ref 0–6)
EOSINOPHIL NFR BLD AUTO: 0.9 % — SIGNIFICANT CHANGE UP (ref 0–6)
EOSINOPHIL NFR BLD AUTO: 0.9 % — SIGNIFICANT CHANGE UP (ref 0–6)
EOSINOPHIL NFR BLD AUTO: 1 % — SIGNIFICANT CHANGE UP (ref 0–6)
EOSINOPHIL NFR BLD AUTO: 1 % — SIGNIFICANT CHANGE UP (ref 0–6)
EOSINOPHIL NFR BLD AUTO: 1.4 % — SIGNIFICANT CHANGE UP (ref 0–6)
EOSINOPHIL NFR BLD AUTO: 2.1 % — SIGNIFICANT CHANGE UP (ref 0–6)
EOSINOPHIL NFR BLD MANUAL: 0 % — SIGNIFICANT CHANGE UP (ref 0–6)
EOSINOPHIL NFR BLD MANUAL: 0.8 % — SIGNIFICANT CHANGE UP (ref 0–6)
ERYTHROCYTE [SEDIMENTATION RATE] IN BLOOD: 14 MM/HR — SIGNIFICANT CHANGE UP (ref 0–20)
GAS PNL BLDV: SIGNIFICANT CHANGE UP
GFR/BSA.PRED SERPLBLD CYS-BASED-ARV: 19 ML/MIN/1.73M2 — LOW
GGT SERPL-CCNC: 274 U/L — HIGH (ref 5–36)
GIANT PLATELETS BLD QL SMEAR: PRESENT
GLUCOSE BLDC GLUCOMTR-MCNC: 106 MG/DL — HIGH (ref 70–99)
GLUCOSE BLDC GLUCOMTR-MCNC: 107 MG/DL — HIGH (ref 70–99)
GLUCOSE BLDC GLUCOMTR-MCNC: 112 MG/DL — HIGH (ref 70–99)
GLUCOSE BLDC GLUCOMTR-MCNC: 113 MG/DL — HIGH (ref 70–99)
GLUCOSE BLDC GLUCOMTR-MCNC: 113 MG/DL — HIGH (ref 70–99)
GLUCOSE BLDC GLUCOMTR-MCNC: 114 MG/DL — HIGH (ref 70–99)
GLUCOSE BLDC GLUCOMTR-MCNC: 115 MG/DL — HIGH (ref 70–99)
GLUCOSE BLDC GLUCOMTR-MCNC: 118 MG/DL — HIGH (ref 70–99)
GLUCOSE BLDC GLUCOMTR-MCNC: 119 MG/DL — HIGH (ref 70–99)
GLUCOSE BLDC GLUCOMTR-MCNC: 119 MG/DL — HIGH (ref 70–99)
GLUCOSE BLDC GLUCOMTR-MCNC: 121 MG/DL — HIGH (ref 70–99)
GLUCOSE BLDC GLUCOMTR-MCNC: 123 MG/DL — HIGH (ref 70–99)
GLUCOSE BLDC GLUCOMTR-MCNC: 137 MG/DL — HIGH (ref 70–99)
GLUCOSE BLDC GLUCOMTR-MCNC: 139 MG/DL — HIGH (ref 70–99)
GLUCOSE BLDC GLUCOMTR-MCNC: 139 MG/DL — HIGH (ref 70–99)
GLUCOSE BLDC GLUCOMTR-MCNC: 141 MG/DL — HIGH (ref 70–99)
GLUCOSE BLDC GLUCOMTR-MCNC: 142 MG/DL — HIGH (ref 70–99)
GLUCOSE BLDC GLUCOMTR-MCNC: 151 MG/DL — HIGH (ref 70–99)
GLUCOSE BLDC GLUCOMTR-MCNC: 161 MG/DL — HIGH (ref 70–99)
GLUCOSE BLDC GLUCOMTR-MCNC: 167 MG/DL — HIGH (ref 70–99)
GLUCOSE BLDC GLUCOMTR-MCNC: 189 MG/DL — HIGH (ref 70–99)
GLUCOSE BLDC GLUCOMTR-MCNC: 65 MG/DL — LOW (ref 70–99)
GLUCOSE BLDC GLUCOMTR-MCNC: 87 MG/DL — SIGNIFICANT CHANGE UP (ref 70–99)
GLUCOSE BLDC GLUCOMTR-MCNC: 88 MG/DL — SIGNIFICANT CHANGE UP (ref 70–99)
GLUCOSE BLDC GLUCOMTR-MCNC: 90 MG/DL — SIGNIFICANT CHANGE UP (ref 70–99)
GLUCOSE BLDC GLUCOMTR-MCNC: 92 MG/DL — SIGNIFICANT CHANGE UP (ref 70–99)
GLUCOSE BLDC GLUCOMTR-MCNC: 93 MG/DL — SIGNIFICANT CHANGE UP (ref 70–99)
GLUCOSE BLDC GLUCOMTR-MCNC: 94 MG/DL — SIGNIFICANT CHANGE UP (ref 70–99)
GLUCOSE BLDC GLUCOMTR-MCNC: 94 MG/DL — SIGNIFICANT CHANGE UP (ref 70–99)
GLUCOSE BLDC GLUCOMTR-MCNC: 98 MG/DL — SIGNIFICANT CHANGE UP (ref 70–99)
GLUCOSE FLD-MCNC: 61 MG/DL — SIGNIFICANT CHANGE UP
GLUCOSE SERPL-MCNC: 101 MG/DL — HIGH (ref 70–99)
GLUCOSE SERPL-MCNC: 105 MG/DL — HIGH (ref 70–99)
GLUCOSE SERPL-MCNC: 106 MG/DL — HIGH (ref 70–99)
GLUCOSE SERPL-MCNC: 110 MG/DL — HIGH (ref 70–99)
GLUCOSE SERPL-MCNC: 110 MG/DL — HIGH (ref 70–99)
GLUCOSE SERPL-MCNC: 112 MG/DL — HIGH (ref 70–99)
GLUCOSE SERPL-MCNC: 114 MG/DL — HIGH (ref 70–99)
GLUCOSE SERPL-MCNC: 121 MG/DL — HIGH (ref 70–99)
GLUCOSE SERPL-MCNC: 121 MG/DL — HIGH (ref 70–99)
GLUCOSE SERPL-MCNC: 123 MG/DL — HIGH (ref 70–99)
GLUCOSE SERPL-MCNC: 129 MG/DL — HIGH (ref 70–99)
GLUCOSE SERPL-MCNC: 130 MG/DL — HIGH (ref 70–99)
GLUCOSE SERPL-MCNC: 138 MG/DL — HIGH (ref 70–99)
GLUCOSE SERPL-MCNC: 139 MG/DL — HIGH (ref 70–99)
GLUCOSE SERPL-MCNC: 152 MG/DL — HIGH (ref 70–99)
GLUCOSE SERPL-MCNC: 155 MG/DL — HIGH (ref 70–99)
GLUCOSE SERPL-MCNC: 49 MG/DL — CRITICAL LOW (ref 70–99)
GLUCOSE SERPL-MCNC: 54 MG/DL — CRITICAL LOW (ref 70–99)
GLUCOSE SERPL-MCNC: 54 MG/DL — CRITICAL LOW (ref 70–99)
GLUCOSE SERPL-MCNC: 58 MG/DL — LOW (ref 70–99)
GLUCOSE SERPL-MCNC: 64 MG/DL — LOW (ref 70–99)
GLUCOSE SERPL-MCNC: 70 MG/DL — SIGNIFICANT CHANGE UP (ref 70–99)
GLUCOSE SERPL-MCNC: 73 MG/DL — SIGNIFICANT CHANGE UP (ref 70–99)
GLUCOSE SERPL-MCNC: 75 MG/DL — SIGNIFICANT CHANGE UP (ref 70–99)
GLUCOSE SERPL-MCNC: 77 MG/DL — SIGNIFICANT CHANGE UP (ref 70–99)
GLUCOSE SERPL-MCNC: 77 MG/DL — SIGNIFICANT CHANGE UP (ref 70–99)
GLUCOSE SERPL-MCNC: 81 MG/DL — SIGNIFICANT CHANGE UP (ref 70–99)
GLUCOSE SERPL-MCNC: 83 MG/DL — SIGNIFICANT CHANGE UP (ref 70–99)
GLUCOSE SERPL-MCNC: 85 MG/DL — SIGNIFICANT CHANGE UP (ref 70–99)
GLUCOSE SERPL-MCNC: 87 MG/DL — SIGNIFICANT CHANGE UP (ref 70–99)
GLUCOSE SERPL-MCNC: 87 MG/DL — SIGNIFICANT CHANGE UP (ref 70–99)
GLUCOSE SERPL-MCNC: 90 MG/DL — SIGNIFICANT CHANGE UP (ref 70–99)
GLUCOSE SERPL-MCNC: 91 MG/DL — SIGNIFICANT CHANGE UP (ref 70–99)
GLUCOSE SERPL-MCNC: 92 MG/DL — SIGNIFICANT CHANGE UP (ref 70–99)
GLUCOSE SERPL-MCNC: 93 MG/DL — SIGNIFICANT CHANGE UP (ref 70–99)
GLUCOSE SERPL-MCNC: 93 MG/DL — SIGNIFICANT CHANGE UP (ref 70–99)
GLUCOSE SERPL-MCNC: 95 MG/DL — SIGNIFICANT CHANGE UP (ref 70–99)
GLUCOSE SERPL-MCNC: 95 MG/DL — SIGNIFICANT CHANGE UP (ref 70–99)
GLUCOSE SERPL-MCNC: 97 MG/DL — SIGNIFICANT CHANGE UP (ref 70–99)
GLUCOSE SERPL-MCNC: 98 MG/DL — SIGNIFICANT CHANGE UP (ref 70–99)
GLUCOSE SERPL-MCNC: 99 MG/DL — SIGNIFICANT CHANGE UP (ref 70–99)
GLUCOSE UR QL: NEGATIVE MG/DL — SIGNIFICANT CHANGE UP
GLUCOSE UR QL: NEGATIVE MG/DL — SIGNIFICANT CHANGE UP
GRAM STN FLD: SIGNIFICANT CHANGE UP
HCT VFR BLD CALC: 19.1 % — CRITICAL LOW (ref 34.5–45)
HCT VFR BLD CALC: 19.6 % — CRITICAL LOW (ref 34.5–45)
HCT VFR BLD CALC: 21.1 % — LOW (ref 34.5–45)
HCT VFR BLD CALC: 21.6 % — LOW (ref 34.5–45)
HCT VFR BLD CALC: 21.8 % — LOW (ref 34.5–45)
HCT VFR BLD CALC: 22.2 % — LOW (ref 34.5–45)
HCT VFR BLD CALC: 22.3 % — LOW (ref 34.5–45)
HCT VFR BLD CALC: 22.3 % — LOW (ref 34.5–45)
HCT VFR BLD CALC: 22.4 % — LOW (ref 34.5–45)
HCT VFR BLD CALC: 22.5 % — LOW (ref 34.5–45)
HCT VFR BLD CALC: 22.7 % — LOW (ref 34.5–45)
HCT VFR BLD CALC: 22.7 % — LOW (ref 34.5–45)
HCT VFR BLD CALC: 22.8 % — LOW (ref 34.5–45)
HCT VFR BLD CALC: 23 % — LOW (ref 34.5–45)
HCT VFR BLD CALC: 23 % — LOW (ref 34.5–45)
HCT VFR BLD CALC: 23.4 % — LOW (ref 34.5–45)
HCT VFR BLD CALC: 23.5 % — LOW (ref 34.5–45)
HCT VFR BLD CALC: 23.6 % — LOW (ref 34.5–45)
HCT VFR BLD CALC: 23.7 % — LOW (ref 34.5–45)
HCT VFR BLD CALC: 23.7 % — LOW (ref 34.5–45)
HCT VFR BLD CALC: 24 % — LOW (ref 34.5–45)
HCT VFR BLD CALC: 24 % — LOW (ref 34.5–45)
HCT VFR BLD CALC: 24.1 % — LOW (ref 34.5–45)
HCT VFR BLD CALC: 24.2 % — LOW (ref 34.5–45)
HCT VFR BLD CALC: 24.9 % — LOW (ref 34.5–45)
HCT VFR BLD CALC: 25.3 % — LOW (ref 34.5–45)
HCT VFR BLD CALC: 25.4 % — LOW (ref 34.5–45)
HCT VFR BLD CALC: 25.4 % — LOW (ref 34.5–45)
HCT VFR BLD CALC: 26.3 % — LOW (ref 34.5–45)
HCT VFR BLD CALC: 26.5 % — LOW (ref 34.5–45)
HCT VFR BLD CALC: 26.6 % — LOW (ref 34.5–45)
HCT VFR BLD CALC: 27.6 % — LOW (ref 34.5–45)
HCT VFR BLD CALC: 27.8 % — LOW (ref 34.5–45)
HCT VFR BLD CALC: 28.6 % — LOW (ref 34.5–45)
HCT VFR BLD CALC: 29.8 % — LOW (ref 34.5–45)
HCT VFR BLD CALC: 29.8 % — LOW (ref 34.5–45)
HCT VFR BLD CALC: 30.3 % — LOW (ref 34.5–45)
HCT VFR BLD CALC: 30.8 % — LOW (ref 34.5–45)
HCT VFR BLD CALC: 31.1 % — LOW (ref 34.5–45)
HCT VFR BLD CALC: 31.4 % — LOW (ref 34.5–45)
HCT VFR BLD CALC: 32 % — LOW (ref 34.5–45)
HCT VFR BLD CALC: 32.5 % — LOW (ref 34.5–45)
HCT VFR BLD CALC: 32.5 % — LOW (ref 34.5–45)
HCT VFR BLD CALC: 36.2 % — SIGNIFICANT CHANGE UP (ref 34.5–45)
HGB BLD-MCNC: 10 G/DL — LOW (ref 11.5–15.5)
HGB BLD-MCNC: 10.1 G/DL — LOW (ref 11.5–15.5)
HGB BLD-MCNC: 10.1 G/DL — LOW (ref 11.5–15.5)
HGB BLD-MCNC: 10.8 G/DL — LOW (ref 11.5–15.5)
HGB BLD-MCNC: 5.8 G/DL — CRITICAL LOW (ref 11.5–15.5)
HGB BLD-MCNC: 6.2 G/DL — CRITICAL LOW (ref 11.5–15.5)
HGB BLD-MCNC: 6.4 G/DL — CRITICAL LOW (ref 11.5–15.5)
HGB BLD-MCNC: 6.8 G/DL — CRITICAL LOW (ref 11.5–15.5)
HGB BLD-MCNC: 6.8 G/DL — CRITICAL LOW (ref 11.5–15.5)
HGB BLD-MCNC: 7.1 G/DL — LOW (ref 11.5–15.5)
HGB BLD-MCNC: 7.2 G/DL — LOW (ref 11.5–15.5)
HGB BLD-MCNC: 7.3 G/DL — LOW (ref 11.5–15.5)
HGB BLD-MCNC: 7.3 G/DL — LOW (ref 11.5–15.5)
HGB BLD-MCNC: 7.4 G/DL — LOW (ref 11.5–15.5)
HGB BLD-MCNC: 7.6 G/DL — LOW (ref 11.5–15.5)
HGB BLD-MCNC: 7.6 G/DL — LOW (ref 11.5–15.5)
HGB BLD-MCNC: 7.7 G/DL — LOW (ref 11.5–15.5)
HGB BLD-MCNC: 7.8 G/DL — LOW (ref 11.5–15.5)
HGB BLD-MCNC: 7.8 G/DL — LOW (ref 11.5–15.5)
HGB BLD-MCNC: 7.9 G/DL — LOW (ref 11.5–15.5)
HGB BLD-MCNC: 8 G/DL — LOW (ref 11.5–15.5)
HGB BLD-MCNC: 8.3 G/DL — LOW (ref 11.5–15.5)
HGB BLD-MCNC: 8.5 G/DL — LOW (ref 11.5–15.5)
HGB BLD-MCNC: 8.6 G/DL — LOW (ref 11.5–15.5)
HGB BLD-MCNC: 8.8 G/DL — LOW (ref 11.5–15.5)
HGB BLD-MCNC: 8.8 G/DL — LOW (ref 11.5–15.5)
HGB BLD-MCNC: 8.9 G/DL — LOW (ref 11.5–15.5)
HGB BLD-MCNC: 8.9 G/DL — LOW (ref 11.5–15.5)
HGB BLD-MCNC: 9.4 G/DL — LOW (ref 11.5–15.5)
HGB BLD-MCNC: 9.4 G/DL — LOW (ref 11.5–15.5)
HGB BLD-MCNC: 9.5 G/DL — LOW (ref 11.5–15.5)
HGB BLD-MCNC: 9.6 G/DL — LOW (ref 11.5–15.5)
HGB BLD-MCNC: 9.7 G/DL — LOW (ref 11.5–15.5)
HGB BLD-MCNC: 9.8 G/DL — LOW (ref 11.5–15.5)
HYPOCHROMIA BLD QL: SLIGHT — SIGNIFICANT CHANGE UP
IMM GRANULOCYTES # BLD AUTO: 0.02 K/UL — SIGNIFICANT CHANGE UP (ref 0–0.07)
IMM GRANULOCYTES # BLD AUTO: 0.04 K/UL — SIGNIFICANT CHANGE UP (ref 0–0.07)
IMM GRANULOCYTES # BLD AUTO: 0.05 K/UL — SIGNIFICANT CHANGE UP (ref 0–0.07)
IMM GRANULOCYTES # BLD AUTO: 0.13 K/UL — HIGH (ref 0–0.07)
IMM GRANULOCYTES # BLD AUTO: 0.23 K/UL — HIGH (ref 0–0.07)
IMM GRANULOCYTES # BLD AUTO: 0.24 K/UL — HIGH (ref 0–0.07)
IMM GRANULOCYTES # BLD AUTO: 0.25 K/UL — HIGH (ref 0–0.07)
IMM GRANULOCYTES # BLD AUTO: 0.26 K/UL — HIGH (ref 0–0.07)
IMM GRANULOCYTES # BLD AUTO: 0.31 K/UL — HIGH (ref 0–0.07)
IMM GRANULOCYTES # BLD AUTO: 0.37 K/UL — HIGH (ref 0–0.07)
IMM GRANULOCYTES # BLD AUTO: 0.43 K/UL — HIGH (ref 0–0.07)
IMM GRANULOCYTES # BLD AUTO: 0.76 K/UL — HIGH (ref 0–0.07)
IMM GRANULOCYTES # BLD AUTO: 1.23 K/UL — HIGH (ref 0–0.07)
IMM GRANULOCYTES # BLD AUTO: 1.43 K/UL — HIGH (ref 0–0.07)
IMM GRANULOCYTES # BLD AUTO: 1.62 K/UL — HIGH (ref 0–0.07)
IMM GRANULOCYTES # BLD AUTO: 1.7 K/UL — HIGH (ref 0–0.07)
IMM GRANULOCYTES NFR BLD AUTO: 0.3 % — SIGNIFICANT CHANGE UP (ref 0–0.9)
IMM GRANULOCYTES NFR BLD AUTO: 0.3 % — SIGNIFICANT CHANGE UP (ref 0–0.9)
IMM GRANULOCYTES NFR BLD AUTO: 0.4 % — SIGNIFICANT CHANGE UP (ref 0–0.9)
IMM GRANULOCYTES NFR BLD AUTO: 0.4 % — SIGNIFICANT CHANGE UP (ref 0–0.9)
IMM GRANULOCYTES NFR BLD AUTO: 0.8 % — SIGNIFICANT CHANGE UP (ref 0–0.9)
IMM GRANULOCYTES NFR BLD AUTO: 1.2 % — HIGH (ref 0–0.9)
IMM GRANULOCYTES NFR BLD AUTO: 1.5 % — HIGH (ref 0–0.9)
IMM GRANULOCYTES NFR BLD AUTO: 1.5 % — HIGH (ref 0–0.9)
IMM GRANULOCYTES NFR BLD AUTO: 1.7 % — HIGH (ref 0–0.9)
IMM GRANULOCYTES NFR BLD AUTO: 1.8 % — HIGH (ref 0–0.9)
IMM GRANULOCYTES NFR BLD AUTO: 2.2 % — HIGH (ref 0–0.9)
IMM GRANULOCYTES NFR BLD AUTO: 2.2 % — HIGH (ref 0–0.9)
IMM GRANULOCYTES NFR BLD AUTO: 3.1 % — HIGH (ref 0–0.9)
IMM GRANULOCYTES NFR BLD AUTO: 4.3 % — HIGH (ref 0–0.9)
IMM GRANULOCYTES NFR BLD AUTO: 4.4 % — HIGH (ref 0–0.9)
IMM GRANULOCYTES NFR BLD AUTO: 4.5 % — HIGH (ref 0–0.9)
IMM GRANULOCYTES NFR BLD AUTO: 4.9 % — HIGH (ref 0–0.9)
IMMATURE PLATELET FRACTION #: 6.9 K/UL — SIGNIFICANT CHANGE UP (ref 4.7–11.1)
IMMATURE PLATELET FRACTION #: 7.4 K/UL — SIGNIFICANT CHANGE UP (ref 4.7–11.1)
IMMATURE PLATELET FRACTION %: 7.8 % — HIGH (ref 1.6–4.9)
IMMATURE PLATELET FRACTION %: 9.6 % — HIGH (ref 1.6–4.9)
INR BLD: 1.33 RATIO — HIGH (ref 0.85–1.16)
INR BLD: 1.38 RATIO — HIGH (ref 0.85–1.16)
INR BLD: 1.44 RATIO — HIGH (ref 0.85–1.16)
INR BLD: 1.52 RATIO — HIGH (ref 0.85–1.16)
INR BLD: 1.53 RATIO — HIGH (ref 0.85–1.16)
INR BLD: 1.74 RATIO — HIGH (ref 0.85–1.16)
INR BLD: 1.98 RATIO — HIGH (ref 0.85–1.16)
INR BLD: 2.22 RATIO — HIGH (ref 0.85–1.16)
INR BLD: 2.29 RATIO — HIGH (ref 0.85–1.16)
INR BLD: 2.42 RATIO — HIGH (ref 0.85–1.16)
KETONES UR QL: NEGATIVE MG/DL — SIGNIFICANT CHANGE UP
KETONES UR QL: NEGATIVE MG/DL — SIGNIFICANT CHANGE UP
LACTATE SERPL-SCNC: 2 MMOL/L — SIGNIFICANT CHANGE UP (ref 0.5–2)
LACTATE SERPL-SCNC: 2.7 MMOL/L — HIGH (ref 0.5–2)
LACTATE SERPL-SCNC: 2.7 MMOL/L — HIGH (ref 0.5–2)
LACTATE SERPL-SCNC: 4.2 MMOL/L — CRITICAL HIGH (ref 0.5–2)
LACTATE SERPL-SCNC: 4.5 MMOL/L — CRITICAL HIGH (ref 0.5–2)
LDH SERPL L TO P-CCNC: 1766 U/L — SIGNIFICANT CHANGE UP
LEUKOCYTE ESTERASE UR-ACNC: ABNORMAL
LEUKOCYTE ESTERASE UR-ACNC: ABNORMAL
LYMPHOCYTES # BLD AUTO: 0.24 K/UL — LOW (ref 1–3.3)
LYMPHOCYTES # BLD AUTO: 0.26 K/UL — LOW (ref 1–3.3)
LYMPHOCYTES # BLD AUTO: 0.26 K/UL — LOW (ref 1–3.3)
LYMPHOCYTES # BLD AUTO: 0.28 K/UL — LOW (ref 1–3.3)
LYMPHOCYTES # BLD AUTO: 0.35 K/UL — LOW (ref 1–3.3)
LYMPHOCYTES # BLD AUTO: 0.37 K/UL — LOW (ref 1–3.3)
LYMPHOCYTES # BLD AUTO: 0.39 K/UL — LOW (ref 1–3.3)
LYMPHOCYTES # BLD AUTO: 0.43 K/UL — LOW (ref 1–3.3)
LYMPHOCYTES # BLD AUTO: 0.44 K/UL — LOW (ref 1–3.3)
LYMPHOCYTES # BLD AUTO: 0.49 K/UL — LOW (ref 1–3.3)
LYMPHOCYTES # BLD AUTO: 0.49 K/UL — LOW (ref 1–3.3)
LYMPHOCYTES # BLD AUTO: 0.55 K/UL — LOW (ref 1–3.3)
LYMPHOCYTES # BLD AUTO: 0.56 K/UL — LOW (ref 1–3.3)
LYMPHOCYTES # BLD AUTO: 0.57 K/UL — LOW (ref 1–3.3)
LYMPHOCYTES # BLD AUTO: 0.59 K/UL — LOW (ref 1–3.3)
LYMPHOCYTES # BLD AUTO: 0.63 K/UL — LOW (ref 1–3.3)
LYMPHOCYTES # BLD AUTO: 0.64 K/UL — LOW (ref 1–3.3)
LYMPHOCYTES # BLD AUTO: 3.3 % — LOW (ref 13–44)
LYMPHOCYTES # BLD MANUAL: 0.15 K/UL — LOW (ref 1–3.3)
LYMPHOCYTES # BLD MANUAL: 0.97 K/UL — LOW (ref 1–3.3)
LYMPHOCYTES # FLD: 5 % — SIGNIFICANT CHANGE UP
LYMPHOCYTES NFR BLD AUTO: 0.6 % — LOW (ref 13–44)
LYMPHOCYTES NFR BLD AUTO: 0.8 % — LOW (ref 13–44)
LYMPHOCYTES NFR BLD AUTO: 1.1 % — LOW (ref 13–44)
LYMPHOCYTES NFR BLD AUTO: 1.3 % — LOW (ref 13–44)
LYMPHOCYTES NFR BLD AUTO: 2 % — LOW (ref 13–44)
LYMPHOCYTES NFR BLD AUTO: 2 % — LOW (ref 13–44)
LYMPHOCYTES NFR BLD AUTO: 2.1 % — LOW (ref 13–44)
LYMPHOCYTES NFR BLD AUTO: 2.2 % — LOW (ref 13–44)
LYMPHOCYTES NFR BLD AUTO: 2.6 % — LOW (ref 13–44)
LYMPHOCYTES NFR BLD AUTO: 3.1 % — LOW (ref 13–44)
LYMPHOCYTES NFR BLD AUTO: 3.5 % — LOW (ref 13–44)
LYMPHOCYTES NFR BLD AUTO: 3.7 % — LOW (ref 13–44)
LYMPHOCYTES NFR BLD AUTO: 4.3 % — LOW (ref 13–44)
LYMPHOCYTES NFR BLD AUTO: 4.4 % — LOW (ref 13–44)
LYMPHOCYTES NFR BLD AUTO: 4.5 % — LOW (ref 13–44)
LYMPHOCYTES NFR BLD AUTO: 5.2 % — LOW (ref 13–44)
LYMPHOCYTES NFR BLD MANUAL: 2.5 % — LOW (ref 13–44)
LYMPHOCYTES NFR BLD MANUAL: 2.6 % — LOW (ref 13–44)
MACROCYTES BLD QL: SLIGHT — SIGNIFICANT CHANGE UP
MAGNESIUM SERPL-MCNC: 2.1 MG/DL — SIGNIFICANT CHANGE UP (ref 1.6–2.6)
MAGNESIUM SERPL-MCNC: 2.2 MG/DL — SIGNIFICANT CHANGE UP (ref 1.6–2.6)
MAGNESIUM SERPL-MCNC: 2.2 MG/DL — SIGNIFICANT CHANGE UP (ref 1.6–2.6)
MAGNESIUM SERPL-MCNC: 2.3 MG/DL — SIGNIFICANT CHANGE UP (ref 1.6–2.6)
MAGNESIUM SERPL-MCNC: 2.3 MG/DL — SIGNIFICANT CHANGE UP (ref 1.6–2.6)
MAGNESIUM SERPL-MCNC: 2.4 MG/DL — SIGNIFICANT CHANGE UP (ref 1.6–2.6)
MAGNESIUM SERPL-MCNC: 2.5 MG/DL — SIGNIFICANT CHANGE UP (ref 1.6–2.6)
MAGNESIUM SERPL-MCNC: 2.6 MG/DL — SIGNIFICANT CHANGE UP (ref 1.6–2.6)
MAGNESIUM SERPL-MCNC: 2.7 MG/DL — HIGH (ref 1.6–2.6)
MAGNESIUM SERPL-MCNC: 2.8 MG/DL — HIGH (ref 1.6–2.6)
MAGNESIUM SERPL-MCNC: 3.1 MG/DL — HIGH (ref 1.6–2.6)
MAGNESIUM SERPL-MCNC: 3.1 MG/DL — HIGH (ref 1.6–2.6)
MANUAL NEUTROPHIL BANDS #: 3.22 K/UL — HIGH (ref 0–0.84)
MCHC RBC-ENTMCNC: 25.9 PG — LOW (ref 27–34)
MCHC RBC-ENTMCNC: 26.1 PG — LOW (ref 27–34)
MCHC RBC-ENTMCNC: 26.3 PG — LOW (ref 27–34)
MCHC RBC-ENTMCNC: 26.6 PG — LOW (ref 27–34)
MCHC RBC-ENTMCNC: 26.7 PG — LOW (ref 27–34)
MCHC RBC-ENTMCNC: 26.8 PG — LOW (ref 27–34)
MCHC RBC-ENTMCNC: 26.9 PG — LOW (ref 27–34)
MCHC RBC-ENTMCNC: 26.9 PG — LOW (ref 27–34)
MCHC RBC-ENTMCNC: 27 PG — SIGNIFICANT CHANGE UP (ref 27–34)
MCHC RBC-ENTMCNC: 27.1 PG — SIGNIFICANT CHANGE UP (ref 27–34)
MCHC RBC-ENTMCNC: 27.2 PG — SIGNIFICANT CHANGE UP (ref 27–34)
MCHC RBC-ENTMCNC: 27.2 PG — SIGNIFICANT CHANGE UP (ref 27–34)
MCHC RBC-ENTMCNC: 27.3 PG — SIGNIFICANT CHANGE UP (ref 27–34)
MCHC RBC-ENTMCNC: 27.4 PG — SIGNIFICANT CHANGE UP (ref 27–34)
MCHC RBC-ENTMCNC: 27.4 PG — SIGNIFICANT CHANGE UP (ref 27–34)
MCHC RBC-ENTMCNC: 27.5 PG — SIGNIFICANT CHANGE UP (ref 27–34)
MCHC RBC-ENTMCNC: 27.6 PG — SIGNIFICANT CHANGE UP (ref 27–34)
MCHC RBC-ENTMCNC: 27.6 PG — SIGNIFICANT CHANGE UP (ref 27–34)
MCHC RBC-ENTMCNC: 27.7 PG — SIGNIFICANT CHANGE UP (ref 27–34)
MCHC RBC-ENTMCNC: 27.8 PG — SIGNIFICANT CHANGE UP (ref 27–34)
MCHC RBC-ENTMCNC: 27.8 PG — SIGNIFICANT CHANGE UP (ref 27–34)
MCHC RBC-ENTMCNC: 27.9 PG — SIGNIFICANT CHANGE UP (ref 27–34)
MCHC RBC-ENTMCNC: 28 PG — SIGNIFICANT CHANGE UP (ref 27–34)
MCHC RBC-ENTMCNC: 28.1 PG — SIGNIFICANT CHANGE UP (ref 27–34)
MCHC RBC-ENTMCNC: 28.4 PG — SIGNIFICANT CHANGE UP (ref 27–34)
MCHC RBC-ENTMCNC: 28.4 PG — SIGNIFICANT CHANGE UP (ref 27–34)
MCHC RBC-ENTMCNC: 28.7 G/DL — LOW (ref 32–36)
MCHC RBC-ENTMCNC: 28.7 PG — SIGNIFICANT CHANGE UP (ref 27–34)
MCHC RBC-ENTMCNC: 28.7 PG — SIGNIFICANT CHANGE UP (ref 27–34)
MCHC RBC-ENTMCNC: 28.9 PG — SIGNIFICANT CHANGE UP (ref 27–34)
MCHC RBC-ENTMCNC: 29.1 PG — SIGNIFICANT CHANGE UP (ref 27–34)
MCHC RBC-ENTMCNC: 29.6 G/DL — LOW (ref 32–36)
MCHC RBC-ENTMCNC: 29.6 G/DL — LOW (ref 32–36)
MCHC RBC-ENTMCNC: 29.8 G/DL — LOW (ref 32–36)
MCHC RBC-ENTMCNC: 30 G/DL — LOW (ref 32–36)
MCHC RBC-ENTMCNC: 30.4 G/DL — LOW (ref 32–36)
MCHC RBC-ENTMCNC: 30.5 G/DL — LOW (ref 32–36)
MCHC RBC-ENTMCNC: 30.9 G/DL — LOW (ref 32–36)
MCHC RBC-ENTMCNC: 30.9 G/DL — LOW (ref 32–36)
MCHC RBC-ENTMCNC: 31.1 G/DL — LOW (ref 32–36)
MCHC RBC-ENTMCNC: 31.2 G/DL — LOW (ref 32–36)
MCHC RBC-ENTMCNC: 31.2 G/DL — LOW (ref 32–36)
MCHC RBC-ENTMCNC: 31.3 G/DL — LOW (ref 32–36)
MCHC RBC-ENTMCNC: 31.5 G/DL — LOW (ref 32–36)
MCHC RBC-ENTMCNC: 31.5 G/DL — LOW (ref 32–36)
MCHC RBC-ENTMCNC: 31.7 G/DL — LOW (ref 32–36)
MCHC RBC-ENTMCNC: 31.7 G/DL — LOW (ref 32–36)
MCHC RBC-ENTMCNC: 31.8 G/DL — LOW (ref 32–36)
MCHC RBC-ENTMCNC: 31.9 G/DL — LOW (ref 32–36)
MCHC RBC-ENTMCNC: 31.9 G/DL — LOW (ref 32–36)
MCHC RBC-ENTMCNC: 32 G/DL — SIGNIFICANT CHANGE UP (ref 32–36)
MCHC RBC-ENTMCNC: 32 G/DL — SIGNIFICANT CHANGE UP (ref 32–36)
MCHC RBC-ENTMCNC: 32.1 G/DL — SIGNIFICANT CHANGE UP (ref 32–36)
MCHC RBC-ENTMCNC: 32.3 G/DL — SIGNIFICANT CHANGE UP (ref 32–36)
MCHC RBC-ENTMCNC: 32.4 G/DL — SIGNIFICANT CHANGE UP (ref 32–36)
MCHC RBC-ENTMCNC: 32.5 G/DL — SIGNIFICANT CHANGE UP (ref 32–36)
MCHC RBC-ENTMCNC: 32.5 G/DL — SIGNIFICANT CHANGE UP (ref 32–36)
MCHC RBC-ENTMCNC: 32.6 G/DL — SIGNIFICANT CHANGE UP (ref 32–36)
MCHC RBC-ENTMCNC: 32.8 G/DL — SIGNIFICANT CHANGE UP (ref 32–36)
MCHC RBC-ENTMCNC: 32.8 G/DL — SIGNIFICANT CHANGE UP (ref 32–36)
MCHC RBC-ENTMCNC: 33.1 G/DL — SIGNIFICANT CHANGE UP (ref 32–36)
MCHC RBC-ENTMCNC: 33.2 G/DL — SIGNIFICANT CHANGE UP (ref 32–36)
MCHC RBC-ENTMCNC: 33.3 G/DL — SIGNIFICANT CHANGE UP (ref 32–36)
MCHC RBC-ENTMCNC: 33.5 G/DL — SIGNIFICANT CHANGE UP (ref 32–36)
MCHC RBC-ENTMCNC: 33.6 G/DL — SIGNIFICANT CHANGE UP (ref 32–36)
MCHC RBC-ENTMCNC: 33.8 G/DL — SIGNIFICANT CHANGE UP (ref 32–36)
MCHC RBC-ENTMCNC: 33.9 G/DL — SIGNIFICANT CHANGE UP (ref 32–36)
MCV RBC AUTO: 100 FL — SIGNIFICANT CHANGE UP (ref 80–100)
MCV RBC AUTO: 81.4 FL — SIGNIFICANT CHANGE UP (ref 80–100)
MCV RBC AUTO: 82.1 FL — SIGNIFICANT CHANGE UP (ref 80–100)
MCV RBC AUTO: 82.3 FL — SIGNIFICANT CHANGE UP (ref 80–100)
MCV RBC AUTO: 82.4 FL — SIGNIFICANT CHANGE UP (ref 80–100)
MCV RBC AUTO: 83 FL — SIGNIFICANT CHANGE UP (ref 80–100)
MCV RBC AUTO: 83.1 FL — SIGNIFICANT CHANGE UP (ref 80–100)
MCV RBC AUTO: 83.2 FL — SIGNIFICANT CHANGE UP (ref 80–100)
MCV RBC AUTO: 83.2 FL — SIGNIFICANT CHANGE UP (ref 80–100)
MCV RBC AUTO: 83.3 FL — SIGNIFICANT CHANGE UP (ref 80–100)
MCV RBC AUTO: 83.4 FL — SIGNIFICANT CHANGE UP (ref 80–100)
MCV RBC AUTO: 83.6 FL — SIGNIFICANT CHANGE UP (ref 80–100)
MCV RBC AUTO: 83.8 FL — SIGNIFICANT CHANGE UP (ref 80–100)
MCV RBC AUTO: 83.8 FL — SIGNIFICANT CHANGE UP (ref 80–100)
MCV RBC AUTO: 83.9 FL — SIGNIFICANT CHANGE UP (ref 80–100)
MCV RBC AUTO: 83.9 FL — SIGNIFICANT CHANGE UP (ref 80–100)
MCV RBC AUTO: 84 FL — SIGNIFICANT CHANGE UP (ref 80–100)
MCV RBC AUTO: 84.1 FL — SIGNIFICANT CHANGE UP (ref 80–100)
MCV RBC AUTO: 84.2 FL — SIGNIFICANT CHANGE UP (ref 80–100)
MCV RBC AUTO: 84.3 FL — SIGNIFICANT CHANGE UP (ref 80–100)
MCV RBC AUTO: 84.4 FL — SIGNIFICANT CHANGE UP (ref 80–100)
MCV RBC AUTO: 84.6 FL — SIGNIFICANT CHANGE UP (ref 80–100)
MCV RBC AUTO: 84.7 FL — SIGNIFICANT CHANGE UP (ref 80–100)
MCV RBC AUTO: 85.3 FL — SIGNIFICANT CHANGE UP (ref 80–100)
MCV RBC AUTO: 85.3 FL — SIGNIFICANT CHANGE UP (ref 80–100)
MCV RBC AUTO: 85.5 FL — SIGNIFICANT CHANGE UP (ref 80–100)
MCV RBC AUTO: 85.6 FL — SIGNIFICANT CHANGE UP (ref 80–100)
MCV RBC AUTO: 85.8 FL — SIGNIFICANT CHANGE UP (ref 80–100)
MCV RBC AUTO: 86.6 FL — SIGNIFICANT CHANGE UP (ref 80–100)
MCV RBC AUTO: 86.9 FL — SIGNIFICANT CHANGE UP (ref 80–100)
MCV RBC AUTO: 86.9 FL — SIGNIFICANT CHANGE UP (ref 80–100)
MCV RBC AUTO: 87.2 FL — SIGNIFICANT CHANGE UP (ref 80–100)
MCV RBC AUTO: 87.3 FL — SIGNIFICANT CHANGE UP (ref 80–100)
MCV RBC AUTO: 87.4 FL — SIGNIFICANT CHANGE UP (ref 80–100)
MCV RBC AUTO: 87.6 FL — SIGNIFICANT CHANGE UP (ref 80–100)
MCV RBC AUTO: 88.6 FL — SIGNIFICANT CHANGE UP (ref 80–100)
MCV RBC AUTO: 89.1 FL — SIGNIFICANT CHANGE UP (ref 80–100)
MCV RBC AUTO: 89.2 FL — SIGNIFICANT CHANGE UP (ref 80–100)
MCV RBC AUTO: 90 FL — SIGNIFICANT CHANGE UP (ref 80–100)
MCV RBC AUTO: 91.2 FL — SIGNIFICANT CHANGE UP (ref 80–100)
MCV RBC AUTO: 92.9 FL — SIGNIFICANT CHANGE UP (ref 80–100)
MCV RBC AUTO: 93.1 FL — SIGNIFICANT CHANGE UP (ref 80–100)
MCV RBC AUTO: 96.4 FL — SIGNIFICANT CHANGE UP (ref 80–100)
MCV RBC AUTO: 98.3 FL — SIGNIFICANT CHANGE UP (ref 80–100)
METHOD TYPE: SIGNIFICANT CHANGE UP
METHOD TYPE: SIGNIFICANT CHANGE UP
MONOCYTES # BLD AUTO: 0.57 K/UL — SIGNIFICANT CHANGE UP (ref 0–0.9)
MONOCYTES # BLD AUTO: 0.95 K/UL — HIGH (ref 0–0.9)
MONOCYTES # BLD AUTO: 0.96 K/UL — HIGH (ref 0–0.9)
MONOCYTES # BLD AUTO: 1.13 K/UL — HIGH (ref 0–0.9)
MONOCYTES # BLD AUTO: 1.17 K/UL — HIGH (ref 0–0.9)
MONOCYTES # BLD AUTO: 1.18 K/UL — HIGH (ref 0–0.9)
MONOCYTES # BLD AUTO: 1.21 K/UL — HIGH (ref 0–0.9)
MONOCYTES # BLD AUTO: 1.34 K/UL — HIGH (ref 0–0.9)
MONOCYTES # BLD AUTO: 1.35 K/UL — HIGH (ref 0–0.9)
MONOCYTES # BLD AUTO: 1.35 K/UL — HIGH (ref 0–0.9)
MONOCYTES # BLD AUTO: 1.44 K/UL — HIGH (ref 0–0.9)
MONOCYTES # BLD AUTO: 1.44 K/UL — HIGH (ref 0–0.9)
MONOCYTES # BLD AUTO: 1.48 K/UL — HIGH (ref 0–0.9)
MONOCYTES # BLD AUTO: 1.51 K/UL — HIGH (ref 0–0.9)
MONOCYTES # BLD AUTO: 1.53 K/UL — HIGH (ref 0–0.9)
MONOCYTES # BLD AUTO: 2.07 K/UL — HIGH (ref 0–0.9)
MONOCYTES # BLD AUTO: 2.09 K/UL — HIGH (ref 0–0.9)
MONOCYTES # BLD MANUAL: 0.25 K/UL — SIGNIFICANT CHANGE UP (ref 0–0.9)
MONOCYTES # BLD MANUAL: 2.58 K/UL — HIGH (ref 0–0.9)
MONOCYTES NFR BLD AUTO: 10.1 % — SIGNIFICANT CHANGE UP (ref 2–14)
MONOCYTES NFR BLD AUTO: 10.6 % — SIGNIFICANT CHANGE UP (ref 2–14)
MONOCYTES NFR BLD AUTO: 11.9 % — SIGNIFICANT CHANGE UP (ref 2–14)
MONOCYTES NFR BLD AUTO: 12.1 % — SIGNIFICANT CHANGE UP (ref 2–14)
MONOCYTES NFR BLD AUTO: 13 % — SIGNIFICANT CHANGE UP (ref 2–14)
MONOCYTES NFR BLD AUTO: 3.9 % — SIGNIFICANT CHANGE UP (ref 2–14)
MONOCYTES NFR BLD AUTO: 4.1 % — SIGNIFICANT CHANGE UP (ref 2–14)
MONOCYTES NFR BLD AUTO: 5.6 % — SIGNIFICANT CHANGE UP (ref 2–14)
MONOCYTES NFR BLD AUTO: 5.6 % — SIGNIFICANT CHANGE UP (ref 2–14)
MONOCYTES NFR BLD AUTO: 5.7 % — SIGNIFICANT CHANGE UP (ref 2–14)
MONOCYTES NFR BLD AUTO: 7.2 % — SIGNIFICANT CHANGE UP (ref 2–14)
MONOCYTES NFR BLD AUTO: 7.8 % — SIGNIFICANT CHANGE UP (ref 2–14)
MONOCYTES NFR BLD AUTO: 8 % — SIGNIFICANT CHANGE UP (ref 2–14)
MONOCYTES NFR BLD AUTO: 8.6 % — SIGNIFICANT CHANGE UP (ref 2–14)
MONOCYTES NFR BLD AUTO: 9.7 % — SIGNIFICANT CHANGE UP (ref 2–14)
MONOCYTES NFR BLD MANUAL: 4.2 % — SIGNIFICANT CHANGE UP (ref 2–14)
MONOCYTES NFR BLD MANUAL: 6.9 % — SIGNIFICANT CHANGE UP (ref 2–14)
MONOS+MACROS # FLD: 48 % — SIGNIFICANT CHANGE UP
MRSA PCR RESULT.: SIGNIFICANT CHANGE UP
NEUTROPHILS # BLD AUTO: 10.35 K/UL — HIGH (ref 1.8–7.4)
NEUTROPHILS # BLD AUTO: 10.53 K/UL — HIGH (ref 1.8–7.4)
NEUTROPHILS # BLD AUTO: 11.7 K/UL — HIGH (ref 1.8–7.4)
NEUTROPHILS # BLD AUTO: 11.73 K/UL — HIGH (ref 1.8–7.4)
NEUTROPHILS # BLD AUTO: 12.94 K/UL — HIGH (ref 1.8–7.4)
NEUTROPHILS # BLD AUTO: 14.24 K/UL — HIGH (ref 1.8–7.4)
NEUTROPHILS # BLD AUTO: 14.54 K/UL — HIGH (ref 1.8–7.4)
NEUTROPHILS # BLD AUTO: 16.54 K/UL — HIGH (ref 1.8–7.4)
NEUTROPHILS # BLD AUTO: 18.48 K/UL — HIGH (ref 1.8–7.4)
NEUTROPHILS # BLD AUTO: 19.71 K/UL — HIGH (ref 1.8–7.4)
NEUTROPHILS # BLD AUTO: 21.57 K/UL — HIGH (ref 1.8–7.4)
NEUTROPHILS # BLD AUTO: 25.46 K/UL — HIGH (ref 1.8–7.4)
NEUTROPHILS # BLD AUTO: 29.65 K/UL — HIGH (ref 1.8–7.4)
NEUTROPHILS # BLD AUTO: 29.72 K/UL — HIGH (ref 1.8–7.4)
NEUTROPHILS # BLD AUTO: 33.87 K/UL — HIGH (ref 1.8–7.4)
NEUTROPHILS # BLD AUTO: 4.92 K/UL — SIGNIFICANT CHANGE UP (ref 1.8–7.4)
NEUTROPHILS # BLD AUTO: 8.7 K/UL — HIGH (ref 1.8–7.4)
NEUTROPHILS # BLD MANUAL: 30.62 K/UL — HIGH (ref 1.8–7.4)
NEUTROPHILS # BLD MANUAL: 5.38 K/UL — SIGNIFICANT CHANGE UP (ref 1.8–7.4)
NEUTROPHILS NFR BLD AUTO: 80.3 % — HIGH (ref 43–77)
NEUTROPHILS NFR BLD AUTO: 81.4 % — HIGH (ref 43–77)
NEUTROPHILS NFR BLD AUTO: 81.7 % — HIGH (ref 43–77)
NEUTROPHILS NFR BLD AUTO: 81.9 % — HIGH (ref 43–77)
NEUTROPHILS NFR BLD AUTO: 83.2 % — HIGH (ref 43–77)
NEUTROPHILS NFR BLD AUTO: 83.9 % — HIGH (ref 43–77)
NEUTROPHILS NFR BLD AUTO: 84.8 % — HIGH (ref 43–77)
NEUTROPHILS NFR BLD AUTO: 85.6 % — HIGH (ref 43–77)
NEUTROPHILS NFR BLD AUTO: 87.3 % — HIGH (ref 43–77)
NEUTROPHILS NFR BLD AUTO: 88.5 % — HIGH (ref 43–77)
NEUTROPHILS NFR BLD AUTO: 88.7 % — HIGH (ref 43–77)
NEUTROPHILS NFR BLD AUTO: 89.2 % — HIGH (ref 43–77)
NEUTROPHILS NFR BLD AUTO: 90 % — HIGH (ref 43–77)
NEUTROPHILS NFR BLD AUTO: 90.1 % — HIGH (ref 43–77)
NEUTROPHILS NFR BLD AUTO: 90.2 % — HIGH (ref 43–77)
NEUTROPHILS NFR BLD AUTO: 90.4 % — HIGH (ref 43–77)
NEUTROPHILS NFR BLD AUTO: 90.6 % — HIGH (ref 43–77)
NEUTROPHILS NFR BLD MANUAL: 81.9 % — HIGH (ref 43–77)
NEUTROPHILS NFR BLD MANUAL: 91.7 % — HIGH (ref 43–77)
NEUTROPHILS-BODY FLUID: 47 % — HIGH
NEUTS BAND # BLD: 8.6 % — HIGH (ref 0–8)
NEUTS BAND NFR BLD: 8.6 % — HIGH (ref 0–8)
NITRITE UR-MCNC: NEGATIVE — SIGNIFICANT CHANGE UP
NITRITE UR-MCNC: POSITIVE
NRBC # BLD AUTO: 0 K/UL — SIGNIFICANT CHANGE UP (ref 0–0)
NRBC # BLD AUTO: 0.02 K/UL — HIGH (ref 0–0)
NRBC # BLD AUTO: 0.03 K/UL — HIGH (ref 0–0)
NRBC # BLD AUTO: 0.03 K/UL — HIGH (ref 0–0)
NRBC # BLD AUTO: 0.04 K/UL — HIGH (ref 0–0)
NRBC # BLD AUTO: 0.05 K/UL — HIGH (ref 0–0)
NRBC # BLD AUTO: 0.06 K/UL — HIGH (ref 0–0)
NRBC # BLD AUTO: 0.09 K/UL — HIGH (ref 0–0)
NRBC # BLD AUTO: 0.17 K/UL — HIGH (ref 0–0)
NRBC # BLD AUTO: 0.32 K/UL — HIGH (ref 0–0)
NRBC # BLD AUTO: 0.34 K/UL — HIGH (ref 0–0)
NRBC # BLD AUTO: 0.35 K/UL — HIGH (ref 0–0)
NRBC # BLD AUTO: 0.39 K/UL — HIGH (ref 0–0)
NRBC # BLD AUTO: 0.43 K/UL — HIGH (ref 0–0)
NRBC # BLD AUTO: 0.53 K/UL — HIGH (ref 0–0)
NRBC # BLD AUTO: 0.55 K/UL — HIGH (ref 0–0)
NRBC # BLD AUTO: 0.63 K/UL — HIGH (ref 0–0)
NRBC # BLD AUTO: 0.83 K/UL — HIGH (ref 0–0)
NRBC # FLD: 0 K/UL — SIGNIFICANT CHANGE UP (ref 0–0)
NRBC # FLD: 0.02 K/UL — HIGH (ref 0–0)
NRBC # FLD: 0.03 K/UL — HIGH (ref 0–0)
NRBC # FLD: 0.03 K/UL — HIGH (ref 0–0)
NRBC # FLD: 0.04 K/UL — HIGH (ref 0–0)
NRBC # FLD: 0.05 K/UL — HIGH (ref 0–0)
NRBC # FLD: 0.06 K/UL — HIGH (ref 0–0)
NRBC # FLD: 0.09 K/UL — HIGH (ref 0–0)
NRBC # FLD: 0.17 K/UL — HIGH (ref 0–0)
NRBC # FLD: 0.32 K/UL — HIGH (ref 0–0)
NRBC # FLD: 0.34 K/UL — HIGH (ref 0–0)
NRBC # FLD: 0.35 K/UL — HIGH (ref 0–0)
NRBC # FLD: 0.39 K/UL — HIGH (ref 0–0)
NRBC # FLD: 0.43 K/UL — HIGH (ref 0–0)
NRBC # FLD: 0.53 K/UL — HIGH (ref 0–0)
NRBC # FLD: 0.55 K/UL — HIGH (ref 0–0)
NRBC # FLD: 0.63 K/UL — HIGH (ref 0–0)
NRBC # FLD: 0.83 K/UL — HIGH (ref 0–0)
NRBC BLD AUTO-RTO: 0 /100 WBCS — SIGNIFICANT CHANGE UP (ref 0–0)
NRBC BLD AUTO-RTO: 1 /100 WBCS — HIGH (ref 0–0)
NRBC BLD AUTO-RTO: 2 /100 WBCS — HIGH (ref 0–0)
NRBC BLD AUTO-RTO: 3 /100 WBCS — HIGH (ref 0–0)
NRBC BLD AUTO-RTO: 4 /100 WBCS — HIGH (ref 0–0)
NRBC BLD AUTO-RTO: 4 /100 WBCS — HIGH (ref 0–0)
NRBC BLD AUTO-RTO: 5 /100 WBCS — HIGH (ref 0–0)
NT-PROBNP SERPL-SCNC: 215 PG/ML — SIGNIFICANT CHANGE UP
ORGANISM # SPEC MICROSCOPIC CNT: ABNORMAL
OSMOLALITY SERPL: 272 MOSM/KG — LOW (ref 275–295)
OSMOLALITY SERPL: 273 MOSMOL/KG — LOW (ref 275–300)
OSMOLALITY UR: 294 MOSM/KG — SIGNIFICANT CHANGE UP (ref 50–1200)
OSMOLALITY UR: 300 MOSM/KG — SIGNIFICANT CHANGE UP (ref 50–1200)
OVALOCYTES BLD QL SMEAR: SLIGHT — SIGNIFICANT CHANGE UP
PATHOLOGIST REVIEW: SIGNIFICANT CHANGE UP
PH UR: 7 — SIGNIFICANT CHANGE UP (ref 5–8)
PH UR: 7 — SIGNIFICANT CHANGE UP (ref 5–8)
PHOSPHATE SERPL-MCNC: 10.5 MG/DL — HIGH (ref 2.5–4.5)
PHOSPHATE SERPL-MCNC: 11.2 MG/DL — HIGH (ref 2.5–4.5)
PHOSPHATE SERPL-MCNC: 11.8 MG/DL — HIGH (ref 2.5–4.5)
PHOSPHATE SERPL-MCNC: 12.9 MG/DL — HIGH (ref 2.5–4.5)
PHOSPHATE SERPL-MCNC: 13.2 MG/DL — HIGH (ref 2.5–4.5)
PHOSPHATE SERPL-MCNC: 4.3 MG/DL — SIGNIFICANT CHANGE UP (ref 2.5–4.5)
PHOSPHATE SERPL-MCNC: 4.6 MG/DL — HIGH (ref 2.5–4.5)
PHOSPHATE SERPL-MCNC: 4.7 MG/DL — HIGH (ref 2.5–4.5)
PHOSPHATE SERPL-MCNC: 4.9 MG/DL — HIGH (ref 2.5–4.5)
PHOSPHATE SERPL-MCNC: 5 MG/DL — HIGH (ref 2.5–4.5)
PHOSPHATE SERPL-MCNC: 5.1 MG/DL — HIGH (ref 2.5–4.5)
PHOSPHATE SERPL-MCNC: 5.1 MG/DL — HIGH (ref 2.5–4.5)
PHOSPHATE SERPL-MCNC: 5.8 MG/DL — HIGH (ref 2.5–4.5)
PHOSPHATE SERPL-MCNC: 5.9 MG/DL — HIGH (ref 2.5–4.5)
PHOSPHATE SERPL-MCNC: 6 MG/DL — HIGH (ref 2.5–4.5)
PHOSPHATE SERPL-MCNC: 6.2 MG/DL — HIGH (ref 2.5–4.5)
PHOSPHATE SERPL-MCNC: 6.3 MG/DL — HIGH (ref 2.5–4.5)
PHOSPHATE SERPL-MCNC: 6.6 MG/DL — HIGH (ref 2.5–4.5)
PHOSPHATE SERPL-MCNC: 6.6 MG/DL — HIGH (ref 2.5–4.5)
PHOSPHATE SERPL-MCNC: 6.7 MG/DL — HIGH (ref 2.5–4.5)
PHOSPHATE SERPL-MCNC: 6.8 MG/DL — HIGH (ref 2.5–4.5)
PHOSPHATE SERPL-MCNC: 7 MG/DL — HIGH (ref 2.5–4.5)
PHOSPHATE SERPL-MCNC: 7.6 MG/DL — HIGH (ref 2.5–4.5)
PHOSPHATE SERPL-MCNC: 7.9 MG/DL — HIGH (ref 2.5–4.5)
PHOSPHATE SERPL-MCNC: 8.2 MG/DL — HIGH (ref 2.5–4.5)
PHOSPHATE SERPL-MCNC: 8.9 MG/DL — HIGH (ref 2.5–4.5)
PLAT MORPH BLD: NORMAL — SIGNIFICANT CHANGE UP
PLAT MORPH BLD: NORMAL — SIGNIFICANT CHANGE UP
PLATELET # BLD AUTO: 113 K/UL — LOW (ref 150–400)
PLATELET # BLD AUTO: 138 K/UL — LOW (ref 150–400)
PLATELET # BLD AUTO: 148 K/UL — LOW (ref 150–400)
PLATELET # BLD AUTO: 194 K/UL — SIGNIFICANT CHANGE UP (ref 150–400)
PLATELET # BLD AUTO: 197 K/UL — SIGNIFICANT CHANGE UP (ref 150–400)
PLATELET # BLD AUTO: 204 K/UL — SIGNIFICANT CHANGE UP (ref 150–400)
PLATELET # BLD AUTO: 252 K/UL — SIGNIFICANT CHANGE UP (ref 150–400)
PLATELET # BLD AUTO: 255 K/UL — SIGNIFICANT CHANGE UP (ref 150–400)
PLATELET # BLD AUTO: 255 K/UL — SIGNIFICANT CHANGE UP (ref 150–400)
PLATELET # BLD AUTO: 264 K/UL — SIGNIFICANT CHANGE UP (ref 150–400)
PLATELET # BLD AUTO: 290 K/UL — SIGNIFICANT CHANGE UP (ref 150–400)
PLATELET # BLD AUTO: 292 K/UL — SIGNIFICANT CHANGE UP (ref 150–400)
PLATELET # BLD AUTO: 294 K/UL — SIGNIFICANT CHANGE UP (ref 150–400)
PLATELET # BLD AUTO: 304 K/UL — SIGNIFICANT CHANGE UP (ref 150–400)
PLATELET # BLD AUTO: 311 K/UL — SIGNIFICANT CHANGE UP (ref 150–400)
PLATELET # BLD AUTO: 331 K/UL — SIGNIFICANT CHANGE UP (ref 150–400)
PLATELET # BLD AUTO: 336 K/UL — SIGNIFICANT CHANGE UP (ref 150–400)
PLATELET # BLD AUTO: 343 K/UL — SIGNIFICANT CHANGE UP (ref 150–400)
PLATELET # BLD AUTO: 348 K/UL — SIGNIFICANT CHANGE UP (ref 150–400)
PLATELET # BLD AUTO: 350 K/UL — SIGNIFICANT CHANGE UP (ref 150–400)
PLATELET # BLD AUTO: 357 K/UL — SIGNIFICANT CHANGE UP (ref 150–400)
PLATELET # BLD AUTO: 363 K/UL — SIGNIFICANT CHANGE UP (ref 150–400)
PLATELET # BLD AUTO: 372 K/UL — SIGNIFICANT CHANGE UP (ref 150–400)
PLATELET # BLD AUTO: 377 K/UL — SIGNIFICANT CHANGE UP (ref 150–400)
PLATELET # BLD AUTO: 380 K/UL — SIGNIFICANT CHANGE UP (ref 150–400)
PLATELET # BLD AUTO: 381 K/UL — SIGNIFICANT CHANGE UP (ref 150–400)
PLATELET # BLD AUTO: 384 K/UL — SIGNIFICANT CHANGE UP (ref 150–400)
PLATELET # BLD AUTO: 390 K/UL — SIGNIFICANT CHANGE UP (ref 150–400)
PLATELET # BLD AUTO: 394 K/UL — SIGNIFICANT CHANGE UP (ref 150–400)
PLATELET # BLD AUTO: 397 K/UL — SIGNIFICANT CHANGE UP (ref 150–400)
PLATELET # BLD AUTO: 417 K/UL — HIGH (ref 150–400)
PLATELET # BLD AUTO: 421 K/UL — HIGH (ref 150–400)
PLATELET # BLD AUTO: 425 K/UL — HIGH (ref 150–400)
PLATELET # BLD AUTO: 429 K/UL — HIGH (ref 150–400)
PLATELET # BLD AUTO: 432 K/UL — HIGH (ref 150–400)
PLATELET # BLD AUTO: 435 K/UL — HIGH (ref 150–400)
PLATELET # BLD AUTO: 444 K/UL — HIGH (ref 150–400)
PLATELET # BLD AUTO: 445 K/UL — HIGH (ref 150–400)
PLATELET # BLD AUTO: 472 K/UL — HIGH (ref 150–400)
PLATELET # BLD AUTO: 473 K/UL — HIGH (ref 150–400)
PLATELET # BLD AUTO: 478 K/UL — HIGH (ref 150–400)
PLATELET # BLD AUTO: 533 K/UL — HIGH (ref 150–400)
PLATELET # BLD AUTO: 72 K/UL — LOW (ref 150–400)
PLATELET # BLD AUTO: 95 K/UL — LOW (ref 150–400)
PLATELET COUNT - ESTIMATE: ABNORMAL
PMV BLD: 10 FL — SIGNIFICANT CHANGE UP (ref 7–13)
PMV BLD: 10.1 FL — SIGNIFICANT CHANGE UP (ref 7–13)
PMV BLD: 10.2 FL — SIGNIFICANT CHANGE UP (ref 7–13)
PMV BLD: 10.3 FL — SIGNIFICANT CHANGE UP (ref 7–13)
PMV BLD: 10.3 FL — SIGNIFICANT CHANGE UP (ref 7–13)
PMV BLD: 10.4 FL — SIGNIFICANT CHANGE UP (ref 7–13)
PMV BLD: 10.5 FL — SIGNIFICANT CHANGE UP (ref 7–13)
PMV BLD: 10.5 FL — SIGNIFICANT CHANGE UP (ref 7–13)
PMV BLD: 10.6 FL — SIGNIFICANT CHANGE UP (ref 7–13)
PMV BLD: 10.6 FL — SIGNIFICANT CHANGE UP (ref 7–13)
PMV BLD: 10.8 FL — SIGNIFICANT CHANGE UP (ref 7–13)
PMV BLD: 11 FL — SIGNIFICANT CHANGE UP (ref 7–13)
PMV BLD: 11 FL — SIGNIFICANT CHANGE UP (ref 7–13)
PMV BLD: 11.1 FL — SIGNIFICANT CHANGE UP (ref 7–13)
PMV BLD: 11.2 FL — SIGNIFICANT CHANGE UP (ref 7–13)
PMV BLD: 9.2 FL — SIGNIFICANT CHANGE UP (ref 7–13)
PMV BLD: 9.4 FL — SIGNIFICANT CHANGE UP (ref 7–13)
PMV BLD: 9.6 FL — SIGNIFICANT CHANGE UP (ref 7–13)
PMV BLD: 9.6 FL — SIGNIFICANT CHANGE UP (ref 7–13)
PMV BLD: 9.7 FL — SIGNIFICANT CHANGE UP (ref 7–13)
PMV BLD: 9.8 FL — SIGNIFICANT CHANGE UP (ref 7–13)
PMV BLD: 9.9 FL — SIGNIFICANT CHANGE UP (ref 7–13)
POIKILOCYTOSIS BLD QL AUTO: ABNORMAL
POIKILOCYTOSIS BLD QL AUTO: SLIGHT — SIGNIFICANT CHANGE UP
POLYCHROMASIA BLD QL SMEAR: ABNORMAL
POTASSIUM SERPL-MCNC: 3.6 MMOL/L — SIGNIFICANT CHANGE UP (ref 3.5–5.3)
POTASSIUM SERPL-MCNC: 3.7 MMOL/L — SIGNIFICANT CHANGE UP (ref 3.5–5.3)
POTASSIUM SERPL-MCNC: 3.8 MMOL/L — SIGNIFICANT CHANGE UP (ref 3.5–5.3)
POTASSIUM SERPL-MCNC: 3.8 MMOL/L — SIGNIFICANT CHANGE UP (ref 3.5–5.3)
POTASSIUM SERPL-MCNC: 4 MMOL/L — SIGNIFICANT CHANGE UP (ref 3.5–5.3)
POTASSIUM SERPL-MCNC: 4.1 MMOL/L — SIGNIFICANT CHANGE UP (ref 3.5–5.3)
POTASSIUM SERPL-MCNC: 4.2 MMOL/L — SIGNIFICANT CHANGE UP (ref 3.5–5.3)
POTASSIUM SERPL-MCNC: 4.4 MMOL/L — SIGNIFICANT CHANGE UP (ref 3.5–5.3)
POTASSIUM SERPL-MCNC: 4.5 MMOL/L — SIGNIFICANT CHANGE UP (ref 3.5–5.3)
POTASSIUM SERPL-MCNC: 4.6 MMOL/L — SIGNIFICANT CHANGE UP (ref 3.5–5.3)
POTASSIUM SERPL-MCNC: 4.7 MMOL/L — SIGNIFICANT CHANGE UP (ref 3.5–5.3)
POTASSIUM SERPL-MCNC: 4.7 MMOL/L — SIGNIFICANT CHANGE UP (ref 3.5–5.3)
POTASSIUM SERPL-MCNC: 4.8 MMOL/L — SIGNIFICANT CHANGE UP (ref 3.5–5.3)
POTASSIUM SERPL-MCNC: 4.9 MMOL/L — SIGNIFICANT CHANGE UP (ref 3.5–5.3)
POTASSIUM SERPL-MCNC: 5.3 MMOL/L — SIGNIFICANT CHANGE UP (ref 3.5–5.3)
POTASSIUM SERPL-MCNC: 5.3 MMOL/L — SIGNIFICANT CHANGE UP (ref 3.5–5.3)
POTASSIUM SERPL-MCNC: 5.5 MMOL/L — HIGH (ref 3.5–5.3)
POTASSIUM SERPL-MCNC: 5.5 MMOL/L — HIGH (ref 3.5–5.3)
POTASSIUM SERPL-MCNC: 5.7 MMOL/L — HIGH (ref 3.5–5.3)
POTASSIUM SERPL-MCNC: 5.7 MMOL/L — HIGH (ref 3.5–5.3)
POTASSIUM SERPL-MCNC: 6.5 MMOL/L — CRITICAL HIGH (ref 3.5–5.3)
POTASSIUM SERPL-MCNC: 6.8 MMOL/L — CRITICAL HIGH (ref 3.5–5.3)
POTASSIUM SERPL-MCNC: SIGNIFICANT CHANGE UP MMOL/L (ref 3.5–5.3)
POTASSIUM SERPL-SCNC: 3.6 MMOL/L — SIGNIFICANT CHANGE UP (ref 3.5–5.3)
POTASSIUM SERPL-SCNC: 3.7 MMOL/L — SIGNIFICANT CHANGE UP (ref 3.5–5.3)
POTASSIUM SERPL-SCNC: 3.8 MMOL/L — SIGNIFICANT CHANGE UP (ref 3.5–5.3)
POTASSIUM SERPL-SCNC: 3.8 MMOL/L — SIGNIFICANT CHANGE UP (ref 3.5–5.3)
POTASSIUM SERPL-SCNC: 4 MMOL/L — SIGNIFICANT CHANGE UP (ref 3.5–5.3)
POTASSIUM SERPL-SCNC: 4.1 MMOL/L — SIGNIFICANT CHANGE UP (ref 3.5–5.3)
POTASSIUM SERPL-SCNC: 4.2 MMOL/L — SIGNIFICANT CHANGE UP (ref 3.5–5.3)
POTASSIUM SERPL-SCNC: 4.4 MMOL/L — SIGNIFICANT CHANGE UP (ref 3.5–5.3)
POTASSIUM SERPL-SCNC: 4.5 MMOL/L — SIGNIFICANT CHANGE UP (ref 3.5–5.3)
POTASSIUM SERPL-SCNC: 4.6 MMOL/L — SIGNIFICANT CHANGE UP (ref 3.5–5.3)
POTASSIUM SERPL-SCNC: 4.7 MMOL/L — SIGNIFICANT CHANGE UP (ref 3.5–5.3)
POTASSIUM SERPL-SCNC: 4.7 MMOL/L — SIGNIFICANT CHANGE UP (ref 3.5–5.3)
POTASSIUM SERPL-SCNC: 4.8 MMOL/L — SIGNIFICANT CHANGE UP (ref 3.5–5.3)
POTASSIUM SERPL-SCNC: 4.9 MMOL/L — SIGNIFICANT CHANGE UP (ref 3.5–5.3)
POTASSIUM SERPL-SCNC: 5.3 MMOL/L — SIGNIFICANT CHANGE UP (ref 3.5–5.3)
POTASSIUM SERPL-SCNC: 5.3 MMOL/L — SIGNIFICANT CHANGE UP (ref 3.5–5.3)
POTASSIUM SERPL-SCNC: 5.5 MMOL/L — HIGH (ref 3.5–5.3)
POTASSIUM SERPL-SCNC: 5.5 MMOL/L — HIGH (ref 3.5–5.3)
POTASSIUM SERPL-SCNC: 5.7 MMOL/L — HIGH (ref 3.5–5.3)
POTASSIUM SERPL-SCNC: 5.7 MMOL/L — HIGH (ref 3.5–5.3)
POTASSIUM SERPL-SCNC: 6.5 MMOL/L — CRITICAL HIGH (ref 3.5–5.3)
POTASSIUM SERPL-SCNC: 6.8 MMOL/L — CRITICAL HIGH (ref 3.5–5.3)
POTASSIUM SERPL-SCNC: SIGNIFICANT CHANGE UP MMOL/L (ref 3.5–5.3)
POTASSIUM UR-SCNC: 20.8 MMOL/L — SIGNIFICANT CHANGE UP
PROCALCITONIN SERPL-MCNC: 0.1 NG/ML — SIGNIFICANT CHANGE UP (ref 0.02–0.1)
PROCALCITONIN SERPL-MCNC: 1.89 NG/ML — HIGH (ref 0.02–0.1)
PROT FLD-MCNC: 2.4 G/DL — SIGNIFICANT CHANGE UP
PROT SERPL-MCNC: 4.9 G/DL — LOW (ref 6–8.3)
PROT SERPL-MCNC: 5 G/DL — LOW (ref 6–8.3)
PROT SERPL-MCNC: 5 G/DL — LOW (ref 6–8.3)
PROT SERPL-MCNC: 5.1 G/DL — LOW (ref 6–8.3)
PROT SERPL-MCNC: 5.2 G/DL — LOW (ref 6–8.3)
PROT SERPL-MCNC: 5.3 G/DL — LOW (ref 6–8.3)
PROT SERPL-MCNC: 5.4 G/DL — LOW (ref 6–8.3)
PROT SERPL-MCNC: 5.4 G/DL — LOW (ref 6–8.3)
PROT SERPL-MCNC: 5.5 G/DL — LOW (ref 6–8.3)
PROT SERPL-MCNC: 5.5 G/DL — LOW (ref 6–8.3)
PROT SERPL-MCNC: 5.6 G/DL — LOW (ref 6–8.3)
PROT SERPL-MCNC: 5.7 G/DL — LOW (ref 6–8.3)
PROT SERPL-MCNC: 5.8 G/DL — LOW (ref 6–8.3)
PROT SERPL-MCNC: 5.8 G/DL — LOW (ref 6–8.3)
PROT SERPL-MCNC: 5.9 G/DL — LOW (ref 6–8.3)
PROT SERPL-MCNC: 6 G/DL — SIGNIFICANT CHANGE UP (ref 6–8.3)
PROT SERPL-MCNC: 6.1 G/DL — SIGNIFICANT CHANGE UP (ref 6–8.3)
PROT SERPL-MCNC: 6.1 G/DL — SIGNIFICANT CHANGE UP (ref 6–8.3)
PROT SERPL-MCNC: 6.2 G/DL — SIGNIFICANT CHANGE UP (ref 6–8.3)
PROT SERPL-MCNC: 6.5 G/DL — SIGNIFICANT CHANGE UP (ref 6–8.3)
PROT SERPL-MCNC: 6.6 G/DL — SIGNIFICANT CHANGE UP (ref 6–8.3)
PROT SERPL-MCNC: 6.9 G/DL — SIGNIFICANT CHANGE UP (ref 6–8.3)
PROT UR-MCNC: NEGATIVE MG/DL — SIGNIFICANT CHANGE UP
PROT UR-MCNC: SIGNIFICANT CHANGE UP MG/DL
PROTHROM AB SERPL-ACNC: 15.4 SEC — HIGH (ref 9.9–13.4)
PROTHROM AB SERPL-ACNC: 16 SEC — HIGH (ref 9.9–13.4)
PROTHROM AB SERPL-ACNC: 16.7 SEC — HIGH (ref 9.9–13.4)
PROTHROM AB SERPL-ACNC: 17.4 SEC — HIGH (ref 9.9–13.4)
PROTHROM AB SERPL-ACNC: 18 SEC — HIGH (ref 9.9–13.4)
PROTHROM AB SERPL-ACNC: 20.6 SEC — HIGH (ref 9.9–13.4)
PROTHROM AB SERPL-ACNC: 23.4 SEC — HIGH (ref 9.9–13.4)
PROTHROM AB SERPL-ACNC: 25.5 SEC — HIGH (ref 9.9–13.4)
PROTHROM AB SERPL-ACNC: 26.3 SEC — HIGH (ref 9.9–13.4)
PROTHROM AB SERPL-ACNC: 28.5 SEC — HIGH (ref 9.9–13.4)
RBC # BLD: 2.11 M/UL — LOW (ref 3.8–5.2)
RBC # BLD: 2.23 M/UL — LOW (ref 3.8–5.2)
RBC # BLD: 2.24 M/UL — LOW (ref 3.8–5.2)
RBC # BLD: 2.34 M/UL — LOW (ref 3.8–5.2)
RBC # BLD: 2.47 M/UL — LOW (ref 3.8–5.2)
RBC # BLD: 2.49 M/UL — LOW (ref 3.8–5.2)
RBC # BLD: 2.5 M/UL — LOW (ref 3.8–5.2)
RBC # BLD: 2.5 M/UL — LOW (ref 3.8–5.2)
RBC # BLD: 2.62 M/UL — LOW (ref 3.8–5.2)
RBC # BLD: 2.63 M/UL — LOW (ref 3.8–5.2)
RBC # BLD: 2.65 M/UL — LOW (ref 3.8–5.2)
RBC # BLD: 2.65 M/UL — LOW (ref 3.8–5.2)
RBC # BLD: 2.7 M/UL — LOW (ref 3.8–5.2)
RBC # BLD: 2.71 M/UL — LOW (ref 3.8–5.2)
RBC # BLD: 2.74 M/UL — LOW (ref 3.8–5.2)
RBC # BLD: 2.77 M/UL — LOW (ref 3.8–5.2)
RBC # BLD: 2.79 M/UL — LOW (ref 3.8–5.2)
RBC # BLD: 2.8 M/UL — LOW (ref 3.8–5.2)
RBC # BLD: 2.82 M/UL — LOW (ref 3.8–5.2)
RBC # BLD: 2.84 M/UL — LOW (ref 3.8–5.2)
RBC # BLD: 2.85 M/UL — LOW (ref 3.8–5.2)
RBC # BLD: 2.85 M/UL — LOW (ref 3.8–5.2)
RBC # BLD: 2.88 M/UL — LOW (ref 3.8–5.2)
RBC # BLD: 2.9 M/UL — LOW (ref 3.8–5.2)
RBC # BLD: 3.03 M/UL — LOW (ref 3.8–5.2)
RBC # BLD: 3.04 M/UL — LOW (ref 3.8–5.2)
RBC # BLD: 3.12 M/UL — LOW (ref 3.8–5.2)
RBC # BLD: 3.17 M/UL — LOW (ref 3.8–5.2)
RBC # BLD: 3.2 M/UL — LOW (ref 3.8–5.2)
RBC # BLD: 3.26 M/UL — LOW (ref 3.8–5.2)
RBC # BLD: 3.31 M/UL — LOW (ref 3.8–5.2)
RBC # BLD: 3.38 M/UL — LOW (ref 3.8–5.2)
RBC # BLD: 3.43 M/UL — LOW (ref 3.8–5.2)
RBC # BLD: 3.44 M/UL — LOW (ref 3.8–5.2)
RBC # BLD: 3.51 M/UL — LOW (ref 3.8–5.2)
RBC # BLD: 3.53 M/UL — LOW (ref 3.8–5.2)
RBC # BLD: 3.6 M/UL — LOW (ref 3.8–5.2)
RBC # BLD: 3.61 M/UL — LOW (ref 3.8–5.2)
RBC # BLD: 3.66 M/UL — LOW (ref 3.8–5.2)
RBC # BLD: 3.74 M/UL — LOW (ref 3.8–5.2)
RBC # BLD: 3.79 M/UL — LOW (ref 3.8–5.2)
RBC # BLD: 4.02 M/UL — SIGNIFICANT CHANGE UP (ref 3.8–5.2)
RBC # FLD: 15.6 % — HIGH (ref 10.3–14.5)
RBC # FLD: 15.7 % — HIGH (ref 10.3–14.5)
RBC # FLD: 15.7 % — HIGH (ref 10.3–14.5)
RBC # FLD: 15.8 % — HIGH (ref 10.3–14.5)
RBC # FLD: 15.9 % — HIGH (ref 10.3–14.5)
RBC # FLD: 15.9 % — HIGH (ref 10.3–14.5)
RBC # FLD: 16 % — HIGH (ref 10.3–14.5)
RBC # FLD: 16 % — HIGH (ref 10.3–14.5)
RBC # FLD: 16.1 % — HIGH (ref 10.3–14.5)
RBC # FLD: 16.1 % — HIGH (ref 10.3–14.5)
RBC # FLD: 16.4 % — HIGH (ref 10.3–14.5)
RBC # FLD: 16.4 % — HIGH (ref 10.3–14.5)
RBC # FLD: 16.5 % — HIGH (ref 10.3–14.5)
RBC # FLD: 16.6 % — HIGH (ref 10.3–14.5)
RBC # FLD: 16.7 % — HIGH (ref 10.3–14.5)
RBC # FLD: 16.9 % — HIGH (ref 10.3–14.5)
RBC # FLD: 16.9 % — HIGH (ref 10.3–14.5)
RBC # FLD: 17.2 % — HIGH (ref 10.3–14.5)
RBC # FLD: 17.6 % — HIGH (ref 10.3–14.5)
RBC # FLD: 17.8 % — HIGH (ref 10.3–14.5)
RBC # FLD: 18 % — HIGH (ref 10.3–14.5)
RBC # FLD: 18.2 % — HIGH (ref 10.3–14.5)
RBC # FLD: 18.5 % — HIGH (ref 10.3–14.5)
RBC # FLD: 18.5 % — HIGH (ref 10.3–14.5)
RBC # FLD: 18.7 % — HIGH (ref 10.3–14.5)
RBC # FLD: 18.9 % — HIGH (ref 10.3–14.5)
RBC # FLD: 19.2 % — HIGH (ref 10.3–14.5)
RBC # FLD: 19.5 % — HIGH (ref 10.3–14.5)
RBC # FLD: 19.5 % — HIGH (ref 10.3–14.5)
RBC # FLD: 19.7 % — HIGH (ref 10.3–14.5)
RBC # FLD: 19.9 % — HIGH (ref 10.3–14.5)
RBC # FLD: 20.8 % — HIGH (ref 10.3–14.5)
RBC # FLD: 21.3 % — HIGH (ref 10.3–14.5)
RBC # FLD: 21.6 % — HIGH (ref 10.3–14.5)
RBC # FLD: 21.7 % — HIGH (ref 10.3–14.5)
RBC # FLD: 21.9 % — HIGH (ref 10.3–14.5)
RBC # FLD: 22 % — HIGH (ref 10.3–14.5)
RBC # FLD: 22.2 % — HIGH (ref 10.3–14.5)
RBC # FLD: 22.2 % — HIGH (ref 10.3–14.5)
RBC BLD AUTO: ABNORMAL
RBC BLD AUTO: ABNORMAL
RBC CASTS # UR COMP ASSIST: 31 /HPF — HIGH (ref 0–4)
RBC CASTS # UR COMP ASSIST: 63 /HPF — HIGH (ref 0–4)
RCV VOL RI: <2000 CELLS/UL — HIGH
REVIEW: SIGNIFICANT CHANGE UP
RH IG SCN BLD-IMP: POSITIVE — SIGNIFICANT CHANGE UP
S AUREUS DNA NOSE QL NAA+PROBE: SIGNIFICANT CHANGE UP
SODIUM SERPL-SCNC: 124 MMOL/L — LOW (ref 135–145)
SODIUM SERPL-SCNC: 125 MMOL/L — LOW (ref 135–145)
SODIUM SERPL-SCNC: 125 MMOL/L — LOW (ref 135–145)
SODIUM SERPL-SCNC: 126 MMOL/L — LOW (ref 135–145)
SODIUM SERPL-SCNC: 127 MMOL/L — LOW (ref 135–145)
SODIUM SERPL-SCNC: 127 MMOL/L — LOW (ref 135–145)
SODIUM SERPL-SCNC: 128 MMOL/L — LOW (ref 135–145)
SODIUM SERPL-SCNC: 129 MMOL/L — LOW (ref 135–145)
SODIUM SERPL-SCNC: 130 MMOL/L — LOW (ref 135–145)
SODIUM SERPL-SCNC: 131 MMOL/L — LOW (ref 135–145)
SODIUM SERPL-SCNC: 132 MMOL/L — LOW (ref 135–145)
SODIUM SERPL-SCNC: 133 MMOL/L — LOW (ref 135–145)
SODIUM SERPL-SCNC: 134 MMOL/L — LOW (ref 135–145)
SODIUM SERPL-SCNC: 135 MMOL/L — SIGNIFICANT CHANGE UP (ref 135–145)
SODIUM SERPL-SCNC: 136 MMOL/L — SIGNIFICANT CHANGE UP (ref 135–145)
SODIUM SERPL-SCNC: 137 MMOL/L — SIGNIFICANT CHANGE UP (ref 135–145)
SODIUM UR-SCNC: 5 MMOL/L — SIGNIFICANT CHANGE UP
SODIUM UR-SCNC: 7 MMOL/L — SIGNIFICANT CHANGE UP
SODIUM UR-SCNC: <20 MMOL/L — SIGNIFICANT CHANGE UP
SP GR SPEC: 1.02 — SIGNIFICANT CHANGE UP (ref 1–1.03)
SP GR SPEC: <1.005 — LOW (ref 1–1.03)
SPECIMEN SOURCE: SIGNIFICANT CHANGE UP
SQUAMOUS # UR AUTO: 1 /HPF — SIGNIFICANT CHANGE UP (ref 0–5)
SQUAMOUS # UR AUTO: 2 /HPF — SIGNIFICANT CHANGE UP (ref 0–5)
T3 SERPL-MCNC: 56 NG/DL — LOW (ref 80–200)
T3 SERPL-MCNC: 83 NG/DL — SIGNIFICANT CHANGE UP (ref 80–200)
T4 FREE SERPL-MCNC: 0.8 NG/DL — LOW (ref 0.9–1.8)
T4 FREE SERPL-MCNC: 1 NG/DL — SIGNIFICANT CHANGE UP (ref 0.9–1.8)
T4 FREE+ TSH PNL SERPL: 38.5 UIU/ML — HIGH (ref 0.27–4.2)
T4 FREE+ TSH PNL SERPL: 57.4 UIU/ML — HIGH (ref 0.27–4.2)
TOTAL CELLS COUNTED, BODY FLUID: 100 CELLS — SIGNIFICANT CHANGE UP
TOTAL NUCLEATED CELL COUNT, BODY FLUID: 28 CELLS/UL — SIGNIFICANT CHANGE UP
TROPONIN T, HIGH SENSITIVITY RESULT: 9 NG/L — SIGNIFICANT CHANGE UP (ref 0–51)
TSH SERPL-MCNC: 47.42 UIU/ML — HIGH (ref 0.27–4.2)
TSH SERPL-MCNC: 51.48 UIU/ML — HIGH (ref 0.27–4.2)
TUBE TYPE: SIGNIFICANT CHANGE UP
UROBILINOGEN FLD QL: 0.2 MG/DL — SIGNIFICANT CHANGE UP (ref 0.2–1)
UROBILINOGEN FLD QL: 1 MG/DL — SIGNIFICANT CHANGE UP (ref 0.2–1)
UUN UR-MCNC: 503 MG/DL — SIGNIFICANT CHANGE UP
WBC # BLD: 10.68 K/UL — HIGH (ref 3.8–10.5)
WBC # BLD: 10.76 K/UL — HIGH (ref 3.8–10.5)
WBC # BLD: 10.89 K/UL — HIGH (ref 3.8–10.5)
WBC # BLD: 11.71 K/UL — HIGH (ref 3.8–10.5)
WBC # BLD: 11.84 K/UL — HIGH (ref 3.8–10.5)
WBC # BLD: 12.88 K/UL — HIGH (ref 3.8–10.5)
WBC # BLD: 13.25 K/UL — HIGH (ref 3.8–10.5)
WBC # BLD: 14.26 K/UL — HIGH (ref 3.8–10.5)
WBC # BLD: 14.29 K/UL — HIGH (ref 3.8–10.5)
WBC # BLD: 15.4 K/UL — HIGH (ref 3.8–10.5)
WBC # BLD: 15.51 K/UL — HIGH (ref 3.8–10.5)
WBC # BLD: 15.77 K/UL — HIGH (ref 3.8–10.5)
WBC # BLD: 15.86 K/UL — HIGH (ref 3.8–10.5)
WBC # BLD: 16.11 K/UL — HIGH (ref 3.8–10.5)
WBC # BLD: 16.79 K/UL — HIGH (ref 3.8–10.5)
WBC # BLD: 16.98 K/UL — HIGH (ref 3.8–10.5)
WBC # BLD: 17 K/UL — HIGH (ref 3.8–10.5)
WBC # BLD: 17.08 K/UL — HIGH (ref 3.8–10.5)
WBC # BLD: 17.09 K/UL — HIGH (ref 3.8–10.5)
WBC # BLD: 17.13 K/UL — HIGH (ref 3.8–10.5)
WBC # BLD: 17.29 K/UL — HIGH (ref 3.8–10.5)
WBC # BLD: 17.93 K/UL — HIGH (ref 3.8–10.5)
WBC # BLD: 18.6 K/UL — HIGH (ref 3.8–10.5)
WBC # BLD: 18.66 K/UL — HIGH (ref 3.8–10.5)
WBC # BLD: 18.8 K/UL — HIGH (ref 3.8–10.5)
WBC # BLD: 19.23 K/UL — HIGH (ref 3.8–10.5)
WBC # BLD: 19.31 K/UL — HIGH (ref 3.8–10.5)
WBC # BLD: 19.44 K/UL — HIGH (ref 3.8–10.5)
WBC # BLD: 19.77 K/UL — HIGH (ref 3.8–10.5)
WBC # BLD: 19.88 K/UL — HIGH (ref 3.8–10.5)
WBC # BLD: 20.7 K/UL — HIGH (ref 3.8–10.5)
WBC # BLD: 20.71 K/UL — HIGH (ref 3.8–10.5)
WBC # BLD: 20.76 K/UL — HIGH (ref 3.8–10.5)
WBC # BLD: 21.12 K/UL — HIGH (ref 3.8–10.5)
WBC # BLD: 21.8 K/UL — HIGH (ref 3.8–10.5)
WBC # BLD: 23.83 K/UL — HIGH (ref 3.8–10.5)
WBC # BLD: 23.94 K/UL — HIGH (ref 3.8–10.5)
WBC # BLD: 24.31 K/UL — HIGH (ref 3.8–10.5)
WBC # BLD: 28.28 K/UL — HIGH (ref 3.8–10.5)
WBC # BLD: 32.84 K/UL — HIGH (ref 3.8–10.5)
WBC # BLD: 32.98 K/UL — HIGH (ref 3.8–10.5)
WBC # BLD: 33.03 K/UL — HIGH (ref 3.8–10.5)
WBC # BLD: 37.39 K/UL — HIGH (ref 3.8–10.5)
WBC # BLD: 5.87 K/UL — SIGNIFICANT CHANGE UP (ref 3.8–10.5)
WBC # FLD AUTO: 10.68 K/UL — HIGH (ref 3.8–10.5)
WBC # FLD AUTO: 10.76 K/UL — HIGH (ref 3.8–10.5)
WBC # FLD AUTO: 10.89 K/UL — HIGH (ref 3.8–10.5)
WBC # FLD AUTO: 11.71 K/UL — HIGH (ref 3.8–10.5)
WBC # FLD AUTO: 11.84 K/UL — HIGH (ref 3.8–10.5)
WBC # FLD AUTO: 12.88 K/UL — HIGH (ref 3.8–10.5)
WBC # FLD AUTO: 13.25 K/UL — HIGH (ref 3.8–10.5)
WBC # FLD AUTO: 14.26 K/UL — HIGH (ref 3.8–10.5)
WBC # FLD AUTO: 14.29 K/UL — HIGH (ref 3.8–10.5)
WBC # FLD AUTO: 15.4 K/UL — HIGH (ref 3.8–10.5)
WBC # FLD AUTO: 15.51 K/UL — HIGH (ref 3.8–10.5)
WBC # FLD AUTO: 15.77 K/UL — HIGH (ref 3.8–10.5)
WBC # FLD AUTO: 15.86 K/UL — HIGH (ref 3.8–10.5)
WBC # FLD AUTO: 16.11 K/UL — HIGH (ref 3.8–10.5)
WBC # FLD AUTO: 16.79 K/UL — HIGH (ref 3.8–10.5)
WBC # FLD AUTO: 16.98 K/UL — HIGH (ref 3.8–10.5)
WBC # FLD AUTO: 17 K/UL — HIGH (ref 3.8–10.5)
WBC # FLD AUTO: 17.08 K/UL — HIGH (ref 3.8–10.5)
WBC # FLD AUTO: 17.09 K/UL — HIGH (ref 3.8–10.5)
WBC # FLD AUTO: 17.13 K/UL — HIGH (ref 3.8–10.5)
WBC # FLD AUTO: 17.29 K/UL — HIGH (ref 3.8–10.5)
WBC # FLD AUTO: 17.93 K/UL — HIGH (ref 3.8–10.5)
WBC # FLD AUTO: 18.6 K/UL — HIGH (ref 3.8–10.5)
WBC # FLD AUTO: 18.66 K/UL — HIGH (ref 3.8–10.5)
WBC # FLD AUTO: 18.8 K/UL — HIGH (ref 3.8–10.5)
WBC # FLD AUTO: 19.23 K/UL — HIGH (ref 3.8–10.5)
WBC # FLD AUTO: 19.31 K/UL — HIGH (ref 3.8–10.5)
WBC # FLD AUTO: 19.44 K/UL — HIGH (ref 3.8–10.5)
WBC # FLD AUTO: 19.77 K/UL — HIGH (ref 3.8–10.5)
WBC # FLD AUTO: 19.88 K/UL — HIGH (ref 3.8–10.5)
WBC # FLD AUTO: 20.7 K/UL — HIGH (ref 3.8–10.5)
WBC # FLD AUTO: 20.71 K/UL — HIGH (ref 3.8–10.5)
WBC # FLD AUTO: 20.76 K/UL — HIGH (ref 3.8–10.5)
WBC # FLD AUTO: 21.12 K/UL — HIGH (ref 3.8–10.5)
WBC # FLD AUTO: 21.8 K/UL — HIGH (ref 3.8–10.5)
WBC # FLD AUTO: 23.83 K/UL — HIGH (ref 3.8–10.5)
WBC # FLD AUTO: 23.94 K/UL — HIGH (ref 3.8–10.5)
WBC # FLD AUTO: 24.31 K/UL — HIGH (ref 3.8–10.5)
WBC # FLD AUTO: 28.28 K/UL — HIGH (ref 3.8–10.5)
WBC # FLD AUTO: 32.84 K/UL — HIGH (ref 3.8–10.5)
WBC # FLD AUTO: 32.98 K/UL — HIGH (ref 3.8–10.5)
WBC # FLD AUTO: 33.03 K/UL — HIGH (ref 3.8–10.5)
WBC # FLD AUTO: 37.39 K/UL — HIGH (ref 3.8–10.5)
WBC # FLD AUTO: 5.87 K/UL — SIGNIFICANT CHANGE UP (ref 3.8–10.5)
WBC COUNT.: 22 CELLS/UL — SIGNIFICANT CHANGE UP
WBC UR QL: 17 /HPF — HIGH (ref 0–5)
WBC UR QL: 89 /HPF — HIGH (ref 0–5)

## 2025-01-01 PROCEDURE — 84484 ASSAY OF TROPONIN QUANT: CPT

## 2025-01-01 PROCEDURE — 73610 X-RAY EXAM OF ANKLE: CPT

## 2025-01-01 PROCEDURE — 99291 CRITICAL CARE FIRST HOUR: CPT

## 2025-01-01 PROCEDURE — 84145 PROCALCITONIN (PCT): CPT

## 2025-01-01 PROCEDURE — 81001 URINALYSIS AUTO W/SCOPE: CPT

## 2025-01-01 PROCEDURE — 80053 COMPREHEN METABOLIC PANEL: CPT

## 2025-01-01 PROCEDURE — 99497 ADVNCD CARE PLAN 30 MIN: CPT | Mod: 25

## 2025-01-01 PROCEDURE — 85014 HEMATOCRIT: CPT

## 2025-01-01 PROCEDURE — 86900 BLOOD TYPING SEROLOGIC ABO: CPT

## 2025-01-01 PROCEDURE — 93306 TTE W/DOPPLER COMPLETE: CPT | Mod: 26

## 2025-01-01 PROCEDURE — 84540 ASSAY OF URINE/UREA-N: CPT

## 2025-01-01 PROCEDURE — 83605 ASSAY OF LACTIC ACID: CPT

## 2025-01-01 PROCEDURE — 82947 ASSAY GLUCOSE BLOOD QUANT: CPT

## 2025-01-01 PROCEDURE — 99233 SBSQ HOSP IP/OBS HIGH 50: CPT

## 2025-01-01 PROCEDURE — 87040 BLOOD CULTURE FOR BACTERIA: CPT

## 2025-01-01 PROCEDURE — 74177 CT ABD & PELVIS W/CONTRAST: CPT

## 2025-01-01 PROCEDURE — 87077 CULTURE AEROBIC IDENTIFY: CPT

## 2025-01-01 PROCEDURE — 82330 ASSAY OF CALCIUM: CPT

## 2025-01-01 PROCEDURE — 36415 COLL VENOUS BLD VENIPUNCTURE: CPT

## 2025-01-01 PROCEDURE — 93010 ELECTROCARDIOGRAM REPORT: CPT

## 2025-01-01 PROCEDURE — 86140 C-REACTIVE PROTEIN: CPT

## 2025-01-01 PROCEDURE — 36800 INSERTION OF CANNULA: CPT

## 2025-01-01 PROCEDURE — 85730 THROMBOPLASTIN TIME PARTIAL: CPT

## 2025-01-01 PROCEDURE — 87086 URINE CULTURE/COLONY COUNT: CPT

## 2025-01-01 PROCEDURE — 99223 1ST HOSP IP/OBS HIGH 75: CPT | Mod: 25

## 2025-01-01 PROCEDURE — 84132 ASSAY OF SERUM POTASSIUM: CPT

## 2025-01-01 PROCEDURE — 76770 US EXAM ABDO BACK WALL COMP: CPT | Mod: 26

## 2025-01-01 PROCEDURE — 71045 X-RAY EXAM CHEST 1 VIEW: CPT | Mod: 26

## 2025-01-01 PROCEDURE — 73610 X-RAY EXAM OF ANKLE: CPT | Mod: 26,RT

## 2025-01-01 PROCEDURE — 77280 THER RAD SIMULAJ FIELD SMPL: CPT | Mod: 26

## 2025-01-01 PROCEDURE — 93971 EXTREMITY STUDY: CPT

## 2025-01-01 PROCEDURE — 82570 ASSAY OF URINE CREATININE: CPT

## 2025-01-01 PROCEDURE — 72132 CT LUMBAR SPINE W/DYE: CPT | Mod: 26

## 2025-01-01 PROCEDURE — 93971 EXTREMITY STUDY: CPT | Mod: 26,RT

## 2025-01-01 PROCEDURE — 87186 SC STD MICRODIL/AGAR DIL: CPT

## 2025-01-01 PROCEDURE — 99232 SBSQ HOSP IP/OBS MODERATE 35: CPT | Mod: GC

## 2025-01-01 PROCEDURE — 76942 ECHO GUIDE FOR BIOPSY: CPT

## 2025-01-01 PROCEDURE — 80048 BASIC METABOLIC PNL TOTAL CA: CPT

## 2025-01-01 PROCEDURE — 99233 SBSQ HOSP IP/OBS HIGH 50: CPT | Mod: GC

## 2025-01-01 PROCEDURE — 84295 ASSAY OF SERUM SODIUM: CPT

## 2025-01-01 PROCEDURE — 85027 COMPLETE CBC AUTOMATED: CPT

## 2025-01-01 PROCEDURE — 93005 ELECTROCARDIOGRAM TRACING: CPT

## 2025-01-01 PROCEDURE — 84300 ASSAY OF URINE SODIUM: CPT

## 2025-01-01 PROCEDURE — 85018 HEMOGLOBIN: CPT

## 2025-01-01 PROCEDURE — 99292 CRITICAL CARE ADDL 30 MIN: CPT | Mod: 25

## 2025-01-01 PROCEDURE — 36620 INSERTION CATHETER ARTERY: CPT

## 2025-01-01 PROCEDURE — 82803 BLOOD GASES ANY COMBINATION: CPT

## 2025-01-01 PROCEDURE — 72125 CT NECK SPINE W/O DYE: CPT

## 2025-01-01 PROCEDURE — 83935 ASSAY OF URINE OSMOLALITY: CPT

## 2025-01-01 PROCEDURE — 85652 RBC SED RATE AUTOMATED: CPT

## 2025-01-01 PROCEDURE — 77334 RADIATION TREATMENT AID(S): CPT | Mod: 26

## 2025-01-01 PROCEDURE — 36556 INSERT NON-TUNNEL CV CATH: CPT

## 2025-01-01 PROCEDURE — 73630 X-RAY EXAM OF FOOT: CPT

## 2025-01-01 PROCEDURE — 99291 CRITICAL CARE FIRST HOUR: CPT | Mod: 25

## 2025-01-01 PROCEDURE — 72125 CT NECK SPINE W/O DYE: CPT | Mod: 26

## 2025-01-01 PROCEDURE — 77263 THER RADIOLOGY TX PLNG CPLX: CPT

## 2025-01-01 PROCEDURE — 85610 PROTHROMBIN TIME: CPT

## 2025-01-01 PROCEDURE — 99232 SBSQ HOSP IP/OBS MODERATE 35: CPT

## 2025-01-01 PROCEDURE — 83735 ASSAY OF MAGNESIUM: CPT

## 2025-01-01 PROCEDURE — 85025 COMPLETE CBC W/AUTO DIFF WBC: CPT

## 2025-01-01 PROCEDURE — 82435 ASSAY OF BLOOD CHLORIDE: CPT

## 2025-01-01 PROCEDURE — 71260 CT THORAX DX C+: CPT

## 2025-01-01 PROCEDURE — 99223 1ST HOSP IP/OBS HIGH 75: CPT

## 2025-01-01 PROCEDURE — 86901 BLOOD TYPING SEROLOGIC RH(D): CPT

## 2025-01-01 PROCEDURE — 71045 X-RAY EXAM CHEST 1 VIEW: CPT

## 2025-01-01 PROCEDURE — 76705 ECHO EXAM OF ABDOMEN: CPT | Mod: 26

## 2025-01-01 PROCEDURE — 76942 ECHO GUIDE FOR BIOPSY: CPT | Mod: 26

## 2025-01-01 PROCEDURE — 84443 ASSAY THYROID STIM HORMONE: CPT

## 2025-01-01 PROCEDURE — 96375 TX/PRO/DX INJ NEW DRUG ADDON: CPT

## 2025-01-01 PROCEDURE — 77290 THER RAD SIMULAJ FIELD CPLX: CPT | Mod: 26

## 2025-01-01 PROCEDURE — 86850 RBC ANTIBODY SCREEN: CPT

## 2025-01-01 PROCEDURE — 72129 CT CHEST SPINE W/DYE: CPT | Mod: 26

## 2025-01-01 PROCEDURE — 99285 EMERGENCY DEPT VISIT HI MDM: CPT

## 2025-01-01 PROCEDURE — 74177 CT ABD & PELVIS W/CONTRAST: CPT | Mod: 26

## 2025-01-01 PROCEDURE — 99291 CRITICAL CARE FIRST HOUR: CPT | Mod: GC

## 2025-01-01 PROCEDURE — 99222 1ST HOSP IP/OBS MODERATE 55: CPT | Mod: GC

## 2025-01-01 PROCEDURE — 99233 SBSQ HOSP IP/OBS HIGH 50: CPT | Mod: 25

## 2025-01-01 PROCEDURE — 83930 ASSAY OF BLOOD OSMOLALITY: CPT

## 2025-01-01 PROCEDURE — 93356 MYOCRD STRAIN IMG SPCKL TRCK: CPT

## 2025-01-01 PROCEDURE — 77307 TELETHX ISODOSE PLAN CPLX: CPT | Mod: 26

## 2025-01-01 PROCEDURE — 96374 THER/PROPH/DIAG INJ IV PUSH: CPT

## 2025-01-01 PROCEDURE — 97161 PT EVAL LOW COMPLEX 20 MIN: CPT

## 2025-01-01 PROCEDURE — 99232 SBSQ HOSP IP/OBS MODERATE 35: CPT | Mod: 25

## 2025-01-01 PROCEDURE — 88108 CYTOPATH CONCENTRATE TECH: CPT | Mod: 26,59

## 2025-01-01 PROCEDURE — 99222 1ST HOSP IP/OBS MODERATE 55: CPT

## 2025-01-01 PROCEDURE — 49083 ABD PARACENTESIS W/IMAGING: CPT

## 2025-01-01 PROCEDURE — 71260 CT THORAX DX C+: CPT | Mod: 26

## 2025-01-01 PROCEDURE — 84439 ASSAY OF FREE THYROXINE: CPT

## 2025-01-01 PROCEDURE — 93308 TTE F-UP OR LMTD: CPT | Mod: 26

## 2025-01-01 PROCEDURE — 73630 X-RAY EXAM OF FOOT: CPT | Mod: 26,RT

## 2025-01-01 PROCEDURE — 90945 DIALYSIS ONE EVALUATION: CPT | Mod: GC

## 2025-01-01 PROCEDURE — 93306 TTE W/DOPPLER COMPLETE: CPT

## 2025-01-01 PROCEDURE — 99221 1ST HOSP IP/OBS SF/LOW 40: CPT

## 2025-01-01 RX ORDER — OXYCODONE HYDROCHLORIDE 30 MG/1
10 TABLET ORAL
Refills: 0 | Status: DISCONTINUED | OUTPATIENT
Start: 2025-01-01 | End: 2025-01-01

## 2025-01-01 RX ORDER — DEXTROSE 50 % IN WATER 50 %
50 SYRINGE (ML) INTRAVENOUS ONCE
Refills: 0 | Status: COMPLETED | OUTPATIENT
Start: 2025-01-01 | End: 2025-01-01

## 2025-01-01 RX ORDER — NALOXEGOL OXALATE 12.5 MG/1
12.5 TABLET, FILM COATED ORAL DAILY
Refills: 0 | Status: DISCONTINUED | OUTPATIENT
Start: 2025-01-01 | End: 2025-01-01

## 2025-01-01 RX ORDER — POLYETHYLENE GLYCOL 3350 17 G/17G
17 POWDER, FOR SOLUTION ORAL DAILY
Refills: 0 | Status: DISCONTINUED | OUTPATIENT
Start: 2025-01-01 | End: 2025-01-01

## 2025-01-01 RX ORDER — MAGNESIUM, ALUMINUM HYDROXIDE 200-200 MG
30 TABLET,CHEWABLE ORAL EVERY 4 HOURS
Refills: 0 | Status: DISCONTINUED | OUTPATIENT
Start: 2025-01-01 | End: 2025-01-01

## 2025-01-01 RX ORDER — TRIAMCINOLONE ACETONIDE 1 MG/G
1 CREAM TOPICAL
Refills: 0 | Status: DISCONTINUED | OUTPATIENT
Start: 2025-01-01 | End: 2025-01-01

## 2025-01-01 RX ORDER — PROCHLORPERAZINE 25 MG
10 SUPPOSITORY, RECTAL RECTAL THREE TIMES A DAY
Refills: 0 | Status: DISCONTINUED | OUTPATIENT
Start: 2025-01-01 | End: 2025-01-01

## 2025-01-01 RX ORDER — HEPARIN SODIUM 1000 [USP'U]/ML
5000 INJECTION INTRAVENOUS; SUBCUTANEOUS EVERY 8 HOURS
Refills: 0 | Status: DISCONTINUED | OUTPATIENT
Start: 2025-01-01 | End: 2025-01-01

## 2025-01-01 RX ORDER — VANCOMYCIN HCL IN 5 % DEXTROSE 1.5G/250ML
1000 PLASTIC BAG, INJECTION (ML) INTRAVENOUS ONCE
Refills: 0 | Status: COMPLETED | OUTPATIENT
Start: 2025-01-01 | End: 2025-01-01

## 2025-01-01 RX ORDER — SODIUM BICARBONATE 1 MEQ/ML
50 SYRINGE (ML) INTRAVENOUS ONCE
Refills: 0 | Status: COMPLETED | OUTPATIENT
Start: 2025-01-01 | End: 2025-01-01

## 2025-01-01 RX ORDER — LIDOCAINE HYDROCHLORIDE 20 MG/ML
1 JELLY TOPICAL THREE TIMES A DAY
Refills: 0 | Status: DISCONTINUED | OUTPATIENT
Start: 2025-01-01 | End: 2025-01-01

## 2025-01-01 RX ORDER — NOREPINEPHRINE BITARTRATE 8 MG
0.05 SOLUTION INTRAVENOUS
Qty: 8 | Refills: 0 | Status: DISCONTINUED | OUTPATIENT
Start: 2025-01-01 | End: 2025-01-01

## 2025-01-01 RX ORDER — APIXABAN 2.5 MG/1
5 TABLET, FILM COATED ORAL
Refills: 0 | Status: DISCONTINUED | OUTPATIENT
Start: 2025-01-01 | End: 2025-01-01

## 2025-01-01 RX ORDER — SIMETHICONE 80 MG
80 TABLET,CHEWABLE ORAL EVERY 6 HOURS
Refills: 0 | Status: DISCONTINUED | OUTPATIENT
Start: 2025-01-01 | End: 2025-01-01

## 2025-01-01 RX ORDER — ACETAMINOPHEN 500 MG/5ML
2 LIQUID (ML) ORAL
Qty: 0 | Refills: 0 | DISCHARGE
Start: 2025-01-01

## 2025-01-01 RX ORDER — ALBUMIN (HUMAN) 12.5 G/50ML
250 INJECTION, SOLUTION INTRAVENOUS EVERY 8 HOURS
Refills: 0 | Status: COMPLETED | OUTPATIENT
Start: 2025-01-01 | End: 2025-01-01

## 2025-01-01 RX ORDER — DEXTROSE 50 % IN WATER 50 %
25 SYRINGE (ML) INTRAVENOUS ONCE
Refills: 0 | Status: DISCONTINUED | OUTPATIENT
Start: 2025-01-01 | End: 2025-01-01

## 2025-01-01 RX ORDER — SODIUM CHLORIDE 9 G/1000ML
500 INJECTION, SOLUTION INTRAVENOUS ONCE
Refills: 0 | Status: COMPLETED | OUTPATIENT
Start: 2025-01-01 | End: 2025-01-01

## 2025-01-01 RX ORDER — OXYCODONE HYDROCHLORIDE 30 MG/1
10 TABLET ORAL ONCE
Refills: 0 | Status: DISCONTINUED | OUTPATIENT
Start: 2025-01-01 | End: 2025-01-01

## 2025-01-01 RX ORDER — POLYETHYLENE GLYCOL-3350 AND ELECTROLYTES 236; 6.74; 5.86; 2.97; 22.74 G/274.31G; G/274.31G; G/274.31G; G/274.31G; G/274.31G
500 POWDER, FOR SOLUTION ORAL ONCE
Refills: 0 | Status: COMPLETED | OUTPATIENT
Start: 2025-01-01 | End: 2025-01-01

## 2025-01-01 RX ORDER — SODIUM ZIRCONIUM CYCLOSILICATE 5 G/5G
10 POWDER, FOR SUSPENSION ORAL ONCE
Refills: 0 | Status: COMPLETED | OUTPATIENT
Start: 2025-01-01 | End: 2025-01-01

## 2025-01-01 RX ORDER — OXYCODONE HYDROCHLORIDE 30 MG/1
7.5 TABLET ORAL EVERY 4 HOURS
Refills: 0 | Status: DISCONTINUED | OUTPATIENT
Start: 2025-01-01 | End: 2025-01-01

## 2025-01-01 RX ORDER — SENNA 187 MG
2 TABLET ORAL AT BEDTIME
Refills: 0 | Status: DISCONTINUED | OUTPATIENT
Start: 2025-01-01 | End: 2025-01-01

## 2025-01-01 RX ORDER — PROCHLORPERAZINE 25 MG
1 SUPPOSITORY, RECTAL RECTAL
Qty: 0 | Refills: 0 | DISCHARGE
Start: 2025-01-01

## 2025-01-01 RX ORDER — CALCIUM GLUCONATE 20 MG/ML
1 INJECTION, SOLUTION INTRAVENOUS ONCE
Refills: 0 | Status: DISCONTINUED | OUTPATIENT
Start: 2025-01-01 | End: 2025-01-01

## 2025-01-01 RX ORDER — POLYETHYLENE GLYCOL 3350 17 G/17G
17 POWDER, FOR SOLUTION ORAL
Refills: 0 | Status: DISCONTINUED | OUTPATIENT
Start: 2025-01-01 | End: 2025-01-01

## 2025-01-01 RX ORDER — MIDODRINE HYDROCHLORIDE 5 MG/1
30 TABLET ORAL THREE TIMES A DAY
Refills: 0 | Status: DISCONTINUED | OUTPATIENT
Start: 2025-01-01 | End: 2025-01-01

## 2025-01-01 RX ORDER — AMOXICILLIN AND CLAVULANATE POTASSIUM 500; 125 MG/1; MG/1
1 TABLET, FILM COATED ORAL
Refills: 0 | Status: DISCONTINUED | OUTPATIENT
Start: 2025-01-01 | End: 2025-01-01

## 2025-01-01 RX ORDER — ONDANSETRON HCL/PF 4 MG/2 ML
4 VIAL (ML) INJECTION EVERY 8 HOURS
Refills: 0 | Status: DISCONTINUED | OUTPATIENT
Start: 2025-01-01 | End: 2025-01-01

## 2025-01-01 RX ORDER — DEXTROSE MONOHYDRATE 100 G/1000ML
1000 INJECTION, SOLUTION INTRAVENOUS
Refills: 0 | Status: DISCONTINUED | OUTPATIENT
Start: 2025-01-01 | End: 2025-01-01

## 2025-01-01 RX ORDER — FENTANYL CITRATE-0.9 % NACL/PF 100MCG/2ML
50 SYRINGE (ML) INTRAVENOUS ONCE
Refills: 0 | Status: DISCONTINUED | OUTPATIENT
Start: 2025-01-01 | End: 2025-01-01

## 2025-01-01 RX ORDER — OXYCODONE HYDROCHLORIDE 30 MG/1
1 TABLET ORAL
Qty: 0 | Refills: 0 | DISCHARGE
Start: 2025-01-01

## 2025-01-01 RX ORDER — SODIUM CHLORIDE 9 G/1000ML
1000 INJECTION, SOLUTION INTRAVENOUS
Refills: 0 | Status: DISCONTINUED | OUTPATIENT
Start: 2025-01-01 | End: 2025-01-01

## 2025-01-01 RX ORDER — CEFEPIME 2 G/20ML
1000 INJECTION, POWDER, FOR SOLUTION INTRAVENOUS ONCE
Refills: 0 | Status: COMPLETED | OUTPATIENT
Start: 2025-01-01 | End: 2025-01-01

## 2025-01-01 RX ORDER — LANOLIN/MINERAL OIL/PETROLATUM
1 OINTMENT (GRAM) OPHTHALMIC (EYE) EVERY 8 HOURS
Refills: 0 | Status: DISCONTINUED | OUTPATIENT
Start: 2025-01-01 | End: 2025-01-01

## 2025-01-01 RX ORDER — ALBUMIN (HUMAN) 12.5 G/50ML
250 INJECTION, SOLUTION INTRAVENOUS ONCE
Refills: 0 | Status: COMPLETED | OUTPATIENT
Start: 2025-01-01 | End: 2025-01-01

## 2025-01-01 RX ORDER — HYDROMORPHONE/SOD CHLOR,ISO/PF 2 MG/10 ML
0.5 SYRINGE (ML) INJECTION EVERY 4 HOURS
Refills: 0 | Status: DISCONTINUED | OUTPATIENT
Start: 2025-01-01 | End: 2025-01-01

## 2025-01-01 RX ORDER — LOSARTAN POTASSIUM AND HYDROCHLOROTHIAZIDE 12.5; 5 MG/1; MG/1
1 TABLET ORAL
Refills: 0 | DISCHARGE

## 2025-01-01 RX ORDER — OXYCODONE HYDROCHLORIDE 30 MG/1
5 TABLET ORAL EVERY 4 HOURS
Refills: 0 | Status: DISCONTINUED | OUTPATIENT
Start: 2025-01-01 | End: 2025-01-01

## 2025-01-01 RX ORDER — LEVOTHYROXINE SODIUM 300 MCG
60 TABLET ORAL AT BEDTIME
Refills: 0 | Status: DISCONTINUED | OUTPATIENT
Start: 2025-01-01 | End: 2025-01-01

## 2025-01-01 RX ORDER — HYDROMORPHONE/SOD CHLOR,ISO/PF 2 MG/10 ML
0.5 SYRINGE (ML) INJECTION ONCE
Refills: 0 | Status: DISCONTINUED | OUTPATIENT
Start: 2025-01-01 | End: 2025-01-01

## 2025-01-01 RX ORDER — NOREPINEPHRINE BITARTRATE 8 MG
0.3 SOLUTION INTRAVENOUS
Qty: 16 | Refills: 0 | Status: DISCONTINUED | OUTPATIENT
Start: 2025-01-01 | End: 2025-01-01

## 2025-01-01 RX ORDER — NOREPINEPHRINE BITARTRATE 8 MG
0.3 SOLUTION INTRAVENOUS
Qty: 8 | Refills: 0 | Status: DISCONTINUED | OUTPATIENT
Start: 2025-01-01 | End: 2025-01-01

## 2025-01-01 RX ORDER — ACETAMINOPHEN 500 MG/5ML
975 LIQUID (ML) ORAL EVERY 6 HOURS
Refills: 0 | Status: DISCONTINUED | OUTPATIENT
Start: 2025-01-01 | End: 2025-01-01

## 2025-01-01 RX ORDER — GABAPENTIN 400 MG/1
1 CAPSULE ORAL
Qty: 0 | Refills: 0 | DISCHARGE
Start: 2025-01-01

## 2025-01-01 RX ORDER — DROXIDOPA 300 MG/1
100 CAPSULE ORAL EVERY 8 HOURS
Refills: 0 | Status: DISCONTINUED | OUTPATIENT
Start: 2025-01-01 | End: 2025-01-01

## 2025-01-01 RX ORDER — CALCIUM GLUCONATE 20 MG/ML
2 INJECTION, SOLUTION INTRAVENOUS ONCE
Refills: 0 | Status: COMPLETED | OUTPATIENT
Start: 2025-01-01 | End: 2025-01-01

## 2025-01-01 RX ORDER — MEROPENEM 1 G/30ML
500 INJECTION INTRAVENOUS EVERY 24 HOURS
Refills: 0 | Status: DISCONTINUED | OUTPATIENT
Start: 2025-01-01 | End: 2025-01-01

## 2025-01-01 RX ORDER — DEXTROSE 50 % IN WATER 50 %
50 SYRINGE (ML) INTRAVENOUS ONCE
Refills: 0 | Status: DISCONTINUED | OUTPATIENT
Start: 2025-01-01 | End: 2025-01-01

## 2025-01-01 RX ORDER — MEROPENEM 1 G/30ML
INJECTION INTRAVENOUS
Refills: 0 | Status: DISCONTINUED | OUTPATIENT
Start: 2025-01-01 | End: 2025-01-01

## 2025-01-01 RX ORDER — OXYCODONE HYDROCHLORIDE 30 MG/1
5 TABLET ORAL ONCE
Refills: 0 | Status: DISCONTINUED | OUTPATIENT
Start: 2025-01-01 | End: 2025-01-01

## 2025-01-01 RX ORDER — SIMETHICONE 80 MG
80 TABLET,CHEWABLE ORAL THREE TIMES A DAY
Refills: 0 | Status: DISCONTINUED | OUTPATIENT
Start: 2025-01-01 | End: 2025-01-01

## 2025-01-01 RX ORDER — HYDROCORTISONE 20 MG
50 TABLET ORAL EVERY 6 HOURS
Refills: 0 | Status: DISCONTINUED | OUTPATIENT
Start: 2025-01-01 | End: 2025-01-01

## 2025-01-01 RX ORDER — PIPERACILLIN-TAZO-DEXTROSE,ISO 3.375G/5
3.38 IV SOLUTION, PIGGYBACK PREMIX FROZEN(ML) INTRAVENOUS ONCE
Refills: 0 | Status: COMPLETED | OUTPATIENT
Start: 2025-01-01 | End: 2025-01-01

## 2025-01-01 RX ORDER — CASPOFUNGIN ACETATE 5 MG/ML
INJECTION, POWDER, LYOPHILIZED, FOR SOLUTION INTRAVENOUS
Refills: 0 | Status: DISCONTINUED | OUTPATIENT
Start: 2025-01-01 | End: 2025-01-01

## 2025-01-01 RX ORDER — HYDROMORPHONE/SOD CHLOR,ISO/PF 2 MG/10 ML
0.2 SYRINGE (ML) INJECTION ONCE
Refills: 0 | Status: DISCONTINUED | OUTPATIENT
Start: 2025-01-01 | End: 2025-01-01

## 2025-01-01 RX ORDER — LOSARTAN POTASSIUM 100 MG/1
1 TABLET, FILM COATED ORAL
Qty: 0 | Refills: 0 | DISCHARGE
Start: 2025-01-01

## 2025-01-01 RX ORDER — OXYCODONE HYDROCHLORIDE 30 MG/1
1 TABLET ORAL
Refills: 0 | DISCHARGE

## 2025-01-01 RX ORDER — SODIUM CHLORIDE 0.65 %
1 AEROSOL, SPRAY (ML) NASAL
Refills: 0 | Status: DISCONTINUED | OUTPATIENT
Start: 2025-01-01 | End: 2025-01-01

## 2025-01-01 RX ORDER — GABAPENTIN 400 MG/1
100 CAPSULE ORAL AT BEDTIME
Refills: 0 | Status: DISCONTINUED | OUTPATIENT
Start: 2025-01-01 | End: 2025-01-01

## 2025-01-01 RX ORDER — MIDODRINE HYDROCHLORIDE 5 MG/1
15 TABLET ORAL THREE TIMES A DAY
Refills: 0 | Status: DISCONTINUED | OUTPATIENT
Start: 2025-01-01 | End: 2025-01-01

## 2025-01-01 RX ORDER — NOREPINEPHRINE BITARTRATE 8 MG
0.1 SOLUTION INTRAVENOUS
Qty: 8 | Refills: 0 | Status: DISCONTINUED | OUTPATIENT
Start: 2025-01-01 | End: 2025-01-01

## 2025-01-01 RX ORDER — APIXABAN 5 MG/1
5 TABLET, FILM COATED ORAL
Refills: 0 | Status: DISCONTINUED | OUTPATIENT
Start: 2025-01-01 | End: 2025-01-01

## 2025-01-01 RX ORDER — APIXABAN 5 MG/1
1 TABLET, FILM COATED ORAL
Refills: 0 | DISCHARGE

## 2025-01-01 RX ORDER — GLYCOPYRROLATE 0.2 MG/ML
0.2 INJECTION INTRAMUSCULAR; INTRAVENOUS ONCE
Refills: 0 | Status: DISCONTINUED | OUTPATIENT
Start: 2025-01-01 | End: 2025-01-01

## 2025-01-01 RX ORDER — METHADONE HCL 10 MG
0.5 TABLET ORAL
Refills: 0 | DISCHARGE

## 2025-01-01 RX ORDER — SENNA 187 MG
2 TABLET ORAL
Qty: 0 | Refills: 0 | DISCHARGE
Start: 2025-01-01

## 2025-01-01 RX ORDER — B1/B2/B3/B5/B6/B12/VIT C/FOLIC 500-0.5 MG
1 TABLET ORAL DAILY
Refills: 0 | Status: DISCONTINUED | OUTPATIENT
Start: 2025-01-01 | End: 2025-01-01

## 2025-01-01 RX ORDER — ACETAMINOPHEN 500 MG/5ML
1000 LIQUID (ML) ORAL ONCE
Refills: 0 | Status: COMPLETED | OUTPATIENT
Start: 2025-01-01 | End: 2025-01-01

## 2025-01-01 RX ORDER — GABAPENTIN 400 MG/1
1 CAPSULE ORAL
Refills: 0 | DISCHARGE

## 2025-01-01 RX ORDER — MELATONIN 5 MG
3 TABLET ORAL AT BEDTIME
Refills: 0 | Status: DISCONTINUED | OUTPATIENT
Start: 2025-01-01 | End: 2025-01-01

## 2025-01-01 RX ORDER — BUMETANIDE 1 MG/1
1 TABLET ORAL DAILY
Refills: 0 | Status: DISCONTINUED | OUTPATIENT
Start: 2025-01-01 | End: 2025-01-01

## 2025-01-01 RX ORDER — LEVOTHYROXINE SODIUM 300 MCG
1 TABLET ORAL
Refills: 0
Start: 2025-01-01

## 2025-01-01 RX ORDER — ROSUVASTATIN CALCIUM 20 MG/1
10 TABLET, FILM COATED ORAL AT BEDTIME
Refills: 0 | Status: DISCONTINUED | OUTPATIENT
Start: 2025-01-01 | End: 2025-01-01

## 2025-01-01 RX ORDER — PIPERACILLIN-TAZO-DEXTROSE,ISO 3.375G/5
3.38 IV SOLUTION, PIGGYBACK PREMIX FROZEN(ML) INTRAVENOUS EVERY 8 HOURS
Refills: 0 | Status: DISCONTINUED | OUTPATIENT
Start: 2025-01-01 | End: 2025-01-01

## 2025-01-01 RX ORDER — LEVOTHYROXINE SODIUM 300 MCG
50 TABLET ORAL AT BEDTIME
Refills: 0 | Status: DISCONTINUED | OUTPATIENT
Start: 2025-01-01 | End: 2025-01-01

## 2025-01-01 RX ORDER — POLYETHYLENE GLYCOL 3350 17 G/17G
17 POWDER, FOR SOLUTION ORAL
Qty: 0 | Refills: 0 | DISCHARGE
Start: 2025-01-01

## 2025-01-01 RX ORDER — OXYCODONE HYDROCHLORIDE 30 MG/1
15 TABLET ORAL
Refills: 0 | Status: DISCONTINUED | OUTPATIENT
Start: 2025-01-01 | End: 2025-01-01

## 2025-01-01 RX ORDER — OXYCODONE HYDROCHLORIDE 30 MG/1
10 TABLET ORAL EVERY 6 HOURS
Refills: 0 | Status: DISCONTINUED | OUTPATIENT
Start: 2025-01-01 | End: 2025-01-01

## 2025-01-01 RX ORDER — ROSUVASTATIN CALCIUM 20 MG/1
1 TABLET, FILM COATED ORAL
Qty: 0 | Refills: 0 | DISCHARGE
Start: 2025-01-01

## 2025-01-01 RX ORDER — SPIRONOLACTONE 25 MG
25 TABLET ORAL DAILY
Refills: 0 | Status: DISCONTINUED | OUTPATIENT
Start: 2025-01-01 | End: 2025-01-01

## 2025-01-01 RX ORDER — MIDODRINE HYDROCHLORIDE 5 MG/1
10 TABLET ORAL THREE TIMES A DAY
Refills: 0 | Status: DISCONTINUED | OUTPATIENT
Start: 2025-01-01 | End: 2025-01-01

## 2025-01-01 RX ORDER — HEPARIN SODIUM 1000 [USP'U]/ML
INJECTION INTRAVENOUS; SUBCUTANEOUS
Qty: 25000 | Refills: 0 | Status: DISCONTINUED | OUTPATIENT
Start: 2025-01-01 | End: 2025-01-01

## 2025-01-01 RX ORDER — BUMETANIDE 1 MG/1
1 TABLET ORAL ONCE
Refills: 0 | Status: COMPLETED | OUTPATIENT
Start: 2025-01-01 | End: 2025-01-01

## 2025-01-01 RX ORDER — NALOXEGOL OXALATE 12.5 MG/1
25 TABLET, FILM COATED ORAL DAILY
Refills: 0 | Status: DISCONTINUED | OUTPATIENT
Start: 2025-01-01 | End: 2025-01-01

## 2025-01-01 RX ORDER — SODIUM BICARBONATE 1 MEQ/ML
1300 SYRINGE (ML) INTRAVENOUS THREE TIMES A DAY
Refills: 0 | Status: DISCONTINUED | OUTPATIENT
Start: 2025-01-01 | End: 2025-01-01

## 2025-01-01 RX ORDER — OXYCODONE HYDROCHLORIDE 30 MG/1
1 TABLET ORAL
Qty: 30 | Refills: 0
Start: 2025-01-01 | End: 2025-08-12

## 2025-01-01 RX ORDER — SODIUM BICARBONATE 1 MEQ/ML
650 SYRINGE (ML) INTRAVENOUS THREE TIMES A DAY
Refills: 0 | Status: DISCONTINUED | OUTPATIENT
Start: 2025-01-01 | End: 2025-01-01

## 2025-01-01 RX ORDER — NALOXONE HYDROCHLORIDE 0.4 MG/ML
1 INJECTION, SOLUTION INTRAMUSCULAR; INTRAVENOUS; SUBCUTANEOUS
Qty: 1 | Refills: 0
Start: 2025-01-01 | End: 2025-08-12

## 2025-01-01 RX ORDER — GLYCOPYRROLATE 0.2 MG/ML
2 INJECTION INTRAMUSCULAR; INTRAVENOUS ONCE
Refills: 0 | Status: DISCONTINUED | OUTPATIENT
Start: 2025-01-01 | End: 2025-01-01

## 2025-01-01 RX ORDER — APIXABAN 5 MG/1
5 TABLET, FILM COATED ORAL EVERY 12 HOURS
Refills: 0 | Status: DISCONTINUED | OUTPATIENT
Start: 2025-01-01 | End: 2025-01-01

## 2025-01-01 RX ORDER — LOSARTAN POTASSIUM 100 MG/1
50 TABLET, FILM COATED ORAL DAILY
Refills: 0 | Status: DISCONTINUED | OUTPATIENT
Start: 2025-01-01 | End: 2025-01-01

## 2025-01-01 RX ORDER — MIDODRINE HYDROCHLORIDE 5 MG/1
20 TABLET ORAL THREE TIMES A DAY
Refills: 0 | Status: DISCONTINUED | OUTPATIENT
Start: 2025-01-01 | End: 2025-01-01

## 2025-01-01 RX ORDER — APIXABAN 5 MG/1
1 TABLET, FILM COATED ORAL
Qty: 0 | Refills: 0 | DISCHARGE
Start: 2025-01-01

## 2025-01-01 RX ORDER — LEVOTHYROXINE SODIUM 300 MCG
88 TABLET ORAL
Refills: 0 | Status: DISCONTINUED | OUTPATIENT
Start: 2025-01-01 | End: 2025-01-01

## 2025-01-01 RX ORDER — CEFEPIME 2 G/20ML
INJECTION, POWDER, FOR SOLUTION INTRAVENOUS
Refills: 0 | Status: DISCONTINUED | OUTPATIENT
Start: 2025-01-01 | End: 2025-01-01

## 2025-01-01 RX ORDER — ALBUMIN (HUMAN) 12.5 G/50ML
100 INJECTION, SOLUTION INTRAVENOUS ONCE
Refills: 0 | Status: DISCONTINUED | OUTPATIENT
Start: 2025-01-01 | End: 2025-01-01

## 2025-01-01 RX ORDER — DROXIDOPA 300 MG/1
300 CAPSULE ORAL EVERY 8 HOURS
Refills: 0 | Status: DISCONTINUED | OUTPATIENT
Start: 2025-01-01 | End: 2025-01-01

## 2025-01-01 RX ORDER — ACETAMINOPHEN 500 MG/5ML
650 LIQUID (ML) ORAL EVERY 6 HOURS
Refills: 0 | Status: DISCONTINUED | OUTPATIENT
Start: 2025-01-01 | End: 2025-01-01

## 2025-01-01 RX ORDER — DEXTROSE 50 % IN WATER 50 %
12.5 SYRINGE (ML) INTRAVENOUS ONCE
Refills: 0 | Status: DISCONTINUED | OUTPATIENT
Start: 2025-01-01 | End: 2025-01-01

## 2025-01-01 RX ORDER — SODIUM CHLORIDE 9 G/1000ML
1000 INJECTION, SOLUTION INTRAVENOUS ONCE
Refills: 0 | Status: DISCONTINUED | OUTPATIENT
Start: 2025-01-01 | End: 2025-01-01

## 2025-01-01 RX ORDER — LEVOTHYROXINE SODIUM 300 MCG
75 TABLET ORAL DAILY
Refills: 0 | Status: DISCONTINUED | OUTPATIENT
Start: 2025-01-01 | End: 2025-01-01

## 2025-01-01 RX ORDER — MEROPENEM 1 G/30ML
500 INJECTION INTRAVENOUS ONCE
Refills: 0 | Status: COMPLETED | OUTPATIENT
Start: 2025-01-01 | End: 2025-01-01

## 2025-01-01 RX ORDER — LIDOCAINE HYDROCHLORIDE 20 MG/ML
1 JELLY TOPICAL DAILY
Refills: 0 | Status: DISCONTINUED | OUTPATIENT
Start: 2025-01-01 | End: 2025-01-01

## 2025-01-01 RX ORDER — VASOPRESSIN 20 [USP'U]/ML
0.04 INJECTION INTRAVENOUS
Qty: 40 | Refills: 0 | Status: DISCONTINUED | OUTPATIENT
Start: 2025-01-01 | End: 2025-01-01

## 2025-01-01 RX ORDER — CLINDAMYCIN PHOSPHATE 150 MG/ML
900 VIAL (ML) INJECTION ONCE
Refills: 0 | Status: COMPLETED | OUTPATIENT
Start: 2025-01-01 | End: 2025-01-01

## 2025-01-01 RX ORDER — POLYETHYLENE GLYCOL 3350 17 G/17G
17 POWDER, FOR SOLUTION ORAL ONCE
Refills: 0 | Status: DISCONTINUED | OUTPATIENT
Start: 2025-01-01 | End: 2025-01-01

## 2025-01-01 RX ORDER — CEFEPIME 2 G/20ML
1000 INJECTION, POWDER, FOR SOLUTION INTRAVENOUS EVERY 24 HOURS
Refills: 0 | Status: DISCONTINUED | OUTPATIENT
Start: 2025-01-01 | End: 2025-01-01

## 2025-01-01 RX ORDER — NALOXEGOL OXALATE 12.5 MG/1
1 TABLET, FILM COATED ORAL
Qty: 0 | Refills: 0 | DISCHARGE
Start: 2025-01-01

## 2025-01-01 RX ORDER — LEVOTHYROXINE SODIUM 300 MCG
50 TABLET ORAL AT BEDTIME
Refills: 0 | Status: COMPLETED | OUTPATIENT
Start: 2025-01-01 | End: 2025-01-01

## 2025-01-01 RX ORDER — BUMETANIDE 1 MG/1
2 TABLET ORAL ONCE
Refills: 0 | Status: COMPLETED | OUTPATIENT
Start: 2025-01-01 | End: 2025-01-01

## 2025-01-01 RX ORDER — LEVOTHYROXINE SODIUM 300 MCG
1 TABLET ORAL
Refills: 0 | DISCHARGE

## 2025-01-01 RX ADMIN — OXYCODONE HYDROCHLORIDE 7.5 MILLIGRAM(S): 30 TABLET ORAL at 00:45

## 2025-01-01 RX ADMIN — OXYCODONE HYDROCHLORIDE 5 MILLIGRAM(S): 30 TABLET ORAL at 22:21

## 2025-01-01 RX ADMIN — GABAPENTIN 100 MILLIGRAM(S): 400 CAPSULE ORAL at 21:39

## 2025-01-01 RX ADMIN — GABAPENTIN 100 MILLIGRAM(S): 400 CAPSULE ORAL at 22:58

## 2025-01-01 RX ADMIN — OXYCODONE HYDROCHLORIDE 7.5 MILLIGRAM(S): 30 TABLET ORAL at 08:26

## 2025-01-01 RX ADMIN — APIXABAN 5 MILLIGRAM(S): 5 TABLET, FILM COATED ORAL at 06:36

## 2025-01-01 RX ADMIN — Medication 2 TABLET(S): at 22:07

## 2025-01-01 RX ADMIN — OXYCODONE HYDROCHLORIDE 7.5 MILLIGRAM(S): 30 TABLET ORAL at 22:04

## 2025-01-01 RX ADMIN — OXYCODONE HYDROCHLORIDE 7.5 MILLIGRAM(S): 30 TABLET ORAL at 12:05

## 2025-01-01 RX ADMIN — TRIAMCINOLONE ACETONIDE 1 APPLICATION(S): 1 CREAM TOPICAL at 06:08

## 2025-01-01 RX ADMIN — OXYCODONE HYDROCHLORIDE 7.5 MILLIGRAM(S): 30 TABLET ORAL at 11:04

## 2025-01-01 RX ADMIN — BUMETANIDE 1 MILLIGRAM(S): 1 TABLET ORAL at 21:47

## 2025-01-01 RX ADMIN — Medication 80 MILLIGRAM(S): at 06:02

## 2025-01-01 RX ADMIN — ALBUMIN (HUMAN) 125 MILLILITER(S): 12.5 INJECTION, SOLUTION INTRAVENOUS at 10:03

## 2025-01-01 RX ADMIN — OXYCODONE HYDROCHLORIDE 7.5 MILLIGRAM(S): 30 TABLET ORAL at 22:52

## 2025-01-01 RX ADMIN — Medication 20 MILLIGRAM(S): at 12:05

## 2025-01-01 RX ADMIN — OXYCODONE HYDROCHLORIDE 5 MILLIGRAM(S): 30 TABLET ORAL at 08:54

## 2025-01-01 RX ADMIN — Medication 2 TABLET(S): at 21:18

## 2025-01-01 RX ADMIN — Medication 10 MILLIGRAM(S): at 22:57

## 2025-01-01 RX ADMIN — ROSUVASTATIN CALCIUM 10 MILLIGRAM(S): 20 TABLET, FILM COATED ORAL at 21:30

## 2025-01-01 RX ADMIN — OXYCODONE HYDROCHLORIDE 7.5 MILLIGRAM(S): 30 TABLET ORAL at 14:47

## 2025-01-01 RX ADMIN — OXYCODONE HYDROCHLORIDE 7.5 MILLIGRAM(S): 30 TABLET ORAL at 20:49

## 2025-01-01 RX ADMIN — OXYCODONE HYDROCHLORIDE 7.5 MILLIGRAM(S): 30 TABLET ORAL at 01:08

## 2025-01-01 RX ADMIN — OXYCODONE HYDROCHLORIDE 7.5 MILLIGRAM(S): 30 TABLET ORAL at 02:35

## 2025-01-01 RX ADMIN — Medication 88 MICROGRAM(S): at 05:08

## 2025-01-01 RX ADMIN — Medication 500 MILLILITER(S): at 18:08

## 2025-01-01 RX ADMIN — GABAPENTIN 100 MILLIGRAM(S): 400 CAPSULE ORAL at 21:41

## 2025-01-01 RX ADMIN — Medication 1 APPLICATION(S): at 13:38

## 2025-01-01 RX ADMIN — Medication 1 DROP(S): at 20:19

## 2025-01-01 RX ADMIN — Medication 80 MILLIGRAM(S): at 05:17

## 2025-01-01 RX ADMIN — OXYCODONE HYDROCHLORIDE 5 MILLIGRAM(S): 30 TABLET ORAL at 18:01

## 2025-01-01 RX ADMIN — OXYCODONE HYDROCHLORIDE 7.5 MILLIGRAM(S): 30 TABLET ORAL at 06:37

## 2025-01-01 RX ADMIN — Medication 80 MILLIGRAM(S): at 13:33

## 2025-01-01 RX ADMIN — Medication 80 MILLIGRAM(S): at 22:01

## 2025-01-01 RX ADMIN — Medication 1 SPRAY(S): at 18:02

## 2025-01-01 RX ADMIN — Medication 1 GRAM(S): at 13:39

## 2025-01-01 RX ADMIN — OXYCODONE HYDROCHLORIDE 7.5 MILLIGRAM(S): 30 TABLET ORAL at 04:49

## 2025-01-01 RX ADMIN — OXYCODONE HYDROCHLORIDE 5 MILLIGRAM(S): 30 TABLET ORAL at 06:35

## 2025-01-01 RX ADMIN — Medication 1 APPLICATION(S): at 12:38

## 2025-01-01 RX ADMIN — AMOXICILLIN AND CLAVULANATE POTASSIUM 1 TABLET(S): 500; 125 TABLET, FILM COATED ORAL at 17:19

## 2025-01-01 RX ADMIN — Medication 80 MILLIGRAM(S): at 18:28

## 2025-01-01 RX ADMIN — Medication 20 MILLIGRAM(S): at 13:08

## 2025-01-01 RX ADMIN — OXYCODONE HYDROCHLORIDE 5 MILLIGRAM(S): 30 TABLET ORAL at 06:16

## 2025-01-01 RX ADMIN — Medication 60 MICROGRAM(S): at 22:46

## 2025-01-01 RX ADMIN — Medication 0.5 MILLIGRAM(S): at 10:33

## 2025-01-01 RX ADMIN — ROSUVASTATIN CALCIUM 10 MILLIGRAM(S): 20 TABLET, FILM COATED ORAL at 21:51

## 2025-01-01 RX ADMIN — Medication 25 GRAM(S): at 23:48

## 2025-01-01 RX ADMIN — Medication 1 TABLET(S): at 12:15

## 2025-01-01 RX ADMIN — SODIUM CHLORIDE 500 MILLILITER(S): 9 INJECTION, SOLUTION INTRAVENOUS at 10:13

## 2025-01-01 RX ADMIN — APIXABAN 5 MILLIGRAM(S): 2.5 TABLET, FILM COATED ORAL at 18:06

## 2025-01-01 RX ADMIN — Medication 0.5 MILLIGRAM(S): at 17:19

## 2025-01-01 RX ADMIN — HEPARIN SODIUM 5000 UNIT(S): 1000 INJECTION INTRAVENOUS; SUBCUTANEOUS at 16:28

## 2025-01-01 RX ADMIN — OXYCODONE HYDROCHLORIDE 15 MILLIGRAM(S): 30 TABLET ORAL at 22:37

## 2025-01-01 RX ADMIN — OXYCODONE HYDROCHLORIDE 7.5 MILLIGRAM(S): 30 TABLET ORAL at 16:58

## 2025-01-01 RX ADMIN — ROSUVASTATIN CALCIUM 10 MILLIGRAM(S): 20 TABLET, FILM COATED ORAL at 21:24

## 2025-01-01 RX ADMIN — Medication 1 GRAM(S): at 21:19

## 2025-01-01 RX ADMIN — OXYCODONE HYDROCHLORIDE 7.5 MILLIGRAM(S): 30 TABLET ORAL at 13:00

## 2025-01-01 RX ADMIN — TRIAMCINOLONE ACETONIDE 1 APPLICATION(S): 1 CREAM TOPICAL at 05:19

## 2025-01-01 RX ADMIN — GABAPENTIN 100 MILLIGRAM(S): 400 CAPSULE ORAL at 21:24

## 2025-01-01 RX ADMIN — APIXABAN 5 MILLIGRAM(S): 5 TABLET, FILM COATED ORAL at 06:02

## 2025-01-01 RX ADMIN — OXYCODONE HYDROCHLORIDE 7.5 MILLIGRAM(S): 30 TABLET ORAL at 11:10

## 2025-01-01 RX ADMIN — Medication 0.2 MILLIGRAM(S): at 13:49

## 2025-01-01 RX ADMIN — Medication 20 MILLIGRAM(S): at 12:24

## 2025-01-01 RX ADMIN — MIDODRINE HYDROCHLORIDE 30 MILLIGRAM(S): 5 TABLET ORAL at 12:28

## 2025-01-01 RX ADMIN — Medication 1000 UNIT(S): at 13:03

## 2025-01-01 RX ADMIN — OXYCODONE HYDROCHLORIDE 7.5 MILLIGRAM(S): 30 TABLET ORAL at 06:23

## 2025-01-01 RX ADMIN — OXYCODONE HYDROCHLORIDE 5 MILLIGRAM(S): 30 TABLET ORAL at 06:39

## 2025-01-01 RX ADMIN — Medication 80 MILLIGRAM(S): at 05:08

## 2025-01-01 RX ADMIN — Medication 1 TABLET(S): at 12:31

## 2025-01-01 RX ADMIN — Medication 1 GRAM(S): at 06:02

## 2025-01-01 RX ADMIN — Medication 0.5 MILLIGRAM(S): at 19:27

## 2025-01-01 RX ADMIN — MIDODRINE HYDROCHLORIDE 30 MILLIGRAM(S): 5 TABLET ORAL at 21:30

## 2025-01-01 RX ADMIN — TRIAMCINOLONE ACETONIDE 1 APPLICATION(S): 1 CREAM TOPICAL at 18:18

## 2025-01-01 RX ADMIN — Medication 1 APPLICATION(S): at 12:18

## 2025-01-01 RX ADMIN — Medication 1 GRAM(S): at 22:00

## 2025-01-01 RX ADMIN — OXYCODONE HYDROCHLORIDE 15 MILLIGRAM(S): 30 TABLET ORAL at 23:15

## 2025-01-01 RX ADMIN — OXYCODONE HYDROCHLORIDE 15 MILLIGRAM(S): 30 TABLET ORAL at 22:57

## 2025-01-01 RX ADMIN — APIXABAN 5 MILLIGRAM(S): 2.5 TABLET, FILM COATED ORAL at 05:55

## 2025-01-01 RX ADMIN — OXYCODONE HYDROCHLORIDE 5 MILLIGRAM(S): 30 TABLET ORAL at 17:14

## 2025-01-01 RX ADMIN — OXYCODONE HYDROCHLORIDE 7.5 MILLIGRAM(S): 30 TABLET ORAL at 12:30

## 2025-01-01 RX ADMIN — Medication 80 MILLIGRAM(S): at 22:58

## 2025-01-01 RX ADMIN — APIXABAN 5 MILLIGRAM(S): 5 TABLET, FILM COATED ORAL at 18:07

## 2025-01-01 RX ADMIN — Medication 1 TABLET(S): at 11:53

## 2025-01-01 RX ADMIN — DROXIDOPA 300 MILLIGRAM(S): 300 CAPSULE ORAL at 22:56

## 2025-01-01 RX ADMIN — Medication 25 GRAM(S): at 17:20

## 2025-01-01 RX ADMIN — ROSUVASTATIN CALCIUM 10 MILLIGRAM(S): 20 TABLET, FILM COATED ORAL at 22:00

## 2025-01-01 RX ADMIN — HEPARIN SODIUM 5000 UNIT(S): 1000 INJECTION INTRAVENOUS; SUBCUTANEOUS at 06:04

## 2025-01-01 RX ADMIN — HEPARIN SODIUM 1300 UNIT(S)/HR: 1000 INJECTION INTRAVENOUS; SUBCUTANEOUS at 06:06

## 2025-01-01 RX ADMIN — Medication 10 MILLIGRAM(S): at 05:54

## 2025-01-01 RX ADMIN — OXYCODONE HYDROCHLORIDE 5 MILLIGRAM(S): 30 TABLET ORAL at 21:51

## 2025-01-01 RX ADMIN — Medication 1 TABLET(S): at 11:23

## 2025-01-01 RX ADMIN — Medication 80 MILLIGRAM(S): at 21:24

## 2025-01-01 RX ADMIN — GABAPENTIN 100 MILLIGRAM(S): 400 CAPSULE ORAL at 21:11

## 2025-01-01 RX ADMIN — OXYCODONE HYDROCHLORIDE 15 MILLIGRAM(S): 30 TABLET ORAL at 21:54

## 2025-01-01 RX ADMIN — Medication 650 MILLIGRAM(S): at 05:16

## 2025-01-01 RX ADMIN — Medication 40 MILLIGRAM(S): at 12:33

## 2025-01-01 RX ADMIN — TRIAMCINOLONE ACETONIDE 1 APPLICATION(S): 1 CREAM TOPICAL at 18:08

## 2025-01-01 RX ADMIN — Medication 1 GRAM(S): at 17:21

## 2025-01-01 RX ADMIN — Medication 20 MILLIGRAM(S): at 12:58

## 2025-01-01 RX ADMIN — SODIUM ZIRCONIUM CYCLOSILICATE 10 GRAM(S): 5 POWDER, FOR SUSPENSION ORAL at 18:51

## 2025-01-01 RX ADMIN — GABAPENTIN 100 MILLIGRAM(S): 400 CAPSULE ORAL at 22:41

## 2025-01-01 RX ADMIN — OXYCODONE HYDROCHLORIDE 7.5 MILLIGRAM(S): 30 TABLET ORAL at 05:15

## 2025-01-01 RX ADMIN — MIDODRINE HYDROCHLORIDE 20 MILLIGRAM(S): 5 TABLET ORAL at 06:01

## 2025-01-01 RX ADMIN — OXYCODONE HYDROCHLORIDE 7.5 MILLIGRAM(S): 30 TABLET ORAL at 06:53

## 2025-01-01 RX ADMIN — APIXABAN 5 MILLIGRAM(S): 5 TABLET, FILM COATED ORAL at 05:00

## 2025-01-01 RX ADMIN — AMOXICILLIN AND CLAVULANATE POTASSIUM 1 TABLET(S): 500; 125 TABLET, FILM COATED ORAL at 17:23

## 2025-01-01 RX ADMIN — OXYCODONE HYDROCHLORIDE 7.5 MILLIGRAM(S): 30 TABLET ORAL at 06:24

## 2025-01-01 RX ADMIN — Medication 1 TABLET(S): at 13:05

## 2025-01-01 RX ADMIN — MIDODRINE HYDROCHLORIDE 20 MILLIGRAM(S): 5 TABLET ORAL at 15:39

## 2025-01-01 RX ADMIN — OXYCODONE HYDROCHLORIDE 5 MILLIGRAM(S): 30 TABLET ORAL at 16:50

## 2025-01-01 RX ADMIN — Medication 50 MILLILITER(S): at 19:00

## 2025-01-01 RX ADMIN — OXYCODONE HYDROCHLORIDE 7.5 MILLIGRAM(S): 30 TABLET ORAL at 10:03

## 2025-01-01 RX ADMIN — Medication 80 MILLIGRAM(S): at 14:31

## 2025-01-01 RX ADMIN — Medication 75 MILLILITER(S): at 21:37

## 2025-01-01 RX ADMIN — MIDODRINE HYDROCHLORIDE 10 MILLIGRAM(S): 5 TABLET ORAL at 17:20

## 2025-01-01 RX ADMIN — Medication 50 MICROGRAM(S): at 10:45

## 2025-01-01 RX ADMIN — Medication 5 UNIT(S): at 06:10

## 2025-01-01 RX ADMIN — Medication 80 MILLIGRAM(S): at 06:15

## 2025-01-01 RX ADMIN — Medication 1 APPLICATION(S): at 06:01

## 2025-01-01 RX ADMIN — Medication 1 SPRAY(S): at 18:49

## 2025-01-01 RX ADMIN — OXYCODONE HYDROCHLORIDE 7.5 MILLIGRAM(S): 30 TABLET ORAL at 05:19

## 2025-01-01 RX ADMIN — Medication 80 MILLIGRAM(S): at 21:31

## 2025-01-01 RX ADMIN — OXYCODONE HYDROCHLORIDE 7.5 MILLIGRAM(S): 30 TABLET ORAL at 14:21

## 2025-01-01 RX ADMIN — Medication 40 MILLIGRAM(S): at 12:27

## 2025-01-01 RX ADMIN — Medication 80 MILLIGRAM(S): at 06:05

## 2025-01-01 RX ADMIN — OXYCODONE HYDROCHLORIDE 7.5 MILLIGRAM(S): 30 TABLET ORAL at 09:23

## 2025-01-01 RX ADMIN — GABAPENTIN 100 MILLIGRAM(S): 400 CAPSULE ORAL at 21:38

## 2025-01-01 RX ADMIN — Medication 50 MILLILITER(S): at 01:01

## 2025-01-01 RX ADMIN — NALOXEGOL OXALATE 12.5 MILLIGRAM(S): 12.5 TABLET, FILM COATED ORAL at 12:27

## 2025-01-01 RX ADMIN — Medication 50 MICROGRAM(S): at 22:03

## 2025-01-01 RX ADMIN — Medication 1 TABLET(S): at 12:36

## 2025-01-01 RX ADMIN — Medication 50 MICROGRAM(S): at 22:07

## 2025-01-01 RX ADMIN — NOREPINEPHRINE BITARTRATE 20.1 MICROGRAM(S)/KG/MIN: 8 SOLUTION at 19:47

## 2025-01-01 RX ADMIN — ALBUMIN (HUMAN) 125 MILLILITER(S): 12.5 INJECTION, SOLUTION INTRAVENOUS at 02:20

## 2025-01-01 RX ADMIN — Medication 1 APPLICATION(S): at 06:12

## 2025-01-01 RX ADMIN — Medication 1000 UNIT(S): at 12:24

## 2025-01-01 RX ADMIN — AMOXICILLIN AND CLAVULANATE POTASSIUM 1 TABLET(S): 500; 125 TABLET, FILM COATED ORAL at 05:28

## 2025-01-01 RX ADMIN — OXYCODONE HYDROCHLORIDE 5 MILLIGRAM(S): 30 TABLET ORAL at 12:12

## 2025-01-01 RX ADMIN — OXYCODONE HYDROCHLORIDE 7.5 MILLIGRAM(S): 30 TABLET ORAL at 01:14

## 2025-01-01 RX ADMIN — OXYCODONE HYDROCHLORIDE 7.5 MILLIGRAM(S): 30 TABLET ORAL at 21:39

## 2025-01-01 RX ADMIN — APIXABAN 5 MILLIGRAM(S): 2.5 TABLET, FILM COATED ORAL at 17:01

## 2025-01-01 RX ADMIN — MEROPENEM 100 MILLIGRAM(S): 1 INJECTION INTRAVENOUS at 15:42

## 2025-01-01 RX ADMIN — Medication 1 APPLICATION(S): at 11:56

## 2025-01-01 RX ADMIN — Medication 650 MILLIGRAM(S): at 08:44

## 2025-01-01 RX ADMIN — Medication 102 MILLIGRAM(S): at 15:34

## 2025-01-01 RX ADMIN — Medication 10 MILLIGRAM(S): at 13:26

## 2025-01-01 RX ADMIN — OXYCODONE HYDROCHLORIDE 7.5 MILLIGRAM(S): 30 TABLET ORAL at 10:33

## 2025-01-01 RX ADMIN — BUMETANIDE 1 MILLIGRAM(S): 1 TABLET ORAL at 09:05

## 2025-01-01 RX ADMIN — APIXABAN 5 MILLIGRAM(S): 2.5 TABLET, FILM COATED ORAL at 06:29

## 2025-01-01 RX ADMIN — OXYCODONE HYDROCHLORIDE 7.5 MILLIGRAM(S): 30 TABLET ORAL at 10:10

## 2025-01-01 RX ADMIN — Medication 0.2 MILLIGRAM(S): at 13:19

## 2025-01-01 RX ADMIN — OXYCODONE HYDROCHLORIDE 7.5 MILLIGRAM(S): 30 TABLET ORAL at 08:56

## 2025-01-01 RX ADMIN — Medication 1 SPRAY(S): at 06:00

## 2025-01-01 RX ADMIN — OXYCODONE HYDROCHLORIDE 7.5 MILLIGRAM(S): 30 TABLET ORAL at 14:42

## 2025-01-01 RX ADMIN — NOREPINEPHRINE BITARTRATE 20.1 MICROGRAM(S)/KG/MIN: 8 SOLUTION at 15:28

## 2025-01-01 RX ADMIN — LOSARTAN POTASSIUM 50 MILLIGRAM(S): 100 TABLET, FILM COATED ORAL at 05:40

## 2025-01-01 RX ADMIN — MIDODRINE HYDROCHLORIDE 15 MILLIGRAM(S): 5 TABLET ORAL at 11:51

## 2025-01-01 RX ADMIN — OXYCODONE HYDROCHLORIDE 15 MILLIGRAM(S): 30 TABLET ORAL at 21:25

## 2025-01-01 RX ADMIN — Medication 10 MILLIGRAM(S): at 09:18

## 2025-01-01 RX ADMIN — Medication 10 MILLIGRAM(S): at 05:09

## 2025-01-01 RX ADMIN — Medication 1 GRAM(S): at 14:31

## 2025-01-01 RX ADMIN — Medication 80 MILLIGRAM(S): at 21:38

## 2025-01-01 RX ADMIN — Medication 1000 UNIT(S): at 11:23

## 2025-01-01 RX ADMIN — Medication 50 MILLILITER(S): at 06:42

## 2025-01-01 RX ADMIN — DROXIDOPA 100 MILLIGRAM(S): 300 CAPSULE ORAL at 14:09

## 2025-01-01 RX ADMIN — MIDODRINE HYDROCHLORIDE 30 MILLIGRAM(S): 5 TABLET ORAL at 05:22

## 2025-01-01 RX ADMIN — AMOXICILLIN AND CLAVULANATE POTASSIUM 1 TABLET(S): 500; 125 TABLET, FILM COATED ORAL at 05:08

## 2025-01-01 RX ADMIN — Medication 88 MICROGRAM(S): at 05:09

## 2025-01-01 RX ADMIN — APIXABAN 5 MILLIGRAM(S): 5 TABLET, FILM COATED ORAL at 17:50

## 2025-01-01 RX ADMIN — LOSARTAN POTASSIUM 50 MILLIGRAM(S): 100 TABLET, FILM COATED ORAL at 06:02

## 2025-01-01 RX ADMIN — ROSUVASTATIN CALCIUM 10 MILLIGRAM(S): 20 TABLET, FILM COATED ORAL at 21:41

## 2025-01-01 RX ADMIN — NOREPINEPHRINE BITARTRATE 13.4 MICROGRAM(S)/KG/MIN: 8 SOLUTION at 20:15

## 2025-01-01 RX ADMIN — Medication 1 SPRAY(S): at 05:10

## 2025-01-01 RX ADMIN — OXYCODONE HYDROCHLORIDE 7.5 MILLIGRAM(S): 30 TABLET ORAL at 14:31

## 2025-01-01 RX ADMIN — APIXABAN 5 MILLIGRAM(S): 5 TABLET, FILM COATED ORAL at 05:29

## 2025-01-01 RX ADMIN — OXYCODONE HYDROCHLORIDE 15 MILLIGRAM(S): 30 TABLET ORAL at 01:29

## 2025-01-01 RX ADMIN — LOSARTAN POTASSIUM 50 MILLIGRAM(S): 100 TABLET, FILM COATED ORAL at 05:00

## 2025-01-01 RX ADMIN — Medication 40 MILLIGRAM(S): at 17:07

## 2025-01-01 RX ADMIN — OXYCODONE HYDROCHLORIDE 5 MILLIGRAM(S): 30 TABLET ORAL at 19:53

## 2025-01-01 RX ADMIN — OXYCODONE HYDROCHLORIDE 7.5 MILLIGRAM(S): 30 TABLET ORAL at 05:37

## 2025-01-01 RX ADMIN — Medication 2 TABLET(S): at 22:41

## 2025-01-01 RX ADMIN — Medication 50 MILLIEQUIVALENT(S): at 10:05

## 2025-01-01 RX ADMIN — OXYCODONE HYDROCHLORIDE 5 MILLIGRAM(S): 30 TABLET ORAL at 03:46

## 2025-01-01 RX ADMIN — GABAPENTIN 100 MILLIGRAM(S): 400 CAPSULE ORAL at 22:07

## 2025-01-01 RX ADMIN — NOREPINEPHRINE BITARTRATE 13.4 MICROGRAM(S)/KG/MIN: 8 SOLUTION at 19:46

## 2025-01-01 RX ADMIN — ROSUVASTATIN CALCIUM 10 MILLIGRAM(S): 20 TABLET, FILM COATED ORAL at 23:06

## 2025-01-01 RX ADMIN — OXYCODONE HYDROCHLORIDE 7.5 MILLIGRAM(S): 30 TABLET ORAL at 17:37

## 2025-01-01 RX ADMIN — BUMETANIDE 1 MILLIGRAM(S): 1 TABLET ORAL at 06:44

## 2025-01-01 RX ADMIN — Medication 20 MILLIGRAM(S): at 12:15

## 2025-01-01 RX ADMIN — OXYCODONE HYDROCHLORIDE 5 MILLIGRAM(S): 30 TABLET ORAL at 22:07

## 2025-01-01 RX ADMIN — Medication 0.5 MILLIGRAM(S): at 21:51

## 2025-01-01 RX ADMIN — Medication 80 MILLIGRAM(S): at 22:37

## 2025-01-01 RX ADMIN — Medication 10 MILLIGRAM(S): at 22:00

## 2025-01-01 RX ADMIN — SODIUM ZIRCONIUM CYCLOSILICATE 10 GRAM(S): 5 POWDER, FOR SUSPENSION ORAL at 19:44

## 2025-01-01 RX ADMIN — Medication 1 SPRAY(S): at 18:07

## 2025-01-01 RX ADMIN — Medication 10 MILLIGRAM(S): at 21:26

## 2025-01-01 RX ADMIN — Medication 10 MILLIGRAM(S): at 13:39

## 2025-01-01 RX ADMIN — ROSUVASTATIN CALCIUM 10 MILLIGRAM(S): 20 TABLET, FILM COATED ORAL at 22:56

## 2025-01-01 RX ADMIN — OXYCODONE HYDROCHLORIDE 7.5 MILLIGRAM(S): 30 TABLET ORAL at 02:14

## 2025-01-01 RX ADMIN — OXYCODONE HYDROCHLORIDE 15 MILLIGRAM(S): 30 TABLET ORAL at 22:45

## 2025-01-01 RX ADMIN — NOREPINEPHRINE BITARTRATE 20.1 MICROGRAM(S)/KG/MIN: 8 SOLUTION at 20:14

## 2025-01-01 RX ADMIN — Medication 80 MILLIGRAM(S): at 20:42

## 2025-01-01 RX ADMIN — OXYCODONE HYDROCHLORIDE 7.5 MILLIGRAM(S): 30 TABLET ORAL at 08:53

## 2025-01-01 RX ADMIN — OXYCODONE HYDROCHLORIDE 15 MILLIGRAM(S): 30 TABLET ORAL at 22:00

## 2025-01-01 RX ADMIN — Medication 88 MICROGRAM(S): at 06:02

## 2025-01-01 RX ADMIN — OXYCODONE HYDROCHLORIDE 7.5 MILLIGRAM(S): 30 TABLET ORAL at 00:22

## 2025-01-01 RX ADMIN — Medication 25 GRAM(S): at 07:29

## 2025-01-01 RX ADMIN — Medication 20 MILLIGRAM(S): at 12:12

## 2025-01-01 RX ADMIN — Medication 1 APPLICATION(S): at 21:39

## 2025-01-01 RX ADMIN — APIXABAN 5 MILLIGRAM(S): 2.5 TABLET, FILM COATED ORAL at 06:10

## 2025-01-01 RX ADMIN — OXYCODONE HYDROCHLORIDE 5 MILLIGRAM(S): 30 TABLET ORAL at 07:39

## 2025-01-01 RX ADMIN — POLYETHYLENE GLYCOL 3350 17 GRAM(S): 17 POWDER, FOR SOLUTION ORAL at 17:50

## 2025-01-01 RX ADMIN — Medication 10 MILLIGRAM(S): at 06:02

## 2025-01-01 RX ADMIN — OXYCODONE HYDROCHLORIDE 7.5 MILLIGRAM(S): 30 TABLET ORAL at 18:58

## 2025-01-01 RX ADMIN — AMOXICILLIN AND CLAVULANATE POTASSIUM 1 TABLET(S): 500; 125 TABLET, FILM COATED ORAL at 17:46

## 2025-01-01 RX ADMIN — Medication 50 MICROGRAM(S): at 23:06

## 2025-01-01 RX ADMIN — OXYCODONE HYDROCHLORIDE 7.5 MILLIGRAM(S): 30 TABLET ORAL at 18:47

## 2025-01-01 RX ADMIN — Medication 1 GRAM(S): at 05:39

## 2025-01-01 RX ADMIN — Medication 400 MILLIGRAM(S): at 18:07

## 2025-01-01 RX ADMIN — NOREPINEPHRINE BITARTRATE 20.1 MICROGRAM(S)/KG/MIN: 8 SOLUTION at 09:27

## 2025-01-01 RX ADMIN — Medication 0.5 MILLIGRAM(S): at 21:36

## 2025-01-01 RX ADMIN — OXYCODONE HYDROCHLORIDE 10 MILLIGRAM(S): 30 TABLET ORAL at 10:01

## 2025-01-01 RX ADMIN — GABAPENTIN 100 MILLIGRAM(S): 400 CAPSULE ORAL at 21:19

## 2025-01-01 RX ADMIN — Medication 1 SPRAY(S): at 17:39

## 2025-01-01 RX ADMIN — ROSUVASTATIN CALCIUM 10 MILLIGRAM(S): 20 TABLET, FILM COATED ORAL at 21:11

## 2025-01-01 RX ADMIN — ALBUMIN (HUMAN) 125 MILLILITER(S): 12.5 INJECTION, SOLUTION INTRAVENOUS at 13:37

## 2025-01-01 RX ADMIN — Medication 2 TABLET(S): at 21:24

## 2025-01-01 RX ADMIN — Medication 88 MICROGRAM(S): at 05:22

## 2025-01-01 RX ADMIN — Medication 50 MILLILITER(S): at 06:11

## 2025-01-01 RX ADMIN — OXYCODONE HYDROCHLORIDE 7.5 MILLIGRAM(S): 30 TABLET ORAL at 21:40

## 2025-01-01 RX ADMIN — APIXABAN 5 MILLIGRAM(S): 5 TABLET, FILM COATED ORAL at 06:08

## 2025-01-01 RX ADMIN — APIXABAN 5 MILLIGRAM(S): 2.5 TABLET, FILM COATED ORAL at 18:02

## 2025-01-01 RX ADMIN — Medication 2 TABLET(S): at 21:19

## 2025-01-01 RX ADMIN — Medication 50 MICROGRAM(S): at 10:30

## 2025-01-01 RX ADMIN — Medication 50 MICROGRAM(S): at 21:23

## 2025-01-01 RX ADMIN — OXYCODONE HYDROCHLORIDE 7.5 MILLIGRAM(S): 30 TABLET ORAL at 11:49

## 2025-01-01 RX ADMIN — OXYCODONE HYDROCHLORIDE 7.5 MILLIGRAM(S): 30 TABLET ORAL at 15:50

## 2025-01-01 RX ADMIN — LOSARTAN POTASSIUM 50 MILLIGRAM(S): 100 TABLET, FILM COATED ORAL at 06:44

## 2025-01-01 RX ADMIN — DROXIDOPA 100 MILLIGRAM(S): 300 CAPSULE ORAL at 05:22

## 2025-01-01 RX ADMIN — Medication 1 APPLICATION(S): at 05:21

## 2025-01-01 RX ADMIN — Medication 25 GRAM(S): at 14:25

## 2025-01-01 RX ADMIN — Medication 1000 UNIT(S): at 12:38

## 2025-01-01 RX ADMIN — Medication 20 MILLIGRAM(S): at 12:08

## 2025-01-01 RX ADMIN — OXYCODONE HYDROCHLORIDE 10 MILLIGRAM(S): 30 TABLET ORAL at 03:33

## 2025-01-01 RX ADMIN — Medication 25 GRAM(S): at 23:46

## 2025-01-01 RX ADMIN — DEXTROSE MONOHYDRATE 20 MILLILITER(S): 100 INJECTION, SOLUTION INTRAVENOUS at 20:15

## 2025-01-01 RX ADMIN — Medication 20 MILLIGRAM(S): at 11:46

## 2025-01-01 RX ADMIN — AMOXICILLIN AND CLAVULANATE POTASSIUM 1 TABLET(S): 500; 125 TABLET, FILM COATED ORAL at 06:02

## 2025-01-01 RX ADMIN — Medication 50 MICROGRAM(S): at 21:39

## 2025-01-01 RX ADMIN — GABAPENTIN 100 MILLIGRAM(S): 400 CAPSULE ORAL at 22:57

## 2025-01-01 RX ADMIN — SODIUM CHLORIDE 500 MILLILITER(S): 9 INJECTION, SOLUTION INTRAVENOUS at 20:09

## 2025-01-01 RX ADMIN — POLYETHYLENE GLYCOL 3350 17 GRAM(S): 17 POWDER, FOR SOLUTION ORAL at 18:28

## 2025-01-01 RX ADMIN — OXYCODONE HYDROCHLORIDE 7.5 MILLIGRAM(S): 30 TABLET ORAL at 12:07

## 2025-01-01 RX ADMIN — NALOXEGOL OXALATE 12.5 MILLIGRAM(S): 12.5 TABLET, FILM COATED ORAL at 11:49

## 2025-01-01 RX ADMIN — NOREPINEPHRINE BITARTRATE 13.4 MICROGRAM(S)/KG/MIN: 8 SOLUTION at 18:15

## 2025-01-01 RX ADMIN — Medication 80 MILLIGRAM(S): at 14:23

## 2025-01-01 RX ADMIN — Medication 50 MILLIEQUIVALENT(S): at 03:30

## 2025-01-01 RX ADMIN — APIXABAN 5 MILLIGRAM(S): 2.5 TABLET, FILM COATED ORAL at 19:18

## 2025-01-01 RX ADMIN — OXYCODONE HYDROCHLORIDE 7.5 MILLIGRAM(S): 30 TABLET ORAL at 19:32

## 2025-01-01 RX ADMIN — OXYCODONE HYDROCHLORIDE 7.5 MILLIGRAM(S): 30 TABLET ORAL at 22:09

## 2025-01-01 RX ADMIN — Medication 10 MILLIGRAM(S): at 13:53

## 2025-01-01 RX ADMIN — Medication 50 MILLILITER(S): at 21:25

## 2025-01-01 RX ADMIN — OXYCODONE HYDROCHLORIDE 7.5 MILLIGRAM(S): 30 TABLET ORAL at 16:59

## 2025-01-01 RX ADMIN — Medication 1 TABLET(S): at 22:49

## 2025-01-01 RX ADMIN — Medication 650 MILLIGRAM(S): at 09:14

## 2025-01-01 RX ADMIN — POLYETHYLENE GLYCOL 3350 17 GRAM(S): 17 POWDER, FOR SOLUTION ORAL at 06:08

## 2025-01-01 RX ADMIN — Medication 5 UNIT(S): at 12:39

## 2025-01-01 RX ADMIN — Medication 50 MICROGRAM(S): at 22:53

## 2025-01-01 RX ADMIN — Medication 20 MILLIGRAM(S): at 11:53

## 2025-01-01 RX ADMIN — APIXABAN 5 MILLIGRAM(S): 5 TABLET, FILM COATED ORAL at 17:43

## 2025-01-01 RX ADMIN — TRIAMCINOLONE ACETONIDE 1 APPLICATION(S): 1 CREAM TOPICAL at 06:03

## 2025-01-01 RX ADMIN — GABAPENTIN 100 MILLIGRAM(S): 400 CAPSULE ORAL at 23:06

## 2025-01-01 RX ADMIN — POLYETHYLENE GLYCOL 3350 17 GRAM(S): 17 POWDER, FOR SOLUTION ORAL at 13:07

## 2025-01-01 RX ADMIN — Medication 25 GRAM(S): at 16:30

## 2025-01-01 RX ADMIN — Medication 20 MILLIGRAM(S): at 11:50

## 2025-01-01 RX ADMIN — Medication 0.5 MILLIGRAM(S): at 12:59

## 2025-01-01 RX ADMIN — Medication 1 GRAM(S): at 06:27

## 2025-01-01 RX ADMIN — Medication 0.5 MILLIGRAM(S): at 23:39

## 2025-01-01 RX ADMIN — OXYCODONE HYDROCHLORIDE 5 MILLIGRAM(S): 30 TABLET ORAL at 14:25

## 2025-01-01 RX ADMIN — CEFEPIME 100 MILLIGRAM(S): 2 INJECTION, POWDER, FOR SOLUTION INTRAVENOUS at 10:44

## 2025-01-01 RX ADMIN — ALBUMIN (HUMAN) 125 MILLILITER(S): 12.5 INJECTION, SOLUTION INTRAVENOUS at 12:02

## 2025-01-01 RX ADMIN — Medication 2 TABLET(S): at 22:04

## 2025-01-01 RX ADMIN — NOREPINEPHRINE BITARTRATE 13.4 MICROGRAM(S)/KG/MIN: 8 SOLUTION at 20:45

## 2025-01-01 RX ADMIN — OXYCODONE HYDROCHLORIDE 7.5 MILLIGRAM(S): 30 TABLET ORAL at 17:17

## 2025-01-01 RX ADMIN — Medication 25 GRAM(S): at 10:04

## 2025-01-01 RX ADMIN — OXYCODONE HYDROCHLORIDE 7.5 MILLIGRAM(S): 30 TABLET ORAL at 08:23

## 2025-01-01 RX ADMIN — VASOPRESSIN 6 UNIT(S)/MIN: 20 INJECTION INTRAVENOUS at 15:28

## 2025-01-01 RX ADMIN — Medication 50 MILLIGRAM(S): at 02:48

## 2025-01-01 RX ADMIN — SODIUM CHLORIDE 1000 MILLILITER(S): 9 INJECTION, SOLUTION INTRAVENOUS at 22:03

## 2025-01-01 RX ADMIN — Medication 1 APPLICATION(S): at 06:43

## 2025-01-01 RX ADMIN — Medication 10 MILLIGRAM(S): at 00:45

## 2025-01-01 RX ADMIN — MEROPENEM 100 MILLIGRAM(S): 1 INJECTION INTRAVENOUS at 17:37

## 2025-01-01 RX ADMIN — Medication 60 MICROGRAM(S): at 23:51

## 2025-01-01 RX ADMIN — Medication 10 MILLIGRAM(S): at 06:29

## 2025-01-01 RX ADMIN — Medication 10 MILLIGRAM(S): at 13:03

## 2025-01-01 RX ADMIN — Medication 50 MICROGRAM(S): at 21:50

## 2025-01-01 RX ADMIN — Medication 80 MILLIGRAM(S): at 13:05

## 2025-01-01 RX ADMIN — OXYCODONE HYDROCHLORIDE 7.5 MILLIGRAM(S): 30 TABLET ORAL at 01:23

## 2025-01-01 RX ADMIN — BUMETANIDE 1 MILLIGRAM(S): 1 TABLET ORAL at 14:23

## 2025-01-01 RX ADMIN — MIDODRINE HYDROCHLORIDE 30 MILLIGRAM(S): 5 TABLET ORAL at 06:02

## 2025-01-01 RX ADMIN — APIXABAN 5 MILLIGRAM(S): 5 TABLET, FILM COATED ORAL at 05:19

## 2025-01-01 RX ADMIN — Medication 5 UNIT(S): at 21:28

## 2025-01-01 RX ADMIN — TRIAMCINOLONE ACETONIDE 1 APPLICATION(S): 1 CREAM TOPICAL at 17:43

## 2025-01-01 RX ADMIN — OXYCODONE HYDROCHLORIDE 5 MILLIGRAM(S): 30 TABLET ORAL at 09:54

## 2025-01-01 RX ADMIN — OXYCODONE HYDROCHLORIDE 15 MILLIGRAM(S): 30 TABLET ORAL at 00:45

## 2025-01-01 RX ADMIN — Medication 20 MILLIGRAM(S): at 11:33

## 2025-01-01 RX ADMIN — OXYCODONE HYDROCHLORIDE 7.5 MILLIGRAM(S): 30 TABLET ORAL at 22:13

## 2025-01-01 RX ADMIN — Medication 50 MICROGRAM(S): at 21:11

## 2025-01-01 RX ADMIN — GABAPENTIN 100 MILLIGRAM(S): 400 CAPSULE ORAL at 21:51

## 2025-01-01 RX ADMIN — APIXABAN 5 MILLIGRAM(S): 2.5 TABLET, FILM COATED ORAL at 17:11

## 2025-01-01 RX ADMIN — Medication 25 MILLIGRAM(S): at 06:36

## 2025-01-01 RX ADMIN — Medication 1000 UNIT(S): at 12:05

## 2025-01-01 RX ADMIN — Medication 1 SPRAY(S): at 18:33

## 2025-01-01 RX ADMIN — Medication 1000 UNIT(S): at 13:09

## 2025-01-01 RX ADMIN — Medication 80 MILLIGRAM(S): at 13:53

## 2025-01-01 RX ADMIN — Medication 10 MILLIGRAM(S): at 14:47

## 2025-01-01 RX ADMIN — OXYCODONE HYDROCHLORIDE 7.5 MILLIGRAM(S): 30 TABLET ORAL at 15:31

## 2025-01-01 RX ADMIN — Medication 1000 UNIT(S): at 11:53

## 2025-01-01 RX ADMIN — OXYCODONE HYDROCHLORIDE 7.5 MILLIGRAM(S): 30 TABLET ORAL at 16:28

## 2025-01-01 RX ADMIN — LOSARTAN POTASSIUM 50 MILLIGRAM(S): 100 TABLET, FILM COATED ORAL at 05:19

## 2025-01-01 RX ADMIN — Medication 1 APPLICATION(S): at 05:42

## 2025-01-01 RX ADMIN — Medication 50 MICROGRAM(S): at 21:20

## 2025-01-01 RX ADMIN — ALBUMIN (HUMAN) 125 MILLILITER(S): 12.5 INJECTION, SOLUTION INTRAVENOUS at 01:20

## 2025-01-01 RX ADMIN — Medication 88 MICROGRAM(S): at 06:22

## 2025-01-01 RX ADMIN — LOSARTAN POTASSIUM 50 MILLIGRAM(S): 100 TABLET, FILM COATED ORAL at 06:07

## 2025-01-01 RX ADMIN — Medication 80 MILLIGRAM(S): at 21:48

## 2025-01-01 RX ADMIN — TRIAMCINOLONE ACETONIDE 1 APPLICATION(S): 1 CREAM TOPICAL at 17:20

## 2025-01-01 RX ADMIN — VASOPRESSIN 6 UNIT(S)/MIN: 20 INJECTION INTRAVENOUS at 19:47

## 2025-01-01 RX ADMIN — Medication 80 MILLIGRAM(S): at 07:17

## 2025-01-01 RX ADMIN — OXYCODONE HYDROCHLORIDE 15 MILLIGRAM(S): 30 TABLET ORAL at 23:37

## 2025-01-01 RX ADMIN — Medication 1 GRAM(S): at 22:41

## 2025-01-01 RX ADMIN — Medication 50 MICROGRAM(S): at 21:40

## 2025-01-01 RX ADMIN — OXYCODONE HYDROCHLORIDE 7.5 MILLIGRAM(S): 30 TABLET ORAL at 10:21

## 2025-01-01 RX ADMIN — GABAPENTIN 100 MILLIGRAM(S): 400 CAPSULE ORAL at 22:04

## 2025-01-01 RX ADMIN — OXYCODONE HYDROCHLORIDE 7.5 MILLIGRAM(S): 30 TABLET ORAL at 03:14

## 2025-01-01 RX ADMIN — OXYCODONE HYDROCHLORIDE 5 MILLIGRAM(S): 30 TABLET ORAL at 23:07

## 2025-01-01 RX ADMIN — ROSUVASTATIN CALCIUM 10 MILLIGRAM(S): 20 TABLET, FILM COATED ORAL at 22:50

## 2025-01-01 RX ADMIN — OXYCODONE HYDROCHLORIDE 7.5 MILLIGRAM(S): 30 TABLET ORAL at 04:17

## 2025-01-01 RX ADMIN — Medication 650 MILLIGRAM(S): at 15:02

## 2025-01-01 RX ADMIN — MIDODRINE HYDROCHLORIDE 30 MILLIGRAM(S): 5 TABLET ORAL at 17:55

## 2025-01-01 RX ADMIN — OXYCODONE HYDROCHLORIDE 5 MILLIGRAM(S): 30 TABLET ORAL at 15:25

## 2025-01-01 RX ADMIN — APIXABAN 5 MILLIGRAM(S): 2.5 TABLET, FILM COATED ORAL at 17:22

## 2025-01-01 RX ADMIN — OXYCODONE HYDROCHLORIDE 7.5 MILLIGRAM(S): 30 TABLET ORAL at 13:34

## 2025-01-01 RX ADMIN — Medication 1 GRAM(S): at 14:03

## 2025-01-01 RX ADMIN — OXYCODONE HYDROCHLORIDE 5 MILLIGRAM(S): 30 TABLET ORAL at 07:07

## 2025-01-01 RX ADMIN — LOSARTAN POTASSIUM 50 MILLIGRAM(S): 100 TABLET, FILM COATED ORAL at 05:28

## 2025-01-01 RX ADMIN — Medication 2 TABLET(S): at 22:58

## 2025-01-01 RX ADMIN — Medication 1 APPLICATION(S): at 17:02

## 2025-01-01 RX ADMIN — Medication 1 APPLICATION(S): at 12:34

## 2025-01-01 RX ADMIN — LOSARTAN POTASSIUM 50 MILLIGRAM(S): 100 TABLET, FILM COATED ORAL at 06:30

## 2025-01-01 RX ADMIN — Medication 400 MILLIGRAM(S): at 20:10

## 2025-01-01 RX ADMIN — Medication 10 MILLIGRAM(S): at 06:44

## 2025-01-01 RX ADMIN — OXYCODONE HYDROCHLORIDE 7.5 MILLIGRAM(S): 30 TABLET ORAL at 17:23

## 2025-01-01 RX ADMIN — Medication 100 MILLIGRAM(S): at 03:02

## 2025-01-01 RX ADMIN — APIXABAN 5 MILLIGRAM(S): 5 TABLET, FILM COATED ORAL at 05:37

## 2025-01-01 RX ADMIN — ALBUMIN (HUMAN) 125 MILLILITER(S): 12.5 INJECTION, SOLUTION INTRAVENOUS at 09:24

## 2025-01-01 RX ADMIN — NOREPINEPHRINE BITARTRATE 13.4 MICROGRAM(S)/KG/MIN: 8 SOLUTION at 07:27

## 2025-01-01 RX ADMIN — Medication 1000 UNIT(S): at 12:27

## 2025-01-01 RX ADMIN — Medication 2 TABLET(S): at 21:41

## 2025-01-01 RX ADMIN — OXYCODONE HYDROCHLORIDE 5 MILLIGRAM(S): 30 TABLET ORAL at 09:50

## 2025-01-01 RX ADMIN — BUMETANIDE 2 MILLIGRAM(S): 1 TABLET ORAL at 16:28

## 2025-01-01 RX ADMIN — OXYCODONE HYDROCHLORIDE 15 MILLIGRAM(S): 30 TABLET ORAL at 00:29

## 2025-01-01 RX ADMIN — Medication 50 MICROGRAM(S): at 22:57

## 2025-01-01 RX ADMIN — AMOXICILLIN AND CLAVULANATE POTASSIUM 1 TABLET(S): 500; 125 TABLET, FILM COATED ORAL at 17:07

## 2025-01-01 RX ADMIN — Medication 102 MILLIGRAM(S): at 01:57

## 2025-01-01 RX ADMIN — DEXTROSE MONOHYDRATE 20 MILLILITER(S): 100 INJECTION, SOLUTION INTRAVENOUS at 10:46

## 2025-01-01 RX ADMIN — OXYCODONE HYDROCHLORIDE 15 MILLIGRAM(S): 30 TABLET ORAL at 23:00

## 2025-01-01 RX ADMIN — GABAPENTIN 100 MILLIGRAM(S): 400 CAPSULE ORAL at 22:13

## 2025-01-01 RX ADMIN — Medication 1 SPRAY(S): at 06:43

## 2025-01-01 RX ADMIN — Medication 50 MILLIGRAM(S): at 11:06

## 2025-01-01 RX ADMIN — CALCIUM GLUCONATE 200 GRAM(S): 20 INJECTION, SOLUTION INTRAVENOUS at 10:05

## 2025-01-01 RX ADMIN — VASOPRESSIN 6 UNIT(S)/MIN: 20 INJECTION INTRAVENOUS at 09:28

## 2025-01-01 RX ADMIN — OXYCODONE HYDROCHLORIDE 7.5 MILLIGRAM(S): 30 TABLET ORAL at 04:10

## 2025-01-01 RX ADMIN — OXYCODONE HYDROCHLORIDE 7.5 MILLIGRAM(S): 30 TABLET ORAL at 12:49

## 2025-01-01 RX ADMIN — OXYCODONE HYDROCHLORIDE 15 MILLIGRAM(S): 30 TABLET ORAL at 21:30

## 2025-01-01 RX ADMIN — OXYCODONE HYDROCHLORIDE 7.5 MILLIGRAM(S): 30 TABLET ORAL at 23:45

## 2025-01-01 RX ADMIN — AMOXICILLIN AND CLAVULANATE POTASSIUM 1 TABLET(S): 500; 125 TABLET, FILM COATED ORAL at 05:00

## 2025-01-01 RX ADMIN — OXYCODONE HYDROCHLORIDE 7.5 MILLIGRAM(S): 30 TABLET ORAL at 09:18

## 2025-01-01 RX ADMIN — OXYCODONE HYDROCHLORIDE 5 MILLIGRAM(S): 30 TABLET ORAL at 00:00

## 2025-01-01 RX ADMIN — MEROPENEM 100 MILLIGRAM(S): 1 INJECTION INTRAVENOUS at 15:55

## 2025-01-01 RX ADMIN — Medication 2 TABLET(S): at 21:38

## 2025-01-01 RX ADMIN — Medication 200 GRAM(S): at 03:45

## 2025-01-01 RX ADMIN — OXYCODONE HYDROCHLORIDE 7.5 MILLIGRAM(S): 30 TABLET ORAL at 23:00

## 2025-01-01 RX ADMIN — Medication 1 APPLICATION(S): at 06:19

## 2025-01-01 RX ADMIN — NOREPINEPHRINE BITARTRATE 13.4 MICROGRAM(S)/KG/MIN: 8 SOLUTION at 19:48

## 2025-01-01 RX ADMIN — OXYCODONE HYDROCHLORIDE 5 MILLIGRAM(S): 30 TABLET ORAL at 04:46

## 2025-01-01 RX ADMIN — DROXIDOPA 300 MILLIGRAM(S): 300 CAPSULE ORAL at 05:17

## 2025-01-01 RX ADMIN — ROSUVASTATIN CALCIUM 10 MILLIGRAM(S): 20 TABLET, FILM COATED ORAL at 21:18

## 2025-01-01 RX ADMIN — OXYCODONE HYDROCHLORIDE 7.5 MILLIGRAM(S): 30 TABLET ORAL at 16:29

## 2025-01-01 RX ADMIN — OXYCODONE HYDROCHLORIDE 7.5 MILLIGRAM(S): 30 TABLET ORAL at 18:32

## 2025-01-01 RX ADMIN — POLYETHYLENE GLYCOL 3350 17 GRAM(S): 17 POWDER, FOR SOLUTION ORAL at 11:49

## 2025-01-01 RX ADMIN — Medication 10 MILLIGRAM(S): at 14:31

## 2025-01-01 RX ADMIN — OXYCODONE HYDROCHLORIDE 5 MILLIGRAM(S): 30 TABLET ORAL at 05:01

## 2025-01-01 RX ADMIN — Medication 1 TABLET(S): at 12:27

## 2025-01-01 RX ADMIN — Medication 1 APPLICATION(S): at 12:08

## 2025-01-01 RX ADMIN — Medication 80 MILLIGRAM(S): at 13:39

## 2025-01-01 RX ADMIN — OXYCODONE HYDROCHLORIDE 5 MILLIGRAM(S): 30 TABLET ORAL at 10:20

## 2025-01-01 RX ADMIN — OXYCODONE HYDROCHLORIDE 7.5 MILLIGRAM(S): 30 TABLET ORAL at 06:26

## 2025-01-01 RX ADMIN — OXYCODONE HYDROCHLORIDE 7.5 MILLIGRAM(S): 30 TABLET ORAL at 00:17

## 2025-01-01 RX ADMIN — OXYCODONE HYDROCHLORIDE 5 MILLIGRAM(S): 30 TABLET ORAL at 17:07

## 2025-01-01 RX ADMIN — Medication 0.5 MILLIGRAM(S): at 13:14

## 2025-01-01 RX ADMIN — Medication 250 MILLIGRAM(S): at 20:09

## 2025-01-01 RX ADMIN — OXYCODONE HYDROCHLORIDE 7.5 MILLIGRAM(S): 30 TABLET ORAL at 17:07

## 2025-01-01 RX ADMIN — OXYCODONE HYDROCHLORIDE 7.5 MILLIGRAM(S): 30 TABLET ORAL at 23:52

## 2025-01-01 RX ADMIN — OXYCODONE HYDROCHLORIDE 7.5 MILLIGRAM(S): 30 TABLET ORAL at 02:07

## 2025-01-01 RX ADMIN — Medication 200 GRAM(S): at 20:09

## 2025-01-01 RX ADMIN — Medication 20 MILLIGRAM(S): at 12:47

## 2025-01-01 RX ADMIN — Medication 10 MILLIGRAM(S): at 13:05

## 2025-01-01 RX ADMIN — Medication 1 SPRAY(S): at 06:02

## 2025-01-01 RX ADMIN — OXYCODONE HYDROCHLORIDE 7.5 MILLIGRAM(S): 30 TABLET ORAL at 11:37

## 2025-01-01 RX ADMIN — Medication 25 GRAM(S): at 00:29

## 2025-01-01 RX ADMIN — OXYCODONE HYDROCHLORIDE 7.5 MILLIGRAM(S): 30 TABLET ORAL at 17:53

## 2025-01-01 RX ADMIN — OXYCODONE HYDROCHLORIDE 15 MILLIGRAM(S): 30 TABLET ORAL at 22:30

## 2025-01-01 RX ADMIN — OXYCODONE HYDROCHLORIDE 7.5 MILLIGRAM(S): 30 TABLET ORAL at 04:16

## 2025-01-01 RX ADMIN — LOSARTAN POTASSIUM 50 MILLIGRAM(S): 100 TABLET, FILM COATED ORAL at 05:08

## 2025-01-01 RX ADMIN — Medication 80 MILLIGRAM(S): at 13:36

## 2025-01-01 RX ADMIN — MIDODRINE HYDROCHLORIDE 20 MILLIGRAM(S): 5 TABLET ORAL at 13:36

## 2025-01-01 RX ADMIN — NALOXEGOL OXALATE 25 MILLIGRAM(S): 12.5 TABLET, FILM COATED ORAL at 11:46

## 2025-01-01 RX ADMIN — OXYCODONE HYDROCHLORIDE 5 MILLIGRAM(S): 30 TABLET ORAL at 15:45

## 2025-01-01 RX ADMIN — Medication 1 APPLICATION(S): at 12:03

## 2025-01-01 RX ADMIN — Medication 20 MILLIGRAM(S): at 13:22

## 2025-01-01 RX ADMIN — APIXABAN 5 MILLIGRAM(S): 2.5 TABLET, FILM COATED ORAL at 06:15

## 2025-01-01 RX ADMIN — GABAPENTIN 100 MILLIGRAM(S): 400 CAPSULE ORAL at 22:37

## 2025-01-01 RX ADMIN — Medication 88 MICROGRAM(S): at 05:16

## 2025-01-01 RX ADMIN — OXYCODONE HYDROCHLORIDE 7.5 MILLIGRAM(S): 30 TABLET ORAL at 07:26

## 2025-01-01 RX ADMIN — OXYCODONE HYDROCHLORIDE 7.5 MILLIGRAM(S): 30 TABLET ORAL at 14:04

## 2025-01-01 RX ADMIN — Medication 80 MILLIGRAM(S): at 06:45

## 2025-01-01 RX ADMIN — Medication 1 TABLET(S): at 12:06

## 2025-01-01 RX ADMIN — BUMETANIDE 1 MILLIGRAM(S): 1 TABLET ORAL at 06:29

## 2025-01-01 RX ADMIN — Medication 0.5 MILLIGRAM(S): at 10:45

## 2025-01-01 RX ADMIN — ROSUVASTATIN CALCIUM 10 MILLIGRAM(S): 20 TABLET, FILM COATED ORAL at 22:41

## 2025-01-01 RX ADMIN — Medication 20 MILLIGRAM(S): at 12:27

## 2025-01-01 RX ADMIN — Medication 10 MILLIGRAM(S): at 06:05

## 2025-01-01 RX ADMIN — OXYCODONE HYDROCHLORIDE 5 MILLIGRAM(S): 30 TABLET ORAL at 17:01

## 2025-01-01 RX ADMIN — Medication 80 MILLIGRAM(S): at 15:02

## 2025-01-01 RX ADMIN — OXYCODONE HYDROCHLORIDE 7.5 MILLIGRAM(S): 30 TABLET ORAL at 20:19

## 2025-01-01 RX ADMIN — Medication 1000 UNIT(S): at 12:15

## 2025-01-01 RX ADMIN — OXYCODONE HYDROCHLORIDE 7.5 MILLIGRAM(S): 30 TABLET ORAL at 02:37

## 2025-01-01 RX ADMIN — OXYCODONE HYDROCHLORIDE 7.5 MILLIGRAM(S): 30 TABLET ORAL at 02:08

## 2025-01-01 RX ADMIN — Medication 50 MILLILITER(S): at 19:30

## 2025-01-01 RX ADMIN — AMOXICILLIN AND CLAVULANATE POTASSIUM 1 TABLET(S): 500; 125 TABLET, FILM COATED ORAL at 17:50

## 2025-01-01 RX ADMIN — MIDODRINE HYDROCHLORIDE 30 MILLIGRAM(S): 5 TABLET ORAL at 11:23

## 2025-01-01 RX ADMIN — MIDODRINE HYDROCHLORIDE 15 MILLIGRAM(S): 5 TABLET ORAL at 06:15

## 2025-01-01 RX ADMIN — TRIAMCINOLONE ACETONIDE 1 APPLICATION(S): 1 CREAM TOPICAL at 06:37

## 2025-01-01 RX ADMIN — DROXIDOPA 100 MILLIGRAM(S): 300 CAPSULE ORAL at 15:02

## 2025-01-01 RX ADMIN — Medication 50 MILLILITER(S): at 12:33

## 2025-01-01 RX ADMIN — BUMETANIDE 1 MILLIGRAM(S): 1 TABLET ORAL at 05:09

## 2025-01-01 RX ADMIN — APIXABAN 5 MILLIGRAM(S): 2.5 TABLET, FILM COATED ORAL at 18:51

## 2025-01-01 RX ADMIN — Medication 10 MILLIGRAM(S): at 06:26

## 2025-01-01 RX ADMIN — Medication 20 MILLIGRAM(S): at 14:03

## 2025-01-01 RX ADMIN — OXYCODONE HYDROCHLORIDE 5 MILLIGRAM(S): 30 TABLET ORAL at 21:16

## 2025-01-01 RX ADMIN — ROSUVASTATIN CALCIUM 10 MILLIGRAM(S): 20 TABLET, FILM COATED ORAL at 21:53

## 2025-01-01 RX ADMIN — ROSUVASTATIN CALCIUM 10 MILLIGRAM(S): 20 TABLET, FILM COATED ORAL at 22:58

## 2025-01-01 RX ADMIN — Medication 25 MILLIGRAM(S): at 06:07

## 2025-01-01 RX ADMIN — Medication 2 TABLET(S): at 21:11

## 2025-01-01 RX ADMIN — OXYCODONE HYDROCHLORIDE 7.5 MILLIGRAM(S): 30 TABLET ORAL at 10:30

## 2025-01-01 RX ADMIN — OXYCODONE HYDROCHLORIDE 10 MILLIGRAM(S): 30 TABLET ORAL at 10:31

## 2025-01-01 RX ADMIN — Medication 1 APPLICATION(S): at 05:19

## 2025-01-01 RX ADMIN — Medication 250 MILLIGRAM(S): at 04:12

## 2025-01-01 RX ADMIN — Medication 80 MILLIGRAM(S): at 13:26

## 2025-01-01 RX ADMIN — APIXABAN 5 MILLIGRAM(S): 2.5 TABLET, FILM COATED ORAL at 17:20

## 2025-01-01 RX ADMIN — APIXABAN 5 MILLIGRAM(S): 2.5 TABLET, FILM COATED ORAL at 17:18

## 2025-01-01 RX ADMIN — OXYCODONE HYDROCHLORIDE 7.5 MILLIGRAM(S): 30 TABLET ORAL at 21:49

## 2025-01-01 RX ADMIN — Medication 50 MILLIEQUIVALENT(S): at 17:11

## 2025-01-01 RX ADMIN — Medication 60 MICROGRAM(S): at 22:11

## 2025-01-01 RX ADMIN — OXYCODONE HYDROCHLORIDE 15 MILLIGRAM(S): 30 TABLET ORAL at 22:54

## 2025-01-01 RX ADMIN — OXYCODONE HYDROCHLORIDE 15 MILLIGRAM(S): 30 TABLET ORAL at 22:25

## 2025-01-01 RX ADMIN — Medication 40 MILLIGRAM(S): at 11:23

## 2025-01-01 RX ADMIN — AMOXICILLIN AND CLAVULANATE POTASSIUM 1 TABLET(S): 500; 125 TABLET, FILM COATED ORAL at 06:07

## 2025-01-01 RX ADMIN — AMOXICILLIN AND CLAVULANATE POTASSIUM 1 TABLET(S): 500; 125 TABLET, FILM COATED ORAL at 18:59

## 2025-01-01 RX ADMIN — MEROPENEM 100 MILLIGRAM(S): 1 INJECTION INTRAVENOUS at 17:55

## 2025-01-01 RX ADMIN — Medication 1 SPRAY(S): at 06:17

## 2025-01-01 RX ADMIN — Medication 2 TABLET(S): at 21:53

## 2025-01-01 RX ADMIN — AMOXICILLIN AND CLAVULANATE POTASSIUM 1 TABLET(S): 500; 125 TABLET, FILM COATED ORAL at 06:36

## 2025-01-01 RX ADMIN — Medication 25 GRAM(S): at 08:47

## 2025-01-01 RX ADMIN — ROSUVASTATIN CALCIUM 10 MILLIGRAM(S): 20 TABLET, FILM COATED ORAL at 22:07

## 2025-01-01 RX ADMIN — OXYCODONE HYDROCHLORIDE 5 MILLIGRAM(S): 30 TABLET ORAL at 10:51

## 2025-01-01 RX ADMIN — Medication 50 MILLIGRAM(S): at 20:20

## 2025-01-01 RX ADMIN — Medication 1 GRAM(S): at 09:16

## 2025-01-01 RX ADMIN — TRIAMCINOLONE ACETONIDE 1 APPLICATION(S): 1 CREAM TOPICAL at 17:46

## 2025-01-01 RX ADMIN — Medication 80 MILLIGRAM(S): at 22:50

## 2025-01-01 RX ADMIN — OXYCODONE HYDROCHLORIDE 5 MILLIGRAM(S): 30 TABLET ORAL at 10:21

## 2025-01-01 RX ADMIN — Medication 0.5 MILLIGRAM(S): at 02:59

## 2025-01-01 RX ADMIN — OXYCODONE HYDROCHLORIDE 5 MILLIGRAM(S): 30 TABLET ORAL at 20:53

## 2025-01-01 RX ADMIN — APIXABAN 5 MILLIGRAM(S): 2.5 TABLET, FILM COATED ORAL at 05:07

## 2025-01-01 RX ADMIN — NOREPINEPHRINE BITARTRATE 13.4 MICROGRAM(S)/KG/MIN: 8 SOLUTION at 07:31

## 2025-01-01 RX ADMIN — APIXABAN 5 MILLIGRAM(S): 2.5 TABLET, FILM COATED ORAL at 06:27

## 2025-01-01 RX ADMIN — AMOXICILLIN AND CLAVULANATE POTASSIUM 1 TABLET(S): 500; 125 TABLET, FILM COATED ORAL at 05:37

## 2025-01-01 RX ADMIN — LOSARTAN POTASSIUM 50 MILLIGRAM(S): 100 TABLET, FILM COATED ORAL at 05:37

## 2025-01-01 RX ADMIN — APIXABAN 5 MILLIGRAM(S): 5 TABLET, FILM COATED ORAL at 18:28

## 2025-01-01 RX ADMIN — AMOXICILLIN AND CLAVULANATE POTASSIUM 1 TABLET(S): 500; 125 TABLET, FILM COATED ORAL at 18:27

## 2025-01-01 RX ADMIN — APIXABAN 5 MILLIGRAM(S): 5 TABLET, FILM COATED ORAL at 17:20

## 2025-01-01 RX ADMIN — Medication 80 MILLIGRAM(S): at 05:54

## 2025-01-01 RX ADMIN — Medication 50 MICROGRAM(S): at 00:11

## 2025-01-01 RX ADMIN — OXYCODONE HYDROCHLORIDE 7.5 MILLIGRAM(S): 30 TABLET ORAL at 16:50

## 2025-01-01 RX ADMIN — OXYCODONE HYDROCHLORIDE 5 MILLIGRAM(S): 30 TABLET ORAL at 03:17

## 2025-01-01 RX ADMIN — POLYETHYLENE GLYCOL 3350 17 GRAM(S): 17 POWDER, FOR SOLUTION ORAL at 17:19

## 2025-01-01 RX ADMIN — MEROPENEM 100 MILLIGRAM(S): 1 INJECTION INTRAVENOUS at 17:03

## 2025-01-01 RX ADMIN — Medication 1 TABLET(S): at 11:50

## 2025-01-01 RX ADMIN — OXYCODONE HYDROCHLORIDE 7.5 MILLIGRAM(S): 30 TABLET ORAL at 04:40

## 2025-01-01 RX ADMIN — CALCIUM GLUCONATE 200 GRAM(S): 20 INJECTION, SOLUTION INTRAVENOUS at 06:11

## 2025-01-01 RX ADMIN — OXYCODONE HYDROCHLORIDE 7.5 MILLIGRAM(S): 30 TABLET ORAL at 11:25

## 2025-01-01 RX ADMIN — APIXABAN 5 MILLIGRAM(S): 5 TABLET, FILM COATED ORAL at 17:46

## 2025-01-01 RX ADMIN — Medication 10 MILLIGRAM(S): at 13:37

## 2025-01-01 RX ADMIN — OXYCODONE HYDROCHLORIDE 7.5 MILLIGRAM(S): 30 TABLET ORAL at 05:54

## 2025-01-01 RX ADMIN — HEPARIN SODIUM 5000 UNIT(S): 1000 INJECTION INTRAVENOUS; SUBCUTANEOUS at 22:37

## 2025-01-01 RX ADMIN — POLYETHYLENE GLYCOL 3350 17 GRAM(S): 17 POWDER, FOR SOLUTION ORAL at 06:03

## 2025-01-01 RX ADMIN — Medication 20 MILLIGRAM(S): at 13:36

## 2025-01-01 RX ADMIN — BUMETANIDE 1 MILLIGRAM(S): 1 TABLET ORAL at 11:47

## 2025-01-01 RX ADMIN — OXYCODONE HYDROCHLORIDE 7.5 MILLIGRAM(S): 30 TABLET ORAL at 23:47

## 2025-01-01 RX ADMIN — GABAPENTIN 100 MILLIGRAM(S): 400 CAPSULE ORAL at 21:32

## 2025-01-01 RX ADMIN — GABAPENTIN 100 MILLIGRAM(S): 400 CAPSULE ORAL at 21:54

## 2025-01-01 RX ADMIN — Medication 0.5 MILLIGRAM(S): at 23:54

## 2025-01-01 RX ADMIN — APIXABAN 5 MILLIGRAM(S): 5 TABLET, FILM COATED ORAL at 17:08

## 2025-01-01 RX ADMIN — MIDODRINE HYDROCHLORIDE 10 MILLIGRAM(S): 5 TABLET ORAL at 11:45

## 2025-01-01 RX ADMIN — APIXABAN 5 MILLIGRAM(S): 2.5 TABLET, FILM COATED ORAL at 06:45

## 2025-01-01 RX ADMIN — OXYCODONE HYDROCHLORIDE 10 MILLIGRAM(S): 30 TABLET ORAL at 02:33

## 2025-01-01 RX ADMIN — Medication 25 GRAM(S): at 00:33

## 2025-01-01 RX ADMIN — MIDODRINE HYDROCHLORIDE 30 MILLIGRAM(S): 5 TABLET ORAL at 05:17

## 2025-01-01 RX ADMIN — OXYCODONE HYDROCHLORIDE 7.5 MILLIGRAM(S): 30 TABLET ORAL at 13:19

## 2025-01-01 RX ADMIN — Medication 80 MILLIGRAM(S): at 13:03

## 2025-01-01 RX ADMIN — OXYCODONE HYDROCHLORIDE 15 MILLIGRAM(S): 30 TABLET ORAL at 22:26

## 2025-01-01 RX ADMIN — Medication 1 SPRAY(S): at 17:55

## 2025-01-01 RX ADMIN — Medication 80 MILLIGRAM(S): at 22:57

## 2025-01-01 RX ADMIN — OXYCODONE HYDROCHLORIDE 7.5 MILLIGRAM(S): 30 TABLET ORAL at 22:10

## 2025-01-01 RX ADMIN — OXYCODONE HYDROCHLORIDE 15 MILLIGRAM(S): 30 TABLET ORAL at 01:14

## 2025-01-01 RX ADMIN — Medication 50 MICROGRAM(S): at 22:14

## 2025-01-01 RX ADMIN — Medication 50 MILLIEQUIVALENT(S): at 07:58

## 2025-01-01 RX ADMIN — Medication 1 TABLET(S): at 13:36

## 2025-01-01 RX ADMIN — Medication 2 TABLET(S): at 22:00

## 2025-01-01 RX ADMIN — OXYCODONE HYDROCHLORIDE 5 MILLIGRAM(S): 30 TABLET ORAL at 06:14

## 2025-01-01 RX ADMIN — OXYCODONE HYDROCHLORIDE 7.5 MILLIGRAM(S): 30 TABLET ORAL at 12:37

## 2025-01-01 RX ADMIN — Medication 10 MILLIGRAM(S): at 21:53

## 2025-01-01 RX ADMIN — Medication 1000 MILLIGRAM(S): at 19:00

## 2025-01-01 RX ADMIN — OXYCODONE HYDROCHLORIDE 15 MILLIGRAM(S): 30 TABLET ORAL at 22:58

## 2025-01-01 RX ADMIN — POLYETHYLENE GLYCOL 3350 17 GRAM(S): 17 POWDER, FOR SOLUTION ORAL at 15:09

## 2025-01-01 RX ADMIN — GABAPENTIN 100 MILLIGRAM(S): 400 CAPSULE ORAL at 22:00

## 2025-01-01 RX ADMIN — Medication 25 MILLIGRAM(S): at 13:26

## 2025-01-01 RX ADMIN — LOSARTAN POTASSIUM 50 MILLIGRAM(S): 100 TABLET, FILM COATED ORAL at 05:54

## 2025-01-01 RX ADMIN — TRIAMCINOLONE ACETONIDE 1 APPLICATION(S): 1 CREAM TOPICAL at 05:40

## 2025-01-01 RX ADMIN — OXYCODONE HYDROCHLORIDE 5 MILLIGRAM(S): 30 TABLET ORAL at 16:37

## 2025-01-01 RX ADMIN — VASOPRESSIN 6 UNIT(S)/MIN: 20 INJECTION INTRAVENOUS at 20:14

## 2025-01-01 RX ADMIN — APIXABAN 5 MILLIGRAM(S): 5 TABLET, FILM COATED ORAL at 17:23

## 2025-01-01 RX ADMIN — OXYCODONE HYDROCHLORIDE 7.5 MILLIGRAM(S): 30 TABLET ORAL at 23:13

## 2025-01-01 RX ADMIN — ROSUVASTATIN CALCIUM 10 MILLIGRAM(S): 20 TABLET, FILM COATED ORAL at 21:39

## 2025-01-01 RX ADMIN — Medication 90 MILLILITER(S): at 04:45

## 2025-01-01 RX ADMIN — Medication 80 MILLIGRAM(S): at 22:54

## 2025-01-01 RX ADMIN — OXYCODONE HYDROCHLORIDE 7.5 MILLIGRAM(S): 30 TABLET ORAL at 16:20

## 2025-01-01 RX ADMIN — Medication 1000 UNIT(S): at 11:50

## 2025-01-01 RX ADMIN — Medication 40 MILLIGRAM(S): at 12:07

## 2025-01-01 RX ADMIN — Medication 20 MILLIGRAM(S): at 12:36

## 2025-01-01 RX ADMIN — Medication 50 MILLILITER(S): at 14:27

## 2025-01-01 RX ADMIN — Medication 1 APPLICATION(S): at 06:11

## 2025-01-01 RX ADMIN — Medication 1 GRAM(S): at 13:22

## 2025-01-01 RX ADMIN — APIXABAN 5 MILLIGRAM(S): 2.5 TABLET, FILM COATED ORAL at 06:02

## 2025-01-01 RX ADMIN — MIDODRINE HYDROCHLORIDE 15 MILLIGRAM(S): 5 TABLET ORAL at 18:05

## 2025-01-01 RX ADMIN — Medication 25 GRAM(S): at 07:40

## 2025-01-01 RX ADMIN — MEROPENEM 100 MILLIGRAM(S): 1 INJECTION INTRAVENOUS at 17:26

## 2025-01-01 RX ADMIN — Medication 50 MICROGRAM(S): at 22:50

## 2025-01-01 RX ADMIN — Medication 20 MILLIGRAM(S): at 12:30

## 2025-01-01 RX ADMIN — OXYCODONE HYDROCHLORIDE 5 MILLIGRAM(S): 30 TABLET ORAL at 16:45

## 2025-01-01 RX ADMIN — Medication 2 TABLET(S): at 21:50

## 2025-01-01 RX ADMIN — OXYCODONE HYDROCHLORIDE 7.5 MILLIGRAM(S): 30 TABLET ORAL at 02:38

## 2025-01-01 RX ADMIN — Medication 75 MILLILITER(S): at 19:44

## 2025-01-01 RX ADMIN — ROSUVASTATIN CALCIUM 10 MILLIGRAM(S): 20 TABLET, FILM COATED ORAL at 21:38

## 2025-01-01 RX ADMIN — Medication 650 MILLIGRAM(S): at 22:53

## 2025-01-01 RX ADMIN — Medication 50 MICROGRAM(S): at 00:54

## 2025-01-01 RX ADMIN — Medication 10 MILLIGRAM(S): at 06:27

## 2025-01-01 RX ADMIN — APIXABAN 5 MILLIGRAM(S): 5 TABLET, FILM COATED ORAL at 05:40

## 2025-01-01 RX ADMIN — Medication 2 TABLET(S): at 22:37

## 2025-01-01 RX ADMIN — OXYCODONE HYDROCHLORIDE 7.5 MILLIGRAM(S): 30 TABLET ORAL at 10:37

## 2025-01-01 RX ADMIN — Medication 1000 UNIT(S): at 13:36

## 2025-01-01 RX ADMIN — Medication 1 APPLICATION(S): at 06:04

## 2025-01-01 RX ADMIN — Medication 1 APPLICATION(S): at 05:17

## 2025-01-01 RX ADMIN — OXYCODONE HYDROCHLORIDE 7.5 MILLIGRAM(S): 30 TABLET ORAL at 15:45

## 2025-01-01 RX ADMIN — ROSUVASTATIN CALCIUM 10 MILLIGRAM(S): 20 TABLET, FILM COATED ORAL at 22:04

## 2025-01-01 RX ADMIN — POLYETHYLENE GLYCOL-3350 AND ELECTROLYTES 500 MILLILITER(S): 236; 6.74; 5.86; 2.97; 22.74 POWDER, FOR SOLUTION ORAL at 05:08

## 2025-01-01 RX ADMIN — Medication 60 MICROGRAM(S): at 22:37

## 2025-01-01 RX ADMIN — OXYCODONE HYDROCHLORIDE 7.5 MILLIGRAM(S): 30 TABLET ORAL at 07:53

## 2025-01-01 RX ADMIN — ROSUVASTATIN CALCIUM 10 MILLIGRAM(S): 20 TABLET, FILM COATED ORAL at 22:37

## 2025-01-01 RX ADMIN — Medication 40 MILLIGRAM(S): at 11:06

## 2025-01-01 RX ADMIN — MIDODRINE HYDROCHLORIDE 10 MILLIGRAM(S): 5 TABLET ORAL at 05:07

## 2025-01-01 RX ADMIN — Medication 88 MICROGRAM(S): at 05:36

## 2025-01-01 RX ADMIN — Medication 80 MILLIGRAM(S): at 21:54

## 2025-01-01 RX ADMIN — Medication 1 APPLICATION(S): at 13:10

## 2025-01-01 RX ADMIN — OXYCODONE HYDROCHLORIDE 7.5 MILLIGRAM(S): 30 TABLET ORAL at 19:19

## 2025-01-01 RX ADMIN — Medication 80 MILLIGRAM(S): at 06:30

## 2025-01-01 RX ADMIN — OXYCODONE HYDROCHLORIDE 5 MILLIGRAM(S): 30 TABLET ORAL at 02:17

## 2025-01-01 RX ADMIN — DROXIDOPA 100 MILLIGRAM(S): 300 CAPSULE ORAL at 21:30

## 2025-01-01 RX ADMIN — Medication 1 SPRAY(S): at 05:19

## 2025-01-01 RX ADMIN — OXYCODONE HYDROCHLORIDE 7.5 MILLIGRAM(S): 30 TABLET ORAL at 09:10

## 2025-01-24 ENCOUNTER — APPOINTMENT (OUTPATIENT)
Dept: HEMATOLOGY ONCOLOGY | Facility: CLINIC | Age: 60
End: 2025-01-24
Payer: COMMERCIAL

## 2025-01-24 ENCOUNTER — APPOINTMENT (OUTPATIENT)
Dept: HEMATOLOGY ONCOLOGY | Facility: CLINIC | Age: 60
End: 2025-01-24

## 2025-01-24 VITALS
HEART RATE: 90 BPM | DIASTOLIC BLOOD PRESSURE: 77 MMHG | WEIGHT: 135.14 LBS | BODY MASS INDEX: 23.21 KG/M2 | RESPIRATION RATE: 16 BRPM | TEMPERATURE: 97.2 F | OXYGEN SATURATION: 99 % | SYSTOLIC BLOOD PRESSURE: 127 MMHG

## 2025-01-24 DIAGNOSIS — C55 MALIGNANT NEOPLASM OF UTERUS, PART UNSPECIFIED: ICD-10-CM

## 2025-01-24 PROCEDURE — 99214 OFFICE O/P EST MOD 30 MIN: CPT

## 2025-01-29 ENCOUNTER — APPOINTMENT (OUTPATIENT)
Dept: CARDIOLOGY | Facility: CLINIC | Age: 60
End: 2025-01-29
Payer: COMMERCIAL

## 2025-01-29 PROCEDURE — 93356 MYOCRD STRAIN IMG SPCKL TRCK: CPT

## 2025-01-29 PROCEDURE — 93306 TTE W/DOPPLER COMPLETE: CPT

## 2025-02-03 ENCOUNTER — OUTPATIENT (OUTPATIENT)
Dept: OUTPATIENT SERVICES | Facility: HOSPITAL | Age: 60
LOS: 1 days | Discharge: HOME | End: 2025-02-03

## 2025-02-03 DIAGNOSIS — Z90.710 ACQUIRED ABSENCE OF BOTH CERVIX AND UTERUS: Chronic | ICD-10-CM

## 2025-02-03 DIAGNOSIS — C55 MALIGNANT NEOPLASM OF UTERUS, PART UNSPECIFIED: ICD-10-CM

## 2025-02-03 DIAGNOSIS — Z90.49 ACQUIRED ABSENCE OF OTHER SPECIFIED PARTS OF DIGESTIVE TRACT: Chronic | ICD-10-CM

## 2025-02-03 DIAGNOSIS — Z98.89 OTHER SPECIFIED POSTPROCEDURAL STATES: Chronic | ICD-10-CM

## 2025-02-04 RX ORDER — PROCHLORPERAZINE MALEATE 10 MG/1
10 TABLET ORAL EVERY 6 HOURS
Qty: 20 | Refills: 0 | Status: ACTIVE | COMMUNITY
Start: 2025-02-04 | End: 1900-01-01

## 2025-02-06 ENCOUNTER — RESULT REVIEW (OUTPATIENT)
Age: 60
End: 2025-02-06

## 2025-02-06 ENCOUNTER — APPOINTMENT (OUTPATIENT)
Dept: INFUSION THERAPY | Facility: HOSPITAL | Age: 60
End: 2025-02-06

## 2025-02-06 ENCOUNTER — APPOINTMENT (OUTPATIENT)
Dept: HEMATOLOGY ONCOLOGY | Facility: CLINIC | Age: 60
End: 2025-02-06

## 2025-02-06 ENCOUNTER — NON-APPOINTMENT (OUTPATIENT)
Age: 60
End: 2025-02-06

## 2025-02-06 ENCOUNTER — APPOINTMENT (OUTPATIENT)
Dept: GERIATRICS | Facility: CLINIC | Age: 60
End: 2025-02-06
Payer: COMMERCIAL

## 2025-02-06 DIAGNOSIS — K21.9 GASTRO-ESOPHAGEAL REFLUX DISEASE W/OUT ESOPHAGITIS: ICD-10-CM

## 2025-02-06 DIAGNOSIS — Z51.5 ENCOUNTER FOR PALLIATIVE CARE: ICD-10-CM

## 2025-02-06 DIAGNOSIS — C55 MALIGNANT NEOPLASM OF UTERUS, PART UNSPECIFIED: ICD-10-CM

## 2025-02-06 DIAGNOSIS — R10.9 UNSPECIFIED ABDOMINAL PAIN: ICD-10-CM

## 2025-02-06 LAB
ALBUMIN SERPL ELPH-MCNC: 4.1 G/DL — SIGNIFICANT CHANGE UP (ref 3.3–5)
ALP SERPL-CCNC: 131 U/L — HIGH (ref 40–120)
ALT FLD-CCNC: 40 U/L — SIGNIFICANT CHANGE UP (ref 10–45)
ANION GAP SERPL CALC-SCNC: 12 MMOL/L — SIGNIFICANT CHANGE UP (ref 5–17)
AST SERPL-CCNC: 56 U/L — HIGH (ref 10–40)
BASOPHILS # BLD AUTO: 0.01 K/UL — SIGNIFICANT CHANGE UP (ref 0–0.2)
BASOPHILS NFR BLD AUTO: 0.2 % — SIGNIFICANT CHANGE UP (ref 0–2)
BILIRUB SERPL-MCNC: 0.2 MG/DL — SIGNIFICANT CHANGE UP (ref 0.2–1.2)
BUN SERPL-MCNC: 9 MG/DL — SIGNIFICANT CHANGE UP (ref 7–23)
CALCIUM SERPL-MCNC: 9.8 MG/DL — SIGNIFICANT CHANGE UP (ref 8.4–10.5)
CANCER AG125 SERPL-ACNC: 31 U/ML — SIGNIFICANT CHANGE UP
CEA SERPL-MCNC: 230 NG/ML — HIGH (ref 0–3.8)
CHLORIDE SERPL-SCNC: 103 MMOL/L — SIGNIFICANT CHANGE UP (ref 96–108)
CO2 SERPL-SCNC: 26 MMOL/L — SIGNIFICANT CHANGE UP (ref 22–31)
CREAT SERPL-MCNC: 0.66 MG/DL — SIGNIFICANT CHANGE UP (ref 0.5–1.3)
EGFR: 102 ML/MIN/1.73M2 — SIGNIFICANT CHANGE UP
EGFR: 102 ML/MIN/1.73M2 — SIGNIFICANT CHANGE UP
EOSINOPHIL # BLD AUTO: 0.23 K/UL — SIGNIFICANT CHANGE UP (ref 0–0.5)
EOSINOPHIL NFR BLD AUTO: 5.2 % — SIGNIFICANT CHANGE UP (ref 0–6)
GLUCOSE SERPL-MCNC: 94 MG/DL — SIGNIFICANT CHANGE UP (ref 70–99)
HCT VFR BLD CALC: 33 % — LOW (ref 34.5–45)
HGB BLD-MCNC: 10.9 G/DL — LOW (ref 11.5–15.5)
IMM GRANULOCYTES NFR BLD AUTO: 0.2 % — SIGNIFICANT CHANGE UP (ref 0–0.9)
LYMPHOCYTES # BLD AUTO: 0.75 K/UL — LOW (ref 1–3.3)
LYMPHOCYTES # BLD AUTO: 16.9 % — SIGNIFICANT CHANGE UP (ref 13–44)
MAGNESIUM SERPL-MCNC: 2.1 MG/DL — SIGNIFICANT CHANGE UP (ref 1.6–2.6)
MCHC RBC-ENTMCNC: 28.7 PG — SIGNIFICANT CHANGE UP (ref 27–34)
MCHC RBC-ENTMCNC: 33 G/DL — SIGNIFICANT CHANGE UP (ref 32–36)
MCV RBC AUTO: 86.8 FL — SIGNIFICANT CHANGE UP (ref 80–100)
MONOCYTES # BLD AUTO: 0.42 K/UL — SIGNIFICANT CHANGE UP (ref 0–0.9)
MONOCYTES NFR BLD AUTO: 9.5 % — SIGNIFICANT CHANGE UP (ref 2–14)
NEUTROPHILS # BLD AUTO: 3.01 K/UL — SIGNIFICANT CHANGE UP (ref 1.8–7.4)
NEUTROPHILS NFR BLD AUTO: 68 % — SIGNIFICANT CHANGE UP (ref 43–77)
NRBC # BLD: 0 /100 WBCS — SIGNIFICANT CHANGE UP (ref 0–0)
NRBC BLD-RTO: 0 /100 WBCS — SIGNIFICANT CHANGE UP (ref 0–0)
PLATELET # BLD AUTO: 245 K/UL — SIGNIFICANT CHANGE UP (ref 150–400)
POTASSIUM SERPL-MCNC: 4.4 MMOL/L — SIGNIFICANT CHANGE UP (ref 3.5–5.3)
POTASSIUM SERPL-SCNC: 4.4 MMOL/L — SIGNIFICANT CHANGE UP (ref 3.5–5.3)
PROT SERPL-MCNC: 6.9 G/DL — SIGNIFICANT CHANGE UP (ref 6–8.3)
RBC # BLD: 3.8 M/UL — SIGNIFICANT CHANGE UP (ref 3.8–5.2)
RBC # FLD: 14.1 % — SIGNIFICANT CHANGE UP (ref 10.3–14.5)
SODIUM SERPL-SCNC: 141 MMOL/L — SIGNIFICANT CHANGE UP (ref 135–145)
WBC # BLD: 4.43 K/UL — SIGNIFICANT CHANGE UP (ref 3.8–10.5)
WBC # FLD AUTO: 4.43 K/UL — SIGNIFICANT CHANGE UP (ref 3.8–10.5)

## 2025-02-06 PROCEDURE — 99205 OFFICE O/P NEW HI 60 MIN: CPT

## 2025-02-06 RX ORDER — GABAPENTIN 100 MG/1
100 CAPSULE ORAL
Qty: 30 | Refills: 1 | Status: ACTIVE | COMMUNITY
Start: 2025-02-06 | End: 1900-01-01

## 2025-02-07 DIAGNOSIS — R11.2 NAUSEA WITH VOMITING, UNSPECIFIED: ICD-10-CM

## 2025-02-07 DIAGNOSIS — Z51.11 ENCOUNTER FOR ANTINEOPLASTIC CHEMOTHERAPY: ICD-10-CM

## 2025-02-07 DIAGNOSIS — C54.1 MALIGNANT NEOPLASM OF ENDOMETRIUM: ICD-10-CM

## 2025-02-11 PROBLEM — Z51.5 ENCOUNTER FOR PALLIATIVE CARE: Status: ACTIVE | Noted: 2025-02-11

## 2025-02-13 NOTE — PHYSICAL EXAM
Pt stated that he has on and off R sided chest pain radiates to his back x 5 days , denies any cardiac history .    [Fully active, able to carry on all pre-disease performance without restriction] : Status 0 - Fully active, able to carry on all pre-disease performance without restriction [Normal] : affect appropriate

## 2025-02-18 ENCOUNTER — RESULT REVIEW (OUTPATIENT)
Age: 60
End: 2025-02-18

## 2025-02-18 ENCOUNTER — NON-APPOINTMENT (OUTPATIENT)
Age: 60
End: 2025-02-18

## 2025-02-18 ENCOUNTER — APPOINTMENT (OUTPATIENT)
Dept: HEMATOLOGY ONCOLOGY | Facility: CLINIC | Age: 60
End: 2025-02-18
Payer: COMMERCIAL

## 2025-02-18 VITALS
HEART RATE: 86 BPM | OXYGEN SATURATION: 99 % | BODY MASS INDEX: 23.25 KG/M2 | SYSTOLIC BLOOD PRESSURE: 128 MMHG | DIASTOLIC BLOOD PRESSURE: 74 MMHG | WEIGHT: 135.36 LBS | TEMPERATURE: 97.2 F | RESPIRATION RATE: 16 BRPM

## 2025-02-18 LAB
ALBUMIN SERPL ELPH-MCNC: 3.9 G/DL
ALP BLD-CCNC: 149 U/L
ALT SERPL-CCNC: 48 U/L
ANION GAP SERPL CALC-SCNC: 12 MMOL/L
AST SERPL-CCNC: 54 U/L
BASOPHILS # BLD AUTO: 0.02 K/UL — SIGNIFICANT CHANGE UP (ref 0–0.2)
BASOPHILS NFR BLD AUTO: 0.6 % — SIGNIFICANT CHANGE UP (ref 0–2)
BILIRUB SERPL-MCNC: <0.2 MG/DL
BUN SERPL-MCNC: 16 MG/DL
CALCIUM SERPL-MCNC: 9.2 MG/DL
CANCER AG125 SERPL-ACNC: 46 U/ML
CEA SERPL-MCNC: 291 NG/ML
CHLORIDE SERPL-SCNC: 104 MMOL/L
CO2 SERPL-SCNC: 28 MMOL/L
CREAT SERPL-MCNC: 0.7 MG/DL
EGFR: 100 ML/MIN/1.73M2
EOSINOPHIL # BLD AUTO: 0.12 K/UL — SIGNIFICANT CHANGE UP (ref 0–0.5)
EOSINOPHIL NFR BLD AUTO: 3.4 % — SIGNIFICANT CHANGE UP (ref 0–6)
GLUCOSE SERPL-MCNC: 132 MG/DL
HCT VFR BLD CALC: 32.1 % — LOW (ref 34.5–45)
HGB BLD-MCNC: 10.4 G/DL — LOW (ref 11.5–15.5)
IMM GRANULOCYTES NFR BLD AUTO: 0.3 % — SIGNIFICANT CHANGE UP (ref 0–0.9)
LYMPHOCYTES # BLD AUTO: 0.46 K/UL — LOW (ref 1–3.3)
LYMPHOCYTES # BLD AUTO: 13.1 % — SIGNIFICANT CHANGE UP (ref 13–44)
MAGNESIUM SERPL-MCNC: 2.1 MG/DL
MCHC RBC-ENTMCNC: 28.8 PG — SIGNIFICANT CHANGE UP (ref 27–34)
MCHC RBC-ENTMCNC: 32.4 G/DL — SIGNIFICANT CHANGE UP (ref 32–36)
MCV RBC AUTO: 88.9 FL — SIGNIFICANT CHANGE UP (ref 80–100)
MONOCYTES # BLD AUTO: 0.36 K/UL — SIGNIFICANT CHANGE UP (ref 0–0.9)
MONOCYTES NFR BLD AUTO: 10.3 % — SIGNIFICANT CHANGE UP (ref 2–14)
NEUTROPHILS # BLD AUTO: 2.54 K/UL — SIGNIFICANT CHANGE UP (ref 1.8–7.4)
NEUTROPHILS NFR BLD AUTO: 72.3 % — SIGNIFICANT CHANGE UP (ref 43–77)
NRBC BLD AUTO-RTO: 0 /100 WBCS — SIGNIFICANT CHANGE UP (ref 0–0)
PLATELET # BLD AUTO: 209 K/UL — SIGNIFICANT CHANGE UP (ref 150–400)
POTASSIUM SERPL-SCNC: 4.9 MMOL/L
PROT SERPL-MCNC: 6.4 G/DL
RBC # BLD: 3.61 M/UL — LOW (ref 3.8–5.2)
RBC # FLD: 13.7 % — SIGNIFICANT CHANGE UP (ref 10.3–14.5)
SODIUM SERPL-SCNC: 143 MMOL/L
WBC # BLD: 3.51 K/UL — LOW (ref 3.8–10.5)
WBC # FLD AUTO: 3.51 K/UL — LOW (ref 3.8–10.5)

## 2025-02-18 PROCEDURE — 99213 OFFICE O/P EST LOW 20 MIN: CPT

## 2025-02-18 PROCEDURE — G2211 COMPLEX E/M VISIT ADD ON: CPT | Mod: NC

## 2025-02-27 ENCOUNTER — RESULT REVIEW (OUTPATIENT)
Age: 60
End: 2025-02-27

## 2025-02-27 ENCOUNTER — APPOINTMENT (OUTPATIENT)
Dept: HEMATOLOGY ONCOLOGY | Facility: CLINIC | Age: 60
End: 2025-02-27
Payer: COMMERCIAL

## 2025-02-27 ENCOUNTER — APPOINTMENT (OUTPATIENT)
Dept: GERIATRICS | Facility: CLINIC | Age: 60
End: 2025-02-27
Payer: COMMERCIAL

## 2025-02-27 ENCOUNTER — APPOINTMENT (OUTPATIENT)
Dept: INFUSION THERAPY | Facility: HOSPITAL | Age: 60
End: 2025-02-27

## 2025-02-27 DIAGNOSIS — K21.9 GASTRO-ESOPHAGEAL REFLUX DISEASE W/OUT ESOPHAGITIS: ICD-10-CM

## 2025-02-27 DIAGNOSIS — C55 MALIGNANT NEOPLASM OF UTERUS, PART UNSPECIFIED: ICD-10-CM

## 2025-02-27 DIAGNOSIS — Z51.5 ENCOUNTER FOR PALLIATIVE CARE: ICD-10-CM

## 2025-02-27 DIAGNOSIS — R10.9 UNSPECIFIED ABDOMINAL PAIN: ICD-10-CM

## 2025-02-27 LAB
ALBUMIN SERPL ELPH-MCNC: 3.9 G/DL — SIGNIFICANT CHANGE UP (ref 3.3–5)
ALP SERPL-CCNC: 164 U/L — HIGH (ref 40–120)
ALT FLD-CCNC: 58 U/L — HIGH (ref 10–45)
ANION GAP SERPL CALC-SCNC: 10 MMOL/L — SIGNIFICANT CHANGE UP (ref 5–17)
AST SERPL-CCNC: 68 U/L — HIGH (ref 10–40)
BASOPHILS # BLD AUTO: 0.03 K/UL — SIGNIFICANT CHANGE UP (ref 0–0.2)
BASOPHILS NFR BLD AUTO: 0.7 % — SIGNIFICANT CHANGE UP (ref 0–2)
BILIRUB SERPL-MCNC: <0.2 MG/DL — SIGNIFICANT CHANGE UP (ref 0.2–1.2)
BUN SERPL-MCNC: 9 MG/DL — SIGNIFICANT CHANGE UP (ref 7–23)
CALCIUM SERPL-MCNC: 9.6 MG/DL — SIGNIFICANT CHANGE UP (ref 8.4–10.5)
CHLORIDE SERPL-SCNC: 102 MMOL/L — SIGNIFICANT CHANGE UP (ref 96–108)
CO2 SERPL-SCNC: 27 MMOL/L — SIGNIFICANT CHANGE UP (ref 22–31)
CREAT SERPL-MCNC: 0.62 MG/DL — SIGNIFICANT CHANGE UP (ref 0.5–1.3)
EGFR: 103 ML/MIN/1.73M2 — SIGNIFICANT CHANGE UP
EGFR: 103 ML/MIN/1.73M2 — SIGNIFICANT CHANGE UP
EOSINOPHIL # BLD AUTO: 0.27 K/UL — SIGNIFICANT CHANGE UP (ref 0–0.5)
EOSINOPHIL NFR BLD AUTO: 6.1 % — HIGH (ref 0–6)
GLUCOSE SERPL-MCNC: 110 MG/DL — HIGH (ref 70–99)
HCT VFR BLD CALC: 32 % — LOW (ref 34.5–45)
HGB BLD-MCNC: 10.6 G/DL — LOW (ref 11.5–15.5)
IMM GRANULOCYTES NFR BLD AUTO: 0.7 % — SIGNIFICANT CHANGE UP (ref 0–0.9)
LYMPHOCYTES # BLD AUTO: 0.92 K/UL — LOW (ref 1–3.3)
LYMPHOCYTES # BLD AUTO: 20.7 % — SIGNIFICANT CHANGE UP (ref 13–44)
MCHC RBC-ENTMCNC: 28.3 PG — SIGNIFICANT CHANGE UP (ref 27–34)
MCHC RBC-ENTMCNC: 33.1 G/DL — SIGNIFICANT CHANGE UP (ref 32–36)
MCV RBC AUTO: 85.6 FL — SIGNIFICANT CHANGE UP (ref 80–100)
MONOCYTES # BLD AUTO: 0.61 K/UL — SIGNIFICANT CHANGE UP (ref 0–0.9)
MONOCYTES NFR BLD AUTO: 13.7 % — SIGNIFICANT CHANGE UP (ref 2–14)
NEUTROPHILS # BLD AUTO: 2.59 K/UL — SIGNIFICANT CHANGE UP (ref 1.8–7.4)
NEUTROPHILS NFR BLD AUTO: 58.1 % — SIGNIFICANT CHANGE UP (ref 43–77)
NRBC BLD AUTO-RTO: 0 /100 WBCS — SIGNIFICANT CHANGE UP (ref 0–0)
PLATELET # BLD AUTO: 366 K/UL — SIGNIFICANT CHANGE UP (ref 150–400)
POTASSIUM SERPL-MCNC: 4.6 MMOL/L — SIGNIFICANT CHANGE UP (ref 3.5–5.3)
POTASSIUM SERPL-SCNC: 4.6 MMOL/L — SIGNIFICANT CHANGE UP (ref 3.5–5.3)
PROT SERPL-MCNC: 6.7 G/DL — SIGNIFICANT CHANGE UP (ref 6–8.3)
RBC # BLD: 3.74 M/UL — LOW (ref 3.8–5.2)
RBC # FLD: 14.4 % — SIGNIFICANT CHANGE UP (ref 10.3–14.5)
SODIUM SERPL-SCNC: 139 MMOL/L — SIGNIFICANT CHANGE UP (ref 135–145)
WBC # BLD: 4.45 K/UL — SIGNIFICANT CHANGE UP (ref 3.8–10.5)
WBC # FLD AUTO: 4.45 K/UL — SIGNIFICANT CHANGE UP (ref 3.8–10.5)

## 2025-02-27 PROCEDURE — 99215 OFFICE O/P EST HI 40 MIN: CPT

## 2025-02-28 ENCOUNTER — NON-APPOINTMENT (OUTPATIENT)
Age: 60
End: 2025-02-28

## 2025-02-28 LAB
APPEARANCE UR: CLEAR — SIGNIFICANT CHANGE UP
BILIRUB UR-MCNC: NEGATIVE — SIGNIFICANT CHANGE UP
COLOR SPEC: YELLOW — SIGNIFICANT CHANGE UP
CULTURE RESULTS: SIGNIFICANT CHANGE UP
DIFF PNL FLD: NEGATIVE — SIGNIFICANT CHANGE UP
GLUCOSE UR QL: NEGATIVE MG/DL — SIGNIFICANT CHANGE UP
KETONES UR-MCNC: NEGATIVE MG/DL — SIGNIFICANT CHANGE UP
LEUKOCYTE ESTERASE UR-ACNC: NEGATIVE — SIGNIFICANT CHANGE UP
NITRITE UR-MCNC: NEGATIVE — SIGNIFICANT CHANGE UP
PH UR: 7.5 — SIGNIFICANT CHANGE UP (ref 5–8)
PROT UR-MCNC: NEGATIVE MG/DL — SIGNIFICANT CHANGE UP
SP GR SPEC: 1 — SIGNIFICANT CHANGE UP (ref 1–1.03)
SPECIMEN SOURCE: SIGNIFICANT CHANGE UP
UROBILINOGEN FLD QL: 0.2 MG/DL — SIGNIFICANT CHANGE UP (ref 0.2–1)

## 2025-03-12 ENCOUNTER — APPOINTMENT (OUTPATIENT)
Dept: HEMATOLOGY ONCOLOGY | Facility: CLINIC | Age: 60
End: 2025-03-12
Payer: COMMERCIAL

## 2025-03-12 VITALS
TEMPERATURE: 97.3 F | BODY MASS INDEX: 22.91 KG/M2 | RESPIRATION RATE: 16 BRPM | OXYGEN SATURATION: 97 % | WEIGHT: 133.38 LBS | DIASTOLIC BLOOD PRESSURE: 78 MMHG | SYSTOLIC BLOOD PRESSURE: 127 MMHG | HEART RATE: 99 BPM

## 2025-03-12 DIAGNOSIS — R05.8 OTHER SPECIFIED COUGH: ICD-10-CM

## 2025-03-12 PROCEDURE — 99214 OFFICE O/P EST MOD 30 MIN: CPT

## 2025-03-13 ENCOUNTER — NON-APPOINTMENT (OUTPATIENT)
Age: 60
End: 2025-03-13

## 2025-03-14 PROBLEM — R05.8 OTHER COUGH: Status: ACTIVE | Noted: 2025-03-14

## 2025-03-17 ENCOUNTER — APPOINTMENT (OUTPATIENT)
Dept: HEMATOLOGY ONCOLOGY | Facility: CLINIC | Age: 60
End: 2025-03-17
Payer: COMMERCIAL

## 2025-03-17 VITALS
RESPIRATION RATE: 16 BRPM | SYSTOLIC BLOOD PRESSURE: 125 MMHG | OXYGEN SATURATION: 98 % | HEART RATE: 93 BPM | DIASTOLIC BLOOD PRESSURE: 82 MMHG | TEMPERATURE: 99.3 F

## 2025-03-17 VITALS — WEIGHT: 132.28 LBS | BODY MASS INDEX: 22.86 KG/M2 | HEIGHT: 63.78 IN

## 2025-03-17 DIAGNOSIS — C55 MALIGNANT NEOPLASM OF UTERUS, PART UNSPECIFIED: ICD-10-CM

## 2025-03-17 PROCEDURE — 99214 OFFICE O/P EST MOD 30 MIN: CPT

## 2025-03-17 RX ORDER — HYDROCODONE BITARTRATE AND HOMATROPINE METHYLBROMIDE 1.5; 5 MG/5ML; MG/5ML
5-1.5 SOLUTION ORAL 4 TIMES DAILY
Qty: 600 | Refills: 0 | Status: ACTIVE | COMMUNITY
Start: 2025-03-14 | End: 1900-01-01

## 2025-03-17 RX ORDER — PROCHLORPERAZINE MALEATE 10 MG/1
10 TABLET ORAL EVERY 6 HOURS
Qty: 20 | Refills: 6 | Status: ACTIVE | COMMUNITY
Start: 2025-03-17 | End: 1900-01-01

## 2025-03-17 RX ORDER — DEXAMETHASONE 4 MG/1
4 TABLET ORAL
Qty: 30 | Refills: 1 | Status: ACTIVE | COMMUNITY
Start: 2025-03-17 | End: 1900-01-01

## 2025-03-19 ENCOUNTER — NON-APPOINTMENT (OUTPATIENT)
Age: 60
End: 2025-03-19

## 2025-03-20 ENCOUNTER — APPOINTMENT (OUTPATIENT)
Dept: GERIATRICS | Facility: CLINIC | Age: 60
End: 2025-03-20

## 2025-03-24 DIAGNOSIS — M79.89 OTHER SPECIFIED SOFT TISSUE DISORDERS: ICD-10-CM

## 2025-03-25 ENCOUNTER — APPOINTMENT (OUTPATIENT)
Dept: HEMATOLOGY ONCOLOGY | Facility: CLINIC | Age: 60
End: 2025-03-25

## 2025-03-25 ENCOUNTER — OUTPATIENT (OUTPATIENT)
Dept: OUTPATIENT SERVICES | Facility: HOSPITAL | Age: 60
LOS: 1 days | End: 2025-03-25
Payer: COMMERCIAL

## 2025-03-25 ENCOUNTER — APPOINTMENT (OUTPATIENT)
Dept: INFUSION THERAPY | Facility: HOSPITAL | Age: 60
End: 2025-03-25

## 2025-03-25 ENCOUNTER — APPOINTMENT (OUTPATIENT)
Dept: ULTRASOUND IMAGING | Facility: IMAGING CENTER | Age: 60
End: 2025-03-25
Payer: COMMERCIAL

## 2025-03-25 DIAGNOSIS — Z90.710 ACQUIRED ABSENCE OF BOTH CERVIX AND UTERUS: Chronic | ICD-10-CM

## 2025-03-25 DIAGNOSIS — M79.89 OTHER SPECIFIED SOFT TISSUE DISORDERS: ICD-10-CM

## 2025-03-25 PROCEDURE — 93971 EXTREMITY STUDY: CPT | Mod: 26,RT

## 2025-03-25 PROCEDURE — 93971 EXTREMITY STUDY: CPT

## 2025-03-28 ENCOUNTER — TRANSCRIPTION ENCOUNTER (OUTPATIENT)
Age: 60
End: 2025-03-28

## 2025-04-01 ENCOUNTER — TRANSCRIPTION ENCOUNTER (OUTPATIENT)
Age: 60
End: 2025-04-01

## 2025-04-02 ENCOUNTER — TRANSCRIPTION ENCOUNTER (OUTPATIENT)
Age: 60
End: 2025-04-02

## 2025-04-02 ENCOUNTER — APPOINTMENT (OUTPATIENT)
Dept: HEMATOLOGY ONCOLOGY | Facility: CLINIC | Age: 60
End: 2025-04-02

## 2025-04-03 ENCOUNTER — TRANSCRIPTION ENCOUNTER (OUTPATIENT)
Age: 60
End: 2025-04-03

## 2025-04-08 ENCOUNTER — NON-APPOINTMENT (OUTPATIENT)
Age: 60
End: 2025-04-08

## 2025-04-08 ENCOUNTER — APPOINTMENT (OUTPATIENT)
Dept: GERIATRICS | Facility: CLINIC | Age: 60
End: 2025-04-08
Payer: COMMERCIAL

## 2025-04-08 ENCOUNTER — OUTPATIENT (OUTPATIENT)
Dept: OUTPATIENT SERVICES | Facility: HOSPITAL | Age: 60
LOS: 1 days | Discharge: HOME | End: 2025-04-08

## 2025-04-08 ENCOUNTER — TRANSCRIPTION ENCOUNTER (OUTPATIENT)
Age: 60
End: 2025-04-08

## 2025-04-08 DIAGNOSIS — C55 MALIGNANT NEOPLASM OF UTERUS, PART UNSPECIFIED: ICD-10-CM

## 2025-04-08 DIAGNOSIS — Z90.710 ACQUIRED ABSENCE OF BOTH CERVIX AND UTERUS: Chronic | ICD-10-CM

## 2025-04-08 DIAGNOSIS — Z98.89 OTHER SPECIFIED POSTPROCEDURAL STATES: Chronic | ICD-10-CM

## 2025-04-08 DIAGNOSIS — G89.3 NEOPLASM RELATED PAIN (ACUTE) (CHRONIC): ICD-10-CM

## 2025-04-08 DIAGNOSIS — Z90.49 ACQUIRED ABSENCE OF OTHER SPECIFIED PARTS OF DIGESTIVE TRACT: Chronic | ICD-10-CM

## 2025-04-08 DIAGNOSIS — Z51.5 ENCOUNTER FOR PALLIATIVE CARE: ICD-10-CM

## 2025-04-08 DIAGNOSIS — K59.00 CONSTIPATION, UNSPECIFIED: ICD-10-CM

## 2025-04-08 DIAGNOSIS — R05.8 OTHER SPECIFIED COUGH: ICD-10-CM

## 2025-04-08 PROCEDURE — 99215 OFFICE O/P EST HI 40 MIN: CPT | Mod: 95

## 2025-04-08 RX ORDER — OXYCODONE 5 MG/1
5 TABLET ORAL
Qty: 42 | Refills: 0 | Status: ACTIVE | COMMUNITY
Start: 2025-04-08 | End: 1900-01-01

## 2025-04-09 ENCOUNTER — APPOINTMENT (OUTPATIENT)
Dept: HEMATOLOGY ONCOLOGY | Facility: CLINIC | Age: 60
End: 2025-04-09

## 2025-04-09 ENCOUNTER — RESULT REVIEW (OUTPATIENT)
Age: 60
End: 2025-04-09

## 2025-04-09 ENCOUNTER — APPOINTMENT (OUTPATIENT)
Dept: INFUSION THERAPY | Facility: HOSPITAL | Age: 60
End: 2025-04-09

## 2025-04-09 ENCOUNTER — NON-APPOINTMENT (OUTPATIENT)
Age: 60
End: 2025-04-09

## 2025-04-09 LAB
BASOPHILS # BLD AUTO: 0.03 K/UL — SIGNIFICANT CHANGE UP (ref 0–0.2)
BASOPHILS NFR BLD AUTO: 0.4 % — SIGNIFICANT CHANGE UP (ref 0–2)
EOSINOPHIL # BLD AUTO: 0.06 K/UL — SIGNIFICANT CHANGE UP (ref 0–0.5)
EOSINOPHIL NFR BLD AUTO: 0.8 % — SIGNIFICANT CHANGE UP (ref 0–6)
HCT VFR BLD CALC: 34.1 % — LOW (ref 34.5–45)
HGB BLD-MCNC: 11.2 G/DL — LOW (ref 11.5–15.5)
IMM GRANULOCYTES NFR BLD AUTO: 0.4 % — SIGNIFICANT CHANGE UP (ref 0–0.9)
LYMPHOCYTES # BLD AUTO: 0.48 K/UL — LOW (ref 1–3.3)
LYMPHOCYTES # BLD AUTO: 6.1 % — LOW (ref 13–44)
MCHC RBC-ENTMCNC: 28.1 PG — SIGNIFICANT CHANGE UP (ref 27–34)
MCHC RBC-ENTMCNC: 32.8 G/DL — SIGNIFICANT CHANGE UP (ref 32–36)
MCV RBC AUTO: 85.5 FL — SIGNIFICANT CHANGE UP (ref 80–100)
MONOCYTES # BLD AUTO: 0.42 K/UL — SIGNIFICANT CHANGE UP (ref 0–0.9)
MONOCYTES NFR BLD AUTO: 5.3 % — SIGNIFICANT CHANGE UP (ref 2–14)
NEUTROPHILS # BLD AUTO: 6.88 K/UL — SIGNIFICANT CHANGE UP (ref 1.8–7.4)
NEUTROPHILS NFR BLD AUTO: 87 % — HIGH (ref 43–77)
NRBC BLD AUTO-RTO: 0 /100 WBCS — SIGNIFICANT CHANGE UP (ref 0–0)
PLATELET # BLD AUTO: 301 K/UL — SIGNIFICANT CHANGE UP (ref 150–400)
RBC # BLD: 3.99 M/UL — SIGNIFICANT CHANGE UP (ref 3.8–5.2)
RBC # FLD: 15.1 % — HIGH (ref 10.3–14.5)
WBC # BLD: 7.9 K/UL — SIGNIFICANT CHANGE UP (ref 3.8–10.5)
WBC # FLD AUTO: 7.9 K/UL — SIGNIFICANT CHANGE UP (ref 3.8–10.5)

## 2025-04-10 DIAGNOSIS — C54.1 MALIGNANT NEOPLASM OF ENDOMETRIUM: ICD-10-CM

## 2025-04-10 DIAGNOSIS — R11.2 NAUSEA WITH VOMITING, UNSPECIFIED: ICD-10-CM

## 2025-04-10 DIAGNOSIS — Z51.11 ENCOUNTER FOR ANTINEOPLASTIC CHEMOTHERAPY: ICD-10-CM

## 2025-04-10 LAB
CREAT ?TM UR-MCNC: 42 MG/DL — SIGNIFICANT CHANGE UP
PROT ?TM UR-MCNC: 6 MG/DL — SIGNIFICANT CHANGE UP (ref 0–12)
PROT/CREAT UR-RTO: 0.2 RATIO — SIGNIFICANT CHANGE UP (ref 0–0.2)
T4 FREE+ TSH PNL SERPL: 4.11 UIU/ML — SIGNIFICANT CHANGE UP (ref 0.27–4.2)

## 2025-04-15 ENCOUNTER — APPOINTMENT (OUTPATIENT)
Dept: GERIATRICS | Facility: CLINIC | Age: 60
End: 2025-04-15

## 2025-04-16 ENCOUNTER — APPOINTMENT (OUTPATIENT)
Dept: INFUSION THERAPY | Facility: HOSPITAL | Age: 60
End: 2025-04-16

## 2025-04-16 ENCOUNTER — APPOINTMENT (OUTPATIENT)
Dept: CT IMAGING | Facility: IMAGING CENTER | Age: 60
End: 2025-04-16
Payer: COMMERCIAL

## 2025-04-16 ENCOUNTER — RESULT REVIEW (OUTPATIENT)
Age: 60
End: 2025-04-16

## 2025-04-16 ENCOUNTER — APPOINTMENT (OUTPATIENT)
Dept: HEMATOLOGY ONCOLOGY | Facility: CLINIC | Age: 60
End: 2025-04-16
Payer: COMMERCIAL

## 2025-04-16 ENCOUNTER — APPOINTMENT (OUTPATIENT)
Dept: HEMATOLOGY ONCOLOGY | Facility: CLINIC | Age: 60
End: 2025-04-16

## 2025-04-16 ENCOUNTER — OUTPATIENT (OUTPATIENT)
Dept: OUTPATIENT SERVICES | Facility: HOSPITAL | Age: 60
LOS: 1 days | End: 2025-04-16
Payer: COMMERCIAL

## 2025-04-16 DIAGNOSIS — R06.02 SHORTNESS OF BREATH: ICD-10-CM

## 2025-04-16 DIAGNOSIS — Z98.89 OTHER SPECIFIED POSTPROCEDURAL STATES: Chronic | ICD-10-CM

## 2025-04-16 DIAGNOSIS — Z90.49 ACQUIRED ABSENCE OF OTHER SPECIFIED PARTS OF DIGESTIVE TRACT: Chronic | ICD-10-CM

## 2025-04-16 LAB
ALBUMIN SERPL ELPH-MCNC: 3.8 G/DL — SIGNIFICANT CHANGE UP (ref 3.3–5)
ALP SERPL-CCNC: 250 U/L — HIGH (ref 40–120)
ALT FLD-CCNC: 42 U/L — SIGNIFICANT CHANGE UP (ref 10–45)
ANION GAP SERPL CALC-SCNC: 14 MMOL/L — SIGNIFICANT CHANGE UP (ref 5–17)
AST SERPL-CCNC: 81 U/L — HIGH (ref 10–40)
BASOPHILS # BLD AUTO: 0.04 K/UL — SIGNIFICANT CHANGE UP (ref 0–0.2)
BASOPHILS NFR BLD AUTO: 0.6 % — SIGNIFICANT CHANGE UP (ref 0–2)
BILIRUB SERPL-MCNC: 0.2 MG/DL — SIGNIFICANT CHANGE UP (ref 0.2–1.2)
BUN SERPL-MCNC: 10 MG/DL — SIGNIFICANT CHANGE UP (ref 7–23)
CALCIUM SERPL-MCNC: 9.6 MG/DL — SIGNIFICANT CHANGE UP (ref 8.4–10.5)
CHLORIDE SERPL-SCNC: 101 MMOL/L — SIGNIFICANT CHANGE UP (ref 96–108)
CO2 SERPL-SCNC: 24 MMOL/L — SIGNIFICANT CHANGE UP (ref 22–31)
CREAT ?TM UR-MCNC: 30 MG/DL — SIGNIFICANT CHANGE UP
CREAT SERPL-MCNC: 0.53 MG/DL — SIGNIFICANT CHANGE UP (ref 0.5–1.3)
EGFR: 107 ML/MIN/1.73M2 — SIGNIFICANT CHANGE UP
EGFR: 107 ML/MIN/1.73M2 — SIGNIFICANT CHANGE UP
EOSINOPHIL # BLD AUTO: 0.21 K/UL — SIGNIFICANT CHANGE UP (ref 0–0.5)
EOSINOPHIL NFR BLD AUTO: 3.3 % — SIGNIFICANT CHANGE UP (ref 0–6)
GLUCOSE SERPL-MCNC: 89 MG/DL — SIGNIFICANT CHANGE UP (ref 70–99)
HCT VFR BLD CALC: 32.7 % — LOW (ref 34.5–45)
HGB BLD-MCNC: 10.9 G/DL — LOW (ref 11.5–15.5)
IMM GRANULOCYTES NFR BLD AUTO: 0.8 % — SIGNIFICANT CHANGE UP (ref 0–0.9)
LYMPHOCYTES # BLD AUTO: 0.85 K/UL — LOW (ref 1–3.3)
LYMPHOCYTES # BLD AUTO: 13.4 % — SIGNIFICANT CHANGE UP (ref 13–44)
MCHC RBC-ENTMCNC: 28 PG — SIGNIFICANT CHANGE UP (ref 27–34)
MCHC RBC-ENTMCNC: 33.3 G/DL — SIGNIFICANT CHANGE UP (ref 32–36)
MCV RBC AUTO: 84.1 FL — SIGNIFICANT CHANGE UP (ref 80–100)
MONOCYTES # BLD AUTO: 0.59 K/UL — SIGNIFICANT CHANGE UP (ref 0–0.9)
MONOCYTES NFR BLD AUTO: 9.3 % — SIGNIFICANT CHANGE UP (ref 2–14)
NEUTROPHILS # BLD AUTO: 4.59 K/UL — SIGNIFICANT CHANGE UP (ref 1.8–7.4)
NEUTROPHILS NFR BLD AUTO: 72.6 % — SIGNIFICANT CHANGE UP (ref 43–77)
NRBC BLD AUTO-RTO: 0 /100 WBCS — SIGNIFICANT CHANGE UP (ref 0–0)
PLATELET # BLD AUTO: 352 K/UL — SIGNIFICANT CHANGE UP (ref 150–400)
POTASSIUM SERPL-MCNC: 4.5 MMOL/L — SIGNIFICANT CHANGE UP (ref 3.5–5.3)
POTASSIUM SERPL-SCNC: 4.5 MMOL/L — SIGNIFICANT CHANGE UP (ref 3.5–5.3)
PROT ?TM UR-MCNC: 5 MG/DL — SIGNIFICANT CHANGE UP (ref 0–12)
PROT SERPL-MCNC: 6.9 G/DL — SIGNIFICANT CHANGE UP (ref 6–8.3)
PROT/CREAT UR-RTO: 0.2 RATIO — SIGNIFICANT CHANGE UP (ref 0–0.2)
RBC # BLD: 3.89 M/UL — SIGNIFICANT CHANGE UP (ref 3.8–5.2)
RBC # FLD: 15.1 % — HIGH (ref 10.3–14.5)
SODIUM SERPL-SCNC: 139 MMOL/L — SIGNIFICANT CHANGE UP (ref 135–145)
T4 FREE SERPL-MCNC: 0.8 NG/DL — LOW (ref 0.9–1.8)
T4 FREE+ TSH PNL SERPL: 21 UIU/ML — HIGH (ref 0.27–4.2)
WBC # BLD: 6.33 K/UL — SIGNIFICANT CHANGE UP (ref 3.8–10.5)
WBC # FLD AUTO: 6.33 K/UL — SIGNIFICANT CHANGE UP (ref 3.8–10.5)

## 2025-04-16 PROCEDURE — 71275 CT ANGIOGRAPHY CHEST: CPT

## 2025-04-16 PROCEDURE — 71275 CT ANGIOGRAPHY CHEST: CPT | Mod: 26

## 2025-04-16 PROCEDURE — 99215 OFFICE O/P EST HI 40 MIN: CPT

## 2025-04-16 PROCEDURE — 99213 OFFICE O/P EST LOW 20 MIN: CPT

## 2025-04-23 ENCOUNTER — APPOINTMENT (OUTPATIENT)
Dept: INFUSION THERAPY | Facility: HOSPITAL | Age: 60
End: 2025-04-23

## 2025-04-23 ENCOUNTER — RESULT REVIEW (OUTPATIENT)
Age: 60
End: 2025-04-23

## 2025-04-23 ENCOUNTER — APPOINTMENT (OUTPATIENT)
Dept: HEMATOLOGY ONCOLOGY | Facility: CLINIC | Age: 60
End: 2025-04-23
Payer: COMMERCIAL

## 2025-04-23 VITALS
DIASTOLIC BLOOD PRESSURE: 88 MMHG | RESPIRATION RATE: 16 BRPM | SYSTOLIC BLOOD PRESSURE: 153 MMHG | BODY MASS INDEX: 22.1 KG/M2 | TEMPERATURE: 97.2 F | HEART RATE: 101 BPM | OXYGEN SATURATION: 98 % | WEIGHT: 127.87 LBS

## 2025-04-23 DIAGNOSIS — C55 MALIGNANT NEOPLASM OF UTERUS, PART UNSPECIFIED: ICD-10-CM

## 2025-04-23 LAB
ALBUMIN SERPL ELPH-MCNC: 4.2 G/DL — SIGNIFICANT CHANGE UP (ref 3.3–5)
ALP SERPL-CCNC: 230 U/L — HIGH (ref 40–120)
ALT FLD-CCNC: 35 U/L — SIGNIFICANT CHANGE UP (ref 10–45)
ANION GAP SERPL CALC-SCNC: 13 MMOL/L — SIGNIFICANT CHANGE UP (ref 5–17)
AST SERPL-CCNC: 84 U/L — HIGH (ref 10–40)
BASOPHILS # BLD AUTO: 0.03 K/UL — SIGNIFICANT CHANGE UP (ref 0–0.2)
BASOPHILS NFR BLD AUTO: 0.5 % — SIGNIFICANT CHANGE UP (ref 0–2)
BILIRUB SERPL-MCNC: 0.2 MG/DL — SIGNIFICANT CHANGE UP (ref 0.2–1.2)
BUN SERPL-MCNC: 12 MG/DL — SIGNIFICANT CHANGE UP (ref 7–23)
CALCIUM SERPL-MCNC: 9.6 MG/DL — SIGNIFICANT CHANGE UP (ref 8.4–10.5)
CHLORIDE SERPL-SCNC: 102 MMOL/L — SIGNIFICANT CHANGE UP (ref 96–108)
CO2 SERPL-SCNC: 25 MMOL/L — SIGNIFICANT CHANGE UP (ref 22–31)
CREAT SERPL-MCNC: 0.58 MG/DL — SIGNIFICANT CHANGE UP (ref 0.5–1.3)
EGFR: 105 ML/MIN/1.73M2 — SIGNIFICANT CHANGE UP
EGFR: 105 ML/MIN/1.73M2 — SIGNIFICANT CHANGE UP
EOSINOPHIL # BLD AUTO: 0.09 K/UL — SIGNIFICANT CHANGE UP (ref 0–0.5)
EOSINOPHIL NFR BLD AUTO: 1.6 % — SIGNIFICANT CHANGE UP (ref 0–6)
GLUCOSE SERPL-MCNC: 99 MG/DL — SIGNIFICANT CHANGE UP (ref 70–99)
HCT VFR BLD CALC: 33.3 % — LOW (ref 34.5–45)
HGB BLD-MCNC: 10.9 G/DL — LOW (ref 11.5–15.5)
IMM GRANULOCYTES NFR BLD AUTO: 1 % — HIGH (ref 0–0.9)
LYMPHOCYTES # BLD AUTO: 0.72 K/UL — LOW (ref 1–3.3)
LYMPHOCYTES # BLD AUTO: 12.5 % — LOW (ref 13–44)
MCHC RBC-ENTMCNC: 27.9 PG — SIGNIFICANT CHANGE UP (ref 27–34)
MCHC RBC-ENTMCNC: 32.7 G/DL — SIGNIFICANT CHANGE UP (ref 32–36)
MCV RBC AUTO: 85.4 FL — SIGNIFICANT CHANGE UP (ref 80–100)
MONOCYTES # BLD AUTO: 0.41 K/UL — SIGNIFICANT CHANGE UP (ref 0–0.9)
MONOCYTES NFR BLD AUTO: 7.1 % — SIGNIFICANT CHANGE UP (ref 2–14)
NEUTROPHILS # BLD AUTO: 4.45 K/UL — SIGNIFICANT CHANGE UP (ref 1.8–7.4)
NEUTROPHILS NFR BLD AUTO: 77.3 % — HIGH (ref 43–77)
NRBC BLD AUTO-RTO: 0 /100 WBCS — SIGNIFICANT CHANGE UP (ref 0–0)
PLATELET # BLD AUTO: 287 K/UL — SIGNIFICANT CHANGE UP (ref 150–400)
POTASSIUM SERPL-MCNC: 3.8 MMOL/L — SIGNIFICANT CHANGE UP (ref 3.5–5.3)
POTASSIUM SERPL-SCNC: 3.8 MMOL/L — SIGNIFICANT CHANGE UP (ref 3.5–5.3)
PROT SERPL-MCNC: 6.9 G/DL — SIGNIFICANT CHANGE UP (ref 6–8.3)
RBC # BLD: 3.9 M/UL — SIGNIFICANT CHANGE UP (ref 3.8–5.2)
RBC # FLD: 15.5 % — HIGH (ref 10.3–14.5)
SODIUM SERPL-SCNC: 140 MMOL/L — SIGNIFICANT CHANGE UP (ref 135–145)
WBC # BLD: 5.76 K/UL — SIGNIFICANT CHANGE UP (ref 3.8–10.5)
WBC # FLD AUTO: 5.76 K/UL — SIGNIFICANT CHANGE UP (ref 3.8–10.5)

## 2025-04-23 PROCEDURE — 99214 OFFICE O/P EST MOD 30 MIN: CPT

## 2025-04-24 LAB
CANCER AG125 SERPL-ACNC: 105 U/ML — HIGH
CREAT ?TM UR-MCNC: 9 MG/DL — SIGNIFICANT CHANGE UP
PROT ?TM UR-MCNC: <4 MG/DL — SIGNIFICANT CHANGE UP (ref 0–12)
PROT/CREAT UR-RTO: SIGNIFICANT CHANGE UP RATIO (ref 0–0.2)
T4 FREE SERPL-MCNC: 0.7 NG/DL — LOW (ref 0.9–1.8)
T4 FREE+ TSH PNL SERPL: 31.3 UIU/ML — HIGH (ref 0.27–4.2)

## 2025-04-28 ENCOUNTER — TRANSCRIPTION ENCOUNTER (OUTPATIENT)
Age: 60
End: 2025-04-28

## 2025-05-06 ENCOUNTER — APPOINTMENT (OUTPATIENT)
Dept: INFUSION THERAPY | Facility: HOSPITAL | Age: 60
End: 2025-05-06

## 2025-05-07 ENCOUNTER — RESULT REVIEW (OUTPATIENT)
Age: 60
End: 2025-05-07

## 2025-05-07 ENCOUNTER — APPOINTMENT (OUTPATIENT)
Dept: INFUSION THERAPY | Facility: HOSPITAL | Age: 60
End: 2025-05-07

## 2025-05-07 ENCOUNTER — TRANSCRIPTION ENCOUNTER (OUTPATIENT)
Age: 60
End: 2025-05-07

## 2025-05-07 ENCOUNTER — APPOINTMENT (OUTPATIENT)
Dept: HEMATOLOGY ONCOLOGY | Facility: CLINIC | Age: 60
End: 2025-05-07

## 2025-05-07 LAB
BASOPHILS # BLD AUTO: 0.03 K/UL — SIGNIFICANT CHANGE UP (ref 0–0.2)
BASOPHILS NFR BLD AUTO: 0.5 % — SIGNIFICANT CHANGE UP (ref 0–2)
EOSINOPHIL # BLD AUTO: 0.06 K/UL — SIGNIFICANT CHANGE UP (ref 0–0.5)
EOSINOPHIL NFR BLD AUTO: 1 % — SIGNIFICANT CHANGE UP (ref 0–6)
HCT VFR BLD CALC: 34.2 % — LOW (ref 34.5–45)
HGB BLD-MCNC: 11.3 G/DL — LOW (ref 11.5–15.5)
IMM GRANULOCYTES NFR BLD AUTO: 0.2 % — SIGNIFICANT CHANGE UP (ref 0–0.9)
LYMPHOCYTES # BLD AUTO: 0.67 K/UL — LOW (ref 1–3.3)
LYMPHOCYTES # BLD AUTO: 10.8 % — LOW (ref 13–44)
MCHC RBC-ENTMCNC: 28 PG — SIGNIFICANT CHANGE UP (ref 27–34)
MCHC RBC-ENTMCNC: 33 G/DL — SIGNIFICANT CHANGE UP (ref 32–36)
MCV RBC AUTO: 84.9 FL — SIGNIFICANT CHANGE UP (ref 80–100)
MONOCYTES # BLD AUTO: 0.58 K/UL — SIGNIFICANT CHANGE UP (ref 0–0.9)
MONOCYTES NFR BLD AUTO: 9.4 % — SIGNIFICANT CHANGE UP (ref 2–14)
NEUTROPHILS # BLD AUTO: 4.84 K/UL — SIGNIFICANT CHANGE UP (ref 1.8–7.4)
NEUTROPHILS NFR BLD AUTO: 78.1 % — HIGH (ref 43–77)
NRBC BLD AUTO-RTO: 0 /100 WBCS — SIGNIFICANT CHANGE UP (ref 0–0)
PLATELET # BLD AUTO: 221 K/UL — SIGNIFICANT CHANGE UP (ref 150–400)
RBC # BLD: 4.03 M/UL — SIGNIFICANT CHANGE UP (ref 3.8–5.2)
RBC # FLD: 15.5 % — HIGH (ref 10.3–14.5)
WBC # BLD: 6.19 K/UL — SIGNIFICANT CHANGE UP (ref 3.8–10.5)
WBC # FLD AUTO: 6.19 K/UL — SIGNIFICANT CHANGE UP (ref 3.8–10.5)

## 2025-05-08 ENCOUNTER — TRANSCRIPTION ENCOUNTER (OUTPATIENT)
Age: 60
End: 2025-05-08

## 2025-05-08 LAB
APPEARANCE UR: CLEAR — SIGNIFICANT CHANGE UP
BILIRUB UR-MCNC: NEGATIVE — SIGNIFICANT CHANGE UP
COLOR SPEC: YELLOW — SIGNIFICANT CHANGE UP
DIFF PNL FLD: NEGATIVE — SIGNIFICANT CHANGE UP
GLUCOSE UR QL: NEGATIVE MG/DL — SIGNIFICANT CHANGE UP
KETONES UR-MCNC: NEGATIVE MG/DL — SIGNIFICANT CHANGE UP
LEUKOCYTE ESTERASE UR-ACNC: NEGATIVE — SIGNIFICANT CHANGE UP
NITRITE UR-MCNC: NEGATIVE — SIGNIFICANT CHANGE UP
PH UR: 7 — SIGNIFICANT CHANGE UP (ref 5–8)
PROT UR-MCNC: NEGATIVE MG/DL — SIGNIFICANT CHANGE UP
SP GR SPEC: 1.01 — SIGNIFICANT CHANGE UP (ref 1–1.03)
UROBILINOGEN FLD QL: 0.2 MG/DL — SIGNIFICANT CHANGE UP (ref 0.2–1)

## 2025-05-09 LAB
CULTURE RESULTS: NO GROWTH — SIGNIFICANT CHANGE UP
SPECIMEN SOURCE: SIGNIFICANT CHANGE UP

## 2025-05-14 ENCOUNTER — RESULT REVIEW (OUTPATIENT)
Age: 60
End: 2025-05-14

## 2025-05-14 ENCOUNTER — APPOINTMENT (OUTPATIENT)
Dept: HEMATOLOGY ONCOLOGY | Facility: CLINIC | Age: 60
End: 2025-05-14

## 2025-05-14 ENCOUNTER — APPOINTMENT (OUTPATIENT)
Dept: INFUSION THERAPY | Facility: HOSPITAL | Age: 60
End: 2025-05-14

## 2025-05-14 LAB
BASOPHILS # BLD AUTO: 0.04 K/UL — SIGNIFICANT CHANGE UP (ref 0–0.2)
BASOPHILS NFR BLD AUTO: 0.8 % — SIGNIFICANT CHANGE UP (ref 0–2)
EOSINOPHIL # BLD AUTO: 0.11 K/UL — SIGNIFICANT CHANGE UP (ref 0–0.5)
EOSINOPHIL NFR BLD AUTO: 2.2 % — SIGNIFICANT CHANGE UP (ref 0–6)
HCT VFR BLD CALC: 34.3 % — LOW (ref 34.5–45)
HGB BLD-MCNC: 11.4 G/DL — LOW (ref 11.5–15.5)
IMM GRANULOCYTES NFR BLD AUTO: 0.6 % — SIGNIFICANT CHANGE UP (ref 0–0.9)
LYMPHOCYTES # BLD AUTO: 0.61 K/UL — LOW (ref 1–3.3)
LYMPHOCYTES # BLD AUTO: 12.1 % — LOW (ref 13–44)
MCHC RBC-ENTMCNC: 28.2 PG — SIGNIFICANT CHANGE UP (ref 27–34)
MCHC RBC-ENTMCNC: 33.2 G/DL — SIGNIFICANT CHANGE UP (ref 32–36)
MCV RBC AUTO: 84.9 FL — SIGNIFICANT CHANGE UP (ref 80–100)
MONOCYTES # BLD AUTO: 0.42 K/UL — SIGNIFICANT CHANGE UP (ref 0–0.9)
MONOCYTES NFR BLD AUTO: 8.3 % — SIGNIFICANT CHANGE UP (ref 2–14)
NEUTROPHILS # BLD AUTO: 3.85 K/UL — SIGNIFICANT CHANGE UP (ref 1.8–7.4)
NEUTROPHILS NFR BLD AUTO: 76 % — SIGNIFICANT CHANGE UP (ref 43–77)
NRBC BLD AUTO-RTO: 0 /100 WBCS — SIGNIFICANT CHANGE UP (ref 0–0)
PLATELET # BLD AUTO: 244 K/UL — SIGNIFICANT CHANGE UP (ref 150–400)
RBC # BLD: 4.04 M/UL — SIGNIFICANT CHANGE UP (ref 3.8–5.2)
RBC # FLD: 15.2 % — HIGH (ref 10.3–14.5)
WBC # BLD: 5.06 K/UL — SIGNIFICANT CHANGE UP (ref 3.8–10.5)
WBC # FLD AUTO: 5.06 K/UL — SIGNIFICANT CHANGE UP (ref 3.8–10.5)

## 2025-05-21 ENCOUNTER — RESULT REVIEW (OUTPATIENT)
Age: 60
End: 2025-05-21

## 2025-05-21 ENCOUNTER — APPOINTMENT (OUTPATIENT)
Dept: HEMATOLOGY ONCOLOGY | Facility: CLINIC | Age: 60
End: 2025-05-21
Payer: COMMERCIAL

## 2025-05-21 ENCOUNTER — APPOINTMENT (OUTPATIENT)
Dept: INFUSION THERAPY | Facility: HOSPITAL | Age: 60
End: 2025-05-21

## 2025-05-21 DIAGNOSIS — C55 MALIGNANT NEOPLASM OF UTERUS, PART UNSPECIFIED: ICD-10-CM

## 2025-05-21 LAB
ALBUMIN SERPL ELPH-MCNC: 4.3 G/DL — SIGNIFICANT CHANGE UP (ref 3.3–5)
ALP SERPL-CCNC: 243 U/L — HIGH (ref 40–120)
ALT FLD-CCNC: 34 U/L — SIGNIFICANT CHANGE UP (ref 10–45)
ANION GAP SERPL CALC-SCNC: 12 MMOL/L — SIGNIFICANT CHANGE UP (ref 5–17)
AST SERPL-CCNC: 105 U/L — HIGH (ref 10–40)
BASOPHILS # BLD AUTO: 0.04 K/UL — SIGNIFICANT CHANGE UP (ref 0–0.2)
BASOPHILS NFR BLD AUTO: 1 % — SIGNIFICANT CHANGE UP (ref 0–2)
BILIRUB SERPL-MCNC: 0.3 MG/DL — SIGNIFICANT CHANGE UP (ref 0.2–1.2)
BUN SERPL-MCNC: 8 MG/DL — SIGNIFICANT CHANGE UP (ref 7–23)
CALCIUM SERPL-MCNC: 9.7 MG/DL — SIGNIFICANT CHANGE UP (ref 8.4–10.5)
CHLORIDE SERPL-SCNC: 102 MMOL/L — SIGNIFICANT CHANGE UP (ref 96–108)
CO2 SERPL-SCNC: 26 MMOL/L — SIGNIFICANT CHANGE UP (ref 22–31)
CREAT ?TM UR-MCNC: 30 MG/DL — SIGNIFICANT CHANGE UP
CREAT SERPL-MCNC: 0.58 MG/DL — SIGNIFICANT CHANGE UP (ref 0.5–1.3)
EGFR: 105 ML/MIN/1.73M2 — SIGNIFICANT CHANGE UP
EGFR: 105 ML/MIN/1.73M2 — SIGNIFICANT CHANGE UP
EOSINOPHIL # BLD AUTO: 0.09 K/UL — SIGNIFICANT CHANGE UP (ref 0–0.5)
EOSINOPHIL NFR BLD AUTO: 2.2 % — SIGNIFICANT CHANGE UP (ref 0–6)
GLUCOSE SERPL-MCNC: 85 MG/DL — SIGNIFICANT CHANGE UP (ref 70–99)
HCT VFR BLD CALC: 34.1 % — LOW (ref 34.5–45)
HGB BLD-MCNC: 11.5 G/DL — SIGNIFICANT CHANGE UP (ref 11.5–15.5)
IMM GRANULOCYTES NFR BLD AUTO: 0.7 % — SIGNIFICANT CHANGE UP (ref 0–0.9)
LYMPHOCYTES # BLD AUTO: 0.55 K/UL — LOW (ref 1–3.3)
LYMPHOCYTES # BLD AUTO: 13.3 % — SIGNIFICANT CHANGE UP (ref 13–44)
MCHC RBC-ENTMCNC: 28.7 PG — SIGNIFICANT CHANGE UP (ref 27–34)
MCHC RBC-ENTMCNC: 33.7 G/DL — SIGNIFICANT CHANGE UP (ref 32–36)
MCV RBC AUTO: 85 FL — SIGNIFICANT CHANGE UP (ref 80–100)
MONOCYTES # BLD AUTO: 0.38 K/UL — SIGNIFICANT CHANGE UP (ref 0–0.9)
MONOCYTES NFR BLD AUTO: 9.2 % — SIGNIFICANT CHANGE UP (ref 2–14)
NEUTROPHILS # BLD AUTO: 3.05 K/UL — SIGNIFICANT CHANGE UP (ref 1.8–7.4)
NEUTROPHILS NFR BLD AUTO: 73.6 % — SIGNIFICANT CHANGE UP (ref 43–77)
NRBC BLD AUTO-RTO: 0 /100 WBCS — SIGNIFICANT CHANGE UP (ref 0–0)
PLATELET # BLD AUTO: 239 K/UL — SIGNIFICANT CHANGE UP (ref 150–400)
POTASSIUM SERPL-MCNC: 4.7 MMOL/L — SIGNIFICANT CHANGE UP (ref 3.5–5.3)
POTASSIUM SERPL-SCNC: 4.7 MMOL/L — SIGNIFICANT CHANGE UP (ref 3.5–5.3)
PROT ?TM UR-MCNC: <4 MG/DL — SIGNIFICANT CHANGE UP (ref 0–12)
PROT SERPL-MCNC: 6.9 G/DL — SIGNIFICANT CHANGE UP (ref 6–8.3)
PROT/CREAT UR-RTO: SIGNIFICANT CHANGE UP RATIO (ref 0–0.2)
RBC # BLD: 4.01 M/UL — SIGNIFICANT CHANGE UP (ref 3.8–5.2)
RBC # FLD: 15.1 % — HIGH (ref 10.3–14.5)
SODIUM SERPL-SCNC: 140 MMOL/L — SIGNIFICANT CHANGE UP (ref 135–145)
WBC # BLD: 4.14 K/UL — SIGNIFICANT CHANGE UP (ref 3.8–10.5)
WBC # FLD AUTO: 4.14 K/UL — SIGNIFICANT CHANGE UP (ref 3.8–10.5)

## 2025-05-21 PROCEDURE — G2211 COMPLEX E/M VISIT ADD ON: CPT | Mod: NC

## 2025-05-21 PROCEDURE — 99213 OFFICE O/P EST LOW 20 MIN: CPT

## 2025-06-04 ENCOUNTER — APPOINTMENT (OUTPATIENT)
Dept: HEMATOLOGY ONCOLOGY | Facility: CLINIC | Age: 60
End: 2025-06-04

## 2025-06-04 ENCOUNTER — APPOINTMENT (OUTPATIENT)
Dept: INFUSION THERAPY | Facility: HOSPITAL | Age: 60
End: 2025-06-04

## 2025-06-11 ENCOUNTER — APPOINTMENT (OUTPATIENT)
Dept: INFUSION THERAPY | Facility: HOSPITAL | Age: 60
End: 2025-06-11

## 2025-06-11 ENCOUNTER — APPOINTMENT (OUTPATIENT)
Dept: HEMATOLOGY ONCOLOGY | Facility: CLINIC | Age: 60
End: 2025-06-11

## 2025-06-12 ENCOUNTER — INPATIENT (INPATIENT)
Facility: HOSPITAL | Age: 60
LOS: 5 days | Discharge: HOME | End: 2025-06-18
Attending: STUDENT IN AN ORGANIZED HEALTH CARE EDUCATION/TRAINING PROGRAM | Admitting: STUDENT IN AN ORGANIZED HEALTH CARE EDUCATION/TRAINING PROGRAM
Payer: COMMERCIAL

## 2025-06-12 ENCOUNTER — APPOINTMENT (OUTPATIENT)
Dept: HEMATOLOGY ONCOLOGY | Facility: CLINIC | Age: 60
End: 2025-06-12
Payer: COMMERCIAL

## 2025-06-12 VITALS
RESPIRATION RATE: 16 BRPM | BODY MASS INDEX: 22.29 KG/M2 | HEART RATE: 98 BPM | OXYGEN SATURATION: 98 % | WEIGHT: 128.97 LBS | SYSTOLIC BLOOD PRESSURE: 144 MMHG | DIASTOLIC BLOOD PRESSURE: 85 MMHG | TEMPERATURE: 98 F

## 2025-06-12 VITALS
HEART RATE: 78 BPM | WEIGHT: 121.92 LBS | HEIGHT: 63.78 IN | DIASTOLIC BLOOD PRESSURE: 85 MMHG | RESPIRATION RATE: 17 BRPM | SYSTOLIC BLOOD PRESSURE: 150 MMHG | OXYGEN SATURATION: 98 % | TEMPERATURE: 98 F

## 2025-06-12 DIAGNOSIS — Z90.710 ACQUIRED ABSENCE OF BOTH CERVIX AND UTERUS: Chronic | ICD-10-CM

## 2025-06-12 DIAGNOSIS — Z98.89 OTHER SPECIFIED POSTPROCEDURAL STATES: Chronic | ICD-10-CM

## 2025-06-12 DIAGNOSIS — Z90.49 ACQUIRED ABSENCE OF OTHER SPECIFIED PARTS OF DIGESTIVE TRACT: Chronic | ICD-10-CM

## 2025-06-12 DIAGNOSIS — K62.89 OTHER SPECIFIED DISEASES OF ANUS AND RECTUM: ICD-10-CM

## 2025-06-12 LAB
ALBUMIN SERPL ELPH-MCNC: 3.7 G/DL — SIGNIFICANT CHANGE UP (ref 3.3–5)
ALP SERPL-CCNC: 381 U/L — HIGH (ref 40–120)
ALT FLD-CCNC: 41 U/L — HIGH (ref 4–33)
ANION GAP SERPL CALC-SCNC: 14 MMOL/L — SIGNIFICANT CHANGE UP (ref 7–14)
APTT BLD: 27.5 SEC — SIGNIFICANT CHANGE UP (ref 26.1–36.8)
AST SERPL-CCNC: 120 U/L — HIGH (ref 4–32)
BASOPHILS # BLD AUTO: 0.02 K/UL — SIGNIFICANT CHANGE UP (ref 0–0.2)
BASOPHILS NFR BLD AUTO: 0.2 % — SIGNIFICANT CHANGE UP (ref 0–2)
BILIRUB SERPL-MCNC: 0.4 MG/DL — SIGNIFICANT CHANGE UP (ref 0.2–1.2)
BLOOD GAS VENOUS COMPREHENSIVE RESULT: SIGNIFICANT CHANGE UP
BUN SERPL-MCNC: 7 MG/DL — SIGNIFICANT CHANGE UP (ref 7–23)
CALCIUM SERPL-MCNC: 9.3 MG/DL — SIGNIFICANT CHANGE UP (ref 8.4–10.5)
CHLORIDE SERPL-SCNC: 98 MMOL/L — SIGNIFICANT CHANGE UP (ref 98–107)
CO2 SERPL-SCNC: 27 MMOL/L — SIGNIFICANT CHANGE UP (ref 22–31)
CREAT SERPL-MCNC: 0.53 MG/DL — SIGNIFICANT CHANGE UP (ref 0.5–1.3)
EGFR: 107 ML/MIN/1.73M2 — SIGNIFICANT CHANGE UP
EGFR: 107 ML/MIN/1.73M2 — SIGNIFICANT CHANGE UP
EOSINOPHIL # BLD AUTO: 0.12 K/UL — SIGNIFICANT CHANGE UP (ref 0–0.5)
EOSINOPHIL NFR BLD AUTO: 1.3 % — SIGNIFICANT CHANGE UP (ref 0–6)
GLUCOSE SERPL-MCNC: 107 MG/DL — HIGH (ref 70–99)
HCT VFR BLD CALC: 31.5 % — LOW (ref 34.5–45)
HGB BLD-MCNC: 10.5 G/DL — LOW (ref 11.5–15.5)
IANC: 7.21 K/UL — SIGNIFICANT CHANGE UP (ref 1.8–7.4)
IMM GRANULOCYTES NFR BLD AUTO: 0.2 % — SIGNIFICANT CHANGE UP (ref 0–0.9)
INR BLD: 1.08 RATIO — SIGNIFICANT CHANGE UP (ref 0.85–1.16)
LIDOCAIN IGE QN: 40 U/L — SIGNIFICANT CHANGE UP (ref 7–60)
LYMPHOCYTES # BLD AUTO: 0.63 K/UL — LOW (ref 1–3.3)
LYMPHOCYTES # BLD AUTO: 7 % — LOW (ref 13–44)
MCHC RBC-ENTMCNC: 28.2 PG — SIGNIFICANT CHANGE UP (ref 27–34)
MCHC RBC-ENTMCNC: 33.3 G/DL — SIGNIFICANT CHANGE UP (ref 32–36)
MCV RBC AUTO: 84.5 FL — SIGNIFICANT CHANGE UP (ref 80–100)
MONOCYTES # BLD AUTO: 0.94 K/UL — HIGH (ref 0–0.9)
MONOCYTES NFR BLD AUTO: 10.5 % — SIGNIFICANT CHANGE UP (ref 2–14)
NEUTROPHILS # BLD AUTO: 7.21 K/UL — SIGNIFICANT CHANGE UP (ref 1.8–7.4)
NEUTROPHILS NFR BLD AUTO: 80.8 % — HIGH (ref 43–77)
NRBC # BLD AUTO: 0 K/UL — SIGNIFICANT CHANGE UP (ref 0–0)
NRBC # FLD: 0 K/UL — SIGNIFICANT CHANGE UP (ref 0–0)
NRBC BLD AUTO-RTO: 0 /100 WBCS — SIGNIFICANT CHANGE UP (ref 0–0)
PLATELET # BLD AUTO: 236 K/UL — SIGNIFICANT CHANGE UP (ref 150–400)
POTASSIUM SERPL-MCNC: 3.8 MMOL/L — SIGNIFICANT CHANGE UP (ref 3.5–5.3)
POTASSIUM SERPL-SCNC: 3.8 MMOL/L — SIGNIFICANT CHANGE UP (ref 3.5–5.3)
PROT SERPL-MCNC: 6.4 G/DL — SIGNIFICANT CHANGE UP (ref 6–8.3)
PROTHROM AB SERPL-ACNC: 12.9 SEC — SIGNIFICANT CHANGE UP (ref 9.9–13.4)
RBC # BLD: 3.73 M/UL — LOW (ref 3.8–5.2)
RBC # FLD: 15 % — HIGH (ref 10.3–14.5)
SODIUM SERPL-SCNC: 139 MMOL/L — SIGNIFICANT CHANGE UP (ref 135–145)
WBC # BLD: 8.94 K/UL — SIGNIFICANT CHANGE UP (ref 3.8–10.5)
WBC # FLD AUTO: 8.94 K/UL — SIGNIFICANT CHANGE UP (ref 3.8–10.5)

## 2025-06-12 PROCEDURE — 99285 EMERGENCY DEPT VISIT HI MDM: CPT

## 2025-06-12 PROCEDURE — 74177 CT ABD & PELVIS W/CONTRAST: CPT | Mod: 26

## 2025-06-12 PROCEDURE — 99223 1ST HOSP IP/OBS HIGH 75: CPT

## 2025-06-12 PROCEDURE — 99214 OFFICE O/P EST MOD 30 MIN: CPT

## 2025-06-12 RX ORDER — HEPARIN SODIUM 1000 [USP'U]/ML
INJECTION INTRAVENOUS; SUBCUTANEOUS
Qty: 25000 | Refills: 0 | Status: DISCONTINUED | OUTPATIENT
Start: 2025-06-12 | End: 2025-06-14

## 2025-06-12 RX ORDER — HEPARIN SODIUM 1000 [USP'U]/ML
2000 INJECTION INTRAVENOUS; SUBCUTANEOUS EVERY 6 HOURS
Refills: 0 | Status: DISCONTINUED | OUTPATIENT
Start: 2025-06-12 | End: 2025-06-14

## 2025-06-12 RX ORDER — HEPARIN SODIUM 1000 [USP'U]/ML
4500 INJECTION INTRAVENOUS; SUBCUTANEOUS EVERY 6 HOURS
Refills: 0 | Status: DISCONTINUED | OUTPATIENT
Start: 2025-06-12 | End: 2025-06-14

## 2025-06-12 RX ORDER — HEPARIN SODIUM 1000 [USP'U]/ML
4000 INJECTION INTRAVENOUS; SUBCUTANEOUS ONCE
Refills: 0 | Status: DISCONTINUED | OUTPATIENT
Start: 2025-06-12 | End: 2025-06-12

## 2025-06-12 RX ORDER — HEPARIN SODIUM 1000 [USP'U]/ML
4500 INJECTION INTRAVENOUS; SUBCUTANEOUS ONCE
Refills: 0 | Status: COMPLETED | OUTPATIENT
Start: 2025-06-12 | End: 2025-06-12

## 2025-06-12 RX ORDER — HEPARIN SODIUM 1000 [USP'U]/ML
INJECTION INTRAVENOUS; SUBCUTANEOUS
Qty: 25000 | Refills: 0 | Status: DISCONTINUED | OUTPATIENT
Start: 2025-06-12 | End: 2025-06-12

## 2025-06-12 RX ORDER — HEPARIN SODIUM 1000 [USP'U]/ML
4000 INJECTION INTRAVENOUS; SUBCUTANEOUS EVERY 6 HOURS
Refills: 0 | Status: DISCONTINUED | OUTPATIENT
Start: 2025-06-12 | End: 2025-06-12

## 2025-06-12 RX ORDER — IOHEXOL 350 MG/ML
30 INJECTION, SOLUTION INTRAVENOUS ONCE
Refills: 0 | Status: COMPLETED | OUTPATIENT
Start: 2025-06-12 | End: 2025-06-12

## 2025-06-12 RX ORDER — HEPARIN SODIUM 1000 [USP'U]/ML
2000 INJECTION INTRAVENOUS; SUBCUTANEOUS EVERY 6 HOURS
Refills: 0 | Status: DISCONTINUED | OUTPATIENT
Start: 2025-06-12 | End: 2025-06-12

## 2025-06-12 RX ADMIN — HEPARIN SODIUM 1100 UNIT(S)/HR: 1000 INJECTION INTRAVENOUS; SUBCUTANEOUS at 23:57

## 2025-06-12 RX ADMIN — Medication 4 MILLIGRAM(S): at 23:32

## 2025-06-12 RX ADMIN — IOHEXOL 30 MILLILITER(S): 350 INJECTION, SOLUTION INTRAVENOUS at 18:26

## 2025-06-12 RX ADMIN — HEPARIN SODIUM 4500 UNIT(S): 1000 INJECTION INTRAVENOUS; SUBCUTANEOUS at 23:59

## 2025-06-12 RX ADMIN — Medication 4 MILLIGRAM(S): at 18:26

## 2025-06-12 NOTE — H&P ADULT - NSHPREVIEWOFSYSTEMS_GEN_ALL_CORE
- CONSTITUTIONAL: Denies weight loss, fever and chills.  - HEENT: Denies changes in vision and hearing.  - RESPIRATORY: Denies SOB  - CV: Denies palpitations and CP.  - GI: As per HPI  - : Denies dysuria and urinary frequency.  - MSK: Denies myalgia and joint pain.  - SKIN: Denies rash and pruritus.  - NEUROLOGICAL: Denies headache and syncope.  - PSYCHIATRIC: Denies recent changes in mood. Denies anxiety and depression.    A 12-point review of systems was completed and is otherwise negative except as noted in the HPI.

## 2025-06-12 NOTE — ED PROVIDER NOTE - NSICDXPASTSURGICALHX_GEN_ALL_CORE_FT
PAST SURGICAL HISTORY:  H/O abdominal hysterectomy 1/2021 @ [facility]    H/O ovarian cystectomy 2004    History of cholecystectomy 217

## 2025-06-12 NOTE — ED PROVIDER NOTE - CARDIAC, MLM
c/o vomiting, cough, rash, sore throat x5days. Per mom pt. is +12wks and 3days pregnant. +vomiting 2x/day, unable to hold any PO down. +UO. +Rash noted to neck and per pt. rash on b/l inner upper thighs. Pt. c/o left leg pain but unable to state when started. Denies Fever/D. Per pt. follows with ob/gyn and takes prenatals everyday. Pt. visibly uncomfortable in triage, and c/o pain in throat when speaking. lungs clear/equal b/l. Alert and appropriate, BCR <2sec, no inc WOB. PMHx asthma. NKA. IUTD. Normal rate, regular rhythm.  Heart sounds S1, S2.  No murmurs, rubs or gallops. c/o vomiting, cough, rash, sore throat x5days. Per mom pt. is +12wks and 3days pregnant. +vomiting 2x/day, unable to hold any PO down. +UO. +Rash noted to neck and per pt. rash on b/l inner upper thighs. Pt. c/o left leg pain but unable to state when started. Denies Fever/D. Per pt. follows with ob/gyn and takes prenatals everyday. Pt. visibly uncomfortable in triage, and c/o pain in throat when speaking. lungs clear/equal b/l. Alert and appropriate, BCR <2sec, no inc WOB. PMHx asthma. NKA. IUTD. Pt. meets code sepsis. ANM notified. MD brought to rapid assess and MD JUSTUS Haile downgraded code sepsis at this time.

## 2025-06-12 NOTE — H&P ADULT - NSHPPHYSICALEXAM_GEN_ALL_CORE
- GENERAL: Alert and oriented x 3. No acute distress  - EYES: EOMI. Anicteric.  - HENT: No scleral icterus. No cervical lymphadenopathy.  - LUNGS: Clear to auscultation bilaterally. No accessory muscle use.  - CARDIOVASCULAR: Regular rate and rhythm. No murmur. No JVD.  - ABDOMEN: Soft, non-tender and non-distended. No palpable masses.  - EXTREMITIES: No edema. Non-tender.  - SKIN: No rashes or lesions. Warm.  - NEUROLOGIC: No focal neurological deficits. CN II-XII grossly intact, but not individually tested.  - PSYCHIATRIC: Cooperative. Appropriate mood and affect.

## 2025-06-12 NOTE — H&P ADULT - PROBLEM SELECTOR PLAN 7
-Home medication: Losartan 50 mg QD  -c/w home meds with hold parameters (SBP <110, DBP <60)   -Monitor BP closely and adjust medications if clinically indicated  -DASH Diet

## 2025-06-12 NOTE — ED PROVIDER NOTE - ATTENDING APP SHARED VISIT CONTRIBUTION OF CARE
michael: Patient is 59-year-old woman with history of uterine cancer last chemo was 3 weeks ago.  She comes in with rectal discomfort and pain for a month and a half she has been seeing multiple physicians no one is done a physical exam of the rectum however she had a CAT scan that was done 3 days ago.  CT showed chronic left portal vein thrombosis with near occlusive right portal vein thrombus and probable thrombus in the right iliac veins bilateral hydronephrosis.  She also has not had a bowel movement since the exam.  She was sent to the emergency department for further evaluation and treatment.    No fevers chills chest pain no nausea vomiting no bright red blood per rectum.    1 get with the patient patient's vital signs include blood pressure 150/85 respiratory rate 17 heart rate 78 afebrile O2 sat is 98    Patient nontoxic-appearing awake alert and appropriate  Pt alert and can phonate well  h at/nc  perrl, conj clear, sclera anicteric,  neck supple  abd soft no r/g/t mild abdominal distention.  Rectal exam done in my presence.  No external hemorrhoids no stool but seems like there is a mass reachable by digit  ext no edema no deformities  neueo awake, lucid normal gait moves all extremities with strength  psych normal affect  vs reasonable    Patient has family member who is a nurse with her at the bedside.  She is agreeable to all conversations with this person present.  Abdomen is soft but clearly given prior CAT scan there is concern for a mass that is obstructing.  She is known to have hydro.  Also patient would likely benefit from IV heparin but with CT first.I asked that the patient had a goals of care conversation and she has not yet had 1.  She is open to palliative care speaking with her after she has some time to think about it

## 2025-06-12 NOTE — H&P ADULT - TIME BILLING
I have spent 82 minutes of time on the encounter which excludes teaching and separately reported services.    Preparing to see the patient including review of tests and other providers' notes, confirming history with patient/family member, performing medical examination and evaluation, counseling and educating the patient/family/caregiver, ordering medications, tests and procedures, communicating with other health care professionals, documenting clinical information in the EMR, independently interpreting results and communicating results to the patient/family/caregiver, care coordination

## 2025-06-12 NOTE — H&P ADULT - NSHPLABSRESULTS_GEN_ALL_CORE
10.5   8.94  )-----------( 236      ( 12 Jun 2025 18:30 )             31.5     139  |  98  |  7   ----------------------------<  107[H]     06-12  3.8   |  27  |  0.53    Ca    9.3      12 Jun 2025 18:30    TPro  6.4  /  Alb  3.7  /  TBili  0.4  /  DBili  x   /  AST  120[H]  /  ALT  41[H]  /  AlkPhos  381[H]  06-12    PT/INR: 12.9/1.08 (06-12-25 @ 19:36)  PTT: 27.5 (06-12-25 @ 19:36)      18:30 - VBG - pH: 7.49  | pCO2: 43    | pO2: 46    | Lactate: 1.7

## 2025-06-12 NOTE — H&P ADULT - PROBLEM SELECTOR PLAN 9
VTE PPx: Heparin gtt  Nutrition: DASH/TLC Diet  Fluids: PRN  Electrolytes: Maintain K>4, Mag >2, Phos > 3  Access: PIV  Dispo: pending clinical improvement

## 2025-06-12 NOTE — ED PROVIDER NOTE - OBJECTIVE STATEMENT
Patient is a 59-year-old female with past medical history of uterine cancer with last chemo 3 weeks ago, history of hysterectomy presents with rectal pain x 1.5 months.  Patient reports gradual worsening rectal pain which worsens with bowel movement.  Patient reports she had CT scan performed 3 days ago as ordered by oncologist Dr. Shweta Matos.  CAT scan showed chronic left portal vein thrombus with new occlusive right portal vein thrombus, probable thrombus in the right iliac veins, bilateral hydronephrosis.  Patient reports since CT scan she has not had a bowel movement.  She states she saw Dr. Matos today who directed patient to the emergency department to evaluate for bowel obstruction.  Patient also reports dysuria.  Patient denies any fevers, chills, chest pain, abdominal pain, nausea, vomiting, melena, bright blood per rectum.

## 2025-06-12 NOTE — H&P ADULT - PROBLEM SELECTOR PLAN 1
-Currently on Abraxane and Mvasi  -CT A/P: No evidence for bowel obstruction. Increase in size and number of hepatic metastases. Increase in left pelvic mass and right perirectal mass. Moderate left hydroureteronephrosis to the level of the left pelvic   Mass. Question mild intrahepatic biliary duct dilation in the left lobe peripheral to the tumor versus thrombosed left portal venous branches. 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 Increased lymphadenopathy. New small volume of ascites. Lung nodules in the lung bases.   -, CEA  -Palliative care consultation   -HemOnc consultation in AM

## 2025-06-12 NOTE — H&P ADULT - HISTORY OF PRESENT ILLNESS
59F w/ PMHx of uterine cancer on chemotherapy (last chemo on 5/21/25), history of hysterectomy, and HTN, HLD, and hypothyroidism who presents with worsening rectal pain of 1.5 months duration. Patient is sent in the ED by Dr. Shweta Matos for worsening abdominal pain with concern for bowel obstruction. Patient has not had a bowel movement for 3 days until just before my evaluation in the ED. Patient was started on heparin gtt in ED for PVT. Currently, patient states that her pain has improved and denies fever, chills, nausea, vomiting, chest pain, shortness of breath, melena or BRBPR.

## 2025-06-12 NOTE — H&P ADULT - ASSESSMENT
59F w/ PMHx of uterine cancer on chemotherapy (last chemo on 5/21/25), history of hysterectomy, and HTN, HLD, and hypothyroidism who presents with worsening rectal pain and found to have progressive mets and thrombi. Admitted to medicine for further management and monitoring. Detailed plan as below:

## 2025-06-12 NOTE — ED PROVIDER NOTE - CLINICAL SUMMARY MEDICAL DECISION MAKING FREE TEXT BOX
Patient is a 59-year-old female with past medical history of uterine cancer with last chemo 3 weeks ago, history of hysterectomy presents with rectal pain x 1.5 months.  Patient reports gradual worsening rectal pain which worsens with bowel movement.  Patient reports she had CT scan performed 3 days ago as ordered by oncologist Dr. Shweta Matos.  CAT scan showed chronic left portal vein thrombus with new occlusive right portal vein thrombus, probable thrombus in the right iliac veins, bilateral hydronephrosis.  Patient reports since CT scan she has not had a bowel movement.  She states she saw Dr. Matos today who directed patient to the emergency department to evaluate for bowel obstruction.  Patient also reports dysuria.  Patient denies any fevers, chills, chest pain, abdominal pain, nausea, vomiting, melena, bright blood per rectum.  This is a patient with possible bowel obstruction.  Plan to order labs, CT, urinalysis, pain medication.  Disposition pending workup.

## 2025-06-12 NOTE — H&P ADULT - PROBLEM SELECTOR PLAN 2
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 Question mild intrahepatic biliary duct dilation in the left lobe peripheral to the tumor versus thrombosed left portal venous branches.   -Heparin gtt. Can transition to DOAC after treatment considerations.

## 2025-06-12 NOTE — ED ADULT TRIAGE NOTE - CHIEF COMPLAINT QUOTE
pt arrives from cancer center to r/o SBO due to pt c/o lower abdominal pain with known mass to area.  receives IV chemo, last session x3 weeks ago denies nausea, vomiting, fevers, chills, chest pain, SOB, headache, dizziness   history of uterine cancer with mets, hypothyroidism, endometriosis

## 2025-06-12 NOTE — H&P ADULT - NSHPSOURCEINFORD_GEN_ALL_CORE
Patient Same Histology In Subsequent Stages Text: The pattern and morphology of the tumor is as described in the first stage.

## 2025-06-13 DIAGNOSIS — Z51.5 ENCOUNTER FOR PALLIATIVE CARE: ICD-10-CM

## 2025-06-13 DIAGNOSIS — K59.00 CONSTIPATION, UNSPECIFIED: ICD-10-CM

## 2025-06-13 DIAGNOSIS — Z29.9 ENCOUNTER FOR PROPHYLACTIC MEASURES, UNSPECIFIED: ICD-10-CM

## 2025-06-13 DIAGNOSIS — G89.3 NEOPLASM RELATED PAIN (ACUTE) (CHRONIC): ICD-10-CM

## 2025-06-13 DIAGNOSIS — E03.9 HYPOTHYROIDISM, UNSPECIFIED: ICD-10-CM

## 2025-06-13 DIAGNOSIS — I10 ESSENTIAL (PRIMARY) HYPERTENSION: ICD-10-CM

## 2025-06-13 DIAGNOSIS — E78.5 HYPERLIPIDEMIA, UNSPECIFIED: ICD-10-CM

## 2025-06-13 DIAGNOSIS — I82.90 ACUTE EMBOLISM AND THROMBOSIS OF UNSPECIFIED VEIN: ICD-10-CM

## 2025-06-13 DIAGNOSIS — C55 MALIGNANT NEOPLASM OF UTERUS, PART UNSPECIFIED: ICD-10-CM

## 2025-06-13 DIAGNOSIS — R74.8 ABNORMAL LEVELS OF OTHER SERUM ENZYMES: ICD-10-CM

## 2025-06-13 LAB
ALBUMIN SERPL ELPH-MCNC: 3.6 G/DL — SIGNIFICANT CHANGE UP (ref 3.3–5)
ALP SERPL-CCNC: 426 U/L — HIGH (ref 40–120)
ALT FLD-CCNC: 43 U/L — HIGH (ref 4–33)
ANION GAP SERPL CALC-SCNC: 11 MMOL/L — SIGNIFICANT CHANGE UP (ref 7–14)
ANION GAP SERPL CALC-SCNC: 16 MMOL/L — HIGH (ref 7–14)
APPEARANCE UR: ABNORMAL
APTT BLD: 144.7 SEC — SIGNIFICANT CHANGE UP (ref 26.1–36.8)
APTT BLD: 68.6 SEC — HIGH (ref 26.1–36.8)
APTT BLD: 77.3 SEC — HIGH (ref 26.1–36.8)
AST SERPL-CCNC: 119 U/L — HIGH (ref 4–32)
BACTERIA # UR AUTO: ABNORMAL /HPF
BASOPHILS # BLD AUTO: 0.04 K/UL — SIGNIFICANT CHANGE UP (ref 0–0.2)
BASOPHILS NFR BLD AUTO: 0.4 % — SIGNIFICANT CHANGE UP (ref 0–2)
BILIRUB SERPL-MCNC: 0.4 MG/DL — SIGNIFICANT CHANGE UP (ref 0.2–1.2)
BILIRUB UR-MCNC: NEGATIVE — SIGNIFICANT CHANGE UP
BUN SERPL-MCNC: 10 MG/DL — SIGNIFICANT CHANGE UP (ref 7–23)
BUN SERPL-MCNC: 6 MG/DL — LOW (ref 7–23)
CALCIUM SERPL-MCNC: 8.7 MG/DL — SIGNIFICANT CHANGE UP (ref 8.4–10.5)
CALCIUM SERPL-MCNC: 9.3 MG/DL — SIGNIFICANT CHANGE UP (ref 8.4–10.5)
CANCER AG125 SERPL-ACNC: 56 U/ML — HIGH
CANCER AG19-9 SERPL-ACNC: 24 U/ML — SIGNIFICANT CHANGE UP
CEA SERPL-MCNC: 1225 NG/ML — HIGH (ref 0–3.8)
CHLORIDE SERPL-SCNC: 96 MMOL/L — LOW (ref 98–107)
CHLORIDE SERPL-SCNC: 99 MMOL/L — SIGNIFICANT CHANGE UP (ref 98–107)
CO2 SERPL-SCNC: 24 MMOL/L — SIGNIFICANT CHANGE UP (ref 22–31)
CO2 SERPL-SCNC: 27 MMOL/L — SIGNIFICANT CHANGE UP (ref 22–31)
COLOR SPEC: YELLOW — SIGNIFICANT CHANGE UP
CREAT SERPL-MCNC: 0.45 MG/DL — LOW (ref 0.5–1.3)
CREAT SERPL-MCNC: 0.47 MG/DL — LOW (ref 0.5–1.3)
DIFF PNL FLD: NEGATIVE — SIGNIFICANT CHANGE UP
EGFR: 110 ML/MIN/1.73M2 — SIGNIFICANT CHANGE UP
EGFR: 110 ML/MIN/1.73M2 — SIGNIFICANT CHANGE UP
EGFR: 111 ML/MIN/1.73M2 — SIGNIFICANT CHANGE UP
EGFR: 111 ML/MIN/1.73M2 — SIGNIFICANT CHANGE UP
EOSINOPHIL # BLD AUTO: 0.21 K/UL — SIGNIFICANT CHANGE UP (ref 0–0.5)
EOSINOPHIL NFR BLD AUTO: 2.1 % — SIGNIFICANT CHANGE UP (ref 0–6)
EPI CELLS # UR: PRESENT
GLUCOSE SERPL-MCNC: 107 MG/DL — HIGH (ref 70–99)
GLUCOSE SERPL-MCNC: 85 MG/DL — SIGNIFICANT CHANGE UP (ref 70–99)
GLUCOSE UR QL: NEGATIVE MG/DL — SIGNIFICANT CHANGE UP
HCT VFR BLD CALC: 35.2 % — SIGNIFICANT CHANGE UP (ref 34.5–45)
HGB BLD-MCNC: 11.5 G/DL — SIGNIFICANT CHANGE UP (ref 11.5–15.5)
IANC: 8.06 K/UL — HIGH (ref 1.8–7.4)
IMM GRANULOCYTES NFR BLD AUTO: 0.4 % — SIGNIFICANT CHANGE UP (ref 0–0.9)
KETONES UR QL: NEGATIVE MG/DL — SIGNIFICANT CHANGE UP
LEUKOCYTE ESTERASE UR-ACNC: ABNORMAL
LYMPHOCYTES # BLD AUTO: 0.69 K/UL — LOW (ref 1–3.3)
LYMPHOCYTES # BLD AUTO: 6.8 % — LOW (ref 13–44)
MAGNESIUM SERPL-MCNC: 2.1 MG/DL — SIGNIFICANT CHANGE UP (ref 1.6–2.6)
MAGNESIUM SERPL-MCNC: 2.1 MG/DL — SIGNIFICANT CHANGE UP (ref 1.6–2.6)
MCHC RBC-ENTMCNC: 28 PG — SIGNIFICANT CHANGE UP (ref 27–34)
MCHC RBC-ENTMCNC: 32.7 G/DL — SIGNIFICANT CHANGE UP (ref 32–36)
MCV RBC AUTO: 85.6 FL — SIGNIFICANT CHANGE UP (ref 80–100)
MONOCYTES # BLD AUTO: 1.05 K/UL — HIGH (ref 0–0.9)
MONOCYTES NFR BLD AUTO: 10.4 % — SIGNIFICANT CHANGE UP (ref 2–14)
NEUTROPHILS # BLD AUTO: 8.06 K/UL — HIGH (ref 1.8–7.4)
NEUTROPHILS NFR BLD AUTO: 79.9 % — HIGH (ref 43–77)
NITRITE UR-MCNC: NEGATIVE — SIGNIFICANT CHANGE UP
NRBC # BLD AUTO: 0 K/UL — SIGNIFICANT CHANGE UP (ref 0–0)
NRBC # FLD: 0 K/UL — SIGNIFICANT CHANGE UP (ref 0–0)
NRBC BLD AUTO-RTO: 0 /100 WBCS — SIGNIFICANT CHANGE UP (ref 0–0)
PH UR: 7.5 — SIGNIFICANT CHANGE UP (ref 5–8)
PHOSPHATE SERPL-MCNC: 2.7 MG/DL — SIGNIFICANT CHANGE UP (ref 2.5–4.5)
PHOSPHATE SERPL-MCNC: 3.1 MG/DL — SIGNIFICANT CHANGE UP (ref 2.5–4.5)
PLATELET # BLD AUTO: 238 K/UL — SIGNIFICANT CHANGE UP (ref 150–400)
POTASSIUM SERPL-MCNC: 2.9 MMOL/L — CRITICAL LOW (ref 3.5–5.3)
POTASSIUM SERPL-MCNC: 4.1 MMOL/L — SIGNIFICANT CHANGE UP (ref 3.5–5.3)
POTASSIUM SERPL-SCNC: 2.9 MMOL/L — CRITICAL LOW (ref 3.5–5.3)
POTASSIUM SERPL-SCNC: 4.1 MMOL/L — SIGNIFICANT CHANGE UP (ref 3.5–5.3)
PROT SERPL-MCNC: 6.6 G/DL — SIGNIFICANT CHANGE UP (ref 6–8.3)
PROT UR-MCNC: 100 MG/DL
RBC # BLD: 4.11 M/UL — SIGNIFICANT CHANGE UP (ref 3.8–5.2)
RBC # FLD: 15 % — HIGH (ref 10.3–14.5)
RBC CASTS # UR COMP ASSIST: 0 /HPF — SIGNIFICANT CHANGE UP (ref 0–4)
SODIUM SERPL-SCNC: 134 MMOL/L — LOW (ref 135–145)
SODIUM SERPL-SCNC: 139 MMOL/L — SIGNIFICANT CHANGE UP (ref 135–145)
SP GR SPEC: 1.04 — HIGH (ref 1–1.03)
UROBILINOGEN FLD QL: 1 MG/DL — SIGNIFICANT CHANGE UP (ref 0.2–1)
WBC # BLD: 10.09 K/UL — SIGNIFICANT CHANGE UP (ref 3.8–10.5)
WBC # FLD AUTO: 10.09 K/UL — SIGNIFICANT CHANGE UP (ref 3.8–10.5)
WBC UR QL: 5 /HPF — SIGNIFICANT CHANGE UP (ref 0–5)

## 2025-06-13 PROCEDURE — 99233 SBSQ HOSP IP/OBS HIGH 50: CPT

## 2025-06-13 PROCEDURE — 99223 1ST HOSP IP/OBS HIGH 75: CPT

## 2025-06-13 RX ORDER — OXYCODONE HYDROCHLORIDE 30 MG/1
5 TABLET ORAL EVERY 4 HOURS
Refills: 0 | Status: DISCONTINUED | OUTPATIENT
Start: 2025-06-13 | End: 2025-06-18

## 2025-06-13 RX ORDER — ROSUVASTATIN CALCIUM 20 MG/1
10 TABLET, FILM COATED ORAL AT BEDTIME
Refills: 0 | Status: DISCONTINUED | OUTPATIENT
Start: 2025-06-13 | End: 2025-06-18

## 2025-06-13 RX ORDER — ACETAMINOPHEN 500 MG/5ML
650 LIQUID (ML) ORAL EVERY 6 HOURS
Refills: 0 | Status: DISCONTINUED | OUTPATIENT
Start: 2025-06-13 | End: 2025-06-16

## 2025-06-13 RX ORDER — LEVOTHYROXINE SODIUM 300 MCG
50 TABLET ORAL DAILY
Refills: 0 | Status: DISCONTINUED | OUTPATIENT
Start: 2025-06-13 | End: 2025-06-14

## 2025-06-13 RX ORDER — OXYCODONE HYDROCHLORIDE 30 MG/1
5 TABLET ORAL EVERY 6 HOURS
Refills: 0 | Status: DISCONTINUED | OUTPATIENT
Start: 2025-06-13 | End: 2025-06-13

## 2025-06-13 RX ORDER — CYANOCOBALAMIN 1000 UG/ML
1000 INJECTION INTRAMUSCULAR; SUBCUTANEOUS DAILY
Refills: 0 | Status: DISCONTINUED | OUTPATIENT
Start: 2025-06-13 | End: 2025-06-18

## 2025-06-13 RX ORDER — GABAPENTIN 400 MG/1
100 CAPSULE ORAL AT BEDTIME
Refills: 0 | Status: DISCONTINUED | OUTPATIENT
Start: 2025-06-13 | End: 2025-06-18

## 2025-06-13 RX ORDER — SENNA 187 MG
2 TABLET ORAL AT BEDTIME
Refills: 0 | Status: DISCONTINUED | OUTPATIENT
Start: 2025-06-13 | End: 2025-06-14

## 2025-06-13 RX ORDER — OXYCODONE HYDROCHLORIDE 30 MG/1
2.5 TABLET ORAL EVERY 4 HOURS
Refills: 0 | Status: DISCONTINUED | OUTPATIENT
Start: 2025-06-13 | End: 2025-06-18

## 2025-06-13 RX ORDER — LOSARTAN POTASSIUM 100 MG/1
50 TABLET, FILM COATED ORAL DAILY
Refills: 0 | Status: DISCONTINUED | OUTPATIENT
Start: 2025-06-13 | End: 2025-06-18

## 2025-06-13 RX ORDER — POLYETHYLENE GLYCOL 3350 17 G/17G
17 POWDER, FOR SOLUTION ORAL
Refills: 0 | Status: DISCONTINUED | OUTPATIENT
Start: 2025-06-13 | End: 2025-06-15

## 2025-06-13 RX ADMIN — GABAPENTIN 100 MILLIGRAM(S): 400 CAPSULE ORAL at 21:14

## 2025-06-13 RX ADMIN — OXYCODONE HYDROCHLORIDE 5 MILLIGRAM(S): 30 TABLET ORAL at 18:46

## 2025-06-13 RX ADMIN — HEPARIN SODIUM 900 UNIT(S)/HR: 1000 INJECTION INTRAVENOUS; SUBCUTANEOUS at 18:38

## 2025-06-13 RX ADMIN — Medication 2 TABLET(S): at 21:14

## 2025-06-13 RX ADMIN — HEPARIN SODIUM 900 UNIT(S)/HR: 1000 INJECTION INTRAVENOUS; SUBCUTANEOUS at 23:43

## 2025-06-13 RX ADMIN — Medication 50 MILLILITER(S): at 08:52

## 2025-06-13 RX ADMIN — Medication 100 MILLIEQUIVALENT(S): at 07:50

## 2025-06-13 RX ADMIN — Medication 100 MILLIEQUIVALENT(S): at 17:24

## 2025-06-13 RX ADMIN — HEPARIN SODIUM 900 UNIT(S)/HR: 1000 INJECTION INTRAVENOUS; SUBCUTANEOUS at 15:39

## 2025-06-13 RX ADMIN — Medication 40 MILLIEQUIVALENT(S): at 10:14

## 2025-06-13 RX ADMIN — Medication 40 MILLIEQUIVALENT(S): at 15:43

## 2025-06-13 RX ADMIN — Medication 50 MICROGRAM(S): at 05:28

## 2025-06-13 RX ADMIN — HEPARIN SODIUM 900 UNIT(S)/HR: 1000 INJECTION INTRAVENOUS; SUBCUTANEOUS at 20:18

## 2025-06-13 RX ADMIN — CYANOCOBALAMIN 1000 MICROGRAM(S): 1000 INJECTION INTRAMUSCULAR; SUBCUTANEOUS at 08:54

## 2025-06-13 RX ADMIN — LOSARTAN POTASSIUM 50 MILLIGRAM(S): 100 TABLET, FILM COATED ORAL at 05:28

## 2025-06-13 RX ADMIN — ROSUVASTATIN CALCIUM 10 MILLIGRAM(S): 20 TABLET, FILM COATED ORAL at 21:15

## 2025-06-13 RX ADMIN — OXYCODONE HYDROCHLORIDE 5 MILLIGRAM(S): 30 TABLET ORAL at 20:10

## 2025-06-13 RX ADMIN — HEPARIN SODIUM 0 UNIT(S)/HR: 1000 INJECTION INTRAVENOUS; SUBCUTANEOUS at 07:34

## 2025-06-13 RX ADMIN — POLYETHYLENE GLYCOL 3350 17 GRAM(S): 17 POWDER, FOR SOLUTION ORAL at 05:30

## 2025-06-13 RX ADMIN — Medication 2000 UNIT(S): at 08:54

## 2025-06-13 RX ADMIN — OXYCODONE HYDROCHLORIDE 5 MILLIGRAM(S): 30 TABLET ORAL at 11:10

## 2025-06-13 RX ADMIN — Medication 20 MILLIGRAM(S): at 08:54

## 2025-06-13 RX ADMIN — OXYCODONE HYDROCHLORIDE 5 MILLIGRAM(S): 30 TABLET ORAL at 10:13

## 2025-06-13 RX ADMIN — Medication 100 MILLIEQUIVALENT(S): at 13:44

## 2025-06-13 RX ADMIN — HEPARIN SODIUM 900 UNIT(S)/HR: 1000 INJECTION INTRAVENOUS; SUBCUTANEOUS at 08:37

## 2025-06-13 NOTE — CONSULT NOTE ADULT - ASSESSMENT
59F w/ PMHx of uterine cancer on chemotherapy (last chemo on 5/21/25), HTN, HLD, and hypothyroidism who presents with worsening rectal pain and found to have progressive metastatic disease and thrombi. Admitted to medicine for further management and monitoring. Palliative consulted for pain management.

## 2025-06-13 NOTE — CONSULT NOTE ADULT - PROBLEM SELECTOR RECOMMENDATION 2
> Istop reviewed   > > Istop reviewed   >Pt shared she was taking Tylenol at home, was not taking prescribed oxycodone 5mg  > Pt reports adequate relief from oxycodone 5mg. Recommend continuing PRN Oxycodone 5mg Q4 hrs for severe pain, 2.5mg Q4 hrs for moderate pain  > Resume home gabapentin 100mg qhs (pt was taking sparingly)   > Bowel regimen while on opioids  > PRN Narcan

## 2025-06-13 NOTE — PROGRESS NOTE ADULT - SUBJECTIVE AND OBJECTIVE BOX
[facility] Medicine  Available on [employer] Teams    Patient is a 59y old  Female who presents with a chief complaint of     SUBJECTIVE / OVERNIGHT EVENTS: Patient seen and examined at bedside. Patient's  at bedside. Patient endorses pain in abdomen and when trying to defecate. Took oxycodone not too long prior to exam. Had a small BM yesterday.     ADDITIONAL REVIEW OF SYSTEMS:    MEDICATIONS  (STANDING):  cholecalciferol 2000 Unit(s) Oral daily  cyanocobalamin 1000 MICROGram(s) Oral daily  famotidine    Tablet 20 milliGRAM(s) Oral daily  heparin  Infusion.  Unit(s)/Hr (11 mL/Hr) IV Continuous <Continuous>  levothyroxine 50 MICROGram(s) Oral daily  losartan 50 milliGRAM(s) Oral daily  polyethylene glycol 3350 17 Gram(s) Oral two times a day  potassium chloride    Tablet ER 40 milliEquivalent(s) Oral every 4 hours  potassium chloride  10 mEq/100 mL IVPB 10 milliEquivalent(s) IV Intermittent every 1 hour  senna 2 Tablet(s) Oral at bedtime  sodium chloride 0.9%. 1000 milliLiter(s) (50 mL/Hr) IV Continuous <Continuous>    MEDICATIONS  (PRN):  acetaminophen     Tablet .. 650 milliGRAM(s) Oral every 6 hours PRN Temp greater or equal to 38C (100.4F), Mild Pain (1 - 3)  heparin   Injectable 4500 Unit(s) IV Push every 6 hours PRN For aPTT less than 40  heparin   Injectable 2000 Unit(s) IV Push every 6 hours PRN For aPTT between 40 - 57  oxyCODONE    IR 5 milliGRAM(s) Oral every 6 hours PRN Severe Pain (7 - 10)      CAPILLARY BLOOD GLUCOSE        I&O's Summary      PHYSICAL EXAM:    Vital Signs Last 24 Hrs  T(C): 37.1 (13 Jun 2025 10:06), Max: 37.1 (12 Jun 2025 23:00)  T(F): 98.8 (13 Jun 2025 10:06), Max: 98.8 (13 Jun 2025 10:06)  HR: 83 (13 Jun 2025 10:06) (74 - 88)  BP: 139/74 (13 Jun 2025 10:06) (124/77 - 157/90)  BP(mean): --  RR: 18 (13 Jun 2025 10:06) (15 - 18)  SpO2: 100% (13 Jun 2025 10:06) (96% - 100%)    Parameters below as of 13 Jun 2025 10:06  Patient On (Oxygen Delivery Method): room air      CONSTITUTIONAL: NAD, uncomfortable due to pain  EYES: Conjunctiva and sclera clear  ENMT: Moist oral mucosa  RESPIRATORY: Normal respiratory effort; lungs are clear to auscultation bilaterally  CARDIOVASCULAR: Regular rate and rhythm, normal S1 and S2, no murmur/rub/gallop  ABDOMEN: Soft, tender to palpation LLQ, normoactive bowel sounds  PSYCH: A+O to person, place, and time  SKIN: No rashes; no palpable lesions    LABS:                        11.5   10.09 )-----------( 238      ( 13 Jun 2025 05:53 )             35.2     06-13    139  |  96[L]  |  6[L]  ----------------------------<  85  2.9[LL]   |  27  |  0.47[L]    Ca    9.3      13 Jun 2025 05:53  Phos  3.1     06-13  Mg     2.10     06-13    TPro  6.6  /  Alb  3.6  /  TBili  0.4  /  DBili  x   /  AST  119[H]  /  ALT  43[H]  /  AlkPhos  426[H]  06-13    PT/INR - ( 12 Jun 2025 19:36 )   PT: 12.9 sec;   INR: 1.08 ratio         PTT - ( 13 Jun 2025 05:53 )  PTT:144.7 sec      Urinalysis Basic - ( 13 Jun 2025 05:53 )    Color: x / Appearance: x / SG: x / pH: x  Gluc: 85 mg/dL / Ketone: x  / Bili: x / Urobili: x   Blood: x / Protein: x / Nitrite: x   Leuk Esterase: x / RBC: x / WBC x   Sq Epi: x / Non Sq Epi: x / Bacteria: x        Urinalysis with Rflx Culture (collected 13 Jun 2025 02:58)        RADIOLOGY & ADDITIONAL TESTS: No new imaging  Results Reviewed: Yes  Imaging Personally Reviewed:  Electrocardiogram Personally Reviewed:    COORDINATION OF CARE:  Care Discussed with Consultants/Other Providers [Y/N]:  Prior or Outpatient Records Reviewed [Y/N]:

## 2025-06-13 NOTE — CONSULT NOTE ADULT - SUBJECTIVE AND OBJECTIVE BOX
[facility] Geriatrics and Palliative Care  Toña Gutierrez, Palliative Care Nurse Practitioner  Contact Info: Page [phone] (Including Nights/Weekends), Message on Microsoft Teams (Toña Gutierrez), or leave VM at Palliative Office [phone] (non-urgent)    Date of [id number] @ 13:58    HPI:  59F w/ PMHx of uterine cancer on chemotherapy (last chemo on 5/21/25), history of hysterectomy, and HTN, HLD, and hypothyroidism who presents with worsening rectal pain of 1.5 months duration. Patient is sent in the ED by Dr. Shweta Matos for worsening abdominal pain with concern for bowel obstruction. Patient has not had a bowel movement for 3 days until just before my evaluation in the ED. Patient was started on heparin gtt in ED for PVT. Currently, patient states that her pain has improved and denies fever, chills, nausea, vomiting, chest pain, shortness of breath, melena or BRBPR.  (12 Jun 2025 23:45)    PERTINENT PM/SXH:   No pertinent past medical history    Endometriosis    Hypothyroid    Acid reflux    UTI (urinary tract infection)    Uterine cancer      No significant past surgical history    H/O ovarian cystectomy    History of cholecystectomy    H/O abdominal hysterectomy        -------------------------------------------------------------------------------------------------------    FAMILY HISTORY:  Family history of diabetes mellitus    Family history of hypertension    Family history of hyperlipidemia    Family history of breast cancer in sister      Family Hx substance abuse [ ]yes [ ]no  ITEMS NOT CHECKED ARE NOT PRESENT    SOCIAL HISTORY:   Significant other/partner[ ]  Children[ ]  Orthodox/Spirituality:  Substance hx:  [ ]   Tobacco hx:  [ ]   Alcohol hx: [ ]   Home Opioid hx:  [x ] I-Stop Reference No: [id number]    Prescription Information      PDI Filter:    PDI	My Rx	Current Rx	Drug Type	Rx Written	Rx Dispensed	Drug	Quantity	Days Supply	Prescriber Name	Prescriber MICHAEL #	Payment Method	Dispenser  A	N	N	O	04/08/2025	04/08/2025	oxycodone hcl (ir) 5 mg tablet	42	7	Danielle Gifford	[id number]	[id number]  A	N	N	O	03/17/2025	03/31/2025	hydrocodone-homatropine soln	600ml	30	Pascale Carr	[id number]	[facility]  Living Situation: [x ]Home  [ ]Long term care  [ ]Rehab [ ]Other    BASELINE (I)ADL(s) (prior to admission):  [place]: [ x]Total  [ ] Moderate [ ]Dependent    -------------------------------------------------------------------------------------------------------  ADVANCE DIRECTIVES:    DNR/MOLST  [ ]  Living Will  [ ]   DECISION MAKER(s):  [ ] Health Care Proxy(s)  [ ] Surrogate(s)  [ ] Guardian           Name(s): Phone Number(s):    -------------------------------------------------------------------------------------------------------  Allergies    Bactrim (Rash)  metronidazole (Hives; Rash)    Intolerances    MEDICATIONS  (STANDING):  cholecalciferol 2000 Unit(s) Oral daily  cyanocobalamin 1000 MICROGram(s) Oral daily  famotidine    Tablet 20 milliGRAM(s) Oral daily  heparin  Infusion.  Unit(s)/Hr (11 mL/Hr) IV Continuous <Continuous>  levothyroxine 50 MICROGram(s) Oral daily  losartan 50 milliGRAM(s) Oral daily  polyethylene glycol 3350 17 Gram(s) Oral two times a day  potassium chloride    Tablet ER 40 milliEquivalent(s) Oral every 4 hours  potassium chloride  10 mEq/100 mL IVPB 10 milliEquivalent(s) IV Intermittent every 1 hour  rosuvastatin 10 milliGRAM(s) Oral at bedtime  senna 2 Tablet(s) Oral at bedtime  sodium chloride 0.9%. 1000 milliLiter(s) (50 mL/Hr) IV Continuous <Continuous>    MEDICATIONS  (PRN):  acetaminophen     Tablet .. 650 milliGRAM(s) Oral every 6 hours PRN Temp greater or equal to 38C (100.4F), Mild Pain (1 - 3)  heparin   Injectable 4500 Unit(s) IV Push every 6 hours PRN For aPTT less than 40  heparin   Injectable 2000 Unit(s) IV Push every 6 hours PRN For aPTT between 40 - 57  oxyCODONE    IR 5 milliGRAM(s) Oral every 6 hours PRN Severe Pain (7 - 10)    PRESENT SYMPTOMS: [ ]Unable to self-report  [ ] CPOT [ ] PAINADs [ ] RDOS  Source if other than patient:  [ ]Family   [ ]Team     Pain: [ ]yes [ ]no  QOL impact -   Location -                    Aggravating factors -  Quality -  Radiation -  Timing-  Severity (0-10 scale):  Minimal acceptable level (0-10 scale)/Pain goal:    CPOT:    https://www.[facility].org/getattachment/[email or web address]/Critical-Care-Pain-Observation-Tool-(CPOT)    PAINAD Score: See PAINAD tool and score below       RDOS: See RDOS tool and score below   0 to 2  minimal or no respiratory distress   3  mild distress  4 to 6 moderate distress  >7 severe distress    Dyspnea:                           [ ]Mild [ ]Moderate [ ]Severe  Anxiety:                             [ ]Mild [ ]Moderate [ ]Severe  Fatigue:                             [ ]Mild [ ]Moderate [ ]Severe  Nausea:                             [ ]Mild [ ]Moderate [ ]Severe  Loss of appetite:              [ ]Mild [ ]Moderate [ ]Severe  Constipation:                    [ ]Mild [ ]Moderate [ ]Severe  Other Symptoms:  [ ]All other review of systems negative     -------------------------------------------------------------------------------------------------------  PCSSQ[Palliative Care Spiritual Screening Question]   Severity (0-10):  Score of 4 or > indicate consideration of Chaplaincy referral.  Chaplaincy Referral: [ ] yes [ ] refused [ ] following [ ] Deferred     Caregiver [place]? : [ ] yes [ ] no [ ] Deferred [ ] Declined             Social work referral [ ] Patient & Family Centered Care Referral [ ]     Anticipatory Grief present?:  [ ] yes [ ] no  [ ] Deferred                  Social work referral [ ] Chaplaincy Referral [ ]      -------------------------------------------------------------------------------------------------------  PHYSICAL EXAM:  Vital Signs Last 24 Hrs  T(C): 37.1 (13 Jun 2025 10:06), Max: 37.1 (12 Jun 2025 23:00)  T(F): 98.8 (13 Jun 2025 10:06), Max: 98.8 (13 Jun 2025 10:06)  HR: 83 (13 Jun 2025 10:06) (74 - 88)  BP: 139/74 (13 Jun 2025 10:06) (124/77 - 157/90)  BP(mean): --  RR: 18 (13 Jun 2025 10:06) (15 - 18)  SpO2: 100% (13 Jun 2025 10:06) (96% - 100%)    Parameters below as of 13 Jun 2025 10:06  Patient On (Oxygen Delivery Method): room air     I&O's Summary      GENERAL:  [ ]Cachexia  [ ]Alert  [ ]Oriented x   [ ]Lethargic  [ ]Unarousable  [ ]Verbal  [ ]Non-Verbal    Behavioral:   [ ] Anxiety  [ ] Delirium [ ] Agitation [ ] Other    HEENT:  [ ]Normal   [ ]Dry mouth   [ ]ET Tube/Trach  [ ]Oral lesions    PULMONARY:   [ ]Clear [ ]Tachypnea  [ ]Audible excessive secretions   [ ]Rhonchi        [ ]Right [ ]Left [ ]Bilateral  [ ]Crackles        [ ]Right [ ]Left [ ]Bilateral  [ ]Wheezing     [ ]Right [ ]Left [ ]Bilateral  [ ]Diminished breath sounds [ ]right [ ]left [ ]bilateral    CARDIOVASCULAR:    [ ]Regular [ ]Irregular [ ]Tachy  [ ]Conner [ ]Murmur [ ]Other    GASTROINTESTINAL:  [ ]Soft  [ ]Distended   [ ]+BS  [ ]Non tender [ ]Tender  [ ]Other [ ]PEG [ ]OGT/ NGT  Last BM:    GENITOURINARY:  [ ]Normal [ ] Incontinent   [ ]Oliguria/Anuria   [ ]Parikh    MUSCULOSKELETAL:   [ ]Normal   [ ]Weakness  [ ]Bed/Wheelchair bound [ ]Edema    NEUROLOGIC:   [ ]No focal deficits  [ ]Cognitive impairment  [ ]Dysphagia [ ]Dysarthria [ ]Paresis [ ]Other     SKIN:   [ ]Normal  [ ]Rash  [ ]Other  [ ]Pressure ulcer(s)       Present on admission [ ]y [ ]n    -------------------------------------------------------------------------------------------------------  CRITICAL CARE:  [ ] Shock Present  [ ]Septic [ ]Cardiogenic [ ]Neurologic [ ]Hypovolemic  [ ]  Vasopressors [ ]  Inotropes   [ ]Respiratory failure present [ ]Mechanical ventilation [ ]Non-invasive ventilatory support [ ]High flow    [ ]Acute  [ ]Chronic [ ]Hypoxic  [ ]Hypercarbic [ ]Other  [ ]Other organ failure     -------------------------------------------------------------------------------------------------------  LABS:                        11.5   10.09 )-----------( 238      ( 13 Jun 2025 05:53 )             35.2   06-13    139  |  96[L]  |  6[L]  ----------------------------<  85  2.9[LL]   |  27  |  0.47[L]    Ca    9.3      13 Jun 2025 05:53  Phos  3.1     06-13  Mg     2.10     06-13    TPro  6.6  /  Alb  3.6  /  TBili  0.4  /  DBili  x   /  AST  119[H]  /  ALT  43[H]  /  AlkPhos  426[H]  06-13  PT/INR - ( 12 Jun 2025 19:36 )   PT: 12.9 sec;   INR: 1.08 ratio      PTT - ( 13 Jun 2025 05:53 )  PTT:144.7 sec    Urinalysis Basic - ( 13 Jun 2025 05:53 )    Color: x / Appearance: x / SG: x / pH: x  Gluc: 85 mg/dL / Ketone: x  / Bili: x / Urobili: x   Blood: x / Protein: x / Nitrite: x   Leuk Esterase: x / RBC: x / WBC x   Sq Epi: x / Non Sq Epi: x / Bacteria: x    -------------------------------------------------------------------------------------------------------  RADIOLOGY & ADDITIONAL STUDIES: < from: CT Abdomen and Pelvis w/ Oral Cont and w/ IV Cont (06.12.25 @ 20:21) >  IMPRESSION:  *  Increase in size and number of hepatic metastases since 1/17/2025.   Lobulated soft tissue along the posterior right hepatic lobe has also   increased in size, which may represent a serosal implant.  *  Left pelvic mass and right perirectal mass, which have increased in   size since 1/17/2025, and arebroadly inseparable from the sigmoid colon   and the rectum, respectively. There is associated wall thickening of the   sigmoid colon and the rectum, which may represent serosal spread of   disease, with a component of proctocolitis not excluded. Question tumor   thrombus in the right pelvis associated with the perirectal mass.  *  No evidence for bowel obstruction.  *  Moderate left hydroureteronephrosis to the level of the left pelvic   mass, which has increased since 1/17/2025. Fullness of theright renal   collecting system.  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  *  Question mild intrahepatic biliary duct dilation in the left lobe   peripheral to the tumor versus thrombosed left portal venous branches.  *  Increased lymphadenopathy.  *  New small volume of ascites.  *  Lung nodules in the lung bases have increased in size and number since   4/16/2025, and are suspicious for metastases.    Findings were discussed with Dr. Askew 6/12/2025 10:13 PM by Dr. Harvey.    --- End of Report --    -------------------------------------------------------------------------------------------------------  PROTEIN CALORIE MALNUTRITION PRESENT: [ ]mild [ ]moderate [ ]severe [ ]underweight [ ]morbid obesity  https://www.andeal.org/vault/2440/web/files/ONC/Table_Clinical%20Characteristics%20to%20Document%20Malnutrition-White%20JV%20et%20al%202012.pdf    Height (cm): 162 (06-12-25 @ 16:43), 162 (05-21-25 @ 09:03)  Weight (kg): 58.9 (06-12-25 @ 23:00), 58.5 (06-12-25 @ 16:00)  BMI (kg/m2): 22.4 (06-12-25 @ 23:00), 22.3 (06-12-25 @ 16:43), 22.3 (06-12-25 @ 16:00)    Palliative Performance Status Version 2:   See PPSv2 tool and score below          [ ]PPSV2 < or = to 30% [ ]significant weight loss  [ ]poor nutritional intake  [ ]anasarca[ ]Artificial Nutrition      -------------------------------------------------------------------------------------------------------  Other REFERRALS:  [ ]Hospice  [ ]Child Life  [ ]Social Work  [ ]Case management [ ]Holistic Therapy  [facility] Geriatrics and Palliative Care  Toña Gutierrez, Palliative Care Nurse Practitioner  Contact Info: Page [phone] (Including Nights/Weekends), Message on Microsoft Teams (Toña Gutierrez), or leave VM at Palliative Office [phone] (non-urgent)    Date of [id number] @ 13:58    HPI:  59F w/ PMHx of uterine cancer on chemotherapy (last chemo on 5/21/25), history of hysterectomy, and HTN, HLD, and hypothyroidism who presents with worsening rectal pain of 1.5 months duration. Patient is sent in the ED by Dr. Shweta Matos for worsening abdominal pain with concern for bowel obstruction. Patient has not had a bowel movement for 3 days until just before my evaluation in the ED. Patient was started on heparin gtt in ED for PVT. Currently, patient states that her pain has improved and denies fever, chills, nausea, vomiting, chest pain, shortness of breath, melena or BRBPR.  (12 Jun 2025 23:45)    PERTINENT PM/SXH:   No pertinent past medical history    Endometriosis    Hypothyroid    Acid reflux    UTI (urinary tract infection)    Uterine cancer    No significant past surgical history    H/O ovarian cystectomy    History of cholecystectomy    H/O abdominal hysterectomy    -------------------------------------------------------------------------------------------------------    FAMILY HISTORY:  Family history of diabetes mellitus    Family history of hypertension    Family history of hyperlipidemia    Family history of breast cancer in sister      Family Hx substance abuse [ ]yes [ ]no  ITEMS NOT CHECKED ARE NOT PRESENT    SOCIAL HISTORY:   Significant other/partner[ ]  Children[ ]  Yazidi/Spirituality:  Substance hx:  [ ]   Tobacco hx:  [ ]   Alcohol hx: [ ]   Home Opioid hx:  [x ] I-Stop Reference No: [id number]    Prescription Information      PDI Filter:    PDI	My Rx	Current Rx	Drug Type	Rx Written	Rx Dispensed	Drug	Quantity	Days Supply	Prescriber Name	Prescriber MICHAEL #	Payment Method	Dispenser  A	N	N	O	04/08/2025	04/08/2025	oxycodone hcl (ir) 5 mg tablet	42	7	Danielle Gifford	[id number]	[id number]  A	N	N	O	03/17/2025	03/31/2025	hydrocodone-homatropine soln	600ml	Pascale Ricks	[id number]	[facility]  Living Situation: [x ]Home  [ ]Long term care  [ ]Rehab [ ]Other    BASELINE (I)ADL(s) (prior to admission):  [place]: [ x]Total  [ ] Moderate [ ]Dependent    -------------------------------------------------------------------------------------------------------  ADVANCE DIRECTIVES:    DNR/MOLST  [ ]  Living Will  [ ]   DECISION MAKER(s):  [ X] Health Care Proxy(s)  [ ] Surrogate(s)  [ ] Guardian           Name(s): Nikolas Moreno Phone Number(s): [phone]    -------------------------------------------------------------------------------------------------------  Allergies    Bactrim (Rash)  metronidazole (Hives; Rash)    Intolerances    MEDICATIONS  (STANDING):  cholecalciferol 2000 Unit(s) Oral daily  cyanocobalamin 1000 MICROGram(s) Oral daily  famotidine    Tablet 20 milliGRAM(s) Oral daily  heparin  Infusion.  Unit(s)/Hr (11 mL/Hr) IV Continuous <Continuous>  levothyroxine 50 MICROGram(s) Oral daily  losartan 50 milliGRAM(s) Oral daily  polyethylene glycol 3350 17 Gram(s) Oral two times a day  potassium chloride    Tablet ER 40 milliEquivalent(s) Oral every 4 hours  potassium chloride  10 mEq/100 mL IVPB 10 milliEquivalent(s) IV Intermittent every 1 hour  rosuvastatin 10 milliGRAM(s) Oral at bedtime  senna 2 Tablet(s) Oral at bedtime  sodium chloride 0.9%. 1000 milliLiter(s) (50 mL/Hr) IV Continuous <Continuous>    MEDICATIONS  (PRN):  acetaminophen     Tablet .. 650 milliGRAM(s) Oral every 6 hours PRN Temp greater or equal to 38C (100.4F), Mild Pain (1 - 3)  heparin   Injectable 4500 Unit(s) IV Push every 6 hours PRN For aPTT less than 40  heparin   Injectable 2000 Unit(s) IV Push every 6 hours PRN For aPTT between 40 - 57  oxyCODONE    IR 5 milliGRAM(s) Oral every 6 hours PRN Severe Pain (7 - 10)    PRESENT SYMPTOMS: [ ]Unable to self-report  [ ] CPOT [ ] PAINADs [ ] RDOS  Source if other than patient:  [ ]Family   [ ]Team     Pain: [x ]yes [ ]no  QOL impact -   Location - abd/rectal                     Aggravating factors - none  Quality - sharp  Radiation - none  Timing- intermittent   Severity (0-10 scale): 10/10 at worst- 2 during encounter   Minimal acceptable level (0-10 scale)/Pain goal: 2    CPOT:    https://www.[facility].org/getattachment/[email or web address]/Critical-Care-Pain-Observation-Tool-(CPOT)    PAINAD Score: See PAINAD tool and score below       RDOS: See RDOS tool and score below   0 to 2  minimal or no respiratory distress   3  mild distress  4 to 6 moderate distress  >7 severe distress    Dyspnea:                           [ ]Mild [ ]Moderate [ ]Severe  Anxiety:                             [ ]Mild [ ]Moderate [ ]Severe  Fatigue:                             [ ]Mild [ ]Moderate [ ]Severe  Nausea:                             [ ]Mild [ ]Moderate [ ]Severe  Loss of appetite:              [ ]Mild [ ]Moderate [ ]Severe  Constipation:                    [ ]Mild [ ]Moderate [ ]Severe  Other Symptoms:  [x ]All other review of systems negative     -------------------------------------------------------------------------------------------------------  PCSSQ[Palliative Care Spiritual Screening Question]   Severity (0-10):  Score of 4 or > indicate consideration of Chaplaincy referral.  Chaplaincy Referral: [ ] yes [ ] refused [ ] following [ x] Deferred     Caregiver [place]? : [ ] yes [ ] no [ ] Deferred [ x] Declined             Social work referral [ ] Patient & Family Centered Care Referral [ ]     Anticipatory Grief present?:  [ ] yes [ ] no  [ x] Deferred                  Social work referral [ ] Chaplaincy Referral [ ]      -------------------------------------------------------------------------------------------------------  PHYSICAL EXAM:  Vital Signs Last 24 Hrs  T(C): 37.1 (13 Jun 2025 10:06), Max: 37.1 (12 Jun 2025 23:00)  T(F): 98.8 (13 Jun 2025 10:06), Max: 98.8 (13 Jun 2025 10:06)  HR: 83 (13 Jun 2025 10:06) (74 - 88)  BP: 139/74 (13 Jun 2025 10:06) (124/77 - 157/90)  BP(mean): --  RR: 18 (13 Jun 2025 10:06) (15 - 18)  SpO2: 100% (13 Jun 2025 10:06) (96% - 100%)    Parameters below as of 13 Jun 2025 10:06  Patient On (Oxygen Delivery Method): room air     I&O's Summary    GENERAL:  [ ]Cachexia  [x ]Alert  [x ]Oriented x  4  [ ]Lethargic  [ ]Unarousable  [ x]Verbal  [ ]Non-Verbal    Behavioral:   [ ] Anxiety  [ ] Delirium [ ] Agitation [ x] Other    HEENT:  [x ]Normal   [ ]Dry mouth   [ ]ET Tube/Trach  [ ]Oral lesions    PULMONARY:   x]Clear [ ]Tachypnea  [ ]Audible excessive secretions   [ ]Rhonchi        [ ]Right [ ]Left [ ]Bilateral  [ ]Crackles        [ ]Right [ ]Left [ ]Bilateral  [ ]Wheezing     [ ]Right [ ]Left [ ]Bilateral  [ ]Diminished breath sounds [ ]right [ ]left [ ]bilateral    CARDIOVASCULAR:    [x ]Regular [ ]Irregular [ ]Tachy  [ ]Conner [ ]Murmur [ ]Other    GASTROINTESTINAL:  [x ]Soft  [ ]Distended   [ x]+BS  [ ]Non tender [ x]Tender  [ ]Other [ ]PEG [ ]OGT/ NGT  Last BM: 6/13    GENITOURINARY:  [ x]Normal [ ] Incontinent   [ ]Oliguria/Anuria   [ ]Parikh    MUSCULOSKELETAL:   [x]Normal   [ ]Weakness  [ ]Bed/Wheelchair bound [ ]Edema    NEUROLOGIC:   [x ]No focal deficits  [ ]Cognitive impairment  [ ]Dysphagia [ ]Dysarthria [ ]Paresis [ ]Other     SKIN:   [x ]Normal  [ ]Rash  [ ]Other  [ ]Pressure ulcer(s)       Present on admission [ ]y [ ]n    -------------------------------------------------------------------------------------------------------  CRITICAL CARE:  [ ] Shock Present  [ ]Septic [ ]Cardiogenic [ ]Neurologic [ ]Hypovolemic  [ ]  Vasopressors [ ]  Inotropes   [ ]Respiratory failure present [ ]Mechanical ventilation [ ]Non-invasive ventilatory support [ ]High flow    [ ]Acute  [ ]Chronic [ ]Hypoxic  [ ]Hypercarbic [ ]Other  [ ]Other organ failure     -------------------------------------------------------------------------------------------------------  LABS:                        11.5   10.09 )-----------( 238      ( 13 Jun 2025 05:53 )             35.2   06-13    139  |  96[L]  |  6[L]  ----------------------------<  85  2.9[LL]   |  27  |  0.47[L]    Ca    9.3      13 Jun 2025 05:53  Phos  3.1     06-13  Mg     2.10     06-13    TPro  6.6  /  Alb  3.6  /  TBili  0.4  /  DBili  x   /  AST  119[H]  /  ALT  43[H]  /  AlkPhos  426[H]  06-13  PT/INR - ( 12 Jun 2025 19:36 )   PT: 12.9 sec;   INR: 1.08 ratio      PTT - ( 13 Jun 2025 05:53 )  PTT:144.7 sec    Urinalysis Basic - ( 13 Jun 2025 05:53 )    Color: x / Appearance: x / SG: x / pH: x  Gluc: 85 mg/dL / Ketone: x  / Bili: x / Urobili: x   Blood: x / Protein: x / Nitrite: x   Leuk Esterase: x / RBC: x / WBC x   Sq Epi: x / Non Sq Epi: x / Bacteria: x    -------------------------------------------------------------------------------------------------------  RADIOLOGY & ADDITIONAL STUDIES: < from: CT Abdomen and Pelvis w/ Oral Cont and w/ IV Cont (06.12.25 @ 20:21) >  IMPRESSION:  *  Increase in size and number of hepatic metastases since 1/17/2025.   Lobulated soft tissue along the posterior right hepatic lobe has also   increased in size, which may represent a serosal implant.  *  Left pelvic mass and right perirectal mass, which have increased in   size since 1/17/2025, and arebroadly inseparable from the sigmoid colon   and the rectum, respectively. There is associated wall thickening of the   sigmoid colon and the rectum, which may represent serosal spread of   disease, with a component of proctocolitis not excluded. Question tumor   thrombus in the right pelvis associated with the perirectal mass.  *  No evidence for bowel obstruction.  *  Moderate left hydroureteronephrosis to the level of the left pelvic   mass, which has increased since 1/17/2025. Fullness of theright renal   collecting system.  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  *  Question mild intrahepatic biliary duct dilation in the left lobe   peripheral to the tumor versus thrombosed left portal venous branches.  *  Increased lymphadenopathy.  *  New small volume of ascites.  *  Lung nodules in the lung bases have increased in size and number since   4/16/2025, and are suspicious for metastases.    Findings were discussed with Dr. Askew 6/12/2025 10:13 PM by Dr. Harvey.    --- End of Report --  -------------------------------------------------------------------------------------------------------  PROTEIN CALORIE MALNUTRITION PRESENT: [ ]mild [ ]moderate [ ]severe [ ]underweight [ ]morbid obesity  https://www.andeal.org/vault/2440/web/files/ONC/Table_Clinical%20Characteristics%20to%20Document%20Malnutrition-White%20JV%20et%20al%202012.pdf    Height (cm): 162 (06-12-25 @ 16:43), 162 (05-21-25 @ 09:03)  Weight (kg): 58.9 (06-12-25 @ 23:00), 58.5 (06-12-25 @ 16:00)  BMI (kg/m2): 22.4 (06-12-25 @ 23:00), 22.3 (06-12-25 @ 16:43), 22.3 (06-12-25 @ 16:00)    Palliative Performance Status Version 2:   See PPSv2 tool and score below          [ ]PPSV2 < or = to 30% [ ]significant weight loss  [ ]poor nutritional intake  [ ]anasarca[ ]Artificial Nutrition      -------------------------------------------------------------------------------------------------------  Other REFERRALS:  [ ]Hospice  [ ]Child Life  [ ]Social Work  [ ]Case management [ ]Holistic Therapy

## 2025-06-13 NOTE — PATIENT PROFILE ADULT - FALL HARM RISK - HARM RISK INTERVENTIONS

## 2025-06-13 NOTE — CONSULT NOTE ADULT - TIME BILLING
75 minutes spent on total encounter. The necessity of the time spent during the encounter on this date of service was due to:     Time spent for extensive review of the physical chart, electronic medical record, and documentation to obtain collateral information including but not limited to:  [x] Inpatient records (ED, H&P, primary team, and consultants if applicable, care coordination)  [x] Inpatient values/results (biomarkers, immunoassays, imaging, and microbiology results)  [x] Current or proposed treatment plans  [x] Discussion with the primary team  [x] Discussion with the patient, surrogate decision maker, or family

## 2025-06-13 NOTE — CONSULT NOTE ADULT - PROBLEM SELECTOR RECOMMENDATION 3
Thank you for allowing us to participate in your patient's care. We will continue to follow with you. Please page [phone] for any q's or c's. The Geriatric and Palliative Medicine service has coverage 24 hours a day/ 7 days a week to provide medical recommendations regarding symptom management needs via telephone.

## 2025-06-13 NOTE — CONSULT NOTE ADULT - ASSESSMENT
Ms. Moreno is a 59 year old F with history of refractory uterine cancer (relapsed uterine MMT)  on Abraxane and Mvasi (C2D15, 05/21/2025) who presents with worsening rectal pain- for the past 1.5 months. CT abdomen shows:     Disease: UTERINE MMT    Pathology: MMT  AJCC Stage: III, relapsed.    CT abdomen and pelvis shows the following:  Small volume of ascites, new since 1/17/2025.   Mass in the left pelvis with associated clips that is inseparable from   the sigmoid colon that measures 5.5 x 4.4 x 4.5 cm (301:92), previously   4.8 x 3.6 x 3.5 cm on 1/17/2025. This broadly contacts the sigmoid colon,   which is increased since 1/17/2025, where there is associated wall   thickening in the sigmoid colon that may represent serosal spread of   disease. There is a right perirectal mass that measures 3.6 x 3.5 cm   (301:106), which previously measured 1.5 x 1.0 cm on 1/17/2025; this is   broadly inseparable from the rectum, where there is rectal wall   thickening. Question associated tumor thrombus in adjacent right pelvic   branch vessels (602:65).  VESSELS: Mild atherosclerotic changes. Occluded left portal vein, new   since 1/17/2025. Nonocclusive thrombus in the right portal vein, also new   since 1/17/2025. Right portal thrombus appears bland. The main portal   vein is patent without evidence for thrombus. The right hepatic vein is   patent. The middle and left hepatic veins are not well visualized.  LYMPH NODES: New right lower quadrant mesenteric lymphadenopathy, with   example measuring 1.7 x 1.3 cm (301:80). New or increase in size of   retroperitoneal and bilateral iliac chain lymphadenopathy since   1/17/2025. For example, a left common iliac lymph node measures 1.9 x 1.8   cm (301:64), previously 0.7 x 0.5 cm.    Increase in size and number of hepatic metastases since 1/17/2025.   Lobulated soft tissue along the posterior right hepatic lobe has also   increased in size, which may represent a serosal implant.  *  Left pelvic mass and right perirectal mass, which have increased in   size since 1/17/2025, and are broadly inseparable from the sigmoid colon   and the rectum, respectively. There is associated wall thickening of the   sigmoid colon and the rectum, which may represent serosal spread of   disease, with a component of proctocolitis not excluded. Question tumor   thrombus in the right pelvis associated with the perirectal mass.  *  No evidence for bowel obstruction.  *  Moderate left hydroureteronephrosis to the level of the left pelvic   mass, which has increased since 1/17/2025. Fullness of the right renal   collecting system.  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  *  Question mild intrahepatic biliary duct dilation in the left lobe   peripheral to the tumor versus thrombosed left portal venous branches.  *  Increased lymphadenopathy.  *  New small volume of ascites.  *  Lung nodules in the lung bases have increased in size and number since   4/16/2025, and are suspicious for metastases.    -Recommend urology consultation to evaluate for hydronephrosis   -Recommend biopsy of progression of disease to see if anything further actionable   -No plan for inpatient chemotherapy. Patient to follow up with Dr. Matos on discharge to determine next line of treatment.   -Recommend palliative care consultation for pain control and symptom control.   -Recommend radiology review nonocclusive thrombus in the right portal vein and occluded left portal vein to confirm PVT from bland thrombus and not tumor thrombus- if this is the case recommend heparin gtt and can transition to DOAC on discharge.   -Recommend surgical oncology to evaluate for proctocolitis and to determine for intervention.   -Patient to follow up with Carlo on discharge. Please contact oncology on discharge to coordinate.     Discussed and seen with Dr. Vallecillo.    Jennifer Baker MD  Hematology Oncology Fellow, PGY-4   Ms. Moreno is a 59 year old F with history of refractory uterine cancer (relapsed uterine MMT)  on Abraxane and Mvasi (C2D15, 05/21/2025) who presents with worsening rectal pain- for the past 1.5 months. CT abdomen shows:     Disease: UTERINE MMT    Pathology: MMT  AJCC Stage: III, relapsed.    CT abdomen and pelvis shows the following:  Small volume of ascites, new since 1/17/2025.   Mass in the left pelvis with associated clips that is inseparable from   the sigmoid colon that measures 5.5 x 4.4 x 4.5 cm (301:92), previously   4.8 x 3.6 x 3.5 cm on 1/17/2025. This broadly contacts the sigmoid colon,   which is increased since 1/17/2025, where there is associated wall   thickening in the sigmoid colon that may represent serosal spread of   disease. There is a right perirectal mass that measures 3.6 x 3.5 cm   (301:106), which previously measured 1.5 x 1.0 cm on 1/17/2025; this is   broadly inseparable from the rectum, where there is rectal wall   thickening. Question associated tumor thrombus in adjacent right pelvic   branch vessels (602:65).  VESSELS: Mild atherosclerotic changes. Occluded left portal vein, new   since 1/17/2025. Nonocclusive thrombus in the right portal vein, also new   since 1/17/2025. Right portal thrombus appears bland. The main portal   vein is patent without evidence for thrombus. The right hepatic vein is   patent. The middle and left hepatic veins are not well visualized.  LYMPH NODES: New right lower quadrant mesenteric lymphadenopathy, with   example measuring 1.7 x 1.3 cm (301:80). New or increase in size of   retroperitoneal and bilateral iliac chain lymphadenopathy since   1/17/2025. For example, a left common iliac lymph node measures 1.9 x 1.8   cm (301:64), previously 0.7 x 0.5 cm.    Increase in size and number of hepatic metastases since 1/17/2025.   Lobulated soft tissue along the posterior right hepatic lobe has also   increased in size, which may represent a serosal implant.  *  Left pelvic mass and right perirectal mass, which have increased in   size since 1/17/2025, and are broadly inseparable from the sigmoid colon   and the rectum, respectively. There is associated wall thickening of the   sigmoid colon and the rectum, which may represent serosal spread of   disease, with a component of proctocolitis not excluded. Question tumor   thrombus in the right pelvis associated with the perirectal mass.  *  No evidence for bowel obstruction.  *  Moderate left hydroureteronephrosis to the level of the left pelvic   mass, which has increased since 1/17/2025. Fullness of the right renal   collecting system.  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  *  Question mild intrahepatic biliary duct dilation in the left lobe   peripheral to the tumor versus thrombosed left portal venous branches.  *  Increased lymphadenopathy.  *  New small volume of ascites.  *  Lung nodules in the lung bases have increased in size and number since   4/16/2025, and are suspicious for metastases.    -Recommend urology consultation to evaluate for hydronephrosis to eval for nephrostomy tube placement   -Recommend biopsy of progression of disease to see if anything further actionable   -No plan for inpatient chemotherapy. Patient to follow up with Dr. Matos on discharge to determine next line of treatment.   -Recommend palliative care consultation for pain control and symptom control.   -Patient has nonocclusive thrombus in the right portal vein (bland thrombus) and occluded left portal vein to confirm PVT from bland thrombus-recommend heparin gtt and can transition to DOAC on discharge given plan for evaluation of nephrostomy tube placement.   -Patient to follow up with Carlo on discharge. Please contact oncology on discharge to coordinate.     Discussed and seen with Dr. Vallecillo.    Jennifer Baker MD  Hematology Oncology Fellow, PGY-4   Ms. Moreno is a 59 year old F with history of refractory uterine cancer (relapsed uterine MMT)  on Abraxane and Mvasi (C2D15, 05/21/2025) who presents with worsening rectal pain- for the past 1.5 months. CT abdomen shows:     Disease: UTERINE MMT    Pathology: MMT  AJCC Stage: III, relapsed.    CT abdomen and pelvis shows the following:  Small volume of ascites, new since 1/17/2025.   Mass in the left pelvis with associated clips that is inseparable from   the sigmoid colon that measures 5.5 x 4.4 x 4.5 cm (301:92), previously   4.8 x 3.6 x 3.5 cm on 1/17/2025. This broadly contacts the sigmoid colon,   which is increased since 1/17/2025, where there is associated wall   thickening in the sigmoid colon that may represent serosal spread of   disease. There is a right perirectal mass that measures 3.6 x 3.5 cm   (301:106), which previously measured 1.5 x 1.0 cm on 1/17/2025; this is   broadly inseparable from the rectum, where there is rectal wall   thickening. Question associated tumor thrombus in adjacent right pelvic   branch vessels (602:65).  VESSELS: Mild atherosclerotic changes. Occluded left portal vein, new   since 1/17/2025. Nonocclusive thrombus in the right portal vein, also new   since 1/17/2025. Right portal thrombus appears bland. The main portal   vein is patent without evidence for thrombus. The right hepatic vein is   patent. The middle and left hepatic veins are not well visualized.  LYMPH NODES: New right lower quadrant mesenteric lymphadenopathy, with   example measuring 1.7 x 1.3 cm (301:80). New or increase in size of   retroperitoneal and bilateral iliac chain lymphadenopathy since   1/17/2025. For example, a left common iliac lymph node measures 1.9 x 1.8   cm (301:64), previously 0.7 x 0.5 cm.    Increase in size and number of hepatic metastases since 1/17/2025.   Lobulated soft tissue along the posterior right hepatic lobe has also   increased in size, which may represent a serosal implant.  *  Left pelvic mass and right perirectal mass, which have increased in   size since 1/17/2025, and are broadly inseparable from the sigmoid colon   and the rectum, respectively. There is associated wall thickening of the   sigmoid colon and the rectum, which may represent serosal spread of   disease, with a component of proctocolitis not excluded. Question tumor   thrombus in the right pelvis associated with the perirectal mass.  *  No evidence for bowel obstruction.  *  Moderate left hydroureteronephrosis to the level of the left pelvic   mass, which has increased since 1/17/2025. Fullness of the right renal   collecting system.  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  *  Question mild intrahepatic biliary duct dilation in the left lobe   peripheral to the tumor versus thrombosed left portal venous branches.  *  Increased lymphadenopathy.  *  New small volume of ascites.  *  Lung nodules in the lung bases have increased in size and number since   4/16/2025, and are suspicious for metastases.    Patient had outpatient [facility] compared to March with POD present.     -Recommend urology consultation to evaluate for hydronephrosis to eval for nephrostomy tube placement   -No plan for inpatient chemotherapy. Patient to follow up with physician at [place] for next line of treatment on discharge to determine next line of treatment.   -Recommend palliative care consultation for pain control and symptom control.   -Recommend bowel regimen considering rectal dz to avoid constipation and bleeding.   -Patient has nonocclusive thrombus in the right portal vein (bland thrombus) and occluded left portal vein to confirm PVT from bland thrombus-recommend heparin gtt and can transition to DOAC on discharge given plan for evaluation of nephrostomy tube placement. Should only transition once no procedures planned.   -Patient to follow up with [facility] on discharge. Please contact oncology on discharge to coordinate.     Discussed and seen with Dr. Vallecillo.    Jennifer Baker MD  Hematology Oncology Fellow, PGY-4

## 2025-06-13 NOTE — CONSULT NOTE ADULT - ATTENDING COMMENTS
d/w family at bedside after d/w Dr. Matos  no planned inpatient chemo and to see Med onc at [place] to discuss further treatment  urology eval for hydronephrosis to decide stent vs nephrostomy  pall care to help with pain control   no obstruction radiologically but given high likelihood would ensure pt is on an effective bowel regimen

## 2025-06-13 NOTE — ED ADULT NURSE REASSESSMENT NOTE - NS ED NURSE REASSESS COMMENT FT1
Pt  received from primary RN stable. Respirations even and unlabored. Pt expresses no discomfort at this time. TBA, awaiting bed assignment. Heparin running at 11ml/hr.  Safety precautions in place.
Report endorsed to [facility] RN; pt stable for transport at this time. Vitals as documented, no acute distress noted.
Pt awake and alert, A&OX4, resp even and unlabored. Remains endorsing abdominal pain, pt medicated per orders. Denies CP, SOB, HA, dizziness, palpitations, blurry vision. Resting comfortably. 2nd PIV placed - 22G to R wrist + blood return & flushes without difficulty. Heparin started per orders, nomogram followed. Actual weight in chart. Vitals as documented, no acute distress noted. Awaiting bed assignment.

## 2025-06-13 NOTE — CONSULT NOTE ADULT - SUBJECTIVE AND OBJECTIVE BOX
Hematology Consult Note    HPI as per admitting team:   59F w/ PMHx of uterine cancer on chemotherapy (last chemo on 5/21/25), history of hysterectomy, and HTN, HLD, and hypothyroidism who presents with worsening rectal pain of 1.5 months duration. Patient is sent in the ED by Dr. Shweta Matos for worsening abdominal pain with concern for bowel obstruction. Patient has not had a bowel movement for 3 days until just before my evaluation in the ED. Patient was started on heparin gtt in ED for PVT. Currently, patient states that her pain has improved and denies fever, chills, nausea, vomiting, chest pain, shortness of breath, melena or BRBPR.  (12 Jun 2025 23:45)      Oncology history: Ms. Moreno is a 59 year old F with history of refractory uterine cancer (relapsed uterine MMT)  on Abraxane and Mvasi (C2D15, 05/21/2025) who presents with worsening rectal pain- for the past 1.5 months. CT abdomen shows:     Disease: UTERINE MMT    Pathology: MMT  AJCC Stage: III, relapsed.    REVIEW OF SYSTEMS:    CONSTITUTIONAL: No weakness, fevers or chills  EYES/ENT: No visual changes;  No vertigo or throat pain   NECK: No pain or stiffness  RESPIRATORY: No cough, wheezing, hemoptysis; No shortness of breath  CARDIOVASCULAR: No chest pain or palpitations  GASTROINTESTINAL: No abdominal or epigastric pain. No nausea, vomiting, or hematemesis; No diarrhea or constipation. No melena or hematochezia.  GENITOURINARY: No dysuria, frequency or hematuria  NEUROLOGICAL: No numbness or weakness  SKIN: No itching, burning, rashes, or lesions   All other review of systems is negative unless indicated above.    PAST MEDICAL & SURGICAL HISTORY:  Endometriosis      Hypothyroid      Acid reflux      UTI (urinary tract infection)      Uterine cancer  1/2021      H/O ovarian cystectomy  2004      History of cholecystectomy  217      H/O abdominal hysterectomy  1/2021 @ [facility]          FAMILY HISTORY:  Family history of diabetes mellitus    Family history of hypertension    Family history of hyperlipidemia    Family history of breast cancer in sister        SOCIAL HISTORY:     Allergies    Bactrim (Rash)  metronidazole (Hives; Rash)    Intolerances        MEDICATIONS  (STANDING):  cholecalciferol 2000 Unit(s) Oral daily  cyanocobalamin 1000 MICROGram(s) Oral daily  famotidine    Tablet 20 milliGRAM(s) Oral daily  heparin  Infusion.  Unit(s)/Hr (11 mL/Hr) IV Continuous <Continuous>  levothyroxine 50 MICROGram(s) Oral daily  losartan 50 milliGRAM(s) Oral daily  polyethylene glycol 3350 17 Gram(s) Oral two times a day  potassium chloride    Tablet ER 40 milliEquivalent(s) Oral every 4 hours  potassium chloride  10 mEq/100 mL IVPB 10 milliEquivalent(s) IV Intermittent every 1 hour  rosuvastatin 10 milliGRAM(s) Oral at bedtime  senna 2 Tablet(s) Oral at bedtime  sodium chloride 0.9%. 1000 milliLiter(s) (50 mL/Hr) IV Continuous <Continuous>    MEDICATIONS  (PRN):  acetaminophen     Tablet .. 650 milliGRAM(s) Oral every 6 hours PRN Temp greater or equal to 38C (100.4F), Mild Pain (1 - 3)  heparin   Injectable 4500 Unit(s) IV Push every 6 hours PRN For aPTT less than 40  heparin   Injectable 2000 Unit(s) IV Push every 6 hours PRN For aPTT between 40 - 57  oxyCODONE    IR 5 milliGRAM(s) Oral every 6 hours PRN Severe Pain (7 - 10)      OBJECTIVE   Height (cm): 162 (06-12 @ 16:43)  Weight (kg): 58.9 (06-12 @ 23:00), 58.5 (06-12 @ 16:00)  BMI (kg/m2): 22.4 (06-12 @ 23:00), 22.3 (06-12 @ 16:43)  BSA (m2): 1.62 (06-12 @ 23:00), 1.62 (06-12 @ 16:43)    T(F): 98.8 (06-13-25 @ 10:06), Max: 98.8 (06-13-25 @ 10:06)  HR: 83 (06-13-25 @ 10:06)  BP: 139/74 (06-13-25 @ 10:06)  RR: 18 (06-13-25 @ 10:06)  SpO2: 100% (06-13-25 @ 10:06)  Wt(kg): --    PHYSICAL EXAM   GENERAL: NAD, well-developed  HEAD:  Atraumatic, Normocephalic  EYES: EOMI, PERRLA, conjunctiva and sclera clear  NECK: Supple, No JVD  CHEST/LUNG: Clear to auscultation bilaterally; No wheeze  HEART: Regular rate and rhythm; No murmurs, rubs, or gallops  ABDOMEN: Soft, Nontender, Nondistended; Bowel sounds present  EXTREMITIES:  2+ Peripheral Pulses, No clubbing, cyanosis, or edema  NEUROLOGY: non-focal  SKIN: No rashes or lesions                          11.5   10.09 )-----------( 238      ( 13 Jun 2025 05:53 )             35.2       06-13    139  |  96[L]  |  6[L]  ----------------------------<  85  2.9[LL]   |  27  |  0.47[L]    Ca    9.3      13 Jun 2025 05:53  Phos  3.1     06-13  Mg     2.10     06-13    TPro  6.6  /  Alb  3.6  /  TBili  0.4  /  DBili  x   /  AST  119[H]  /  ALT  43[H]  /  AlkPhos  426[H]  06-13      Magnesium: 2.10 mg/dL (06-13 @ 05:53)  Phosphorus: 3.1 mg/dL (06-13 @ 05:53)

## 2025-06-13 NOTE — PROGRESS NOTE ADULT - PROBLEM SELECTOR PLAN 1
-Currently on Abraxane and Mvasi, last given on 5/21  -CT A/P: No evidence for bowel obstruction. Increase in size and number of hepatic metastases. Increase in left pelvic mass and right perirectal mass. Moderate left hydroureteronephrosis to the level of the left pelvic   Mass. Question mild intrahepatic biliary duct dilation in the left lobe peripheral to the tumor versus thrombosed left portal venous branches. 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 Increased lymphadenopathy. New small volume of ascites. Lung nodules in the lung bases.   -Discussed findings of worsening metastatic disease on CT imaging with patient  -Oncology team consulted  -Patient's pain is likely due to disease burden, especially right perirectal mass -Currently on Abraxane and Mvasi, last given on 5/21  -CT A/P: No evidence for bowel obstruction. Increase in size and number of hepatic metastases. Increase in left pelvic mass and right perirectal mass. Moderate left hydroureteronephrosis to the level of the left pelvic   Mass. Question mild intrahepatic biliary duct dilation in the left lobe peripheral to the tumor versus thrombosed left portal venous branches. 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 Increased lymphadenopathy. New small volume of ascites. Lung nodules in the lung bases.   -Discussed findings of worsening metastatic disease on CT imaging with patient  -Oncology team consulted  -Patient's pain is likely due to disease burden, especially right perirectal mass  -Spoke with urology team regarding moderate left hydroureteronephrosis- given normal renal function and no fevers, there is no indication for intervention while inpatient- if patient does develop MYRA or fevers, would then need to consider consulting IR for nephrostomy tube placement

## 2025-06-13 NOTE — CONSULT NOTE ADULT - PROBLEM SELECTOR RECOMMENDATION 9
What Type Of Note Output Would You Prefer (Optional)?: Bullet Format What Is The Reason For Today's Visit?: Full Body Skin Examination What Is The Reason For Today's Visit? (Being Monitored For X): the development of new lesions Additional History: Patient currently taking presidone for prostate cancer > Pt follows with Dr Matos. On tx ( Abraxane and Mvasi), last 5/21/25  > CT shows POD  > Heme/onc consult appreciated

## 2025-06-14 LAB
ALBUMIN SERPL ELPH-MCNC: 3.3 G/DL — SIGNIFICANT CHANGE UP (ref 3.3–5)
ALP SERPL-CCNC: 389 U/L — HIGH (ref 40–120)
ALT FLD-CCNC: 40 U/L — HIGH (ref 4–33)
ANION GAP SERPL CALC-SCNC: 13 MMOL/L — SIGNIFICANT CHANGE UP (ref 7–14)
APTT BLD: 90.9 SEC — HIGH (ref 26.1–36.8)
AST SERPL-CCNC: 109 U/L — HIGH (ref 4–32)
BILIRUB SERPL-MCNC: 0.4 MG/DL — SIGNIFICANT CHANGE UP (ref 0.2–1.2)
BUN SERPL-MCNC: 7 MG/DL — SIGNIFICANT CHANGE UP (ref 7–23)
CALCIUM SERPL-MCNC: 8.8 MG/DL — SIGNIFICANT CHANGE UP (ref 8.4–10.5)
CHLORIDE SERPL-SCNC: 100 MMOL/L — SIGNIFICANT CHANGE UP (ref 98–107)
CO2 SERPL-SCNC: 23 MMOL/L — SIGNIFICANT CHANGE UP (ref 22–31)
CREAT SERPL-MCNC: 0.46 MG/DL — LOW (ref 0.5–1.3)
EGFR: 111 ML/MIN/1.73M2 — SIGNIFICANT CHANGE UP
EGFR: 111 ML/MIN/1.73M2 — SIGNIFICANT CHANGE UP
GLUCOSE SERPL-MCNC: 100 MG/DL — HIGH (ref 70–99)
HCT VFR BLD CALC: 33 % — LOW (ref 34.5–45)
HGB BLD-MCNC: 10.5 G/DL — LOW (ref 11.5–15.5)
MCHC RBC-ENTMCNC: 27.9 PG — SIGNIFICANT CHANGE UP (ref 27–34)
MCHC RBC-ENTMCNC: 31.8 G/DL — LOW (ref 32–36)
MCV RBC AUTO: 87.8 FL — SIGNIFICANT CHANGE UP (ref 80–100)
NRBC # BLD AUTO: 0 K/UL — SIGNIFICANT CHANGE UP (ref 0–0)
NRBC # FLD: 0 K/UL — SIGNIFICANT CHANGE UP (ref 0–0)
NRBC BLD AUTO-RTO: 0 /100 WBCS — SIGNIFICANT CHANGE UP (ref 0–0)
PLATELET # BLD AUTO: 236 K/UL — SIGNIFICANT CHANGE UP (ref 150–400)
POTASSIUM SERPL-MCNC: 3.9 MMOL/L — SIGNIFICANT CHANGE UP (ref 3.5–5.3)
POTASSIUM SERPL-SCNC: 3.9 MMOL/L — SIGNIFICANT CHANGE UP (ref 3.5–5.3)
PROT SERPL-MCNC: 6 G/DL — SIGNIFICANT CHANGE UP (ref 6–8.3)
RBC # BLD: 3.76 M/UL — LOW (ref 3.8–5.2)
RBC # FLD: 15.1 % — HIGH (ref 10.3–14.5)
SODIUM SERPL-SCNC: 136 MMOL/L — SIGNIFICANT CHANGE UP (ref 135–145)
T4 FREE SERPL-MCNC: 0.8 NG/DL — LOW (ref 0.9–1.8)
TSH SERPL-MCNC: 65.37 UIU/ML — HIGH (ref 0.27–4.2)
WBC # BLD: 9.01 K/UL — SIGNIFICANT CHANGE UP (ref 3.8–10.5)
WBC # FLD AUTO: 9.01 K/UL — SIGNIFICANT CHANGE UP (ref 3.8–10.5)

## 2025-06-14 PROCEDURE — 99233 SBSQ HOSP IP/OBS HIGH 50: CPT

## 2025-06-14 RX ORDER — SENNA 187 MG
2 TABLET ORAL
Refills: 0 | Status: DISCONTINUED | OUTPATIENT
Start: 2025-06-14 | End: 2025-06-15

## 2025-06-14 RX ORDER — BISACODYL 5 MG
10 TABLET, DELAYED RELEASE (ENTERIC COATED) ORAL DAILY
Refills: 0 | Status: DISCONTINUED | OUTPATIENT
Start: 2025-06-14 | End: 2025-06-18

## 2025-06-14 RX ORDER — HYDROMORPHONE/SOD CHLOR,ISO/PF 2 MG/10 ML
0.2 SYRINGE (ML) INJECTION EVERY 4 HOURS
Refills: 0 | Status: DISCONTINUED | OUTPATIENT
Start: 2025-06-14 | End: 2025-06-16

## 2025-06-14 RX ORDER — ENOXAPARIN SODIUM 100 MG/ML
60 INJECTION SUBCUTANEOUS EVERY 12 HOURS
Refills: 0 | Status: DISCONTINUED | OUTPATIENT
Start: 2025-06-14 | End: 2025-06-16

## 2025-06-14 RX ORDER — LIDOCAINE HYDROCHLORIDE 20 MG/ML
1 JELLY TOPICAL
Refills: 0 | Status: COMPLETED | OUTPATIENT
Start: 2025-06-14 | End: 2025-06-17

## 2025-06-14 RX ORDER — LEVOTHYROXINE SODIUM 300 MCG
75 TABLET ORAL DAILY
Refills: 0 | Status: DISCONTINUED | OUTPATIENT
Start: 2025-06-15 | End: 2025-06-18

## 2025-06-14 RX ADMIN — OXYCODONE HYDROCHLORIDE 2.5 MILLIGRAM(S): 30 TABLET ORAL at 14:30

## 2025-06-14 RX ADMIN — LOSARTAN POTASSIUM 50 MILLIGRAM(S): 100 TABLET, FILM COATED ORAL at 05:11

## 2025-06-14 RX ADMIN — HEPARIN SODIUM 900 UNIT(S)/HR: 1000 INJECTION INTRAVENOUS; SUBCUTANEOUS at 08:23

## 2025-06-14 RX ADMIN — OXYCODONE HYDROCHLORIDE 5 MILLIGRAM(S): 30 TABLET ORAL at 03:45

## 2025-06-14 RX ADMIN — Medication 2 TABLET(S): at 17:06

## 2025-06-14 RX ADMIN — OXYCODONE HYDROCHLORIDE 5 MILLIGRAM(S): 30 TABLET ORAL at 03:05

## 2025-06-14 RX ADMIN — HEPARIN SODIUM 900 UNIT(S)/HR: 1000 INJECTION INTRAVENOUS; SUBCUTANEOUS at 07:55

## 2025-06-14 RX ADMIN — Medication 2000 UNIT(S): at 11:45

## 2025-06-14 RX ADMIN — OXYCODONE HYDROCHLORIDE 5 MILLIGRAM(S): 30 TABLET ORAL at 20:13

## 2025-06-14 RX ADMIN — ROSUVASTATIN CALCIUM 10 MILLIGRAM(S): 20 TABLET, FILM COATED ORAL at 21:04

## 2025-06-14 RX ADMIN — Medication 10 MILLIGRAM(S): at 17:06

## 2025-06-14 RX ADMIN — LIDOCAINE HYDROCHLORIDE 1 APPLICATION(S): 20 JELLY TOPICAL at 17:06

## 2025-06-14 RX ADMIN — CYANOCOBALAMIN 1000 MICROGRAM(S): 1000 INJECTION INTRAMUSCULAR; SUBCUTANEOUS at 11:45

## 2025-06-14 RX ADMIN — POLYETHYLENE GLYCOL 3350 17 GRAM(S): 17 POWDER, FOR SOLUTION ORAL at 17:06

## 2025-06-14 RX ADMIN — Medication 20 MILLIGRAM(S): at 11:45

## 2025-06-14 RX ADMIN — ENOXAPARIN SODIUM 60 MILLIGRAM(S): 100 INJECTION SUBCUTANEOUS at 19:31

## 2025-06-14 RX ADMIN — POLYETHYLENE GLYCOL 3350 17 GRAM(S): 17 POWDER, FOR SOLUTION ORAL at 05:12

## 2025-06-14 RX ADMIN — OXYCODONE HYDROCHLORIDE 2.5 MILLIGRAM(S): 30 TABLET ORAL at 13:30

## 2025-06-14 RX ADMIN — OXYCODONE HYDROCHLORIDE 5 MILLIGRAM(S): 30 TABLET ORAL at 19:13

## 2025-06-14 RX ADMIN — Medication 50 MICROGRAM(S): at 05:11

## 2025-06-14 NOTE — PROGRESS NOTE ADULT - SUBJECTIVE AND OBJECTIVE BOX
Patient is a 59y old  Female who presents with a chief complaint of admission for abdominal pain  Per chart, Pt is 59F w/ PMHx of uterine cancer on chemotherapy (last chemo on 5/21/25), HTN, HLD, and hypothyroidism who presents with worsening rectal pain and found to have progressive metastatic disease and thrombi. Admitted to medicine for further management and monitoring.   (14 Jun 2025 14:17)      INTERVAL HPI/OVERNIGHT EVENTS:  Seen by me this afternoon, Family at bedside including daughter, decreased PO intake, pain is borderline controlled, medication is not lasting long enough but it works, +BM but feels is not sufficient.    Review of Systems: 12 point review of systems otherwise negative    MEDICATIONS  (STANDING):  cholecalciferol 2000 Unit(s) Oral daily  cyanocobalamin 1000 MICROGram(s) Oral daily  famotidine    Tablet 20 milliGRAM(s) Oral daily  gabapentin 100 milliGRAM(s) Oral at bedtime  heparin  Infusion.  Unit(s)/Hr (11 mL/Hr) IV Continuous <Continuous>  lidocaine 4% Cream 1 Application(s) Topical two times a day  losartan 50 milliGRAM(s) Oral daily  polyethylene glycol 3350 17 Gram(s) Oral two times a day  rosuvastatin 10 milliGRAM(s) Oral at bedtime  senna 2 Tablet(s) Oral two times a day  sodium chloride 0.9%. 1000 milliLiter(s) (50 mL/Hr) IV Continuous <Continuous>    MEDICATIONS  (PRN):  acetaminophen     Tablet .. 650 milliGRAM(s) Oral every 6 hours PRN Temp greater or equal to 38C (100.4F), Mild Pain (1 - 3)  bisacodyl Suppository 10 milliGRAM(s) Rectal daily PRN Constipation  heparin   Injectable 4500 Unit(s) IV Push every 6 hours PRN For aPTT less than 40  heparin   Injectable 2000 Unit(s) IV Push every 6 hours PRN For aPTT between 40 - 57  HYDROmorphone  Injectable 0.2 milliGRAM(s) IV Push every 4 hours PRN Breakthough pain  oxyCODONE    IR 5 milliGRAM(s) Oral every 4 hours PRN Severe Pain (7 - 10)  oxyCODONE    IR 2.5 milliGRAM(s) Oral every 4 hours PRN Moderate Pain (4 - 6)      Allergies    Bactrim (Rash)  metronidazole (Hives; Rash)    Intolerances          Vital Signs Last 24 Hrs  T(C): 36.7 (14 Jun 2025 06:00), Max: 37.1 (13 Jun 2025 18:25)  T(F): 98 (14 Jun 2025 06:00), Max: 98.8 (13 Jun 2025 18:25)  HR: 73 (14 Jun 2025 06:00) (73 - 98)  BP: 146/72 (14 Jun 2025 06:00) (146/72 - 153/88)  BP(mean): --  RR: 18 (14 Jun 2025 06:00) (17 - 18)  SpO2: 97% (14 Jun 2025 06:00) (97% - 99%)    Parameters below as of 14 Jun 2025 06:00  Patient On (Oxygen Delivery Method): room air      CAPILLARY BLOOD GLUCOSE            Physical Exam:    Daily Height in cm: 165.1 (13 Jun 2025 18:06)    Daily   General:  Ill appearing, NAD, not cachetic  HEENT:  Nonicteric, PERRLA  CV:  RRR, no murmur, no JVD  Lungs:  CTA B/L, no wheezes, rales, rhonchi  Abdomen:  Soft, non-tender, slightly distended  Extremities:  2+ pulses, no c/c, no edema  Skin:  Warm and dry, no rashes  :  No espana  Neuro:  AAOx3, non-focal, CN II-XII grossly intact  No Restraints    LABS:                        10.5   9.01  )-----------( 236      ( 14 Jun 2025 05:27 )             33.0     06-14    136  |  100  |  7   ----------------------------<  100[H]  3.9   |  23  |  0.46[L]    Ca    8.8      14 Jun 2025 05:27  Phos  2.7     06-13  Mg     2.10     06-13    TPro  6.0  /  Alb  3.3  /  TBili  0.4  /  DBili  x   /  AST  109[H]  /  ALT  40[H]  /  AlkPhos  389[H]  06-14    PT/INR - ( 12 Jun 2025 19:36 )   PT: 12.9 sec;   INR: 1.08 ratio         PTT - ( 14 Jun 2025 05:27 )  PTT:90.9 sec  Urinalysis Basic - ( 14 Jun 2025 05:27 )    Color: x / Appearance: x / SG: x / pH: x  Gluc: 100 mg/dL / Ketone: x  / Bili: x / Urobili: x   Blood: x / Protein: x / Nitrite: x   Leuk Esterase: x / RBC: x / WBC x   Sq Epi: x / Non Sq Epi: x / Bacteria: x          RADIOLOGY & ADDITIONAL TESTS:  Reviewed by me

## 2025-06-14 NOTE — DIETITIAN INITIAL EVALUATION ADULT - PERTINENT LABORATORY DATA
06-14    136  |  100  |  7   ----------------------------<  100[H]  3.9   |  23  |  0.46[L]    Ca    8.8      14 Jun 2025 05:27  Phos  2.7     06-13  Mg     2.10     06-13    TPro  6.0  /  Alb  3.3  /  TBili  0.4  /  DBili  x   /  AST  109[H]  /  ALT  40[H]  /  AlkPhos  389[H]  06-14

## 2025-06-14 NOTE — DIETITIAN INITIAL EVALUATION ADULT - ADD RECOMMEND
- Consider adding Lacto-Vegetarian to DASH/TLC diet.   - Nutrition department to provide Orgain Vegan Vanilla 2x daily (220kcal/16gm pro/11 fl oz).   - Recommend MVI pending no medical contraindications, for micronutrient support.  - Monitor weights, diet tolerance, skin integrity, BMs, pertinent labs.   - RDN services remain available as needed.

## 2025-06-14 NOTE — DIETITIAN INITIAL EVALUATION ADULT - ORAL INTAKE PTA/DIET HISTORY
Patient reports good appetite/PO intake PTA, consumes a regular Lacto-vegetarian diet at baseline. Pt confirms NKFA, food intolerance. Pt reported UBW ~128lbs/58kg. Pt reported wt loss about 8lbs secondary to dieting and portion control

## 2025-06-14 NOTE — DIETITIAN INITIAL EVALUATION ADULT - CALCULATED FROM (G/KG)
63.91 No Residual Tumor Seen Histology Text: There were no malignant cells seen in the sections examined.

## 2025-06-14 NOTE — DIETITIAN INITIAL EVALUATION ADULT - OTHER INFO
Pt seen at bedside with  present. Pt reported okay PO intake in house. Per RN flowsheet, Pt with 0-25% PO intake noted. Pt amenable to oral nutritional supplement. Patient denies difficulty chewing or swallowing at present. Pt denies any nausea, vomiting, diarrhea, or constipation at this time. Per chart, last BM 6/13. Ordered for bowel regimen. Per chart, UBW: 63kg (Jun, 2024), 61.3kg (1/24), 60kg (3/17), 58.5kg (4/16), 58.6kg (5/14). ABW 58.1kg (6/13). Wt decreased x1 year (7.7%). Not clinical significant. Continue to monitor wt trend. Importance of having well balanced nutrient and protein dense foods emphasized. Also, importance of good adherence to DASH/TLC diet and having protein at each meal suggested. Patient receptive to all information provided and appreciated visit.

## 2025-06-14 NOTE — PROGRESS NOTE ADULT - PROBLEM SELECTOR PLAN 1
Currently on Abraxane and Mvasi, last given on 5/21  -CT A/P: No evidence for bowel obstruction. Increase in size and number of hepatic metastases. Increase in left pelvic mass and right perirectal mass. Moderate left hydroureteronephrosis to the level of the left pelvic   Mass. Question mild intrahepatic biliary duct dilation in the left lobe peripheral to the tumor versus thrombosed left portal venous branches. 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 Increased lymphadenopathy. New small volume of ascites. Lung nodules in the lung bases  - Patient/daughter aware of CT scan results  - Prior attending spoke with  regarding moderate left hydroureteronephrosis- given normal renal function and no fevers, there is no indication for intervention while inpatient- if patient does develop MYRA or fevers, would then need to consider consulting IR for nephrostomy tube placement  - Has f/up appt in [place] ([place]) on Tuesday with alternate Oncologist (pt follows with both Dr. Matos and [place])

## 2025-06-14 NOTE — DIETITIAN INITIAL EVALUATION ADULT - NSICDXPASTSURGICALHX_GEN_ALL_CORE_FT
PAST SURGICAL HISTORY:  H/O abdominal hysterectomy 1/2021 @ [facility]    H/O ovarian cystectomy 2004    History of cholecystectomy 217

## 2025-06-14 NOTE — PROGRESS NOTE ADULT - NS ATTEST RISK PROBLEM GEN_ALL_CORE FT
Cancer related pain, metastatic uterine cancer, VTE, immunosuppressed status, moderate left hydroureteronephrosis

## 2025-06-14 NOTE — DIETITIAN INITIAL EVALUATION ADULT - REASON FOR ADMISSION
admission for abdominal pain  Per chart, Pt is 59F w/ PMHx of uterine cancer on chemotherapy (last chemo on 5/21/25), HTN, HLD, and hypothyroidism who presents with worsening rectal pain and found to have progressive metastatic disease and thrombi. Admitted to medicine for further management and monitoring.

## 2025-06-14 NOTE — DIETITIAN INITIAL EVALUATION ADULT - PERTINENT MEDS FT
MEDICATIONS  (STANDING):  cholecalciferol 2000 Unit(s) Oral daily  cyanocobalamin 1000 MICROGram(s) Oral daily  famotidine    Tablet 20 milliGRAM(s) Oral daily  gabapentin 100 milliGRAM(s) Oral at bedtime  heparin  Infusion.  Unit(s)/Hr (11 mL/Hr) IV Continuous <Continuous>  lidocaine 4% Cream 1 Application(s) Topical two times a day  losartan 50 milliGRAM(s) Oral daily  polyethylene glycol 3350 17 Gram(s) Oral two times a day  rosuvastatin 10 milliGRAM(s) Oral at bedtime  senna 2 Tablet(s) Oral at bedtime  sodium chloride 0.9%. 1000 milliLiter(s) (50 mL/Hr) IV Continuous <Continuous>    MEDICATIONS  (PRN):  acetaminophen     Tablet .. 650 milliGRAM(s) Oral every 6 hours PRN Temp greater or equal to 38C (100.4F), Mild Pain (1 - 3)  heparin   Injectable 4500 Unit(s) IV Push every 6 hours PRN For aPTT less than 40  heparin   Injectable 2000 Unit(s) IV Push every 6 hours PRN For aPTT between 40 - 57  oxyCODONE    IR 5 milliGRAM(s) Oral every 4 hours PRN Severe Pain (7 - 10)  oxyCODONE    IR 2.5 milliGRAM(s) Oral every 4 hours PRN Moderate Pain (4 - 6)

## 2025-06-15 DIAGNOSIS — R50.9 FEVER, UNSPECIFIED: ICD-10-CM

## 2025-06-15 LAB
ADD ON TEST-SPECIMEN IN LAB: SIGNIFICANT CHANGE UP
ADD ON TEST-SPECIMEN IN LAB: SIGNIFICANT CHANGE UP
ALBUMIN SERPL ELPH-MCNC: 3 G/DL — LOW (ref 3.3–5)
ALP SERPL-CCNC: 380 U/L — HIGH (ref 40–120)
ALT FLD-CCNC: 40 U/L — HIGH (ref 4–33)
ANION GAP SERPL CALC-SCNC: 14 MMOL/L — SIGNIFICANT CHANGE UP (ref 7–14)
ANISOCYTOSIS BLD QL: SLIGHT — SIGNIFICANT CHANGE UP
APPEARANCE UR: CLEAR — SIGNIFICANT CHANGE UP
APTT BLD: 35.9 SEC — SIGNIFICANT CHANGE UP (ref 26.1–36.8)
AST SERPL-CCNC: 121 U/L — HIGH (ref 4–32)
B PERT DNA SPEC QL NAA+PROBE: SIGNIFICANT CHANGE UP
B PERT+PARAPERT DNA PNL SPEC NAA+PROBE: SIGNIFICANT CHANGE UP
BACTERIA # UR AUTO: NEGATIVE /HPF — SIGNIFICANT CHANGE UP
BASOPHILS # BLD AUTO: 0.09 K/UL — SIGNIFICANT CHANGE UP (ref 0–0.2)
BASOPHILS NFR BLD AUTO: 0.9 % — SIGNIFICANT CHANGE UP (ref 0–2)
BILIRUB SERPL-MCNC: 0.5 MG/DL — SIGNIFICANT CHANGE UP (ref 0.2–1.2)
BILIRUB UR-MCNC: NEGATIVE — SIGNIFICANT CHANGE UP
BUN SERPL-MCNC: 5 MG/DL — LOW (ref 7–23)
C PNEUM DNA SPEC QL NAA+PROBE: SIGNIFICANT CHANGE UP
CALCIUM SERPL-MCNC: 8.4 MG/DL — SIGNIFICANT CHANGE UP (ref 8.4–10.5)
CAST: 1 /LPF — SIGNIFICANT CHANGE UP (ref 0–4)
CHLORIDE SERPL-SCNC: 97 MMOL/L — LOW (ref 98–107)
CO2 SERPL-SCNC: 22 MMOL/L — SIGNIFICANT CHANGE UP (ref 22–31)
COLOR SPEC: YELLOW — SIGNIFICANT CHANGE UP
CREAT SERPL-MCNC: 0.48 MG/DL — LOW (ref 0.5–1.3)
CULTURE RESULTS: SIGNIFICANT CHANGE UP
DACRYOCYTES BLD QL SMEAR: SLIGHT — SIGNIFICANT CHANGE UP
DIFF PNL FLD: NEGATIVE — SIGNIFICANT CHANGE UP
EGFR: 110 ML/MIN/1.73M2 — SIGNIFICANT CHANGE UP
EGFR: 110 ML/MIN/1.73M2 — SIGNIFICANT CHANGE UP
EOSINOPHIL # BLD AUTO: 0 K/UL — SIGNIFICANT CHANGE UP (ref 0–0.5)
EOSINOPHIL NFR BLD AUTO: 0 % — SIGNIFICANT CHANGE UP (ref 0–6)
FLUAV AG NPH QL: SIGNIFICANT CHANGE UP
FLUAV SUBTYP SPEC NAA+PROBE: SIGNIFICANT CHANGE UP
FLUBV AG NPH QL: SIGNIFICANT CHANGE UP
FLUBV RNA SPEC QL NAA+PROBE: SIGNIFICANT CHANGE UP
GIANT PLATELETS BLD QL SMEAR: PRESENT — SIGNIFICANT CHANGE UP
GLUCOSE SERPL-MCNC: 90 MG/DL — SIGNIFICANT CHANGE UP (ref 70–99)
GLUCOSE UR QL: NEGATIVE MG/DL — SIGNIFICANT CHANGE UP
HADV DNA SPEC QL NAA+PROBE: SIGNIFICANT CHANGE UP
HCOV 229E RNA SPEC QL NAA+PROBE: SIGNIFICANT CHANGE UP
HCOV HKU1 RNA SPEC QL NAA+PROBE: SIGNIFICANT CHANGE UP
HCOV NL63 RNA SPEC QL NAA+PROBE: SIGNIFICANT CHANGE UP
HCOV OC43 RNA SPEC QL NAA+PROBE: SIGNIFICANT CHANGE UP
HCT VFR BLD CALC: 31.1 % — LOW (ref 34.5–45)
HGB BLD-MCNC: 10 G/DL — LOW (ref 11.5–15.5)
HMPV RNA SPEC QL NAA+PROBE: SIGNIFICANT CHANGE UP
HPIV1 RNA SPEC QL NAA+PROBE: SIGNIFICANT CHANGE UP
HPIV2 RNA SPEC QL NAA+PROBE: SIGNIFICANT CHANGE UP
HPIV3 RNA SPEC QL NAA+PROBE: SIGNIFICANT CHANGE UP
HPIV4 RNA SPEC QL NAA+PROBE: SIGNIFICANT CHANGE UP
IANC: 8.23 K/UL — HIGH (ref 1.8–7.4)
KETONES UR QL: NEGATIVE MG/DL — SIGNIFICANT CHANGE UP
LEUKOCYTE ESTERASE UR-ACNC: ABNORMAL
LYMPHOCYTES # BLD AUTO: 0.17 K/UL — LOW (ref 1–3.3)
LYMPHOCYTES # BLD AUTO: 1.7 % — LOW (ref 13–44)
M PNEUMO DNA SPEC QL NAA+PROBE: SIGNIFICANT CHANGE UP
MACROCYTES BLD QL: SLIGHT — SIGNIFICANT CHANGE UP
MAGNESIUM SERPL-MCNC: 2 MG/DL — SIGNIFICANT CHANGE UP (ref 1.6–2.6)
MANUAL SMEAR VERIFICATION: SIGNIFICANT CHANGE UP
MCHC RBC-ENTMCNC: 27.9 PG — SIGNIFICANT CHANGE UP (ref 27–34)
MCHC RBC-ENTMCNC: 32.2 G/DL — SIGNIFICANT CHANGE UP (ref 32–36)
MCV RBC AUTO: 86.9 FL — SIGNIFICANT CHANGE UP (ref 80–100)
MONOCYTES # BLD AUTO: 0.78 K/UL — SIGNIFICANT CHANGE UP (ref 0–0.9)
MONOCYTES NFR BLD AUTO: 7.8 % — SIGNIFICANT CHANGE UP (ref 2–14)
MRSA PCR RESULT.: SIGNIFICANT CHANGE UP
NEUTROPHILS # BLD AUTO: 8.88 K/UL — HIGH (ref 1.8–7.4)
NEUTROPHILS NFR BLD AUTO: 88.7 % — HIGH (ref 43–77)
NITRITE UR-MCNC: NEGATIVE — SIGNIFICANT CHANGE UP
OVALOCYTES BLD QL SMEAR: SLIGHT — SIGNIFICANT CHANGE UP
PH UR: 7 — SIGNIFICANT CHANGE UP (ref 5–8)
PHOSPHATE SERPL-MCNC: 2.7 MG/DL — SIGNIFICANT CHANGE UP (ref 2.5–4.5)
PLAT MORPH BLD: NORMAL — SIGNIFICANT CHANGE UP
PLATELET # BLD AUTO: 222 K/UL — SIGNIFICANT CHANGE UP (ref 150–400)
PLATELET COUNT - ESTIMATE: NORMAL — SIGNIFICANT CHANGE UP
POIKILOCYTOSIS BLD QL AUTO: SLIGHT — SIGNIFICANT CHANGE UP
POLYCHROMASIA BLD QL SMEAR: SLIGHT — SIGNIFICANT CHANGE UP
POTASSIUM SERPL-MCNC: 3.2 MMOL/L — LOW (ref 3.5–5.3)
POTASSIUM SERPL-SCNC: 3.2 MMOL/L — LOW (ref 3.5–5.3)
PROCALCITONIN SERPL-MCNC: 0.11 NG/ML — HIGH (ref 0.02–0.1)
PROT SERPL-MCNC: 5.8 G/DL — LOW (ref 6–8.3)
PROT UR-MCNC: NEGATIVE MG/DL — SIGNIFICANT CHANGE UP
RAPID RVP RESULT: SIGNIFICANT CHANGE UP
RBC # BLD: 3.58 M/UL — LOW (ref 3.8–5.2)
RBC # FLD: 15.2 % — HIGH (ref 10.3–14.5)
RBC BLD AUTO: ABNORMAL
RBC CASTS # UR COMP ASSIST: 0 /HPF — SIGNIFICANT CHANGE UP (ref 0–4)
RSV RNA NPH QL NAA+NON-PROBE: SIGNIFICANT CHANGE UP
RSV RNA SPEC QL NAA+PROBE: SIGNIFICANT CHANGE UP
RV+EV RNA SPEC QL NAA+PROBE: SIGNIFICANT CHANGE UP
S AUREUS DNA NOSE QL NAA+PROBE: DETECTED
SARS-COV-2 RNA SPEC QL NAA+PROBE: SIGNIFICANT CHANGE UP
SARS-COV-2 RNA SPEC QL NAA+PROBE: SIGNIFICANT CHANGE UP
SODIUM SERPL-SCNC: 133 MMOL/L — LOW (ref 135–145)
SOURCE RESPIRATORY: SIGNIFICANT CHANGE UP
SP GR SPEC: 1.01 — SIGNIFICANT CHANGE UP (ref 1–1.03)
SPECIMEN SOURCE: SIGNIFICANT CHANGE UP
SQUAMOUS # UR AUTO: 1 /HPF — SIGNIFICANT CHANGE UP (ref 0–5)
UROBILINOGEN FLD QL: 1 MG/DL — SIGNIFICANT CHANGE UP (ref 0.2–1)
VARIANT LYMPHS # BLD: 0.9 % — SIGNIFICANT CHANGE UP (ref 0–6)
VARIANT LYMPHS NFR BLD MANUAL: 0.9 % — SIGNIFICANT CHANGE UP (ref 0–6)
WBC # BLD: 10.01 K/UL — SIGNIFICANT CHANGE UP (ref 3.8–10.5)
WBC # FLD AUTO: 10.01 K/UL — SIGNIFICANT CHANGE UP (ref 3.8–10.5)
WBC UR QL: 0 /HPF — SIGNIFICANT CHANGE UP (ref 0–5)

## 2025-06-15 PROCEDURE — 99233 SBSQ HOSP IP/OBS HIGH 50: CPT

## 2025-06-15 PROCEDURE — 71045 X-RAY EXAM CHEST 1 VIEW: CPT | Mod: 26

## 2025-06-15 RX ORDER — PIPERACILLIN-TAZO-DEXTROSE,ISO 3.375G/5
3.38 IV SOLUTION, PIGGYBACK PREMIX FROZEN(ML) INTRAVENOUS ONCE
Refills: 0 | Status: COMPLETED | OUTPATIENT
Start: 2025-06-16 | End: 2025-06-16

## 2025-06-15 RX ORDER — PIPERACILLIN-TAZO-DEXTROSE,ISO 3.375G/5
3.38 IV SOLUTION, PIGGYBACK PREMIX FROZEN(ML) INTRAVENOUS ONCE
Refills: 0 | Status: COMPLETED | OUTPATIENT
Start: 2025-06-15 | End: 2025-06-15

## 2025-06-15 RX ORDER — POLYETHYLENE GLYCOL 3350 17 G/17G
17 POWDER, FOR SOLUTION ORAL DAILY
Refills: 0 | Status: DISCONTINUED | OUTPATIENT
Start: 2025-06-16 | End: 2025-06-16

## 2025-06-15 RX ORDER — PHENAZOPYRIDINE HCL 100 MG
100 TABLET ORAL EVERY 8 HOURS
Refills: 0 | Status: COMPLETED | OUTPATIENT
Start: 2025-06-15 | End: 2025-06-17

## 2025-06-15 RX ORDER — ACETAMINOPHEN 500 MG/5ML
1000 LIQUID (ML) ORAL ONCE
Refills: 0 | Status: DISCONTINUED | OUTPATIENT
Start: 2025-06-15 | End: 2025-06-15

## 2025-06-15 RX ORDER — ACETAMINOPHEN 500 MG/5ML
1000 LIQUID (ML) ORAL ONCE
Refills: 0 | Status: COMPLETED | OUTPATIENT
Start: 2025-06-15 | End: 2025-06-15

## 2025-06-15 RX ORDER — PIPERACILLIN-TAZO-DEXTROSE,ISO 3.375G/5
3.38 IV SOLUTION, PIGGYBACK PREMIX FROZEN(ML) INTRAVENOUS EVERY 8 HOURS
Refills: 0 | Status: DISCONTINUED | OUTPATIENT
Start: 2025-06-16 | End: 2025-06-18

## 2025-06-15 RX ORDER — MUPIROCIN CALCIUM 20 MG/G
1 CREAM TOPICAL
Refills: 0 | Status: DISCONTINUED | OUTPATIENT
Start: 2025-06-15 | End: 2025-06-18

## 2025-06-15 RX ADMIN — LIDOCAINE HYDROCHLORIDE 1 APPLICATION(S): 20 JELLY TOPICAL at 18:08

## 2025-06-15 RX ADMIN — OXYCODONE HYDROCHLORIDE 5 MILLIGRAM(S): 30 TABLET ORAL at 05:20

## 2025-06-15 RX ADMIN — OXYCODONE HYDROCHLORIDE 5 MILLIGRAM(S): 30 TABLET ORAL at 22:09

## 2025-06-15 RX ADMIN — ENOXAPARIN SODIUM 60 MILLIGRAM(S): 100 INJECTION SUBCUTANEOUS at 05:26

## 2025-06-15 RX ADMIN — Medication 20 MILLIGRAM(S): at 11:12

## 2025-06-15 RX ADMIN — ROSUVASTATIN CALCIUM 10 MILLIGRAM(S): 20 TABLET, FILM COATED ORAL at 22:09

## 2025-06-15 RX ADMIN — OXYCODONE HYDROCHLORIDE 5 MILLIGRAM(S): 30 TABLET ORAL at 23:09

## 2025-06-15 RX ADMIN — LIDOCAINE HYDROCHLORIDE 1 APPLICATION(S): 20 JELLY TOPICAL at 05:22

## 2025-06-15 RX ADMIN — OXYCODONE HYDROCHLORIDE 5 MILLIGRAM(S): 30 TABLET ORAL at 05:57

## 2025-06-15 RX ADMIN — LOSARTAN POTASSIUM 50 MILLIGRAM(S): 100 TABLET, FILM COATED ORAL at 05:19

## 2025-06-15 RX ADMIN — OXYCODONE HYDROCHLORIDE 5 MILLIGRAM(S): 30 TABLET ORAL at 15:50

## 2025-06-15 RX ADMIN — Medication 2000 UNIT(S): at 11:12

## 2025-06-15 RX ADMIN — Medication 400 MILLIGRAM(S): at 12:59

## 2025-06-15 RX ADMIN — Medication 1000 MILLIGRAM(S): at 13:59

## 2025-06-15 RX ADMIN — Medication 100 MILLIGRAM(S): at 22:15

## 2025-06-15 RX ADMIN — Medication 200 GRAM(S): at 15:49

## 2025-06-15 RX ADMIN — Medication 25 GRAM(S): at 18:07

## 2025-06-15 RX ADMIN — CYANOCOBALAMIN 1000 MICROGRAM(S): 1000 INJECTION INTRAMUSCULAR; SUBCUTANEOUS at 11:12

## 2025-06-15 RX ADMIN — Medication 75 MICROGRAM(S): at 05:20

## 2025-06-15 RX ADMIN — Medication 40 MILLIEQUIVALENT(S): at 11:12

## 2025-06-15 RX ADMIN — ENOXAPARIN SODIUM 60 MILLIGRAM(S): 100 INJECTION SUBCUTANEOUS at 18:30

## 2025-06-15 RX ADMIN — OXYCODONE HYDROCHLORIDE 5 MILLIGRAM(S): 30 TABLET ORAL at 15:17

## 2025-06-15 RX ADMIN — GABAPENTIN 100 MILLIGRAM(S): 400 CAPSULE ORAL at 22:09

## 2025-06-15 NOTE — PHYSICAL THERAPY INITIAL EVALUATION ADULT - PERTINENT HX OF CURRENT PROBLEM, REHAB EVAL
Patient is 59 year old female, PMHx of uterine cancer on chemotherapy (last chemo on 5/21/25), HTN, HLD, and hypothyroidism who presents with worsening rectal pain and found to have progressive metastatic disease and thrombi.

## 2025-06-15 NOTE — PROVIDER CONTACT NOTE (OTHER) - BACKGROUND
59 year old female with refractory uterine cancer with hepatic mets  Admitted with abdominal/rectal pain and constipation

## 2025-06-15 NOTE — PROGRESS NOTE ADULT - PROBLEM SELECTOR PLAN 10
VTE PPx: Initially on IV heparin, switched to lovenox, can transition to DOAC after price check    Discussed with  and Daughter at bedside at length on 6/15. VTE PPx: Initially on IV heparin, switched to lovenox, can transition to DOAC after price check  PT eval --> No needs    Discussed with  and Daughter at bedside at length on 6/15.

## 2025-06-15 NOTE — PHYSICAL THERAPY INITIAL EVALUATION ADULT - LEVEL OF INDEPENDENCE: STAND/SIT, REHAB EVAL
Patient Education     Sinusitis (Antibiotic Treatment)    The sinuses are air-filled spaces within the bones of the face. They connect to the inside of the nose. Sinusitis is an inflammation of the tissue that lines the sinuses. Sinusitis can occur during a cold. It can also happen due to allergies to pollens and other particles in the air. Sinusitis can cause symptoms of sinus congestion and a feeling of fullness. A sinus infection causes fever, headache, and facial pain. There is often green or yellow fluid draining from the nose or into the back of the throat (post-nasal drip). You have been given antibiotics to treat this condition.   Home care    Take the full course of antibiotics as instructed. Don't stop taking them, even when you feel better.    Drink plenty of water, hot tea, and other liquids as directed by the healthcare provider. This may help thin nasal mucus. It also may help your sinuses drain fluids.    Heat may help soothe painful areas of your face. Use a towel soaked in hot water. Or,  the shower and direct the warm spray onto your face. Using a vaporizer along with a menthol rub at night may also help soothe symptoms.     An expectorant with guaifenesin may help thin nasal mucus and help your sinuses drain fluids. Talk with your provider or pharmacists before taking an over-the-counter (OTC) medicine if you have any questions about it or its side effects..    You can use an OTC decongestant, unless a similar medicine was prescribed to you. Nasal sprays work the fastest. Use one that contains phenylephrine or oxymetazoline. First blow your nose gently. Then use the spray. Don't use these medicines more often than directed on the label. If you do, your symptoms may get worse. You may also take pills that contain pseudoephedrine. Don t use products that combine multiple medicines. This is because side effects may be increased. Read labels. You can also ask the pharmacist for help. (People  with high blood pressure should not use decongestants. They can raise blood pressure.) Talk with your provider or pharmacist if you have any questions about the medicine..    OTC antihistamines may help if allergies contributed to your sinusitis. Talk with your provider or pharmacist if you have any questions about the medicine..    Don't use nasal rinses or irrigation during an acute sinus infection, unless your healthcare provider tells you to. Rinsing may spread the infection to other areas in your sinuses.    Use acetaminophen or ibuprofen to control pain, unless another pain medicine was prescribed to you. If you have chronic liver or kidney disease or ever had a stomach ulcer, talk with your healthcare provider before using these medicines. Never give aspirin to anyone under age 18 who is ill with a fever. It may cause severe liver damage.    Don't smoke. This can make symptoms worse.    Follow-up care  Follow up with your healthcare provider, or as advised.   When to seek medical advice  Call your healthcare provider if any of these occur:     Facial pain or headache that gets worse    Stiff neck    Unusual drowsiness or confusion    Swelling of your forehead or eyelids    Symptoms don't go away in 10 days    Vision problems, such as blurred or double vision    Fever of 100.4 F (38 C) or higher, or as directed by your healthcare provider  Call 911  Call 911 if any of these occur:     Seizure    Trouble breathing    Feeling dizzy or faint    Fingernails, skin or lips look blue, purple , or gray  Prevention  Here are steps you can take to help prevent an infection:     Keep good hand washing habits.    Don t have close contact with people who have sore throats, colds, or other upper respiratory infections.    Don t smoke, and stay away from secondhand smoke.    Stay up to date with of your vaccines.  [employer] last reviewed this educational content on 12/1/2019 2000-2020 [employer] [address]  [place] [address]. All rights reserved. This information is not intended as a substitute for professional medical care. Always follow your healthcare professional's instructions.           November 7, 2020 [facility]:     1. Doxycycline antibiotic as per above     2. Follow-up with PCP if sxs change, worsen or fail to fully resolve with above tx.    3. Immediate follow-up if any of the below:       Facial pain or headache that gets worse    Stiff neck    Unusual drowsiness or confusion    Swelling of your forehead or eyelids    Symptoms don't go away in 10 days    Vision problems, such as blurred or double vision    Fever of 100.4 F (38 C) or higher, or as directed by your healthcare provider  Call 911  Call 911 if any of these occur:     Seizure    Trouble breathing    Feeling dizzy or faint    Fingernails, skin or lips look blue, purple , or gray       independent

## 2025-06-15 NOTE — PHYSICAL THERAPY INITIAL EVALUATION ADULT - LEVEL OF CONSCIOUSNESS, REHAB EVAL
November 15, 2020      [facility]  [address], [address]  [place] [address]  Phone: [phone]  Fax: [phone]       Patient: Therese Rivera   YOB: 1984  Date of Visit: 11/15/2020    To Whom It May Concern:    Corona Rivera  was at [facility] on 11/15/2020. She may return to work/school on 11/16/2020 with no restrictions. If you have any questions or concerns, or if I can be of further assistance, please do not hesitate to contact me.    Sincerely,    Zehra De, NP     
alert

## 2025-06-15 NOTE — PROGRESS NOTE ADULT - PROBLEM SELECTOR PLAN 1
89F 6/15, no specific symptoms although pt has been having some loose/soft BM's (on bowel regimen)  - Fever work-up sent, f/up BCx's, RVP, MRSA, procalcitonin  - UA and CXR are negative  - F/up GI PCR, if diarrhea persists despite d/c of bowel regimen we will send C.diff PCR  - Starting empiric Zosyn given underlying immunosuppressed status  - F/up work-up and adjust Abx accordingly  - Monitor temp curve

## 2025-06-15 NOTE — PROGRESS NOTE ADULT - SUBJECTIVE AND OBJECTIVE BOX
Patient is a 59y old  Female who presents with a chief complaint of admission for abdominal pain  Per chart, Pt is 59F w/ PMHx of uterine cancer on chemotherapy (last chemo on 25), HTN, HLD, and hypothyroidism who presents with worsening rectal pain and found to have progressive metastatic disease and thrombi. Admitted to medicine for further management and monitoring.   (2025 14:17)      INTERVAL HPI/OVERNIGHT EVENTS:  Seen by me this afternoon,  and Daughter at bedside. Pain seems to be better controlled, oxycodone is lasting longer (5-6 hours). Tolerating PO, +BM loose/soft. No nausea. Febrile to 102F right after we saw her, denies cough, urinary symptoms. Abdominal pain also will get relieved after patient voids.    Review of Systems: 12 point review of systems otherwise negative    MEDICATIONS  (STANDING):  cholecalciferol 2000 Unit(s) Oral daily  cyanocobalamin 1000 MICROGram(s) Oral daily  enoxaparin Injectable 60 milliGRAM(s) SubCutaneous every 12 hours  famotidine    Tablet 20 milliGRAM(s) Oral daily  gabapentin 100 milliGRAM(s) Oral at bedtime  levothyroxine 75 MICROGram(s) Oral daily  lidocaine 4% Cream 1 Application(s) Topical two times a day  losartan 50 milliGRAM(s) Oral daily  rosuvastatin 10 milliGRAM(s) Oral at bedtime  sodium chloride 0.9%. 1000 milliLiter(s) (50 mL/Hr) IV Continuous <Continuous>    MEDICATIONS  (PRN):  acetaminophen     Tablet .. 650 milliGRAM(s) Oral every 6 hours PRN Temp greater or equal to 38C (100.4F), Mild Pain (1 - 3)  bisacodyl Suppository 10 milliGRAM(s) Rectal daily PRN Constipation  HYDROmorphone  Injectable 0.2 milliGRAM(s) IV Push every 4 hours PRN Breakthough pain  oxyCODONE    IR 5 milliGRAM(s) Oral every 4 hours PRN Severe Pain (7 - 10)  oxyCODONE    IR 2.5 milliGRAM(s) Oral every 4 hours PRN Moderate Pain (4 - 6)      Allergies    Bactrim (Rash)  metronidazole (Hives; Rash)    Intolerances          Vital Signs Last 24 Hrs  T(C): 36.7 (15 Rashawn 2025 14:30), Max: 38.9 (15 Rashawn 2025 13:05)  T(F): 98 (15 Rashawn 2025 14:30), Max: 102 (15 Rashawn 2025 13:05)  HR: 93 (15 Rashawn 2025 13:05) (85 - 102)  BP: 168/70 (15 Rashawn 2025 13:05) (153/86 - 168/70)  BP(mean): --  RR: 18 (15 Rashawn 2025 13:) (18 - 18)  SpO2: 100% (15 Rashawn 2025 13:) (99% - 100%)    Parameters below as of 15 Rashawn 2025 13:05  Patient On (Oxygen Delivery Method): room air      CAPILLARY BLOOD GLUCOSE          -15 @ 07:01  -  06-15 @ 19:29  --------------------------------------------------------  IN: 700 mL / OUT: 0 mL / NET: 700 mL        Physical Exam:    Daily     Daily   General:  Well appearing, NAD, not cachetic  HEENT:  Nonicteric, PERRLA  CV:  RRR, no murmur, no JVD  Lungs:  CTA B/L, no wheezes, rales, rhonchi  Abdomen:  Soft, slight TTP lower abdomen, not disnteded  Extremities:  2+ pulses, no c/c, +LE edema  Skin:  Warm and dry, no rashes  :  No espana  Neuro:  AAOx3, non-focal, CN II-XII grossly intact  No Restraints    LABS:                        10.0   10.01 )-----------( 222      ( 15 Rashawn 2025 06:24 )             31.1     06-15    133[L]  |  97[L]  |  5[L]  ----------------------------<  90  3.2[L]   |  22  |  0.48[L]    Ca    8.4      15 Rashawn 2025 06:24  Phos  2.7     06-15  Mg     2.00     06-15    TPro  5.8[L]  /  Alb  3.0[L]  /  TBili  0.5  /  DBili  x   /  AST  121[H]  /  ALT  40[H]  /  AlkPhos  380[H]  06-15    PTT - ( 15 Rashawn 2025 06:24 )  PTT:35.9 sec  Urinalysis Basic - ( 15 Rashawn 2025 14:59 )    Color: Yellow / Appearance: Clear / S.006 / pH: x  Gluc: x / Ketone: x  / Bili: Negative / Urobili: 1.0 mg/dL   Blood: x / Protein: Negative mg/dL / Nitrite: Negative   Leuk Esterase: Trace / RBC: 0 /HPF / WBC 0 /HPF   Sq Epi: x / Non Sq Epi: 1 /HPF / Bacteria: Negative /HPF          RADIOLOGY & ADDITIONAL TESTS:  Reviewed by me

## 2025-06-16 LAB
ALBUMIN SERPL ELPH-MCNC: 2.9 G/DL — LOW (ref 3.3–5)
ALP SERPL-CCNC: 363 U/L — HIGH (ref 40–120)
ALT FLD-CCNC: 48 U/L — HIGH (ref 4–33)
ANION GAP SERPL CALC-SCNC: 16 MMOL/L — HIGH (ref 7–14)
AST SERPL-CCNC: 128 U/L — HIGH (ref 4–32)
BASOPHILS # BLD AUTO: 0.03 K/UL — SIGNIFICANT CHANGE UP (ref 0–0.2)
BASOPHILS NFR BLD AUTO: 0.3 % — SIGNIFICANT CHANGE UP (ref 0–2)
BILIRUB SERPL-MCNC: 0.6 MG/DL — SIGNIFICANT CHANGE UP (ref 0.2–1.2)
BUN SERPL-MCNC: 7 MG/DL — SIGNIFICANT CHANGE UP (ref 7–23)
CALCIUM SERPL-MCNC: 8.4 MG/DL — SIGNIFICANT CHANGE UP (ref 8.4–10.5)
CHLORIDE SERPL-SCNC: 99 MMOL/L — SIGNIFICANT CHANGE UP (ref 98–107)
CO2 SERPL-SCNC: 20 MMOL/L — LOW (ref 22–31)
CREAT SERPL-MCNC: 0.62 MG/DL — SIGNIFICANT CHANGE UP (ref 0.5–1.3)
CULTURE RESULTS: SIGNIFICANT CHANGE UP
EGFR: 103 ML/MIN/1.73M2 — SIGNIFICANT CHANGE UP
EGFR: 103 ML/MIN/1.73M2 — SIGNIFICANT CHANGE UP
EOSINOPHIL # BLD AUTO: 0.15 K/UL — SIGNIFICANT CHANGE UP (ref 0–0.5)
EOSINOPHIL NFR BLD AUTO: 1.3 % — SIGNIFICANT CHANGE UP (ref 0–6)
EPEC DNA STL QL NAA+PROBE: DETECTED
GI PCR PANEL: DETECTED
GLUCOSE SERPL-MCNC: 93 MG/DL — SIGNIFICANT CHANGE UP (ref 70–99)
HCT VFR BLD CALC: 31 % — LOW (ref 34.5–45)
HGB BLD-MCNC: 9.8 G/DL — LOW (ref 11.5–15.5)
IANC: 9.84 K/UL — HIGH (ref 1.8–7.4)
IMM GRANULOCYTES NFR BLD AUTO: 0.5 % — SIGNIFICANT CHANGE UP (ref 0–0.9)
LYMPHOCYTES # BLD AUTO: 0.46 K/UL — LOW (ref 1–3.3)
LYMPHOCYTES # BLD AUTO: 3.9 % — LOW (ref 13–44)
MAGNESIUM SERPL-MCNC: 2.1 MG/DL — SIGNIFICANT CHANGE UP (ref 1.6–2.6)
MCHC RBC-ENTMCNC: 27.4 PG — SIGNIFICANT CHANGE UP (ref 27–34)
MCHC RBC-ENTMCNC: 31.6 G/DL — LOW (ref 32–36)
MCV RBC AUTO: 86.6 FL — SIGNIFICANT CHANGE UP (ref 80–100)
MONOCYTES # BLD AUTO: 1.24 K/UL — HIGH (ref 0–0.9)
MONOCYTES NFR BLD AUTO: 10.5 % — SIGNIFICANT CHANGE UP (ref 2–14)
NEUTROPHILS # BLD AUTO: 9.84 K/UL — HIGH (ref 1.8–7.4)
NEUTROPHILS NFR BLD AUTO: 83.5 % — HIGH (ref 43–77)
NRBC # BLD AUTO: 0 K/UL — SIGNIFICANT CHANGE UP (ref 0–0)
NRBC # FLD: 0 K/UL — SIGNIFICANT CHANGE UP (ref 0–0)
NRBC BLD AUTO-RTO: 0 /100 WBCS — SIGNIFICANT CHANGE UP (ref 0–0)
PHOSPHATE SERPL-MCNC: 2.3 MG/DL — LOW (ref 2.5–4.5)
PLATELET # BLD AUTO: 228 K/UL — SIGNIFICANT CHANGE UP (ref 150–400)
POTASSIUM SERPL-MCNC: 3.7 MMOL/L — SIGNIFICANT CHANGE UP (ref 3.5–5.3)
POTASSIUM SERPL-SCNC: 3.7 MMOL/L — SIGNIFICANT CHANGE UP (ref 3.5–5.3)
PROT SERPL-MCNC: 5.8 G/DL — LOW (ref 6–8.3)
RBC # BLD: 3.58 M/UL — LOW (ref 3.8–5.2)
RBC # FLD: 15.3 % — HIGH (ref 10.3–14.5)
SODIUM SERPL-SCNC: 135 MMOL/L — SIGNIFICANT CHANGE UP (ref 135–145)
SPECIMEN SOURCE: SIGNIFICANT CHANGE UP
WBC # BLD: 11.78 K/UL — HIGH (ref 3.8–10.5)
WBC # FLD AUTO: 11.78 K/UL — HIGH (ref 3.8–10.5)

## 2025-06-16 PROCEDURE — 99233 SBSQ HOSP IP/OBS HIGH 50: CPT

## 2025-06-16 RX ORDER — APIXABAN 2.5 MG/1
1 TABLET, FILM COATED ORAL
Qty: 64 | Refills: 0
Start: 2025-06-16 | End: 2025-07-15

## 2025-06-16 RX ORDER — OXYCODONE HYDROCHLORIDE 30 MG/1
1 TABLET ORAL
Qty: 84 | Refills: 0
Start: 2025-06-16 | End: 2025-06-29

## 2025-06-16 RX ORDER — LEVOTHYROXINE SODIUM 300 MCG
1 TABLET ORAL
Qty: 30 | Refills: 0
Start: 2025-06-16 | End: 2025-07-15

## 2025-06-16 RX ORDER — APIXABAN 2.5 MG/1
10 TABLET, FILM COATED ORAL EVERY 12 HOURS
Refills: 0 | Status: DISCONTINUED | OUTPATIENT
Start: 2025-06-16 | End: 2025-06-18

## 2025-06-16 RX ORDER — ACETAMINOPHEN 500 MG/5ML
1000 LIQUID (ML) ORAL ONCE
Refills: 0 | Status: COMPLETED | OUTPATIENT
Start: 2025-06-16 | End: 2025-06-16

## 2025-06-16 RX ADMIN — Medication 100 MILLIGRAM(S): at 21:53

## 2025-06-16 RX ADMIN — Medication 1000 MILLIGRAM(S): at 22:15

## 2025-06-16 RX ADMIN — OXYCODONE HYDROCHLORIDE 5 MILLIGRAM(S): 30 TABLET ORAL at 18:47

## 2025-06-16 RX ADMIN — OXYCODONE HYDROCHLORIDE 5 MILLIGRAM(S): 30 TABLET ORAL at 11:03

## 2025-06-16 RX ADMIN — ROSUVASTATIN CALCIUM 10 MILLIGRAM(S): 20 TABLET, FILM COATED ORAL at 21:15

## 2025-06-16 RX ADMIN — Medication 25 GRAM(S): at 02:38

## 2025-06-16 RX ADMIN — LOSARTAN POTASSIUM 50 MILLIGRAM(S): 100 TABLET, FILM COATED ORAL at 06:50

## 2025-06-16 RX ADMIN — MUPIROCIN CALCIUM 1 APPLICATION(S): 20 CREAM TOPICAL at 16:18

## 2025-06-16 RX ADMIN — Medication 25 GRAM(S): at 13:43

## 2025-06-16 RX ADMIN — Medication 100 MILLIGRAM(S): at 06:50

## 2025-06-16 RX ADMIN — Medication 75 MICROGRAM(S): at 06:50

## 2025-06-16 RX ADMIN — POLYETHYLENE GLYCOL 3350 17 GRAM(S): 17 POWDER, FOR SOLUTION ORAL at 11:48

## 2025-06-16 RX ADMIN — Medication 25 GRAM(S): at 21:37

## 2025-06-16 RX ADMIN — Medication 100 MILLIGRAM(S): at 13:44

## 2025-06-16 RX ADMIN — Medication 400 MILLIGRAM(S): at 21:15

## 2025-06-16 RX ADMIN — CYANOCOBALAMIN 1000 MICROGRAM(S): 1000 INJECTION INTRAMUSCULAR; SUBCUTANEOUS at 11:48

## 2025-06-16 RX ADMIN — LIDOCAINE HYDROCHLORIDE 1 APPLICATION(S): 20 JELLY TOPICAL at 06:49

## 2025-06-16 RX ADMIN — OXYCODONE HYDROCHLORIDE 5 MILLIGRAM(S): 30 TABLET ORAL at 19:17

## 2025-06-16 RX ADMIN — LIDOCAINE HYDROCHLORIDE 1 APPLICATION(S): 20 JELLY TOPICAL at 16:18

## 2025-06-16 RX ADMIN — APIXABAN 10 MILLIGRAM(S): 2.5 TABLET, FILM COATED ORAL at 16:19

## 2025-06-16 RX ADMIN — OXYCODONE HYDROCHLORIDE 5 MILLIGRAM(S): 30 TABLET ORAL at 10:03

## 2025-06-16 RX ADMIN — Medication 25 GRAM(S): at 07:06

## 2025-06-16 RX ADMIN — Medication 2000 UNIT(S): at 11:48

## 2025-06-16 RX ADMIN — MUPIROCIN CALCIUM 1 APPLICATION(S): 20 CREAM TOPICAL at 06:50

## 2025-06-16 RX ADMIN — Medication 20 MILLIGRAM(S): at 11:48

## 2025-06-16 NOTE — PROGRESS NOTE ADULT - PROBLEM SELECTOR PLAN 1
> Pt follows with Dr Matos & MD at [place]. On tx ( Abraxane and Mvasi), last 5/21/25  > CT shows POD  > Heme/onc consult appreciated

## 2025-06-16 NOTE — PROGRESS NOTE ADULT - SUBJECTIVE AND OBJECTIVE BOX
[facility] Geriatrics and Palliative Care  Toña Gutierrez, Palliative Care Nurse Practitioner  Contact Info: Page [phone] (Including Nights/Weekends), Message on Microsoft Teams (Toña Gutierrez), or leave VM at Palliative Office [phone] (non-urgent)    Date of Service 06-16-25 @ 14:01    SUBJECTIVE AND OBJECTIVE:   Indication for Geriatrics and Palliative Care Services/INTERVAL HPI: Pain management  Pt seen this AM with family at bedside, pt shared pain is well managed with PO Oxycodone 5mg. Medication is lasting 5/6 hrs. Pt reports loose stools, GI PCR +.   Pt reports new right ankle/leg swelling, report pain with flexion. Primary medical team made aware.     OVERNIGHT EVENTS: Pt required PO Oxycodone 5mg x 2 within 24 hrs (8a-8a)    DNR on chart:  Allergies    Bactrim (Rash)  metronidazole (Hives; Rash)    Intolerances    MEDICATIONS  (STANDING):  cholecalciferol 2000 Unit(s) Oral daily  cyanocobalamin 1000 MICROGram(s) Oral daily  enoxaparin Injectable 60 milliGRAM(s) SubCutaneous every 12 hours  famotidine    Tablet 20 milliGRAM(s) Oral daily  gabapentin 100 milliGRAM(s) Oral at bedtime  levothyroxine 75 MICROGram(s) Oral daily  lidocaine 4% Cream 1 Application(s) Topical two times a day  losartan 50 milliGRAM(s) Oral daily  mupirocin 2% Ointment 1 Application(s) Both Nostrils two times a day  phenazopyridine 100 milliGRAM(s) Oral every 8 hours  piperacillin/tazobactam IVPB.. 3.375 Gram(s) IV Intermittent every 8 hours  rosuvastatin 10 milliGRAM(s) Oral at bedtime  sodium chloride 0.9%. 1000 milliLiter(s) (50 mL/Hr) IV Continuous <Continuous>    MEDICATIONS  (PRN):  bisacodyl Suppository 10 milliGRAM(s) Rectal daily PRN Constipation  oxyCODONE    IR 5 milliGRAM(s) Oral every 4 hours PRN Severe Pain (7 - 10)  oxyCODONE    IR 2.5 milliGRAM(s) Oral every 4 hours PRN Moderate Pain (4 - 6)    -------------------------------------------------------------------------------------------------------  ITEMS UNCHECKED ARE NOT PRESENT    PRESENT SYMPTOMS: [ ]Unable to self-report - see [ ] CPOT [ ] PAINADS [ ] RDOS  Source if other than patient:  [ ]Family   [ ]Team    Pain: [x ]yes [ ]no  QOL impact -   Location - abd/rectal                     Aggravating factors - none  Quality - sharp  Radiation - none  Timing- intermittent   Severity (0-10 scale): 10/10 at worst- 2 during encounter   Minimal acceptable level (0-10 scale)/Pain goal: 2    CPOT:    https://www.[facility].org/getattachment/[email or web address]/Critical-Care-Pain-Observation-Tool-(CPOT)    PAINAD Score: See PAINAD tool and score below       RDOS: See RDOS tool and score below   0 to 2  minimal or no respiratory distress   3  mild distress  4 to 6 moderate distress  >7 severe distress    Dyspnea:                           [ ]Mild [ ]Moderate [ ]Severe  Anxiety:                             [ ]Mild [ ]Moderate [ ]Severe  Fatigue:                             [ ]Mild [ ]Moderate [ ]Severe  Nausea:                             [ ]Mild [ ]Moderate [ ]Severe  Loss of appetite:              [ ]Mild [ ]Moderate [ ]Severe  Constipation:                    [ ]Mild [ ]Moderate [ ]Severe  Other Symptoms:  [ ]All other review of systems negative     Home Medications for Symptoms if present:    I Stop Reference no:     -------------------------------------------------------------------------------------------------------  PCSSQ[Palliative Care Spiritual Screening Question]   Severity (0-10):  Score of 4 or > indicate consideration of Chaplaincy referral.  Chaplaincy Referral: [ ] yes [ ] refused [ ] following [ ] Deferred     Caregiver Elba? : [ ] yes [ ] no [ ] Deferred [ ] Declined             Social work referral [ ] Patient & Family Centered Care Referral [ ]     Anticipatory Grief present?:  [ ] yes [ ] no  [ ] Deferred                  Social work referral [ ] Chaplaincy Referral [ ]    -------------------------------------------------------------------------------------------------------  PHYSICAL EXAM:  Vital Signs Last 24 Hrs  T(C): 37.3 (16 Jun 2025 13:15), Max: 37.3 (16 Jun 2025 13:15)  T(F): 99.1 (16 Jun 2025 13:15), Max: 99.1 (16 Jun 2025 13:15)  HR: 94 (16 Jun 2025 13:15) (89 - 94)  BP: 133/92 (16 Jun 2025 13:15) (128/70 - 141/80)  BP(mean): --  RR: 18 (16 Jun 2025 13:15) (18 - 18)  SpO2: 100% (16 Jun 2025 13:15) (99% - 100%)    Parameters below as of 16 Jun 2025 13:15  Patient On (Oxygen Delivery Method): room air     I&O's Summary    15 Rashawn 2025 07:01  -  16 Jun 2025 07:00  --------------------------------------------------------  IN: 700 mL / OUT: 0 mL / NET: 700 mL       GENERAL:   [ ]Cachexia  [ ]Alert  [ ]Oriented x   [ ]Lethargic  [ ]Unarousable  [ ]Verbal  [ ]Non-Verbal    Behavioral:   [ ]Anxiety  [ ]Delirium [ ]Agitation [ ]Other    HEENT:  [ ]Normal   [ ]Dry mouth   [ ]ET Tube/Trach  [ ]Oral lesions    PULMONARY:   [ ]Clear [ ]Tachypnea  [ ]Audible excessive secretions   [ ]Rhonchi        [ ]Right [ ]Left [ ]Bilateral  [ ]Crackles        [ ]Right [ ]Left [ ]Bilateral  [ ]Wheezing     [ ]Right [ ]Left [ ]Bilateral  [ ]Diminished BS [ ] Right [ ]Left [ ]Bilateral    CARDIOVASCULAR:    [ ]Regular [ ]Irregular [ ]Tachy  [ ]Conner [ ]Murmur [ ]Other    GASTROINTESTINAL:  [ ]Soft  [ ]Distended   [ ]+BS  [ ]Non tender [ ]Tender  [ ]Other [ ]PEG [ ]OGT/ NGT   Last BM:     GENITOURINARY:  [ ]Normal [ ]Incontinent   [ ]Oliguria/Anuria   [ ]Parikh    MUSCULOSKELETAL:   [ ]Normal   [ ]Weakness  [ ]Bed/Wheelchair bound [ ]Edema    NEUROLOGIC:   [ ]No focal deficits  [ ] Cognitive impairment  [ ] Dysphagia [ ]Dysarthria [ ] Paresis [ ]Other     SKIN:   [ ]Normal  [ ]Rash  [ ]Other  [ ]Pressure ulcer(s) [ ]y [ ]n present on admission    -------------------------------------------------------------------------------------------------------  CRITICAL CARE:  [ ]Shock Present  [ ]Septic [ ]Cardiogenic [ ]Neurologic [ ]Hypovolemic  [ ]Vasopressors [ ]Inotropes  [ ]Respiratory failure present [ ]Mechanical Ventilation [ ]Non-invasive ventilatory support [ ]High-Flow   [ ]Acute  [ ]Chronic [ ]Hypoxic  [ ]Hypercarbic [ ]Other  [ ]Other organ failure     -------------------------------------------------------------------------------------------------------  LABS:                        9.8    11.78 )-----------( 228      ( 16 Jun 2025 06:10 )             31.0   06-16    135  |  99  |  7   ----------------------------<  93  3.7   |  20[L]  |  0.62    Ca    8.4      16 Jun 2025 06:10  Phos  2.3     06-16  Mg     2.10     06-16    TPro  5.8[L]  /  Alb  2.9[L]  /  TBili  0.6  /  DBili  x   /  AST  128[H]  /  ALT  48[H]  /  AlkPhos  363[H]  06-16  PTT - ( 15 Rashawn 2025 06:24 )  PTT:35.9 sec    Urinalysis Basic - ( 16 Jun 2025 06:10 )    Color: x / Appearance: x / SG: x / pH: x  Gluc: 93 mg/dL / Ketone: x  / Bili: x / Urobili: x   Blood: x / Protein: x / Nitrite: x   Leuk Esterase: x / RBC: x / WBC x   Sq Epi: x / Non Sq Epi: x / Bacteria: x    -------------------------------------------------------------------------------------------------------  RADIOLOGY & ADDITIONAL STUDIES: < from: CT Abdomen and Pelvis w/ Oral Cont and w/ IV Cont (06.12.25 @ 20:21) >  IMPRESSION:  *  Increase in size and number of hepatic metastases since 1/17/2025.   Lobulated soft tissue along the posterior right hepatic lobe has also   increased in size, which may represent a serosal implant.  *  Left pelvic mass and right perirectal mass, which have increased in   size since 1/17/2025, and arebroadly inseparable from the sigmoid colon   and the rectum, respectively. There is associated wall thickening of the   sigmoid colon and the rectum, which may represent serosal spread of   disease, with a component of proctocolitis not excluded. Question tumor   thrombus in the right pelvis associated with the perirectal mass.  *  No evidence for bowel obstruction.  *  Moderate left hydroureteronephrosis to the level of the left pelvic   mass, which has increased since 1/17/2025. Fullness of theright renal   collecting system.  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  *  Question mild intrahepatic biliary duct dilation in the left lobe   peripheral to the tumor versus thrombosed left portal venous branches.  *  Increased lymphadenopathy.  *  New small volume of ascites.  *  Lung nodules in the lung bases have increased in size and number since   4/16/2025, and are suspicious for metastases.    Findings were discussed with Dr. Askew 6/12/2025 10:13 PM by Dr. Harvey.    --- End of Report ---    < end of copied text   -------------------------------------------------------------------------------------------------------  Protein Calorie Malnutrition Present: [ ]mild [ ]moderate [ ]severe [ ]underweight [ ]morbid obesity  https://www.andeal.org/vault/2440/web/files/ONC/Table_Clinical%20Characteristics%20to%20Document%20Malnutrition-White%20JV%20et%20al%202012.pdf    Height (cm): 165.1 (06-13-25 @ 18:06), 162 (05-21-25 @ 09:03)  Weight (kg): 58.1 (06-13-25 @ 18:06), 58.5 (06-12-25 @ 16:00)  BMI (kg/m2): 21.3 (06-13-25 @ 18:06), 22.3 (06-12-25 @ 16:00)    Palliative Performance Status Version 2:   See PPSv2 tool and score below       [ ]PPSV2 < or = 30%  [ ]significant weight loss [ ]poor nutritional intake [ ]anasarca[ ]Artificial Nutrition    Other REFERRALS:  [ ]Hospice  [ ]Child Life  [ ]Social Work  [ ]Case management [ ]Holistic Therapy [facility] Geriatrics and Palliative Care  Toña Gutierrez, Palliative Care Nurse Practitioner  Contact Info: Page [phone] (Including Nights/Weekends), Message on Microsoft Teams (Toña Gutierrez), or leave VM at Palliative Office [phone] (non-urgent)    Date of Service 06-16-25 @ 14:01    SUBJECTIVE AND OBJECTIVE:   Indication for Geriatrics and Palliative Care Services/INTERVAL HPI: Pain management  Pt seen this AM with family at bedside, pt shared pain is well managed with PO Oxycodone 5mg. Medication is lasting 5/6 hrs. Pt reports loose stools, GI PCR +.   Pt reports new right ankle/leg swelling, report pain with flexion. Primary medical team made aware.     OVERNIGHT EVENTS: Pt required PO Oxycodone 5mg x 2 within 24 hrs (8a-8a). Pt febrile 6/15 infectious workup in progress. Started on IV abx     DNR on chart:  Allergies    Bactrim (Rash)  metronidazole (Hives; Rash)    Intolerances    MEDICATIONS  (STANDING):  cholecalciferol 2000 Unit(s) Oral daily  cyanocobalamin 1000 MICROGram(s) Oral daily  enoxaparin Injectable 60 milliGRAM(s) SubCutaneous every 12 hours  famotidine    Tablet 20 milliGRAM(s) Oral daily  gabapentin 100 milliGRAM(s) Oral at bedtime  levothyroxine 75 MICROGram(s) Oral daily  lidocaine 4% Cream 1 Application(s) Topical two times a day  losartan 50 milliGRAM(s) Oral daily  mupirocin 2% Ointment 1 Application(s) Both Nostrils two times a day  phenazopyridine 100 milliGRAM(s) Oral every 8 hours  piperacillin/tazobactam IVPB.. 3.375 Gram(s) IV Intermittent every 8 hours  rosuvastatin 10 milliGRAM(s) Oral at bedtime  sodium chloride 0.9%. 1000 milliLiter(s) (50 mL/Hr) IV Continuous <Continuous>    MEDICATIONS  (PRN):  bisacodyl Suppository 10 milliGRAM(s) Rectal daily PRN Constipation  oxyCODONE    IR 5 milliGRAM(s) Oral every 4 hours PRN Severe Pain (7 - 10)  oxyCODONE    IR 2.5 milliGRAM(s) Oral every 4 hours PRN Moderate Pain (4 - 6)    -------------------------------------------------------------------------------------------------------  ITEMS UNCHECKED ARE NOT PRESENT    PRESENT SYMPTOMS: [ ]Unable to self-report - see [ ] CPOT [ ] PAINADS [ ] RDOS  Source if other than patient:  [ ]Family   [ ]Team    Pain: [x ]yes [ ]no  QOL impact -   Location - abd/rectal                     Aggravating factors - none  Quality - sharp  Radiation - none  Timing- intermittent   Severity (0-10 scale): 10/10 at worst- 2 during encounter   Minimal acceptable level (0-10 scale)/Pain goal: 2    CPOT:    https://www.[facility].org/getattachment/[email or web address]/Critical-Care-Pain-Observation-Tool-(CPOT)    PAINAD Score: See PAINAD tool and score below       RDOS: See RDOS tool and score below   0 to 2  minimal or no respiratory distress   3  mild distress  4 to 6 moderate distress  >7 severe distress    Dyspnea:                           [ ]Mild [ ]Moderate [ ]Severe  Anxiety:                             [ ]Mild [ ]Moderate [ ]Severe  Fatigue:                             [ ]Mild [ ]Moderate [ ]Severe  Nausea:                             [ ]Mild [ ]Moderate [ ]Severe  Loss of appetite:              [ ]Mild [ ]Moderate [ ]Severe  Constipation:                    [ ]Mild [ ]Moderate [ ]Severe  Other Symptoms:  [ x]All other review of systems negative     Home Medications for Symptoms if present:    I Stop Reference no:     -------------------------------------------------------------------------------------------------------  PCSSQ[Palliative Care Spiritual Screening Question]   Severity (0-10):  Score of 4 or > indicate consideration of Chaplaincy referral.  Chaplaincy Referral: [ ] yes [ ] refused [ ] following [ x] Deferred     Caregiver [place]? : [ ] yes [ ] no [ ] Deferred [x ] Declined             Social work referral [ ] Patient & Family Centered Care Referral [ ]     Anticipatory Grief present?:  [ ] yes [ ] no  [ x] Deferred                  Social work referral [ ] Chaplaincy Referral [ ]    -------------------------------------------------------------------------------------------------------  PHYSICAL EXAM:  Vital Signs Last 24 Hrs  T(C): 37.3 (16 Jun 2025 13:15), Max: 37.3 (16 Jun 2025 13:15)  T(F): 99.1 (16 Jun 2025 13:15), Max: 99.1 (16 Jun 2025 13:15)  HR: 94 (16 Jun 2025 13:15) (89 - 94)  BP: 133/92 (16 Jun 2025 13:15) (128/70 - 141/80)  BP(mean): --  RR: 18 (16 Jun 2025 13:15) (18 - 18)  SpO2: 100% (16 Jun 2025 13:15) (99% - 100%)    Parameters below as of 16 Jun 2025 13:15  Patient On (Oxygen Delivery Method): room air     I&O's Summary    15 Rashawn 2025 07:01  -  16 Jun 2025 07:00  --------------------------------------------------------  IN: 700 mL / OUT: 0 mL / NET: 700 mL         GENERAL:  [ ]Cachexia  [x ]Alert  [x ]Oriented x  4  [ ]Lethargic  [ ]Unarousable  [ x]Verbal  [ ]Non-Verbal    Behavioral:   [ ] Anxiety  [ ] Delirium [ ] Agitation [ x] Other    HEENT:  [x ]Normal   [ ]Dry mouth   [ ]ET Tube/Trach  [ ]Oral lesions    PULMONARY:   x]Clear [ ]Tachypnea  [ ]Audible excessive secretions   [ ]Rhonchi        [ ]Right [ ]Left [ ]Bilateral  [ ]Crackles        [ ]Right [ ]Left [ ]Bilateral  [ ]Wheezing     [ ]Right [ ]Left [ ]Bilateral  [ ]Diminished breath sounds [ ]right [ ]left [ ]bilateral    CARDIOVASCULAR:    [x ]Regular [ ]Irregular [ ]Tachy  [ ]Conner [ ]Murmur [ ]Other    GASTROINTESTINAL:  [x ]Soft  [ ]Distended   [ x]+BS  [ ]Non tender [ x]Tender  [ ]Other [ ]PEG [ ]OGT/ NGT  Last BM: 6/16    GENITOURINARY: new right ankle/leg swelling with associated pain   [ ]Normal [ ] Incontinent   [ ]Oliguria/Anuria   [ ]Parikh    MUSCULOSKELETAL:   []Normal   [ ]Weakness  [ ]Bed/Wheelchair bound [ x]Edema    NEUROLOGIC:   [x ]No focal deficits  [ ]Cognitive impairment  [ ]Dysphagia [ ]Dysarthria [ ]Paresis [ ]Other     SKIN:   [x ]Normal  [ ]Rash  [ ]Other  [ ]Pressure ulcer(s)       Present on admission [ ]y [ ]n    -------------------------------------------------------------------------------------------------------  CRITICAL CARE:  [ ]Shock Present  [ ]Septic [ ]Cardiogenic [ ]Neurologic [ ]Hypovolemic  [ ]Vasopressors [ ]Inotropes  [ ]Respiratory failure present [ ]Mechanical Ventilation [ ]Non-invasive ventilatory support [ ]High-Flow   [ ]Acute  [ ]Chronic [ ]Hypoxic  [ ]Hypercarbic [ ]Other  [ ]Other organ failure     -------------------------------------------------------------------------------------------------------  LABS:                        9.8    11.78 )-----------( 228      ( 16 Jun 2025 06:10 )             31.0   06-16    135  |  99  |  7   ----------------------------<  93  3.7   |  20[L]  |  0.62    Ca    8.4      16 Jun 2025 06:10  Phos  2.3     06-16  Mg     2.10     06-16    TPro  5.8[L]  /  Alb  2.9[L]  /  TBili  0.6  /  DBili  x   /  AST  128[H]  /  ALT  48[H]  /  AlkPhos  363[H]  06-16  PTT - ( 15 Rashawn 2025 06:24 )  PTT:35.9 sec    Urinalysis Basic - ( 16 Jun 2025 06:10 )    Color: x / Appearance: x / SG: x / pH: x  Gluc: 93 mg/dL / Ketone: x  / Bili: x / Urobili: x   Blood: x / Protein: x / Nitrite: x   Leuk Esterase: x / RBC: x / WBC x   Sq Epi: x / Non Sq Epi: x / Bacteria: x    -------------------------------------------------------------------------------------------------------  RADIOLOGY & ADDITIONAL STUDIES: < from: CT Abdomen and Pelvis w/ Oral Cont and w/ IV Cont (06.12.25 @ 20:21) >  IMPRESSION:  *  Increase in size and number of hepatic metastases since 1/17/2025.   Lobulated soft tissue along the posterior right hepatic lobe has also   increased in size, which may represent a serosal implant.  *  Left pelvic mass and right perirectal mass, which have increased in   size since 1/17/2025, and arebroadly inseparable from the sigmoid colon   and the rectum, respectively. There is associated wall thickening of the   sigmoid colon and the rectum, which may represent serosal spread of   disease, with a component of proctocolitis not excluded. Question tumor   thrombus in the right pelvis associated with the perirectal mass.  *  No evidence for bowel obstruction.  *  Moderate left hydroureteronephrosis to the level of the left pelvic   mass, which has increased since 1/17/2025. Fullness of theright renal   collecting system.  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  *  Question mild intrahepatic biliary duct dilation in the left lobe   peripheral to the tumor versus thrombosed left portal venous branches.  *  Increased lymphadenopathy.  *  New small volume of ascites.  *  Lung nodules in the lung bases have increased in size and number since   4/16/2025, and are suspicious for metastases.    Findings were discussed with Dr. Askew 6/12/2025 10:13 PM by Dr. Harvey.    --- End of Report ---    < end of copied text   -------------------------------------------------------------------------------------------------------  Protein Calorie Malnutrition Present: [ ]mild [ ]moderate [ ]severe [ ]underweight [ ]morbid obesity  https://www.andeal.org/vault/2440/web/files/ONC/Table_Clinical%20Characteristics%20to%20Document%20Malnutrition-White%20JV%20et%20al%202012.pdf    Height (cm): 165.1 (06-13-25 @ 18:06), 162 (05-21-25 @ 09:03)  Weight (kg): 58.1 (06-13-25 @ 18:06), 58.5 (06-12-25 @ 16:00)  BMI (kg/m2): 21.3 (06-13-25 @ 18:06), 22.3 (06-12-25 @ 16:00)    Palliative Performance Status Version 2:   See PPSv2 tool and score below       [ ]PPSV2 < or = 30%  [ ]significant weight loss [ ]poor nutritional intake [ ]anasarca[ ]Artificial Nutrition    Other REFERRALS:  [ ]Hospice  [ ]Child Life  [ ]Social Work  [ ]Case management [ ]Holistic Therapy

## 2025-06-16 NOTE — PROGRESS NOTE ADULT - SUBJECTIVE AND OBJECTIVE BOX
Hospitalist Progress Note  Rama Boyd MD Pager # [phone]    OVERNIGHT EVENTS: LONNIE    SUBJECTIVE / INTERVAL HPI: Patient seen and examined at bedside. Patient reports she is having loose/watery bowel movements - a few episodes of day. She has blood in the stool at times but this has been going on for awhile. She also reports crampy abdominal pain. NO nausea/vomiting.     VITAL SIGNS:  Vital Signs Last 24 Hrs  T(C): 37.3 (16 Jun 2025 13:15), Max: 37.3 (16 Jun 2025 13:15)  T(F): 99.1 (16 Jun 2025 13:15), Max: 99.1 (16 Jun 2025 13:15)  HR: 94 (16 Jun 2025 13:15) (89 - 94)  BP: 133/92 (16 Jun 2025 13:15) (128/70 - 141/80)  BP(mean): --  RR: 18 (16 Jun 2025 13:15) (18 - 18)  SpO2: 100% (16 Jun 2025 13:15) (99% - 100%)    Parameters below as of 16 Jun 2025 13:15  Patient On (Oxygen Delivery Method): room air        PHYSICAL EXAM:    General: Comfortable  HEENT: NC/AT; PERRL, anicteric sclera; MMM  Neck: supple  Cardiovascular: +S1/S2; RRR  Respiratory: CTA B/L; no W/R/R  Gastrointestinal: soft, NT/ND; +BSx4  Extremities: WWP; no edema, clubbing or cyanosis  Vascular: 2+ radial, DP/PT pulses B/L  Neurological: AAOx3; no focal deficits    MEDICATIONS:  MEDICATIONS  (STANDING):  apixaban 10 milliGRAM(s) Oral every 12 hours  cholecalciferol 2000 Unit(s) Oral daily  cyanocobalamin 1000 MICROGram(s) Oral daily  famotidine    Tablet 20 milliGRAM(s) Oral daily  gabapentin 100 milliGRAM(s) Oral at bedtime  levothyroxine 75 MICROGram(s) Oral daily  lidocaine 4% Cream 1 Application(s) Topical two times a day  losartan 50 milliGRAM(s) Oral daily  mupirocin 2% Ointment 1 Application(s) Both Nostrils two times a day  phenazopyridine 100 milliGRAM(s) Oral every 8 hours  piperacillin/tazobactam IVPB.. 3.375 Gram(s) IV Intermittent every 8 hours  rosuvastatin 10 milliGRAM(s) Oral at bedtime  sodium chloride 0.9%. 1000 milliLiter(s) (50 mL/Hr) IV Continuous <Continuous>    MEDICATIONS  (PRN):  bisacodyl Suppository 10 milliGRAM(s) Rectal daily PRN Constipation  oxyCODONE    IR 5 milliGRAM(s) Oral every 4 hours PRN Severe Pain (7 - 10)  oxyCODONE    IR 2.5 milliGRAM(s) Oral every 4 hours PRN Moderate Pain (4 - 6)      ALLERGIES:  Allergies    Bactrim (Rash)  metronidazole (Hives; Rash)    Intolerances        LABS:                        9.8    11.78 )-----------( 228      ( 16 Jun 2025 06:10 )             31.0     06-16    135  |  99  |  7   ----------------------------<  93  3.7   |  20[L]  |  0.62    Ca    8.4      16 Jun 2025 06:10  Phos  2.3     06-16  Mg     2.10     06-16    TPro  5.8[L]  /  Alb  2.9[L]  /  TBili  0.6  /  DBili  x   /  AST  128[H]  /  ALT  48[H]  /  AlkPhos  363[H]  06-16    PTT - ( 15 Rashawn 2025 06:24 )  PTT:35.9 sec  Urinalysis Basic - ( 16 Jun 2025 06:10 )    Color: x / Appearance: x / SG: x / pH: x  Gluc: 93 mg/dL / Ketone: x  / Bili: x / Urobili: x   Blood: x / Protein: x / Nitrite: x   Leuk Esterase: x / RBC: x / WBC x   Sq Epi: x / Non Sq Epi: x / Bacteria: x      CAPILLARY BLOOD GLUCOSE          RADIOLOGY & ADDITIONAL TESTS: Reviewed.    ASSESSMENT:    PLAN:

## 2025-06-16 NOTE — PROVIDER CONTACT NOTE (OTHER) - ACTION/TREATMENT ORDERED:
Full infectious workup: Blood cx x2, RVP/MRSA, urine and stool  Tylenol 1000mg IVPB  Zosyn
No further orders at this time

## 2025-06-16 NOTE — PROVIDER CONTACT NOTE (OTHER) - BACKGROUND
58yo F w/ PMHx GERD, hypothyroidism, HTN, WINNIE BSO refractory uterine cancer    Admitted for worsening abd pain and constipation  Pt started on therapeutic AC for new PVT

## 2025-06-16 NOTE — PROVIDER CONTACT NOTE (OTHER) - SITUATION
Pt has new right lower extremity from knee to ankle +1 edema with some numbness and tingling
Pt febrile to 102 degrees oral

## 2025-06-16 NOTE — PROGRESS NOTE ADULT - PROBLEM SELECTOR PLAN 10
VTE PPx: Initially on IV heparin, switched to lovenox, can transition to DOAC after price check  PT eval --> No needs    Discussed with family at bedside on 6/16

## 2025-06-16 NOTE — PROVIDER CONTACT NOTE (OTHER) - ASSESSMENT
Vital signs stable  +1 edema RLE with numbness/tingling  No other complaints at this time
Pt febrile to 102 degrees, otherwise VSS  Pt resting in the bed with no complains  Pt asymptomatic at this time

## 2025-06-16 NOTE — PROGRESS NOTE ADULT - PROBLEM SELECTOR PLAN 1
102F 6/15, no specific symptoms although pt has been having some loose/soft BM's (on bowel regimen). 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 Remainder of fever work up is negative.   - C/w zosyn given immunocompromised status  - D/c bowel regimen and if diarrhea persists will send c. dif

## 2025-06-17 DIAGNOSIS — R19.7 DIARRHEA, UNSPECIFIED: ICD-10-CM

## 2025-06-17 LAB
ALBUMIN SERPL ELPH-MCNC: 2.8 G/DL — LOW (ref 3.3–5)
ALP SERPL-CCNC: 349 U/L — HIGH (ref 40–120)
ALT FLD-CCNC: 50 U/L — HIGH (ref 4–33)
ANION GAP SERPL CALC-SCNC: 12 MMOL/L — SIGNIFICANT CHANGE UP (ref 7–14)
AST SERPL-CCNC: 118 U/L — HIGH (ref 4–32)
BASOPHILS # BLD AUTO: 0.03 K/UL — SIGNIFICANT CHANGE UP (ref 0–0.2)
BASOPHILS NFR BLD AUTO: 0.3 % — SIGNIFICANT CHANGE UP (ref 0–2)
BILIRUB SERPL-MCNC: 0.6 MG/DL — SIGNIFICANT CHANGE UP (ref 0.2–1.2)
BUN SERPL-MCNC: 7 MG/DL — SIGNIFICANT CHANGE UP (ref 7–23)
CALCIUM SERPL-MCNC: 8.6 MG/DL — SIGNIFICANT CHANGE UP (ref 8.4–10.5)
CHLORIDE SERPL-SCNC: 102 MMOL/L — SIGNIFICANT CHANGE UP (ref 98–107)
CO2 SERPL-SCNC: 24 MMOL/L — SIGNIFICANT CHANGE UP (ref 22–31)
CREAT SERPL-MCNC: 0.51 MG/DL — SIGNIFICANT CHANGE UP (ref 0.5–1.3)
EGFR: 108 ML/MIN/1.73M2 — SIGNIFICANT CHANGE UP
EGFR: 108 ML/MIN/1.73M2 — SIGNIFICANT CHANGE UP
EOSINOPHIL # BLD AUTO: 0.17 K/UL — SIGNIFICANT CHANGE UP (ref 0–0.5)
EOSINOPHIL NFR BLD AUTO: 1.9 % — SIGNIFICANT CHANGE UP (ref 0–6)
GLUCOSE SERPL-MCNC: 86 MG/DL — SIGNIFICANT CHANGE UP (ref 70–99)
HCT VFR BLD CALC: 29.9 % — LOW (ref 34.5–45)
HGB BLD-MCNC: 9.6 G/DL — LOW (ref 11.5–15.5)
IANC: 7.36 K/UL — SIGNIFICANT CHANGE UP (ref 1.8–7.4)
IMM GRANULOCYTES NFR BLD AUTO: 0.6 % — SIGNIFICANT CHANGE UP (ref 0–0.9)
LYMPHOCYTES # BLD AUTO: 0.43 K/UL — LOW (ref 1–3.3)
LYMPHOCYTES # BLD AUTO: 4.8 % — LOW (ref 13–44)
MAGNESIUM SERPL-MCNC: 2.2 MG/DL — SIGNIFICANT CHANGE UP (ref 1.6–2.6)
MCHC RBC-ENTMCNC: 27.8 PG — SIGNIFICANT CHANGE UP (ref 27–34)
MCHC RBC-ENTMCNC: 32.1 G/DL — SIGNIFICANT CHANGE UP (ref 32–36)
MCV RBC AUTO: 86.7 FL — SIGNIFICANT CHANGE UP (ref 80–100)
MONOCYTES # BLD AUTO: 0.93 K/UL — HIGH (ref 0–0.9)
MONOCYTES NFR BLD AUTO: 10.4 % — SIGNIFICANT CHANGE UP (ref 2–14)
NEUTROPHILS # BLD AUTO: 7.36 K/UL — SIGNIFICANT CHANGE UP (ref 1.8–7.4)
NEUTROPHILS NFR BLD AUTO: 82 % — HIGH (ref 43–77)
NRBC # BLD AUTO: 0 K/UL — SIGNIFICANT CHANGE UP (ref 0–0)
NRBC # FLD: 0 K/UL — SIGNIFICANT CHANGE UP (ref 0–0)
NRBC BLD AUTO-RTO: 0 /100 WBCS — SIGNIFICANT CHANGE UP (ref 0–0)
PHOSPHATE SERPL-MCNC: 3.2 MG/DL — SIGNIFICANT CHANGE UP (ref 2.5–4.5)
PLATELET # BLD AUTO: 246 K/UL — SIGNIFICANT CHANGE UP (ref 150–400)
POTASSIUM SERPL-MCNC: 3.4 MMOL/L — LOW (ref 3.5–5.3)
POTASSIUM SERPL-SCNC: 3.4 MMOL/L — LOW (ref 3.5–5.3)
PROT SERPL-MCNC: 5.6 G/DL — LOW (ref 6–8.3)
RBC # BLD: 3.45 M/UL — LOW (ref 3.8–5.2)
RBC # FLD: 15.4 % — HIGH (ref 10.3–14.5)
SODIUM SERPL-SCNC: 138 MMOL/L — SIGNIFICANT CHANGE UP (ref 135–145)
WBC # BLD: 8.97 K/UL — SIGNIFICANT CHANGE UP (ref 3.8–10.5)
WBC # FLD AUTO: 8.97 K/UL — SIGNIFICANT CHANGE UP (ref 3.8–10.5)

## 2025-06-17 PROCEDURE — 99223 1ST HOSP IP/OBS HIGH 75: CPT

## 2025-06-17 PROCEDURE — 99233 SBSQ HOSP IP/OBS HIGH 50: CPT

## 2025-06-17 RX ADMIN — ROSUVASTATIN CALCIUM 10 MILLIGRAM(S): 20 TABLET, FILM COATED ORAL at 21:47

## 2025-06-17 RX ADMIN — OXYCODONE HYDROCHLORIDE 5 MILLIGRAM(S): 30 TABLET ORAL at 19:47

## 2025-06-17 RX ADMIN — MUPIROCIN CALCIUM 1 APPLICATION(S): 20 CREAM TOPICAL at 06:42

## 2025-06-17 RX ADMIN — APIXABAN 10 MILLIGRAM(S): 2.5 TABLET, FILM COATED ORAL at 06:41

## 2025-06-17 RX ADMIN — OXYCODONE HYDROCHLORIDE 5 MILLIGRAM(S): 30 TABLET ORAL at 10:47

## 2025-06-17 RX ADMIN — Medication 25 GRAM(S): at 14:58

## 2025-06-17 RX ADMIN — GABAPENTIN 100 MILLIGRAM(S): 400 CAPSULE ORAL at 21:47

## 2025-06-17 RX ADMIN — Medication 25 GRAM(S): at 06:42

## 2025-06-17 RX ADMIN — APIXABAN 10 MILLIGRAM(S): 2.5 TABLET, FILM COATED ORAL at 18:07

## 2025-06-17 RX ADMIN — Medication 100 MILLIGRAM(S): at 07:01

## 2025-06-17 RX ADMIN — Medication 40 MILLIEQUIVALENT(S): at 13:18

## 2025-06-17 RX ADMIN — Medication 100 MILLIGRAM(S): at 15:00

## 2025-06-17 RX ADMIN — OXYCODONE HYDROCHLORIDE 5 MILLIGRAM(S): 30 TABLET ORAL at 23:54

## 2025-06-17 RX ADMIN — CYANOCOBALAMIN 1000 MICROGRAM(S): 1000 INJECTION INTRAMUSCULAR; SUBCUTANEOUS at 12:34

## 2025-06-17 RX ADMIN — OXYCODONE HYDROCHLORIDE 5 MILLIGRAM(S): 30 TABLET ORAL at 09:47

## 2025-06-17 RX ADMIN — OXYCODONE HYDROCHLORIDE 5 MILLIGRAM(S): 30 TABLET ORAL at 14:16

## 2025-06-17 RX ADMIN — OXYCODONE HYDROCHLORIDE 5 MILLIGRAM(S): 30 TABLET ORAL at 15:16

## 2025-06-17 RX ADMIN — Medication 75 MICROGRAM(S): at 06:42

## 2025-06-17 RX ADMIN — LIDOCAINE HYDROCHLORIDE 1 APPLICATION(S): 20 JELLY TOPICAL at 06:42

## 2025-06-17 RX ADMIN — Medication 40 MILLIEQUIVALENT(S): at 17:49

## 2025-06-17 RX ADMIN — LOSARTAN POTASSIUM 50 MILLIGRAM(S): 100 TABLET, FILM COATED ORAL at 06:42

## 2025-06-17 RX ADMIN — Medication 25 GRAM(S): at 21:47

## 2025-06-17 RX ADMIN — Medication 2000 UNIT(S): at 12:34

## 2025-06-17 RX ADMIN — Medication 20 MILLIGRAM(S): at 12:34

## 2025-06-17 RX ADMIN — OXYCODONE HYDROCHLORIDE 5 MILLIGRAM(S): 30 TABLET ORAL at 20:47

## 2025-06-17 RX ADMIN — MUPIROCIN CALCIUM 1 APPLICATION(S): 20 CREAM TOPICAL at 17:51

## 2025-06-17 NOTE — PROGRESS NOTE ADULT - SUBJECTIVE AND OBJECTIVE BOX
Hospitalist Progress Note  Rama Boyd MD Pager # [phone]    OVERNIGHT EVENTS: LONNIE    SUBJECTIVE / INTERVAL HPI: Patient seen and examined at bedside. Patient reports she is having ongoing bleeding from her rectum when she wipes. She had 9 watery bowel movements yesterday. No blood in the toilet just on the toilet paper. She continues to have pain in her lower abdomen. Patient and family is concerned about the rectal mass seen on imaging.     VITAL SIGNS:  Vital Signs Last 24 Hrs  T(C): 37.5 (17 Jun 2025 12:55), Max: 38.1 (16 Jun 2025 20:56)  T(F): 99.5 (17 Jun 2025 12:55), Max: 100.5 (16 Jun 2025 20:56)  HR: 90 (17 Jun 2025 12:55) (78 - 97)  BP: 140/80 (17 Jun 2025 05:43) (140/80 - 148/72)  BP(mean): --  RR: 18 (17 Jun 2025 12:55) (18 - 18)  SpO2: 98% (17 Jun 2025 12:55) (98% - 100%)    Parameters below as of 17 Jun 2025 12:55  Patient On (Oxygen Delivery Method): room air        PHYSICAL EXAM:    General: Comfortable/resting in bed   HEENT: NC/AT; PERRL, anicteric sclera; MMM  Neck: supple  Cardiovascular: +S1/S2; RRR  Respiratory: CTA B/L; no W/R/R  Gastrointestinal: soft, NT/ND; +BSx4 - mild suprapubic tenderness to palpation   Extremities: WWP; no edema, clubbing or cyanosis  Vascular: 2+ radial, DP/PT pulses B/L  Neurological: AAOx3; no focal deficits    MEDICATIONS:  MEDICATIONS  (STANDING):  apixaban 10 milliGRAM(s) Oral every 12 hours  cholecalciferol 2000 Unit(s) Oral daily  cyanocobalamin 1000 MICROGram(s) Oral daily  famotidine    Tablet 20 milliGRAM(s) Oral daily  gabapentin 100 milliGRAM(s) Oral at bedtime  levothyroxine 75 MICROGram(s) Oral daily  losartan 50 milliGRAM(s) Oral daily  mupirocin 2% Ointment 1 Application(s) Both Nostrils two times a day  piperacillin/tazobactam IVPB.. 3.375 Gram(s) IV Intermittent every 8 hours  potassium chloride   Powder 40 milliEquivalent(s) Oral every 4 hours  rosuvastatin 10 milliGRAM(s) Oral at bedtime  sodium chloride 0.9%. 1000 milliLiter(s) (50 mL/Hr) IV Continuous <Continuous>    MEDICATIONS  (PRN):  bisacodyl Suppository 10 milliGRAM(s) Rectal daily PRN Constipation  oxyCODONE    IR 5 milliGRAM(s) Oral every 4 hours PRN Severe Pain (7 - 10)  oxyCODONE    IR 2.5 milliGRAM(s) Oral every 4 hours PRN Moderate Pain (4 - 6)      ALLERGIES:  Allergies    Bactrim (Rash)  metronidazole (Hives; Rash)    Intolerances        LABS:                        9.6    8.97  )-----------( 246      ( 17 Jun 2025 05:57 )             29.9     06-17    138  |  102  |  7   ----------------------------<  86  3.4[L]   |  24  |  0.51    Ca    8.6      17 Jun 2025 05:57  Phos  3.2     06-17  Mg     2.20     06-17    TPro  5.6[L]  /  Alb  2.8[L]  /  TBili  0.6  /  DBili  x   /  AST  118[H]  /  ALT  50[H]  /  AlkPhos  349[H]  06-17      Urinalysis Basic - ( 17 Jun 2025 05:57 )    Color: x / Appearance: x / SG: x / pH: x  Gluc: 86 mg/dL / Ketone: x  / Bili: x / Urobili: x   Blood: x / Protein: x / Nitrite: x   Leuk Esterase: x / RBC: x / WBC x   Sq Epi: x / Non Sq Epi: x / Bacteria: x      CAPILLARY BLOOD GLUCOSE          RADIOLOGY & ADDITIONAL TESTS: Reviewed.    ASSESSMENT:    PLAN:

## 2025-06-17 NOTE — PROGRESS NOTE ADULT - PROBLEM SELECTOR PLAN 6
On Levothyroxine 50mcg  - TSH elevated/FT4 is low at 0.8  - Increasing synthroid to 75 mcg QD  - Repeat TFT's in 4-6 weeks as outpatient
Likely i/s/o hepatic metastases  -CTM and trend/stable
Likely i/s/o hepatic metastases  -CTM and trend/stable
-c/w home Levothyroxine 50mcg  -previous TSH in April was elevated to 31.3, free thyroxine was low at 0.7  -TSH, free thyroxine ordered in AM
Likely i/s/o hepatic metastases  -CTM and trend/stable

## 2025-06-17 NOTE — PROGRESS NOTE ADULT - PROBLEM SELECTOR PLAN 1
89F 6/15, no specific symptoms although pt has been having some loose/soft BM's (on bowel regimen). 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 Remainder of fever work up is negative. Fever curve is downtrending 100.5 6/16.   - C/w zosyn given immunocompromised status  - D/c bowel regimen and if diarrhea persists will send c. dif after 48 hours

## 2025-06-17 NOTE — PROGRESS NOTE ADULT - PROBLEM SELECTOR PROBLEM 9
HLD (hyperlipidemia)
Need for prophylactic measure
HLD (hyperlipidemia)
Need for prophylactic measure
HLD (hyperlipidemia)

## 2025-06-17 NOTE — PROGRESS NOTE ADULT - PROBLEM SELECTOR PLAN 9
VTE PPx: Heparin gtt  Nutrition: DASH/TLC Diet  Dispo: pending clinical improvement
Home medication: Rosuvastatin 10 mg QD  -c/w Rosuvastatin 10 mg QD  -monitor liver enzymes- mildly elevated due to liver metastases
VTE PPx: Heparin gtt. Can transition to DOAC if no invasive procedures/tests planned (price check to be done first)    Discussed with Daughter and Family at bedside at length on 6/14.

## 2025-06-17 NOTE — PROGRESS NOTE ADULT - PROBLEM SELECTOR PLAN 7
On Levothyroxine 50mcg  - TSH elevated/FT4 is low at 0.8  - Increased synthroid to 75 mcg QD  - Repeat TFT's in 4-6 weeks as outpatient
Home medication: Losartan 50 mg QD  -c/w home meds with hold parameters (SBP <110, DBP <60)  -Monitor BP closely, slightly elevated likely pain related
On Levothyroxine 50mcg  - TSH elevated/FT4 is low at 0.8  - Increased synthroid to 75 mcg QD  - Repeat TFT's in 4-6 weeks as outpatient
-Home medication: Losartan 50 mg QD  -c/w home meds with hold parameters (SBP <110, DBP <60)   -Monitor BP closely and adjust medications if clinically indicated  -DASH Diet
On Levothyroxine 50mcg  - TSH elevated/FT4 is low at 0.8  - Increased synthroid to 75 mcg QD  - Repeat TFT's in 4-6 weeks as outpatient

## 2025-06-17 NOTE — PROGRESS NOTE ADULT - PROBLEM SELECTOR PLAN 10
VTE PPx: Initially on IV heparin, switched to lovenox, can transition to DOAC after price check  PT eval --> No needs    Discussed with family at bedside on 6/17

## 2025-06-17 NOTE — PROGRESS NOTE ADULT - PROBLEM SELECTOR PLAN 5
Palliative care team consulted for help with neoplasm-related pain, apprec recs  - C/w oxycodone 2.5-5mg PO PRN for moderate/severe pain  - C/w dilaudid IV for BT 0.2mg (pt has not used it in past 24H, seems that oxy is lasting longer)  - Patient's pain is likely due to disease burden, especially right perirectal mass  - Palliative f/up tomorrow
Palliative care team consulted for help with neoplasm-related pain, apprec recs  - C/w oxycodone 2.5-5mg PO PRN for moderate/severe pain  - C/w dilaudid IV for BT 0.2mg (pt has not used it in past 24H, seems that oxy is lasting longer)  - Patient's pain is likely due to disease burden, especially right perirectal mass  - Palliative following for pain control
-Likely i/s/o hepatic metastases  -CTM and trend
Palliative care team consulted for help with neoplasm-related pain, apprec recs  - C/w oxycodone 2.5-5mg PO PRN for moderate/severe pain  - C/w dilaudid IV for BT 0.2mg (pt has not used it in past 24H, seems that oxy is lasting longer)  - Patient's pain is likely due to disease burden, especially right perirectal mass  - Palliative following for pain control
Likely i/s/o hepatic metastases  -CTM and trend/stable

## 2025-06-17 NOTE — PROGRESS NOTE ADULT - SUBJECTIVE AND OBJECTIVE BOX
[facility] and Pall[facility]  Toña Gutierrez, Palliative Care Nurse Practitioner  Contact Info: Page [phone] (Including Nights/Weekends), Message on Microsoft Teams (Toña Gutierrez), or leave  at Palliative Office [phone] (non-urgent)    Date of Service 06-17-25 @ 13:52    SUBJECTIVE AND OBJECTIVE:  Indication for Geriatrics and Palliative Care Services/INTERVAL HPI: Pain   Pt seen this AM with  and primary medical team. Pt reports blood in stool that's new during this admission but has had episodes in the past. Pt with known perirectal mass and EPEC. Primary team to rule out C.diff as pt c/o > 8 liquid stools within 24 hrs. Pt reports relief with PRN oxycodone but shares she does experience pain with having a bowel movement/straining. Discussed with primary team possible role for radiation therapy.     OVERNIGHT EVENTS: Pt required PRN Oxycodone 5mg x 2 within 24 hrs (8a-8a).    DNR on chart:  Allergies    Bactrim (Rash)  metronidazole (Hives; Rash)    Intolerances    MEDICATIONS  (STANDING):  apixaban 10 milliGRAM(s) Oral every 12 hours  cholecalciferol 2000 Unit(s) Oral daily  cyanocobalamin 1000 MICROGram(s) Oral daily  famotidine    Tablet 20 milliGRAM(s) Oral daily  gabapentin 100 milliGRAM(s) Oral at bedtime  levothyroxine 75 MICROGram(s) Oral daily  losartan 50 milliGRAM(s) Oral daily  mupirocin 2% Ointment 1 Application(s) Both Nostrils two times a day  phenazopyridine 100 milliGRAM(s) Oral every 8 hours  piperacillin/tazobactam IVPB.. 3.375 Gram(s) IV Intermittent every 8 hours  potassium chloride   Powder 40 milliEquivalent(s) Oral every 4 hours  rosuvastatin 10 milliGRAM(s) Oral at bedtime  sodium chloride 0.9%. 1000 milliLiter(s) (50 mL/Hr) IV Continuous <Continuous>    MEDICATIONS  (PRN):  bisacodyl Suppository 10 milliGRAM(s) Rectal daily PRN Constipation  oxyCODONE    IR 5 milliGRAM(s) Oral every 4 hours PRN Severe Pain (7 - 10)  oxyCODONE    IR 2.5 milliGRAM(s) Oral every 4 hours PRN Moderate Pain (4 - 6)    -------------------------------------------------------------------------------------------------------  ITEMS UNCHECKED ARE NOT PRESENT    PRESENT SYMPTOMS: [ ]Unable to self-report - see [ ] CPOT [ ] PAINADS [ ] RDOS  Source if other than patient:  [ ]Family   [ ]Team     Pain:  [ ]yes [ x]no denies pain during encounter   QOL impact -   Location -                    Aggravating factors -  Quality -  Radiation -  Timing-  Severity (0-10 scale):  Minimal acceptable level (0-10 scale)/pain goal:    CPOT:    https://[email or web address]/getattachment/[email or web address]/Critical-Care-Pain-Observation-Tool-(CPOT)    PAINAD Score: See PAINAD tool and score below       RDOS: See RDOS tool and score below   0 to 2  minimal or no respiratory distress   3  mild distress  4 to 6 moderate distress  >7 severe distress    Dyspnea:                           [ ]Mild [ ]Moderate [ ]Severe  Anxiety:                             [ ]Mild [ ]Moderate [ ]Severe  Fatigue:                             [ ]Mild [ ]Moderate [ ]Severe  Nausea:                             [ ]Mild [ ]Moderate [ ]Severe  Loss of appetite:              [ ]Mild [ ]Moderate [ ]Severe  Constipation:                    [ ]Mild [ ]Moderate [ ]Severe  Other Symptoms:  [ x]All other review of systems negative     Home Medications for Symptoms if present:    I Stop Reference no:     -------------------------------------------------------------------------------------------------------  PCSSQ[Palliative Care Spiritual Screening Question]   Severity (0-10):  Score of 4 or > indicate consideration of Chaplaincy referral.  Chaplaincy Referral: [ ] yes [ ] refused [ ] following [x ] Deferred     Caregiver [place]? : [ ] yes [ ] no [ ] Deferred [x ] Declined             Social work referral [ ] Patient & Family Centered Care Referral [ ]     Anticipatory Grief present?:  [ ] yes [ ] no  [x ] Deferred                  Social work referral [ ] Chaplaincy Referral [ ]    -------------------------------------------------------------------------------------------------------  PHYSICAL EXAM:  Vital Signs Last 24 Hrs  T(C): 37.5 (17 Jun 2025 12:55), Max: 38.1 (16 Jun 2025 20:56)  T(F): 99.5 (17 Jun 2025 12:55), Max: 100.5 (16 Jun 2025 20:56)  HR: 90 (17 Jun 2025 12:55) (78 - 97)  BP: 140/80 (17 Jun 2025 05:43) (140/80 - 148/72)  BP(mean): --  RR: 18 (17 Jun 2025 12:55) (18 - 18)  SpO2: 98% (17 Jun 2025 12:55) (98% - 100%)    Parameters below as of 17 Jun 2025 12:55  Patient On (Oxygen Delivery Method): room air     I&O's Summary    16 Jun 2025 07:01  -  17 Jun 2025 07:00  --------------------------------------------------------  IN: 750 mL / OUT: 0 mL / NET: 750 mL      GENERAL:  [ ]Cachexia  [x ]Alert  [x ]Oriented x  4  [ ]Lethargic  [ ]Unarousable  [ x]Verbal  [ ]Non-Verbal    Behavioral:   [ ] Anxiety  [ ] Delirium [ ] Agitation [ x] Other    HEENT:  [x ]Normal   [ ]Dry mouth   [ ]ET Tube/Trach  [ ]Oral lesions    PULMONARY:   x]Clear [ ]Tachypnea  [ ]Audible excessive secretions   [ ]Rhonchi        [ ]Right [ ]Left [ ]Bilateral  [ ]Crackles        [ ]Right [ ]Left [ ]Bilateral  [ ]Wheezing     [ ]Right [ ]Left [ ]Bilateral  [ ]Diminished breath sounds [ ]right [ ]left [ ]bilateral    CARDIOVASCULAR:    [x ]Regular [ ]Irregular [ ]Tachy  [ ]Conner [ ]Murmur [ ]Other    GASTROINTESTINAL:  [x ]Soft  [ ]Distended   [ x]+BS  [ ]Non tender [ x]Tender  [ ]Other [ ]PEG [ ]OGT/ NGT  Last BM: 6/17    GENITOURINARY: new right ankle/leg swelling with associated pain   [ ]Normal [ ] Incontinent   [ ]Oliguria/Anuria   [ ]Parikh    MUSCULOSKELETAL:   []Normal   [ ]Weakness  [ ]Bed/Wheelchair bound [ x]Edema    NEUROLOGIC:   [x ]No focal deficits  [ ]Cognitive impairment  [ ]Dysphagia [ ]Dysarthria [ ]Paresis [ ]Other     SKIN:   [x ]Normal  [ ]Rash  [ ]Other  [ ]Pressure ulcer(s)       Present on admission [ ]y [ ]n  -------------------------------------------------------------------------------------------------------  CRITICAL CARE:  [ ]Shock Present  [ ]Septic [ ]Cardiogenic [ ]Neurologic [ ]Hypovolemic  [ ]Vasopressors [ ]Inotropes  [ ]Respiratory failure present [ ]Mechanical Ventilation [ ]Non-invasive ventilatory support [ ]High-Flow   [ ]Acute  [ ]Chronic [ ]Hypoxic  [ ]Hypercarbic [ ]Other  [ ]Other organ failure     -------------------------------------------------------------------------------------------------------  LABS:                        9.6    8.97  )-----------( 246      ( 17 Jun 2025 05:57 )             29.9   06-17    138  |  102  |  7   ----------------------------<  86  3.4[L]   |  24  |  0.51    Ca    8.6      17 Jun 2025 05:57  Phos  3.2     06-17  Mg     2.20     06-17    TPro  5.6[L]  /  Alb  2.8[L]  /  TBili  0.6  /  DBili  x   /  AST  118[H]  /  ALT  50[H]  /  AlkPhos  349[H]  06-17    Urinalysis Basic - ( 17 Jun 2025 05:57 )    Color: x / Appearance: x / SG: x / pH: x  Gluc: 86 mg/dL / Ketone: x  / Bili: x / Urobili: x   Blood: x / Protein: x / Nitrite: x   Leuk Esterase: x / RBC: x / WBC x   Sq Epi: x / Non Sq Epi: x / Bacteria: x    -------------------------------------------------------------------------------------------------------  RADIOLOGY & ADDITIONAL STUDIES: < from: CT Abdomen and Pelvis w/ Oral Cont and w/ IV Cont (06.12.25 @ 20:21) >  IMPRESSION:  *  Increase in size and number of hepatic metastases since 1/17/2025.   Lobulated soft tissue along the posterior right hepatic lobe has also   increased in size, which may represent a serosal implant.  *  Left pelvic mass and right perirectal mass, which have increased in   size since 1/17/2025, and arebroadly inseparable from the sigmoid colon   and the rectum, respectively. There is associated wall thickening of the   sigmoid colon and the rectum, which may represent serosal spread of   disease, with a component of proctocolitis not excluded. Question tumor   thrombus in the right pelvis associated with the perirectal mass.  *  No evidence for bowel obstruction.  *  Moderate left hydroureteronephrosis to the level of the left pelvic   mass, which has increased since 1/17/2025. Fullness of theright renal   collecting system.  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  *  Question mild intrahepatic biliary duct dilation in the left lobe   peripheral to the tumor versus thrombosed left portal venous branches.  *  Increased lymphadenopathy.  *  New small volume of ascites.  *  Lung nodules in the lung bases have increased in size and number since   4/16/2025, and are suspicious for metastases.    Findings were discussed with Dr. Askew 6/12/2025 10:13 PM by Dr. Harvey.    --- End of Report ---  -------------------------------------------------------------------------------------------------------  Protein Calorie Malnutrition Present: [ ]mild [ ]moderate [ ]severe [ ]underweight [ ]morbid obesity  https://www.andeal.org/vault/2440/web/files/ONC/Table_Clinical%20Characteristics%20to%20Document%20Malnutrition-White%20JV%20et%20al%202012.pdf    Height (cm): 165.1 (06-13-25 @ 18:06), 162 (05-21-25 @ 09:03)  Weight (kg): 58.1 (06-13-25 @ 18:06), 58.5 (06-12-25 @ 16:00)  BMI (kg/m2): 21.3 (06-13-25 @ 18:06), 22.3 (06-12-25 @ 16:00)    Palliative Performance Status Version 2:   See PPSv2 tool and score below       [ ]PPSV2 < or = 30%  [ ]significant weight loss [ ]poor nutritional intake [ ]anasarca[ ]Artificial Nutrition    Other REFERRALS:  [ ]Hospice  [ ]Child Life  [ ]Social Work  [ ]Case management [ ]Holistic Therapy

## 2025-06-17 NOTE — PROGRESS NOTE ADULT - PROBLEM SELECTOR PLAN 8
Home medication: Rosuvastatin 10 mg QD  -c/w Rosuvastatin 10 mg QD  -monitor liver enzymes- mildly elevated due to liver metastases
Home medication: Losartan 50 mg QD  -c/w home meds with hold parameters (SBP <110, DBP <60)  -Monitor BP closely, slightly elevated, if SBP is consistently >160 will increase losartan to 100mg QD
-Home medication: Rosuvastatin 10 mg QD  -c/w Rosuvastatin 10 mg QD  -monitor liver enzymes- mildly elevated due to liver metastases  -DASH/TLC Diet

## 2025-06-17 NOTE — PROGRESS NOTE ADULT - PROBLEM SELECTOR PLAN 1
> Pt follows with Dr Matos & MD at [place]. On tx ( Abraxane and Mvasi), last 5/21/25  > CT shows POD  > Heme/onc consult appreciated

## 2025-06-17 NOTE — CONSULT NOTE ADULT - SUBJECTIVE AND OBJECTIVE BOX
HPI:  59F w/ PMHx of uterine cancer on chemotherapy, with disease progression, now perirectal mass, liver mets, left pelvic mass, and b/l lung nodules at bases, concern for   perirectal mass- constipation/diarrhea and rad onc asked for recommendations.  Regarding chemotherapy, (last chemo on 5/21/25), Dr. Shweta Matos also consulted and pending, history of hysterectomy, and HTN, HLD, and hypothyroidism who presents with worsening rectal pain of 1.5 months duration. Patient is sent in the ED by Dr. Shweta Matos for worsening abdominal pain with concern for bowel obstruction. C urrently, patient states that her pain has improved and denies fever, chills, nausea, vomiting, chest pain, shortness of breath, melena or BRBPR.     s/p carbo/Taxol x 1 cycle, Carbo/Doxil 8/2021, on herceptin until 9/2022 then POD noted to liver and enrolled in a clinical trial study.   Progression of disease is again noted and imaging shows widespread metastatic disease with a perirectal mass that while was causing constipation  possible, is now reportedly causing diarrhea; however, infectious work up found E. Coli as below.     GI PCR Panel Stool (06.15.25 @ 15:48)    GI PCR Panel: Detected: GI Panel PCR evaluates for:  Campylobacter, Plesiomonas shigelloides, Salmonella, Vibrio, Yersinia  enterocolitica, Enteroaggregative Escherichia (EAEC), Enteropathogenic E.  coli (EPEC), Enterotoxigenic E. coli (ETEC), Shiga-like toxin producing  E.coli (STEC), E. coli O157, Shigella/Enteroinvasive E. coli (EIEC),  Adenovirus, Astrovirus, Norovirus, Rotavirus, Sapovirus, Cryptosporidium,  Cyclospora cayetanensis, Entamoeba histolytica, Giardia lamblia.  For culture and susceptibility reports refer to "reflex stool culture".   Enteropathogenic E. coli (EPEC): Detected        oncologist: Carlo Rock.  no pathology in [facility] chart.         < from: CT Abdomen and Pelvis w/ Oral Cont and w/ IV Cont (06.12.25 @ 20:21) >  and fluid.  BONES: Within normal limits.    IMPRESSION:  *  Increase in size and number of hepatic metastases since 1/17/2025.   Lobulated soft tissue along the posterior right hepatic lobe has also   increased in size, which may represent a serosal implant.  *  Left pelvic mass and right perirectal mass, which have increased in   size since 1/17/2025, and arebroadly inseparable from the sigmoid colon   and the rectum, respectively. There is associated wall thickening of the   sigmoid colon and the rectum, which may represent serosal spread of   disease, with a component of proctocolitis not excluded. Question tumor   thrombus in the right pelvis associated with the perirectal mass.  *  No evidence for bowel obstruction.  *  Moderate left hydroureteronephrosis to the level of the left pelvic   mass, which has increased since 1/17/2025. Fullness of theright renal   collecting system.  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  *  Question mild intrahepatic biliary duct dilation in the left lobe   peripheral to the tumor versus thrombosed left portal venous branches.  *  Increased lymphadenopathy.  *  New small volume of ascites.  *  Lung nodules in the lung bases have increased in size and number since   4/16/2025, and are suspicious for metastases.    Findings were discussed with Dr. Askew 6/12/2025 10:13 PM by Dr. Harvey.  Heme onc:   Patient [facility] compared to March with POD present.     -Recommend urology consultation to evaluate for hydronephrosis to eval for nephrostomy tube placement   -No plan for inpatient chemotherapy. Patient to follow up with physician at [facility] for next line of treatment on discharge to determine next line of treatment.   -Recommend palliative care consultation for pain control and symptom control.   -Recommend bowel regimen considering rectal dz to avoid constipation and bleeding.   -Patient has nonocclusive thrombus in the right portal vein (bland thrombus) and occluded left portal vein to confirm PVT from bland thrombus-recommend heparin gtt and can transition to DOAC on discharge given plan for evaluation of nephrostomy tube placement. Should only transition once no procedures planned.   -Patient to follow up with Carlo on discharge. Please contact oncology on discharge to coordinate.         Allergies    Bactrim (Rash)  metronidazole (Hives; Rash)    Intolerances        ROS: [  ] Fever  [  ] Chills  [  ]Chest Pain [  ] SOB  [  ]Cough [  ] N/V  [  ] Diarrhea [  ]Constipation [  ]Other ROS:  [  ] ROS otherwise negative    PAST MEDICAL & SURGICAL HISTORY:  No pertinent past medical history    Endometriosis    Hypothyroid    Acid reflux    UTI (urinary tract infection)    Uterine cancer  1/2021    No significant past surgical history    H/O ovarian cystectomy  2004    History of cholecystectomy  217    H/O abdominal hysterectomy  1/2021 @ [facility]        FAMILY HISTORY:  Family history of diabetes mellitus    Family history of hypertension    Family history of hyperlipidemia    Family history of breast cancer in sister        MEDICATIONS  (STANDING):  apixaban 10 milliGRAM(s) Oral every 12 hours  cholecalciferol 2000 Unit(s) Oral daily  cyanocobalamin 1000 MICROGram(s) Oral daily  famotidine    Tablet 20 milliGRAM(s) Oral daily  gabapentin 100 milliGRAM(s) Oral at bedtime  levothyroxine 75 MICROGram(s) Oral daily  losartan 50 milliGRAM(s) Oral daily  mupirocin 2% Ointment 1 Application(s) Both Nostrils two times a day  phenazopyridine 100 milliGRAM(s) Oral every 8 hours  piperacillin/tazobactam IVPB.. 3.375 Gram(s) IV Intermittent every 8 hours  potassium chloride   Powder 40 milliEquivalent(s) Oral every 4 hours  rosuvastatin 10 milliGRAM(s) Oral at bedtime  sodium chloride 0.9%. 1000 milliLiter(s) (50 mL/Hr) IV Continuous <Continuous>    MEDICATIONS  (PRN):  bisacodyl Suppository 10 milliGRAM(s) Rectal daily PRN Constipation  oxyCODONE    IR 5 milliGRAM(s) Oral every 4 hours PRN Severe Pain (7 - 10)  oxyCODONE    IR 2.5 milliGRAM(s) Oral every 4 hours PRN Moderate Pain (4 - 6)      PHYSICAL EXAM  Vital Signs Last 24 Hrs  T(C): 37.5 (17 Jun 2025 12:55), Max: 38.1 (16 Jun 2025 20:56)  T(F): 99.5 (17 Jun 2025 12:55), Max: 100.5 (16 Jun 2025 20:56)  HR: 90 (17 Jun 2025 12:55) (78 - 97)  BP: 140/80 (17 Jun 2025 05:43) (140/80 - 148/72)  BP(mean): --  RR: 18 (17 Jun 2025 12:55) (18 - 18)  SpO2: 98% (17 Jun 2025 12:55) (98% - 100%)    Parameters below as of 17 Jun 2025 12:55  Patient On (Oxygen Delivery Method): room air        General: appears stated age,  no acute distress  HEENT: NC/AT; EOMI, PERRL, sclera nonicteric; external ears normal; no rhinorrhea or epistaxis; mucous membranes moist; oropharynx clear and without erythema  CV: NR, RR; no appreciable r/m/g  Lungs: CTAB, no increased work of breathing  Abdomen: Bowel sounds present; soft, NTND  MSK: Vertebral spine non-tender to palpation  Neuro: AAOx3; cranial nerves II-XII intact; strength 5/5 in upper and lower extremities; sensation to light touch in tact bilaterally.  Psych: Full affect; mood congruent  Skin: no visible rashes on limited examination    IMAGING/LABS/PATHOLOGY: I have personally reviewed the relevant labs, pathology, and imaging as noted in the HPI.  In addition,                          9.6    8.97  )-----------( 246      ( 17 Jun 2025 05:57 )             29.9     06-17    138  |  102  |  7   ----------------------------<  86  3.4[L]   |  24  |  0.51    Ca    8.6      17 Jun 2025 05:57  Phos  3.2     06-17  Mg     2.20     06-17    TPro  5.6[L]  /  Alb  2.8[L]  /  TBili  0.6  /  DBili  x   /  AST  118[H]  /  ALT  50[H]  /  AlkPhos  349[H]  06-17        ASSESSMENT/PLAN    AMRITA AMBROSIO is a 59y woman h/o metastatic uterine cancer (no pathology in [facility] [place]), under care of oncologist Dr. Shweta Matos, s/p chemotherapy  since 2021, several lines for disease progression, also on clinical trial (at [facility] [place]?)- sent to [facility] by oncologist for concern of rectal mass  causing possible obstruction/constipation, now found to have E. Coli in stool and began having episodes of diarrhea.  No plans for inpatient  chemotherapy but heme onc recommends nephrostomy tube procedure.  To follow up outpatient regarding clinical trial. Infectious disease,  colorectal surgery consults may be appropriate as E.coli in stool, C. Diff pending, perirectal mass by CT imaging with off/on crampy pains  and on a clinical trial study at [facility]. To  d/w Dr. Leavitt if any role for RT in this patient with clear disease progression and mets seen to the lungs, liver,  left pelvic mass, and perirectal mass.                                     Gastro/colorectal surgery to weigh in is recommended, and rad onc consulted.  Case being d/w Dr. Leavitt.  HPI:  59F w/ PMHx of uterine cancer on chemotherapy, with disease progression, now perirectal mass, liver mets, left pelvic mass, and b/l lung nodules at bases, concern for   perirectal mass- constipation/diarrhea and rad onc asked for recommendations.  Regarding chemotherapy, (last chemo on 5/21/25), Dr. Shweta Matos also consulted and pending, history of hysterectomy, and HTN, HLD, and hypothyroidism who presents with worsening rectal pain of 1.5 months duration. Patient is sent in the ED by Dr. Shweta Matos for worsening abdominal pain with concern for bowel obstruction. C urrently, patient states that her pain has improved and denies fever, chills, nausea, vomiting, chest pain, shortness of breath, melena or BRBPR.     s/p carbo/Taxol x 1 cycle, Carbo/Doxil 8/2021, on herceptin until 9/2022 then POD noted to liver and enrolled in a clinical trial study.   Progression of disease is again noted and imaging shows widespread metastatic disease with a perirectal mass that while was causing constipation  possible, is now reportedly causing diarrhea; however, infectious work up found E. Coli as below.     GI PCR Panel Stool (06.15.25 @ 15:48)    GI PCR Panel: Detected: GI Panel PCR evaluates for:  Campylobacter, Plesiomonas shigelloides, Salmonella, Vibrio, Yersinia  enterocolitica, Enteroaggregative Escherichia (EAEC), Enteropathogenic E.  coli (EPEC), Enterotoxigenic E. coli (ETEC), Shiga-like toxin producing  E.coli (STEC), E. coli O157, Shigella/Enteroinvasive E. coli (EIEC),  Adenovirus, Astrovirus, Norovirus, Rotavirus, Sapovirus, Cryptosporidium,  Cyclospora cayetanensis, Entamoeba histolytica, Giardia lamblia.  For culture and susceptibility reports refer to "reflex stool culture".   Enteropathogenic E. coli (EPEC): Detected        oncologist: Carlo Rock.  no pathology in [facility] chart.         < from: CT Abdomen and Pelvis w/ Oral Cont and w/ IV Cont (06.12.25 @ 20:21) >  and fluid.  BONES: Within normal limits.    IMPRESSION:  *  Increase in size and number of hepatic metastases since 1/17/2025.   Lobulated soft tissue along the posterior right hepatic lobe has also   increased in size, which may represent a serosal implant.  *  Left pelvic mass and right perirectal mass, which have increased in   size since 1/17/2025, and arebroadly inseparable from the sigmoid colon   and the rectum, respectively. There is associated wall thickening of the   sigmoid colon and the rectum, which may represent serosal spread of   disease, with a component of proctocolitis not excluded. Question tumor   thrombus in the right pelvis associated with the perirectal mass.  *  No evidence for bowel obstruction.  *  Moderate left hydroureteronephrosis to the level of the left pelvic   mass, which has increased since 1/17/2025. Fullness of theright renal   collecting system.  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  *  Question mild intrahepatic biliary duct dilation in the left lobe   peripheral to the tumor versus thrombosed left portal venous branches.  *  Increased lymphadenopathy.  *  New small volume of ascites.  *  Lung nodules in the lung bases have increased in size and number since   4/16/2025, and are suspicious for metastases.    Findings were discussed with Dr. Askew 6/12/2025 10:13 PM by Dr. Harvey.  Heme onc:   [facility] compared to [facility] with POD present.     -Recommend urology consultation to evaluate for hydronephrosis to eval for nephrostomy tube placement   -No plan for inpatient chemotherapy. Patient to follow up with physician at [facility] for next line of treatment on discharge to determine next line of treatment.   -Recommend palliative care consultation for pain control and symptom control.   -Recommend bowel regimen considering rectal dz to avoid constipation and bleeding.   -Patient has nonocclusive thrombus in the right portal vein (bland thrombus) and occluded left portal vein to confirm PVT from bland thrombus-recommend heparin gtt and can transition to DOAC on discharge given plan for evaluation of nephrostomy tube placement. Should only transition once no procedures planned.   -Patient to follow up with Carlo on discharge. Please contact oncology on discharge to coordinate.         Allergies    Bactrim (Rash)  metronidazole (Hives; Rash)    Intolerances        ROS: [  ] Fever  [  ] Chills  [  ]Chest Pain [  ] SOB  [  ]Cough [  ] N/V  [  ] Diarrhea [  ]Constipation [  ]Other ROS:  [  ] ROS otherwise negative    PAST MEDICAL & SURGICAL HISTORY:  No pertinent past medical history    Endometriosis    Hypothyroid    Acid reflux    UTI (urinary tract infection)    Uterine cancer  1/2021    No significant past surgical history    H/O ovarian cystectomy  2004    History of cholecystectomy  217    H/O abdominal hysterectomy  1/2021 @ [facility]        FAMILY HISTORY:  Family history of diabetes mellitus    Family history of hypertension    Family history of hyperlipidemia    Family history of breast cancer in sister        MEDICATIONS  (STANDING):  apixaban 10 milliGRAM(s) Oral every 12 hours  cholecalciferol 2000 Unit(s) Oral daily  cyanocobalamin 1000 MICROGram(s) Oral daily  famotidine    Tablet 20 milliGRAM(s) Oral daily  gabapentin 100 milliGRAM(s) Oral at bedtime  levothyroxine 75 MICROGram(s) Oral daily  losartan 50 milliGRAM(s) Oral daily  mupirocin 2% Ointment 1 Application(s) Both Nostrils two times a day  phenazopyridine 100 milliGRAM(s) Oral every 8 hours  piperacillin/tazobactam IVPB.. 3.375 Gram(s) IV Intermittent every 8 hours  potassium chloride   Powder 40 milliEquivalent(s) Oral every 4 hours  rosuvastatin 10 milliGRAM(s) Oral at bedtime  sodium chloride 0.9%. 1000 milliLiter(s) (50 mL/Hr) IV Continuous <Continuous>    MEDICATIONS  (PRN):  bisacodyl Suppository 10 milliGRAM(s) Rectal daily PRN Constipation  oxyCODONE    IR 5 milliGRAM(s) Oral every 4 hours PRN Severe Pain (7 - 10)  oxyCODONE    IR 2.5 milliGRAM(s) Oral every 4 hours PRN Moderate Pain (4 - 6)      PHYSICAL EXAM  Vital Signs Last 24 Hrs  T(C): 37.5 (17 Jun 2025 12:55), Max: 38.1 (16 Jun 2025 20:56)  T(F): 99.5 (17 Jun 2025 12:55), Max: 100.5 (16 Jun 2025 20:56)  HR: 90 (17 Jun 2025 12:55) (78 - 97)  BP: 140/80 (17 Jun 2025 05:43) (140/80 - 148/72)  BP(mean): --  RR: 18 (17 Jun 2025 12:55) (18 - 18)  SpO2: 98% (17 Jun 2025 12:55) (98% - 100%)    Parameters below as of 17 Jun 2025 12:55  Patient On (Oxygen Delivery Method): room air        General: appears stated age,  no acute distress  HEENT: NC/AT; EOMI, PERRL, sclera nonicteric; external ears normal; no rhinorrhea or epistaxis; mucous membranes moist; oropharynx clear and without erythema  CV: NR, RR; no appreciable r/m/g  Lungs: CTAB, no increased work of breathing  Abdomen: Bowel sounds present; soft, NTND  MSK: Vertebral spine non-tender to palpation  Neuro: AAOx3; cranial nerves II-XII intact; strength 5/5 in upper and lower extremities; sensation to light touch in tact bilaterally.  Psych: Full affect; mood congruent  Skin: no visible rashes on limited examination    IMAGING/LABS/PATHOLOGY: I have personally reviewed the relevant labs, pathology, and imaging as noted in the HPI.  In addition,                          9.6    8.97  )-----------( 246      ( 17 Jun 2025 05:57 )             29.9     06-17    138  |  102  |  7   ----------------------------<  86  3.4[L]   |  24  |  0.51    Ca    8.6      17 Jun 2025 05:57  Phos  3.2     06-17  Mg     2.20     06-17    TPro  5.6[L]  /  Alb  2.8[L]  /  TBili  0.6  /  DBili  x   /  AST  118[H]  /  ALT  50[H]  /  AlkPhos  349[H]  06-17        ASSESSMENT/PLAN    AMRITA AMBROSIO is a 59y woman h/o metastatic uterine cancer (no pathology in [place]), under care of oncologist Dr. Shweta Matos, s/p chemotherapy  since 2021, several lines for disease progression, also on clinical trial (at [facility]?)- sent to [facility] by oncologist for concern of rectal mass  causing possible obstruction/constipation, now found to have E. Coli in stool and began having episodes of diarrhea.  Pain is rated 3/10, mild.    No plans for inpatient  chemotherapy but heme onc recommends nephrostomy tube procedure.  To follow up outpatient regarding clinical trial. Infectious disease,  colorectal surgery consults may be appropriate as E.coli in stool, C. Diff pending, perirectal mass by CT imaging with off/on crampy pains  and on a clinical trial study at [facility]. To  d/w Dr. Leavitt if any role for RT in this patient with clear disease progression and mets seen to the lungs, liver,  left pelvic mass, and perirectal mass.   Case was discussed:   The Plan is for outpatient discharge when pain is managed well, and to follow up  with rad onc Dr. Park at [address] for a consult for palliative RT, and this is pending approval from the physician from [facility]-  the oncologist who enrolled her on a clinical trial.  Case also d/w Dr. Shweta Matos.                                    Gastro/colorectal surgery to weigh in is recommended, and rad onc consulted.  Case being d/w Dr. Leavitt.

## 2025-06-17 NOTE — PROGRESS NOTE ADULT - PROBLEM SELECTOR PROBLEM 5
Elevated liver enzymes
Cancer related pain
Elevated liver enzymes
Cancer related pain
Cancer related pain

## 2025-06-18 ENCOUNTER — APPOINTMENT (OUTPATIENT)
Dept: HEMATOLOGY ONCOLOGY | Facility: CLINIC | Age: 60
End: 2025-06-18

## 2025-06-18 ENCOUNTER — TRANSCRIPTION ENCOUNTER (OUTPATIENT)
Age: 60
End: 2025-06-18

## 2025-06-18 ENCOUNTER — APPOINTMENT (OUTPATIENT)
Dept: INFUSION THERAPY | Facility: HOSPITAL | Age: 60
End: 2025-06-18

## 2025-06-18 VITALS
HEART RATE: 90 BPM | RESPIRATION RATE: 18 BRPM | SYSTOLIC BLOOD PRESSURE: 139 MMHG | OXYGEN SATURATION: 98 % | DIASTOLIC BLOOD PRESSURE: 87 MMHG

## 2025-06-18 LAB
ALBUMIN SERPL ELPH-MCNC: 3.1 G/DL — LOW (ref 3.3–5)
ALP SERPL-CCNC: 382 U/L — HIGH (ref 40–120)
ALT FLD-CCNC: 48 U/L — HIGH (ref 4–33)
ANION GAP SERPL CALC-SCNC: 13 MMOL/L — SIGNIFICANT CHANGE UP (ref 7–14)
AST SERPL-CCNC: 113 U/L — HIGH (ref 4–32)
BASOPHILS # BLD AUTO: 0.02 K/UL — SIGNIFICANT CHANGE UP (ref 0–0.2)
BASOPHILS NFR BLD AUTO: 0.2 % — SIGNIFICANT CHANGE UP (ref 0–2)
BILIRUB SERPL-MCNC: 0.6 MG/DL — SIGNIFICANT CHANGE UP (ref 0.2–1.2)
BUN SERPL-MCNC: 6 MG/DL — LOW (ref 7–23)
CALCIUM SERPL-MCNC: 9.2 MG/DL — SIGNIFICANT CHANGE UP (ref 8.4–10.5)
CHLORIDE SERPL-SCNC: 97 MMOL/L — LOW (ref 98–107)
CO2 SERPL-SCNC: 22 MMOL/L — SIGNIFICANT CHANGE UP (ref 22–31)
CREAT SERPL-MCNC: 0.55 MG/DL — SIGNIFICANT CHANGE UP (ref 0.5–1.3)
EGFR: 106 ML/MIN/1.73M2 — SIGNIFICANT CHANGE UP
EGFR: 106 ML/MIN/1.73M2 — SIGNIFICANT CHANGE UP
EOSINOPHIL # BLD AUTO: 0.25 K/UL — SIGNIFICANT CHANGE UP (ref 0–0.5)
EOSINOPHIL NFR BLD AUTO: 2.5 % — SIGNIFICANT CHANGE UP (ref 0–6)
GLUCOSE SERPL-MCNC: 94 MG/DL — SIGNIFICANT CHANGE UP (ref 70–99)
HCT VFR BLD CALC: 31.5 % — LOW (ref 34.5–45)
HGB BLD-MCNC: 10.1 G/DL — LOW (ref 11.5–15.5)
IMM GRANULOCYTES # BLD AUTO: 0.14 K/UL — HIGH (ref 0–0.07)
IMM GRANULOCYTES NFR BLD AUTO: 1.4 % — HIGH (ref 0–0.9)
LYMPHOCYTES # BLD AUTO: 0.66 K/UL — LOW (ref 1–3.3)
LYMPHOCYTES NFR BLD AUTO: 6.6 % — LOW (ref 13–44)
MAGNESIUM SERPL-MCNC: 2.1 MG/DL — SIGNIFICANT CHANGE UP (ref 1.6–2.6)
MCHC RBC-ENTMCNC: 27.7 PG — SIGNIFICANT CHANGE UP (ref 27–34)
MCHC RBC-ENTMCNC: 32.1 G/DL — SIGNIFICANT CHANGE UP (ref 32–36)
MCV RBC AUTO: 86.5 FL — SIGNIFICANT CHANGE UP (ref 80–100)
MONOCYTES # BLD AUTO: 1.08 K/UL — HIGH (ref 0–0.9)
MONOCYTES NFR BLD AUTO: 10.9 % — SIGNIFICANT CHANGE UP (ref 2–14)
NEUTROPHILS # BLD AUTO: 7.8 K/UL — HIGH (ref 1.8–7.4)
NEUTROPHILS NFR BLD AUTO: 78.4 % — HIGH (ref 43–77)
NRBC # BLD AUTO: 0 K/UL — SIGNIFICANT CHANGE UP (ref 0–0)
NRBC # FLD: 0 K/UL — SIGNIFICANT CHANGE UP (ref 0–0)
NRBC BLD AUTO-RTO: 0 /100 WBCS — SIGNIFICANT CHANGE UP (ref 0–0)
PHOSPHATE SERPL-MCNC: 3.1 MG/DL — SIGNIFICANT CHANGE UP (ref 2.5–4.5)
PLATELET # BLD AUTO: 307 K/UL — SIGNIFICANT CHANGE UP (ref 150–400)
PMV BLD: 9.7 FL — SIGNIFICANT CHANGE UP (ref 7–13)
POTASSIUM SERPL-MCNC: 4 MMOL/L — SIGNIFICANT CHANGE UP (ref 3.5–5.3)
POTASSIUM SERPL-SCNC: 4 MMOL/L — SIGNIFICANT CHANGE UP (ref 3.5–5.3)
PROT SERPL-MCNC: 6.2 G/DL — SIGNIFICANT CHANGE UP (ref 6–8.3)
RBC # BLD: 3.64 M/UL — LOW (ref 3.8–5.2)
RBC # FLD: 15.4 % — HIGH (ref 10.3–14.5)
SODIUM SERPL-SCNC: 132 MMOL/L — LOW (ref 135–145)
WBC # BLD: 9.95 K/UL — SIGNIFICANT CHANGE UP (ref 3.8–10.5)
WBC # FLD AUTO: 9.95 K/UL — SIGNIFICANT CHANGE UP (ref 3.8–10.5)

## 2025-06-18 PROCEDURE — 99239 HOSP IP/OBS DSCHRG MGMT >30: CPT

## 2025-06-18 PROCEDURE — 99233 SBSQ HOSP IP/OBS HIGH 50: CPT

## 2025-06-18 RX ORDER — LEVOTHYROXINE SODIUM 300 MCG
1 TABLET ORAL
Refills: 0 | DISCHARGE

## 2025-06-18 RX ORDER — LIDOCAINE HYDROCHLORIDE 20 MG/ML
1 JELLY TOPICAL
Qty: 1 | Refills: 0
Start: 2025-06-18 | End: 2025-07-01

## 2025-06-18 RX ORDER — GABAPENTIN 400 MG/1
1 CAPSULE ORAL
Qty: 30 | Refills: 0
Start: 2025-06-18 | End: 2025-07-17

## 2025-06-18 RX ADMIN — OXYCODONE HYDROCHLORIDE 5 MILLIGRAM(S): 30 TABLET ORAL at 10:23

## 2025-06-18 RX ADMIN — Medication 25 GRAM(S): at 05:54

## 2025-06-18 RX ADMIN — LOSARTAN POTASSIUM 50 MILLIGRAM(S): 100 TABLET, FILM COATED ORAL at 05:54

## 2025-06-18 RX ADMIN — Medication 20 MILLIGRAM(S): at 11:54

## 2025-06-18 RX ADMIN — CYANOCOBALAMIN 1000 MICROGRAM(S): 1000 INJECTION INTRAMUSCULAR; SUBCUTANEOUS at 11:55

## 2025-06-18 RX ADMIN — OXYCODONE HYDROCHLORIDE 5 MILLIGRAM(S): 30 TABLET ORAL at 00:54

## 2025-06-18 RX ADMIN — OXYCODONE HYDROCHLORIDE 5 MILLIGRAM(S): 30 TABLET ORAL at 06:54

## 2025-06-18 RX ADMIN — MUPIROCIN CALCIUM 1 APPLICATION(S): 20 CREAM TOPICAL at 05:58

## 2025-06-18 RX ADMIN — APIXABAN 10 MILLIGRAM(S): 2.5 TABLET, FILM COATED ORAL at 05:54

## 2025-06-18 RX ADMIN — Medication 2000 UNIT(S): at 11:54

## 2025-06-18 RX ADMIN — OXYCODONE HYDROCHLORIDE 5 MILLIGRAM(S): 30 TABLET ORAL at 10:53

## 2025-06-18 RX ADMIN — OXYCODONE HYDROCHLORIDE 5 MILLIGRAM(S): 30 TABLET ORAL at 05:54

## 2025-06-18 RX ADMIN — Medication 75 MICROGRAM(S): at 05:55

## 2025-06-18 NOTE — DISCHARGE NOTE PROVIDER - CARE PROVIDER_API CALL
Shweta Matos  Medical Oncology  [address] [place] [id number]  Phone: [phone]  Fax: [phone]  Follow Up Time:     Dilan Park  Radiation Oncology  [address] [place] [id number]  Phone: [phone]  Fax: [phone]  Follow Up Time: 1 week

## 2025-06-18 NOTE — DISCHARGE NOTE NURSING/CASE MANAGEMENT/SOCIAL WORK - NSDCVIVACCINE_GEN_ALL_CORE_FT
influenza, injectable, quadrivalent, preservative free; 17-Oct-2023 13:07; Mariam Amador (RN); [employer]; [id number] (Exp. Date: 01-Jun-2024); IntraMuscular; Deltoid Left.; 0.5 milliLiter(s); VIS (VIS Published: 06-Aug-2021, VIS Presented: 17-Oct-2023);

## 2025-06-18 NOTE — PROGRESS NOTE ADULT - PROBLEM SELECTOR PLAN 2
> Istop reviewed   >Pt shared she was taking Tylenol at home, was not taking prescribed oxycodone 5mg  > Pt reports adequate relief from oxycodone 5mg. Recommend continuing PRN Oxycodone 5mg Q4 hrs for severe pain, 2.5mg Q4 hrs for moderate pain  > c/w home gabapentin 100mg qhs (pt was taking sparingly)   > Bowel regimen while on opioids  > PRN Narcan.
Currently on Abraxane and Mvasi, last given on 5/21  -CT A/P: No evidence for bowel obstruction. Increase in size and number of hepatic metastases. Increase in left pelvic mass and right perirectal mass. Moderate left hydroureteronephrosis to the level of the left pelvic   Mass. Question mild intrahepatic biliary duct dilation in the left lobe peripheral to the tumor versus thrombosed left portal venous branches. 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 Increased lymphadenopathy. New small volume of ascites. Lung nodules in the lung bases  - Patient/daughter aware of CT scan results  - Prior attending spoke with  regarding moderate left hydroureteronephrosis- given normal renal function and no fevers, there is no indication for intervention while inpatient- if patient does develop MYRA or fevers, would then need to consider consulting IR for nephrostomy tube placement  - Has f/up appt in [place] ([place]) on Tuesday with alternate Oncologist (pt follows with both Dr. Matos and at [place])
> Istop reviewed   >Pt shared she was taking Tylenol at home, was not taking prescribed oxycodone 5mg  > Pt reports adequate relief from oxycodone 5mg. Recommend continuing PRN Oxycodone 5mg Q4 hrs for severe pain, 2.5mg Q4 hrs for moderate pain  > c/w home gabapentin 100mg qhs (pt was taking sparingly)   > pt c/o pain while having BM/straining   > Bowel regimen while on opioids  > PRN Narcan.  > DC recs: PRN Oxycodone 2.5mg Q4 hrs for moderate pain, PRN Oxycodone 5mg Q4 hrs for severe pain. Gabapentin 100mg qhs  > Please follow up with Dr. Pugh, Danielle Gifford NP  [facility], [facility]  Phone: [phone] [address] [address]
> Istop reviewed   >Pt shared she was taking Tylenol at home, was not taking prescribed oxycodone 5mg  > Pt reports adequate relief from oxycodone 5mg. Recommend continuing PRN Oxycodone 5mg Q4 hrs for severe pain, 2.5mg Q4 hrs for moderate pain  > c/w home gabapentin 100mg qhs (pt was taking sparingly)   > pt c/o pain while having BM/straining - RT consult to eval perirectal mass  > Bowel regimen while on opioids  > PRN Narcan.
Currently on Abraxane and Mvasi, last given on 5/21  -CT A/P: No evidence for bowel obstruction. Increase in size and number of hepatic metastases. Increase in left pelvic mass and right perirectal mass. Moderate left hydroureteronephrosis to the level of the left pelvic   Mass. Question mild intrahepatic biliary duct dilation in the left lobe peripheral to the tumor versus thrombosed left portal venous branches. 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 Increased lymphadenopathy. New small volume of ascites. Lung nodules in the lung bases  - Patient/daughter aware of CT scan results  - Prior attending spoke with  regarding moderate left hydroureteronephrosis- given normal renal function and no fevers, there is no indication for intervention while inpatient- if patient does develop MYRA or fevers, would then need to consider consulting IR for nephrostomy tube placement  - Has f/up appt in [place] ([place]) on Tuesday with alternate Oncologist (pt follows with both Dr. Matos and at [place])
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 Question mild intrahepatic biliary duct dilation in the left lobe peripheral to the tumor versus thrombosed left portal venous branches  - C/w Heparin gtt. Can transition to DOAC if no invasive procedures/tests planned (price check to be done first)  - Hypercoagulable state from underlying malignancy
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 Question mild intrahepatic biliary duct dilation in the left lobe peripheral to the tumor versus thrombosed left portal venous branches.   -C/w Heparin gtt. Can transition to DOAC if no invasive procedures/tests planned
Currently on Abraxane and Mvasi, last given on 5/21  -CT A/P: No evidence for bowel obstruction. Increase in size and number of hepatic metastases. Increase in left pelvic mass and right perirectal mass. Moderate left hydroureteronephrosis to the level of the left pelvic   Mass. Question mild intrahepatic biliary duct dilation in the left lobe peripheral to the tumor versus thrombosed left portal venous branches. 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 Increased lymphadenopathy. New small volume of ascites. Lung nodules in the lung bases  - Pt reporting bleeding with wiping rectum. C/f bleeding from rectal mass. Hgb stable. Rad onc consulted. Colorectal surgery reviewed - NTD.   - Prior attending spoke with  regarding moderate left hydroureteronephrosis- given normal renal function and no fevers, there is no indication for intervention while inpatient- if patient does develop MYRA or fevers, would then need to consider consulting IR for nephrostomy tube placement  - Has f/up appt in [place] ([place]) on Tuesday with alternate Oncologist (pt follows with both Dr. Matos and at [place])

## 2025-06-18 NOTE — PROGRESS NOTE ADULT - PROBLEM SELECTOR PLAN 4
-Palliative care team consulted for help with neoplasm-related pain  -Started on oxycodone 5mg PO q6h PRN for severe pain
Thank you for allowing us to participate in your patient's care. We will continue to follow with you. Please page [phone] for any q's or c's. The Geriatric and Palliative Medicine service has coverage 24 hours a day/ 7 days a week to provide medical recommendations regarding symptom management needs via telephone.
Thank you for allowing us to participate in your patient's care. We will continue to follow with you. Please page [phone] for any q's or c's. The Geriatric and Palliative Medicine service has coverage 24 hours a day/ 7 days a week to provide medical recommendations regarding symptom management needs via telephone.
Patient reports difficulty with passing stool due to perirectal mass  - Holding senna and miralax decreased to QD given loose BM's/fever  - Monitor BM's
Palliative care team consulted for help with neoplasm-related pain, apprec recs  - C/w oxycodone 2.5-5mg PO PRN for moderate/severe pain  - Adding dilaudid IV for BT 0.2mg as pt states medication is not lasting long enough  - Patient's pain is likely due to disease burden, especially right perirectal mass
Patient reports difficulty with passing stool due to perirectal mass  - Holding senna and miralax decreased to QD given loose BM's/fever  - Monitor BM's
Patient reports difficulty with passing stool due to perirectal mass  - Holding senna and miralax decreased to QD given loose BM's/fever  - Monitor BM's

## 2025-06-18 NOTE — DISCHARGE NOTE PROVIDER - NSDCFUSCHEDAPPT_GEN_ALL_CORE_FT
Shweta Matos  [facility]  Randy Augustin  Scheduled Appointment: 06/18/2025

## 2025-06-18 NOTE — DISCHARGE NOTE NURSING/CASE MANAGEMENT/SOCIAL WORK - PATIENT PORTAL LINK FT
You can access the FollowMyHealth Patient Portal offered by [facility] by registering at the following website: [email or web address]. By joining [facility]’s FollowMyHealth portal, you will also be able to view your health information using other applications (apps) compatible with our system.

## 2025-06-18 NOTE — PROGRESS NOTE ADULT - PROBLEM SELECTOR PROBLEM 3
Diarrhea
Constipation
VTE (venous thromboembolism)
Diarrhea
Palliative care encounter
VTE (venous thromboembolism)
Constipation
VTE (venous thromboembolism)

## 2025-06-18 NOTE — DISCHARGE NOTE PROVIDER - NSDCMRMEDTOKEN_GEN_ALL_CORE_FT
Eliquis Starter Pack for Treatment of DVT and PE 5 mg oral tablet: 1 tab(s) orally 2 times a day 2 tablets two times a day on 6/17-6/18 then 1 tablet two times a day afterwards.  famotidine 20 mg oral tablet: 1 tab(s) orally once a day  gabapentin 100 mg oral capsule: 1 cap(s) orally once a day (at bedtime)  levothyroxine 75 mcg (0.075 mg) oral tablet: 1 tab(s) orally once a day  losartan 50 mg oral tablet: 1 tab(s) orally once a day  oxyCODONE 5 mg oral tablet: 1 tab(s) orally every 4 hours as needed for Severe Pain (7 - 10) Take 0.5 - 1 tablet every 4 hours as needed for moderate to severe pain MDD: 6 tablets  rosuvastatin 10 mg oral tablet: 1 tab(s) orally once a day  Vitamin B-100 oral tablet: 1 tab(s) orally once a day  Vitamin D2 50 mcg (2000 intl units) oral capsule: 1 cap(s) orally once a day   acetaminophen 325 mg oral tablet: 2 tab(s) orally every 6 hours As needed Temp greater or equal to 38C (100.4F), Mild Pain (1 - 3)  apixaban 5 mg oral tablet: 1 tab(s) orally 2 times a day  famotidine 20 mg oral tablet: 1 tab(s) orally once a day  gabapentin 100 mg oral capsule: 1 cap(s) orally once a day (at bedtime)  losartan 50 mg oral tablet: 1 tab(s) orally once a day  naloxegol 12.5 mg oral tablet: 1 tab(s) orally once a day  oxyCODONE 7.5 mg oral tablet: 1 tab(s) orally every 4 hours As needed Severe Pain (7 - 10)  polyethylene glycol 3350 oral powder for reconstitution: 17 gram(s) orally 2 times a day  prochlorperazine 10 mg oral tablet: 1 tab(s) orally 3 times a day  rosuvastatin 10 mg oral tablet: 1 tab(s) orally once a day (at bedtime)  senna leaf extract oral tablet: 2 tab(s) orally once a day (at bedtime)  Vitamin B-100 oral tablet: 1 tab(s) orally once a day  Vitamin D2 50 mcg (2000 intl units) oral capsule: 1 cap(s) orally once a day

## 2025-06-18 NOTE — DISCHARGE NOTE PROVIDER - ATTENDING ATTESTATION STATEMENT
[facility]    DTC Progress Note    Recommendations/ Comments: Chart reviewed for hyperglycemia. POC glucose consistently above 200 mg/dL. This appears to be a new diagnosis. Will attempt to see patient. If appropriate, please consider:  1. Change diet to consistent carb for BG management. 2. Consider changing correction scale to normal sensitivity for increased BMI with normal renal function. Unknown oral intake amounts at this time. Current hospital DM medication:   Lispro high sensitivity correction scale      Chart reviewed on Rashida Sawant. Patient is a 62 y.o. female with possible new diagnosis Type 2 Diabetes on none at home. A1c:   Lab Results   Component Value Date/Time    Hemoglobin A1c 8.1 (H) 08/13/2019 08:12 PM       Recent Glucose Results:   Lab Results   Component Value Date/Time     (H) 08/14/2019 02:08 AM     (H) 08/13/2019 07:51 PM    GLUCPOC 288 (H) 08/14/2019 11:47 AM    GLUCPOC 228 (H) 08/14/2019 06:59 AM    GLUCPOC 268 (H) 08/13/2019 09:29 PM        Lab Results   Component Value Date/Time    Creatinine 0.79 08/14/2019 02:08 AM     Estimated Creatinine Clearance: 77 mL/min (based on SCr of 0.79 mg/dL). Active Orders   Diet    DIET REGULAR        PO intake:   Patient Vitals for the past 72 hrs:   % Diet Eaten   08/12/19 0916 5 %       Will continue to follow as needed.     Thank you  Jackelyn You, RN  Office [phone]  Pager [phone]          Time spent: 5 min I have personally seen and examined the patient. I have collaborated with and supervised the

## 2025-06-18 NOTE — DISCHARGE NOTE PROVIDER - CARE PROVIDERS DIRECT ADDRESSES
[email or web address]

## 2025-06-18 NOTE — PROGRESS NOTE ADULT - ASSESSMENT
59F w/ PMHx of uterine cancer on chemotherapy (last chemo on 5/21/25), HTN, HLD, and hypothyroidism who presents with worsening rectal pain and found to have progressive metastatic disease and thrombi. Admitted to medicine for further management and monitoring.    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
59F w/ PMHx of uterine cancer on chemotherapy (last chemo on 5/21/25), HTN, HLD, and hypothyroidism who presents with worsening rectal pain and found to have progressive metastatic disease and thrombi. Admitted to medicine for further management and monitoring. Palliative consulted for pain management. 
59F w/ PMHx of uterine cancer on chemotherapy (last chemo on 5/21/25), HTN, HLD, and hypothyroidism who presents with worsening rectal pain and found to have progressive metastatic disease and thrombi. Admitted to medicine for further management and monitoring.    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
59F w/ PMHx of uterine cancer on chemotherapy (last chemo on 5/21/25), HTN, HLD, and hypothyroidism who presents with worsening rectal pain and found to have progressive metastatic disease and thrombi. Admitted to medicine for further management and monitoring.
59F w/ PMHx of uterine cancer on chemotherapy (last chemo on 5/21/25), HTN, HLD, and hypothyroidism who presents with worsening rectal pain and found to have progressive metastatic disease and thrombi. Admitted to medicine for further management and monitoring. Palliative consulted for pain management. 
59F w/ PMHx of uterine cancer on chemotherapy (last chemo on 5/21/25), HTN, HLD, and hypothyroidism who presents with worsening rectal pain and found to have progressive metastatic disease and thrombi. Admitted to medicine for further management and monitoring. Palliative consulted for pain management. 
59F w/ PMHx of uterine cancer on chemotherapy (last chemo on 5/21/25), HTN, HLD, and hypothyroidism who presents with worsening rectal pain and found to have progressive metastatic disease and thrombi. Admitted to medicine for further management and monitoring.    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
59F w/ PMHx of uterine cancer on chemotherapy (last chemo on 5/21/25), HTN, HLD, and hypothyroidism who presents with worsening rectal pain and found to have progressive metastatic disease and thrombi. Admitted to medicine for further management and monitoring.    ACTIVE PROBLEMS    Uterine cancer on chemotherapy (last chemo on 5/21/25)  Cancer related pain  Immunosuppressed status  Nonocclusive thrombus in the right portal vein and occluded left portal vein  HTN  HLD  Hypothyroidism  Hypercoagulable state  Moderate left hydroureteronephrosis

## 2025-06-18 NOTE — PROGRESS NOTE ADULT - PROBLEM SELECTOR PLAN 1
> Pt follows with Dr Matos & MD at [place]. On tx ( Abraxane and Mvasi), last 5/21/25  > CT shows POD  > Heme/onc consult appreciated  > Rad/onc consulted - no role for inpt RT

## 2025-06-18 NOTE — PROGRESS NOTE ADULT - SUBJECTIVE AND OBJECTIVE BOX
[facility] Geriatrics and Palliative Care  Toña Gutierrez, Palliative Care Nurse Practitioner  Contact Info: Page [phone] (Including Nights/Weekends), Message on Microsoft Teams (Toña Gutierrez), or leave VM at Palliative Office [phone] (non-urgent)    Date of Service 06-18-25 @ 12:54    SUBJECTIVE AND OBJECTIVE:  Indication for Geriatrics and Palliative Care Services/INTERVAL HPI:   Pt seen this AM with  at the bedside. Pt shared pain is well managed and diarrhea has resolved. Pt planned for dc later today. Discussed followup care with Dr. uPgh, pt will reach out for an apt.     OVERNIGHT EVENTS: Pt required PO Oxycodone 5mg x 4 within 24 hrs (8a-8a)/    DNR on chart:  Allergies    Bactrim (Rash)  metronidazole (Hives; Rash)    Intolerances    MEDICATIONS  (STANDING):  apixaban 10 milliGRAM(s) Oral every 12 hours  cholecalciferol 2000 Unit(s) Oral daily  cyanocobalamin 1000 MICROGram(s) Oral daily  famotidine    Tablet 20 milliGRAM(s) Oral daily  gabapentin 100 milliGRAM(s) Oral at bedtime  levothyroxine 75 MICROGram(s) Oral daily  losartan 50 milliGRAM(s) Oral daily  mupirocin 2% Ointment 1 Application(s) Both Nostrils two times a day  piperacillin/tazobactam IVPB.. 3.375 Gram(s) IV Intermittent every 8 hours  rosuvastatin 10 milliGRAM(s) Oral at bedtime  sodium chloride 0.9%. 1000 milliLiter(s) (50 mL/Hr) IV Continuous <Continuous>    MEDICATIONS  (PRN):  bisacodyl Suppository 10 milliGRAM(s) Rectal daily PRN Constipation  oxyCODONE    IR 5 milliGRAM(s) Oral every 4 hours PRN Severe Pain (7 - 10)  oxyCODONE    IR 2.5 milliGRAM(s) Oral every 4 hours PRN Moderate Pain (4 - 6)    -------------------------------------------------------------------------------------------------------  ITEMS UNCHECKED ARE NOT PRESENT    PRESENT SYMPTOMS: [ ]Unable to self-report - see [ ] CPOT [ ] PAINADS [ ] RDOS  Source if other than patient:  [ ]Family   [ ]Team     Pain:  [ ]yes [x ]no  QOL impact -   Location -                    Aggravating factors -  Quality -  Radiation -  Timing-  Severity (0-10 scale):  Minimal acceptable level (0-10 scale)/pain goal:    CPOT:    https://[email or web address]/[email or web address]/Critical-Care-Pain-Observation-Tool-(CPOT)    PAINAD Score: See PAINAD tool and score below       RDOS: See RDOS tool and score below   0 to 2  minimal or no respiratory distress   3  mild distress  4 to 6 moderate distress  >7 severe distress    Dyspnea:                           [ ]Mild [ ]Moderate [ ]Severe  Anxiety:                             [ ]Mild [ ]Moderate [ ]Severe  Fatigue:                             [ ]Mild [ ]Moderate [ ]Severe  Nausea:                             [ ]Mild [ ]Moderate [ ]Severe  Loss of appetite:              [ ]Mild [ ]Moderate [ ]Severe  Constipation:                    [ ]Mild [ ]Moderate [ ]Severe  Other Symptoms:  [x ]All other review of systems negative     Home Medications for Symptoms if present:    I Stop Reference no:   -------------------------------------------------------------------------------------------------------  PCSSQ[Palliative Care Spiritual Screening Question]   Severity (0-10):  Score of 4 or > indicate consideration of Chaplaincy referral.  Chaplaincy Referral: [ ] yes [ ] refused [ ] following [ x] Deferred     Caregiver [place]? : [ ] yes [ ] no [ ] Deferred [x ] Declined             Social work referral [ ] Patient & Family Centered Care Referral [ ]     Anticipatory Grief present?:  [ ] yes [ ] no  [x ] Deferred                  Social work referral [ ] Chaplaincy Referral [ ]    -------------------------------------------------------------------------------------------------------  PHYSICAL EXAM:  Vital Signs Last 24 Hrs  T(C): 36.8 (18 Jun 2025 05:45), Max: 37.5 (17 Jun 2025 12:55)  T(F): 98.2 (18 Jun 2025 05:45), Max: 99.5 (17 Jun 2025 12:55)  HR: 90 (18 Jun 2025 05:45) (90 - 99)  BP: 144/71 (18 Jun 2025 05:45) (132/72 - 145/74)  BP(mean): --  RR: 18 (18 Jun 2025 05:45) (18 - 18)  SpO2: 98% (18 Jun 2025 05:45) (97% - 99%)    Parameters below as of 18 Jun 2025 05:45  Patient On (Oxygen Delivery Method): room air     I&O's Summary    17 Jun 2025 07:01  -  18 Jun 2025 07:00  --------------------------------------------------------  IN: 1350 mL / OUT: 0 mL / NET: 1350 mL       GENERAL:  [ ]Cachexia  [x ]Alert  [x ]Oriented x  4  [ ]Lethargic  [ ]Unarousable  [ x]Verbal  [ ]Non-Verbal    Behavioral:   [ ] Anxiety  [ ] Delirium [ ] Agitation [ x] Other    HEENT:  [x ]Normal   [ ]Dry mouth   [ ]ET Tube/Trach  [ ]Oral lesions    PULMONARY:   x]Clear [ ]Tachypnea  [ ]Audible excessive secretions   [ ]Rhonchi        [ ]Right [ ]Left [ ]Bilateral  [ ]Crackles        [ ]Right [ ]Left [ ]Bilateral  [ ]Wheezing     [ ]Right [ ]Left [ ]Bilateral  [ ]Diminished breath sounds [ ]right [ ]left [ ]bilateral    CARDIOVASCULAR:    [x ]Regular [ ]Irregular [ ]Tachy  [ ]Conner [ ]Murmur [ ]Other    GASTROINTESTINAL:  [x ]Soft  [ ]Distended   [ x]+BS  [ ]Non tender [ x]Tender  [ ]Other [ ]PEG [ ]OGT/ NGT  Last BM: 6/17    GENITOURINARY: new right ankle/leg swelling with associated pain   [ ]Normal [ ] Incontinent   [ ]Oliguria/Anuria   [ ]Parikh    MUSCULOSKELETAL:   []Normal   [ ]Weakness  [ ]Bed/Wheelchair bound [ x]Edema    NEUROLOGIC:   [x ]No focal deficits  [ ]Cognitive impairment  [ ]Dysphagia [ ]Dysarthria [ ]Paresis [ ]Other     SKIN:   [x ]Normal  [ ]Rash  [ ]Other  [ ]Pressure ulcer(s)       Present on admission [ ]y [ ]n  -------------------------------------------------------------------------------------------------------  CRITICAL CARE:  [ ]Shock Present  [ ]Septic [ ]Cardiogenic [ ]Neurologic [ ]Hypovolemic  [ ]Vasopressors [ ]Inotropes  [ ]Respiratory failure present [ ]Mechanical Ventilation [ ]Non-invasive ventilatory support [ ]High-Flow   [ ]Acute  [ ]Chronic [ ]Hypoxic  [ ]Hypercarbic [ ]Other  [ ]Other organ failure     -------------------------------------------------------------------------------------------------------  LABS:                        10.1   9.95  )-----------( 307      ( 18 Jun 2025 06:33 )             31.5   06-18    132[L]  |  97[L]  |  6[L]  ----------------------------<  94  4.0   |  22  |  0.55    Ca    9.2      18 Jun 2025 06:33  Phos  3.1     06-18  Mg     2.10     06-18    TPro  6.2  /  Alb  3.1[L]  /  TBili  0.6  /  DBili  x   /  AST  113[H]  /  ALT  48[H]  /  AlkPhos  382[H]  06-18    Urinalysis Basic - ( 18 Jun 2025 06:33 )    Color: x / Appearance: x / SG: x / pH: x  Gluc: 94 mg/dL / Ketone: x  / Bili: x / Urobili: x   Blood: x / Protein: x / Nitrite: x   Leuk Esterase: x / RBC: x / WBC x   Sq Epi: x / Non Sq Epi: x / Bacteria: x  -------------------------------------------------------------------------------------------------------  RADIOLOGY & ADDITIONAL STUDIES: < from: CT Abdomen and Pelvis w/ Oral Cont and w/ IV Cont (06.12.25 @ 20:21) >  IMPRESSION:  *  Increase in size and number of hepatic metastases since 1/17/2025.   Lobulated soft tissue along the posterior right hepatic lobe has also   increased in size, which may represent a serosal implant.  *  Left pelvic mass and right perirectal mass, which have increased in   size since 1/17/2025, and arebroadly inseparable from the sigmoid colon   and the rectum, respectively. There is associated wall thickening of the   sigmoid colon and the rectum, which may represent serosal spread of   disease, with a component of proctocolitis not excluded. Question tumor   thrombus in the right pelvis associated with the perirectal mass.  *  No evidence for bowel obstruction.  *  Moderate left hydroureteronephrosis to the level of the left pelvic   mass, which has increased since 1/17/2025. Fullness of theright renal   collecting system.  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  *  Question mild intrahepatic biliary duct dilation in the left lobe   peripheral to the tumor versus thrombosed left portal venous branches.  *  Increased lymphadenopathy.  *  New small volume of ascites.  *  Lung nodules in the lung bases have increased in size and number since   4/16/2025, and are suspicious for metastases.    Findings were discussed with Dr. Askew 6/12/2025 10:13 PM by Dr. Harvey.    --- End of Report ---  -------------------------------------------------------------------------------------------------------  Protein Calorie Malnutrition Present: [ ]mild [ ]moderate [ ]severe [ ]underweight [ ]morbid obesity  https://www.andeal.org/vault/2440/web/files/ONC/Table_Clinical%20Characteristics%20to%20Document%20Malnutrition-White%20JV%20et%20al%202012.pdf    Height (cm): 165.1 (06-13-25 @ 18:06), 162 (05-21-25 @ 09:03)  Weight (kg): 58.1 (06-13-25 @ 18:06), 58.5 (06-12-25 @ 16:00)  BMI (kg/m2): 21.3 (06-13-25 @ 18:06), 22.3 (06-12-25 @ 16:00)    Palliative Performance Status Version 2:   See PPSv2 tool and score below       [ ]PPSV2 < or = 30%  [ ]significant weight loss [ ]poor nutritional intake [ ]anasarca[ ]Artificial Nutrition    Other REFERRALS:  [ ]Hospice  [ ]Child Life  [ ]Social Work  [ ]Case management [ ]Holistic Therapy

## 2025-06-18 NOTE — DISCHARGE NOTE NURSING/CASE MANAGEMENT/SOCIAL WORK - NSDCFUADDAPPT_GEN_ALL_CORE_FT
APPTS ARE READY TO BE MADE: [x] YES    Best Family or Patient Contact (if needed):    Additional Information about above appointments (if needed):    1: [facility] (Radiation Oncology) in 1 week  2:   3:     Other comments or requests:

## 2025-06-18 NOTE — PROGRESS NOTE ADULT - PROVIDER SPECIALTY LIST ADULT
Palliative Care
Hospitalist
Palliative Care
Hospitalist
Hospitalist
Palliative Care
Hospitalist
Hospitalist

## 2025-06-18 NOTE — DISCHARGE NOTE PROVIDER - NSDCCPCAREPLAN_GEN_ALL_CORE_FT
PRINCIPAL DISCHARGE DIAGNOSIS  Diagnosis: Rectal pain  Assessment and Plan of Treatment: You presented with worsening rectal/abd pain due to your cancer. Palliative care team evaluated you and you were started on oxycodone and gabapentin with relief. Follow up with outpatient radiation oncology specialist.      SECONDARY DISCHARGE DIAGNOSES  Diagnosis: Fever  Assessment and Plan of Treatment: You developed fever during admission. Blood cultures, urine cultes were negative however your stool study was positive for Enteropathogenic E.Coli.    Diagnosis: Cancer related pain  Assessment and Plan of Treatment: Continue oxycodone as needed for pain. Continue gabapentin at bedtime for neuropathy.    Diagnosis: VTE (venous thromboembolism)  Assessment and Plan of Treatment: You were found to have portal vein thrombosis. You were started on heparin drip >> Therapeutic Lovenox and then transitioned to oral Eliquis at discharge.    Diagnosis: Diarrhea  Assessment and Plan of Treatment: You had diarrhea that was positive for Enteropathic E. Coli. You were treated with some empiric Zosyn given historu of immunosuppresion. Diarrhea has resolved once Miralax was discontinued.    Diagnosis: Uterine cancer  Assessment and Plan of Treatment: CT scan with progression of cancer. Follow up with Dr. Matos and Dr. Mckeon for ongoing cancer management. Follow up with [facility] oncology outpatient for assessment of rectal mass.

## 2025-06-18 NOTE — DISCHARGE NOTE NURSING/CASE MANAGEMENT/SOCIAL WORK - NSDCPEFALRISK_GEN_ALL_CORE
For information on Fall & Injury Prevention, visit: [email or web address] OR  [email or web address] OR  https://www.cdc.gov/steadi/patient.html

## 2025-06-18 NOTE — PROGRESS NOTE ADULT - PROBLEM SELECTOR PROBLEM 4
Palliative care encounter
Palliative care encounter
Constipation
Constipation
Cancer related pain
Constipation
Cancer related pain

## 2025-06-18 NOTE — DISCHARGE NOTE PROVIDER - ATTENDING DISCHARGE PHYSICAL EXAMINATION:
.  VITAL SIGNS:  T(C): 37.5 (06-18-25 @ 13:08), Max: 37.5 (06-18-25 @ 13:08)  T(F): 99.5 (06-18-25 @ 13:08), Max: 99.5 (06-18-25 @ 13:08)  HR: 90 (06-18-25 @ 13:38) (90 - 99)  BP: 139/87 (06-18-25 @ 13:38) (132/72 - 145/74)  BP(mean): --  RR: 18 (06-18-25 @ 13:38) (18 - 18)  SpO2: 98% (06-18-25 @ 13:38) (97% - 100%)  Wt(kg): --    PHYSICAL EXAM:    Constitutional: Comfortable and resting in bed   Head: NC/AT  Eyes: PERRL, EOMI, anicteric sclera  ENT: no nasal discharge; uvula midline, no oropharyngeal erythema or exudates; MMM  Neck: supple; no JVD or thyromegaly  Respiratory: CTA B/L; no W/R/R, no retractions  Cardiac: +S1/S2; RRR; no M/R/G; PMI non-displaced  Gastrointestinal: abdomen soft, NT/ND; no rebound or guarding; +BSx4  Extremities: WWP, no clubbing or cyanosis; mild pitting edema bilateral lower extremities   Musculoskeletal: NROM x4; no joint swelling, tenderness or erythema  Vascular: 2+ radial, femoral, DP/PT pulses B/L  Dermatologic: skin warm, dry and intact; no rashes, wounds, or scars  Lymphatic: no submandibular or cervical LAD  Neurologic: AAOx3; CNII-XII grossly intact; no focal deficits  Psychiatric: affect and characteristics of appearance, verbalizations, behaviors are appropriate    Hgb has remained stable despite reported blood on toilet paper while wiping. Likely 2/2 rectal mass. Rad onc recommending outpatient follow up for possible radiation. Pt reporting diarrhea has resolved. D/c antibiotics on discharge. Follow up outpatient with Dr. Matos and [place] oncology team.

## 2025-06-18 NOTE — PROGRESS NOTE ADULT - PROBLEM SELECTOR PLAN 3
Patient reports difficulty with passing stool due to perirectal mass  -C/w Miralax and Senna
> pt c/o 8 loose blood tinged stools within 24 hrs.   > GI PCR + EPEC  > Pending c.diff study
Thank you for allowing us to participate in your patient's care. We will continue to follow with you. Please page [phone] for any q's or c's. The Geriatric and Palliative Medicine service has coverage 24 hours a day/ 7 days a week to provide medical recommendations regarding symptom management needs via telephone.
Patient reports difficulty with passing stool due to perirectal mass  - C/w Miralax and Senna, increasing senna to BID  - Monitor for BM's
> Resolved per pt   > GI PCR + EPEC
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 Question mild intrahepatic biliary duct dilation in the left lobe peripheral to the tumor versus thrombosed left portal venous branches  - Initially on IV heparin, switched to lovenox, can transition to DOAC after price check  - Hypercoagulable state from underlying malignancy

## 2025-06-18 NOTE — DISCHARGE NOTE PROVIDER - HOSPITAL COURSE
60yo F w/ PMHx GERD, hypothyroidism, HTN, WINNIE BSO refractory uterine cancer  (dx'd 1/2021 - stage III MMT s/p neoadjuvant Carbo/Taxol x 1 cycle c/b HSR >> Carbo/Doxil >> Carbo/Abraxane/Herceptin compelted 8/2021 f/b herceptin maintenance until 9/2022 >> POD to liver and enrolled in phase II IMMUNOGEN study (received Mirvetuximab) >> POD on Abraxane/Mvasi (lynn) l.d.5/21. Now admitted for worsening abd pain and constipation. CTAP negative for bowel obstruction for showed progression of hepatic mets, LAD, L pelvic mass, c/f serosal spread. Pt started on therapeutic AC for new PVT. C/c/b fever, ifxn w/u in process, started emp Zosyn, BCX NTD, + GI PCR EPEC noted diarrhea, per [facility], will plan for outpatient assessment for rectal mass w/ GYN      --HEM/ONC--  #Uterine cancer.   - Currently on Abraxane and Mvasi  -CT A/P: No evidence for bowel obstruction. Increase in size and number of hepatic metastases. Increase in left pelvic mass and right perirectal mass. Moderate left hydroureteronephrosis to the level of the left pelvic mass. Question mild intrahepatic biliary duct dilation in the left lobe peripheral to the tumor versus thrombosed left portal venous branches. 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 Increased lymphadenopathy. New small volume of ascites. Lung nodules in the lung bases.   -, CEA  -RadOnc consulted, plan for outpatient assessment and eval by GYN/Onc radiation specialist  - rest of plan per Dr. Mckeon ([place]) and Dr. Matos ([facility])    #Cancer related pain.   -palliative care consulted  -c/w Oxy 2.5-5mg q4h prn, gabapentin 100mg QHS   -added pyridium x 2 days for dysuria      #Venous thromboembolism  -Nonocclusive thrombus in the right portal vein and occluded left portal vein. Question mild intrahepatic biliary duct dilation in the left lobe peripheral to the tumor versus thrombosed left portal venous branches.   -Heparin gtt -> eliquis      --ID--  #Fever.   - 102F 6/15, no specific symptoms although pt has been having some loose/soft BM's (on bowel regimen). 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 Remainder of fever work up is negative. Fever curve is downtrending 100.5 6/16.   - C/w zosyn given immunocompromised status, transitioned to ___ at discharge.   - diarrhea resolved on 6/18 off of bowel regimen. C. Diff testing not indicated at this time.     --GI--  #Constipation > diarrhea   -- MiraLAX and Senna BID -> dced 6/16 d/t diarrhea  --diarrhea resolved as above    #Elevated liver enzymes.    -Likely i/s/o hepatic metastases    --ENDO--  #Hypothyroidism.   - pt takes home Levothyroxine 50MCG QAM >> increased to 75mcg QD during admission    --CV--  #HTN (hypertension).   -Home medication: Losartan 50 mg QD  -c/w home meds with hold parameters (SBP <110, DBP <60)     #HLD (hyperlipidemia).   -Home medication:  Rosuvastatin 10 mg QD  -c/w formulary Rosuvastatin 10 mg QD 58yo F w/ PMHx GERD, hypothyroidism, HTN, WINNIE BSO refractory uterine cancer  (dx'd 1/2021 - stage III MMT s/p neoadjuvant Carbo/Taxol x 1 cycle c/b HSR >> Carbo/Doxil >> Carbo/Abraxane/Herceptin compelted 8/2021 f/b herceptin maintenance until 9/2022 >> POD to liver and enrolled in phase II IMMUNOGEN study (received Mirvetuximab) >> POD on Abraxane/Mvasi (lynn) l.d.5/21. Now admitted for worsening abd pain and constipation. CTAP negative for bowel obstruction for showed progression of hepatic mets, LAD, L pelvic mass, c/f serosal spread. Pt started on therapeutic AC for new PVT. C/c/b fever, ifxn w/u in process, s/p Zosyn course, BCX NTD, + GI PCR EPEC noted diarrhea, per [facility], will plan for outpatient assessment for rectal mass w/ GYN      --HEM/ONC--  #Uterine cancer.   - Currently on Abraxane and Mvasi  -CT A/P: No evidence for bowel obstruction. Increase in size and number of hepatic metastases. Increase in left pelvic mass and right perirectal mass. Moderate left hydroureteronephrosis to the level of the left pelvic mass. Question mild intrahepatic biliary duct dilation in the left lobe peripheral to the tumor versus thrombosed left portal venous branches. 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 Increased lymphadenopathy. New small volume of ascites. Lung nodules in the lung bases.   -, CEA  -RadOnc consulted, plan for outpatient assessment and eval by GYN/Onc radiation specialist  - rest of plan per Dr. Mckeon ([place]) and Dr. Matos ([facility])    #Cancer related pain.   -palliative care consulted  -c/w Oxy 2.5-5mg q4h prn, gabapentin 100mg QHS   -added pyridium x 2 days for dysuria      #Venous thromboembolism  -Nonocclusive thrombus in the right portal vein and occluded left portal vein. Question mild intrahepatic biliary duct dilation in the left lobe peripheral to the tumor versus thrombosed left portal venous branches.   -Heparin gtt -> eliquis      --ID--  #Fever.   - 102F 6/15, no specific symptoms although pt has been having some loose/soft BM's (on bowel regimen). 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 Remainder of fever work up is negative. Fever curve is downtrending 100.5 6/16.   - Completed zosyn x 3 days given immunocompromised status  - diarrhea resolved on 6/18 off of bowel regimen. C. Diff testing not indicated at this time.     --GI--  #Constipation > diarrhea   -- MiraLAX and Senna BID -> dced 6/16 d/t diarrhea  --diarrhea resolved as above    #Elevated liver enzymes.    -Likely i/s/o hepatic metastases    --ENDO--  #Hypothyroidism.   - pt takes home Levothyroxine 50MCG QAM >> increased to 75mcg QD during admission    --CV--  #HTN (hypertension).   -Home medication: Losartan 50 mg QD  -c/w home meds with hold parameters (SBP <110, DBP <60)     #HLD (hyperlipidemia).   -Home medication:  Rosuvastatin 10 mg QD  -c/w formulary Rosuvastatin 10 mg QD

## 2025-06-18 NOTE — DISCHARGE NOTE NURSING/CASE MANAGEMENT/SOCIAL WORK - FINANCIAL ASSISTANCE
[facility] provides services at a reduced cost to those who are determined to be eligible through [facility]’s financial assistance program. Information regarding [facility]’s financial assistance program can be found by going to [email or web address] or by calling [phone].

## 2025-06-18 NOTE — PROGRESS NOTE ADULT - TIME BILLING
Review of laboratory data, radiology results, consultants' recommendations, documentation in [place], discussion with patient/advanced care providers and interdisciplinary staff (such as , social workers, etc). Interventions were performed as documented above.
- Ordering, reviewing, and interpreting labs, testing, and imaging  - Independently obtaining a review of systems and performing a physical exam  - Reviewing consultant documentation/recommendations  - Counselling and educating patient and family regarding interpretation of aforementioned items and plan of care  - Documentation of encounter
50 minutes spent on total encounter. The necessity of the time spent during the encounter on this date of service was due to:     Time spent for extensive review of the physical chart, electronic medical record, and documentation to obtain collateral information including but not limited to:  [x] Inpatient records (ED, H&P, primary team, and consultants if applicable, care coordination)  [x] Inpatient values/results (biomarkers, immunoassays, imaging, and microbiology results)  [x] Current or proposed treatment plans  [x] Discussion with the primary team  [x] Discussion with the patient, surrogate decision maker, or family
Patient encounter, including chart review, medication review, patient interview, ordering labs and medications, interpreting labs and imaging results, and coordination of care with consultants
Review of laboratory data, radiology results, consultants' recommendations, documentation in [place], discussion with patient/advanced care providers and interdisciplinary staff (such as , social workers, etc). Interventions were performed as documented above.

## 2025-06-18 NOTE — PROGRESS NOTE ADULT - PROBLEM SELECTOR PROBLEM 2
Cancer related pain
Uterine cancer
Uterine cancer
Cancer related pain
Cancer related pain
VTE (venous thromboembolism)
VTE (venous thromboembolism)
Uterine cancer

## 2025-06-18 NOTE — DISCHARGE NOTE PROVIDER - NSDCFUADDAPPT_GEN_ALL_CORE_FT
APPTS ARE READY TO BE MADE: [x] YES    Best Family or Patient Contact (if needed):    Additional Information about above appointments (if needed):    1: [facility] (Radiation Oncology) in 1 week  2:   3:     Other comments or requests:    APPTS ARE READY TO BE MADE: [x] YES    Best Family or Patient Contact (if needed):    Additional Information about above appointments (if needed):    1: [facility] (Radiation Oncology) in 1 week  2:   3:     Other comments or requests:     Prior to outreaching the patient, it was visible that the patient has secured a follow up appointment which was not scheduled by our team. Patient was scheduled to see Dr. Dao on 6/26 at [address] [place] [address]    Prior to outreaching the patient, it was visible that the patient has secured a follow up appointment which was not scheduled by our team.  Patient was scheduled to see Dr. Matos on 6/25 at [address] [place] [address]

## 2025-06-20 ENCOUNTER — EMERGENCY (EMERGENCY)
Facility: HOSPITAL | Age: 60
LOS: 1 days | End: 2025-06-20
Attending: EMERGENCY MEDICINE
Payer: COMMERCIAL

## 2025-06-20 VITALS
RESPIRATION RATE: 18 BRPM | DIASTOLIC BLOOD PRESSURE: 77 MMHG | TEMPERATURE: 98 F | OXYGEN SATURATION: 97 % | HEIGHT: 65 IN | SYSTOLIC BLOOD PRESSURE: 127 MMHG | WEIGHT: 139.99 LBS | HEART RATE: 102 BPM

## 2025-06-20 DIAGNOSIS — Z98.89 OTHER SPECIFIED POSTPROCEDURAL STATES: Chronic | ICD-10-CM

## 2025-06-20 DIAGNOSIS — Z90.710 ACQUIRED ABSENCE OF BOTH CERVIX AND UTERUS: Chronic | ICD-10-CM

## 2025-06-20 DIAGNOSIS — Z90.49 ACQUIRED ABSENCE OF OTHER SPECIFIED PARTS OF DIGESTIVE TRACT: Chronic | ICD-10-CM

## 2025-06-20 LAB
ALBUMIN SERPL ELPH-MCNC: 3.3 G/DL — SIGNIFICANT CHANGE UP (ref 3.3–5)
ALP SERPL-CCNC: 466 U/L — HIGH (ref 40–120)
ALT FLD-CCNC: 46 U/L — HIGH (ref 10–45)
ANION GAP SERPL CALC-SCNC: 16 MMOL/L — SIGNIFICANT CHANGE UP (ref 5–17)
APPEARANCE UR: CLEAR — SIGNIFICANT CHANGE UP
APTT BLD: 31.5 SEC — SIGNIFICANT CHANGE UP (ref 26.1–36.8)
AST SERPL-CCNC: 107 U/L — HIGH (ref 10–40)
BASOPHILS # BLD AUTO: 0.05 K/UL — SIGNIFICANT CHANGE UP (ref 0–0.2)
BASOPHILS NFR BLD AUTO: 0.5 % — SIGNIFICANT CHANGE UP (ref 0–2)
BILIRUB SERPL-MCNC: 0.5 MG/DL — SIGNIFICANT CHANGE UP (ref 0.2–1.2)
BILIRUB UR-MCNC: NEGATIVE — SIGNIFICANT CHANGE UP
BUN SERPL-MCNC: 9 MG/DL — SIGNIFICANT CHANGE UP (ref 7–23)
CALCIUM SERPL-MCNC: 9.5 MG/DL — SIGNIFICANT CHANGE UP (ref 8.4–10.5)
CHLORIDE SERPL-SCNC: 96 MMOL/L — SIGNIFICANT CHANGE UP (ref 96–108)
CO2 SERPL-SCNC: 24 MMOL/L — SIGNIFICANT CHANGE UP (ref 22–31)
COLOR SPEC: SIGNIFICANT CHANGE UP
CREAT SERPL-MCNC: 0.59 MG/DL — SIGNIFICANT CHANGE UP (ref 0.5–1.3)
CULTURE RESULTS: SIGNIFICANT CHANGE UP
CULTURE RESULTS: SIGNIFICANT CHANGE UP
DIFF PNL FLD: NEGATIVE — SIGNIFICANT CHANGE UP
EGFR: 104 ML/MIN/1.73M2 — SIGNIFICANT CHANGE UP
EGFR: 104 ML/MIN/1.73M2 — SIGNIFICANT CHANGE UP
EOSINOPHIL # BLD AUTO: 0.16 K/UL — SIGNIFICANT CHANGE UP (ref 0–0.5)
EOSINOPHIL NFR BLD AUTO: 1.7 % — SIGNIFICANT CHANGE UP (ref 0–6)
GAS PNL BLDV: SIGNIFICANT CHANGE UP
GLUCOSE SERPL-MCNC: 129 MG/DL — HIGH (ref 70–99)
GLUCOSE UR QL: NEGATIVE MG/DL — SIGNIFICANT CHANGE UP
HCT VFR BLD CALC: 33.5 % — LOW (ref 34.5–45)
HGB BLD-MCNC: 10.5 G/DL — LOW (ref 11.5–15.5)
IMM GRANULOCYTES # BLD AUTO: 0.02 K/UL — SIGNIFICANT CHANGE UP (ref 0–0.07)
IMM GRANULOCYTES NFR BLD AUTO: 0.2 % — SIGNIFICANT CHANGE UP (ref 0–0.9)
INR BLD: 1.45 RATIO — HIGH (ref 0.85–1.16)
KETONES UR QL: NEGATIVE MG/DL — SIGNIFICANT CHANGE UP
LEUKOCYTE ESTERASE UR-ACNC: NEGATIVE — SIGNIFICANT CHANGE UP
LYMPHOCYTES # BLD AUTO: 0.57 K/UL — LOW (ref 1–3.3)
LYMPHOCYTES NFR BLD AUTO: 6.1 % — LOW (ref 13–44)
MAGNESIUM SERPL-MCNC: 2.2 MG/DL — SIGNIFICANT CHANGE UP (ref 1.6–2.6)
MCHC RBC-ENTMCNC: 27.6 PG — SIGNIFICANT CHANGE UP (ref 27–34)
MCHC RBC-ENTMCNC: 31.3 G/DL — LOW (ref 32–36)
MCV RBC AUTO: 88.2 FL — SIGNIFICANT CHANGE UP (ref 80–100)
MONOCYTES # BLD AUTO: 0.99 K/UL — HIGH (ref 0–0.9)
MONOCYTES NFR BLD AUTO: 10.6 % — SIGNIFICANT CHANGE UP (ref 2–14)
NEUTROPHILS # BLD AUTO: 7.53 K/UL — HIGH (ref 1.8–7.4)
NEUTROPHILS NFR BLD AUTO: 80.9 % — HIGH (ref 43–77)
NITRITE UR-MCNC: NEGATIVE — SIGNIFICANT CHANGE UP
NRBC # BLD AUTO: 0 K/UL — SIGNIFICANT CHANGE UP (ref 0–0)
NRBC # FLD: 0 K/UL — SIGNIFICANT CHANGE UP (ref 0–0)
NRBC BLD AUTO-RTO: 0 /100 WBCS — SIGNIFICANT CHANGE UP (ref 0–0)
NT-PROBNP SERPL-SCNC: 87 PG/ML — SIGNIFICANT CHANGE UP (ref 0–300)
PH UR: 6.5 — SIGNIFICANT CHANGE UP (ref 5–8)
PLATELET # BLD AUTO: 412 K/UL — HIGH (ref 150–400)
PMV BLD: 9.6 FL — SIGNIFICANT CHANGE UP (ref 7–13)
POTASSIUM SERPL-MCNC: 4.1 MMOL/L — SIGNIFICANT CHANGE UP (ref 3.5–5.3)
POTASSIUM SERPL-SCNC: 4.1 MMOL/L — SIGNIFICANT CHANGE UP (ref 3.5–5.3)
PROT SERPL-MCNC: 6.9 G/DL — SIGNIFICANT CHANGE UP (ref 6–8.3)
PROT UR-MCNC: NEGATIVE MG/DL — SIGNIFICANT CHANGE UP
PROTHROM AB SERPL-ACNC: 16.5 SEC — HIGH (ref 9.9–13.4)
RBC # BLD: 3.8 M/UL — SIGNIFICANT CHANGE UP (ref 3.8–5.2)
RBC # FLD: 15.4 % — HIGH (ref 10.3–14.5)
SODIUM SERPL-SCNC: 136 MMOL/L — SIGNIFICANT CHANGE UP (ref 135–145)
SP GR SPEC: 1.01 — SIGNIFICANT CHANGE UP (ref 1–1.03)
SPECIMEN SOURCE: SIGNIFICANT CHANGE UP
SPECIMEN SOURCE: SIGNIFICANT CHANGE UP
TROPONIN T, HIGH SENSITIVITY RESULT: 8 NG/L — SIGNIFICANT CHANGE UP (ref 0–51)
UROBILINOGEN FLD QL: 1 MG/DL — SIGNIFICANT CHANGE UP (ref 0.2–1)
WBC # BLD: 9.32 K/UL — SIGNIFICANT CHANGE UP (ref 3.8–10.5)
WBC # FLD AUTO: 9.32 K/UL — SIGNIFICANT CHANGE UP (ref 3.8–10.5)

## 2025-06-20 PROCEDURE — 80053 COMPREHEN METABOLIC PANEL: CPT

## 2025-06-20 PROCEDURE — 85025 COMPLETE CBC W/AUTO DIFF WBC: CPT

## 2025-06-20 PROCEDURE — 84484 ASSAY OF TROPONIN QUANT: CPT

## 2025-06-20 PROCEDURE — 85730 THROMBOPLASTIN TIME PARTIAL: CPT

## 2025-06-20 PROCEDURE — 82803 BLOOD GASES ANY COMBINATION: CPT

## 2025-06-20 PROCEDURE — 85014 HEMATOCRIT: CPT

## 2025-06-20 PROCEDURE — 76770 US EXAM ABDO BACK WALL COMP: CPT

## 2025-06-20 PROCEDURE — 85610 PROTHROMBIN TIME: CPT

## 2025-06-20 PROCEDURE — 76770 US EXAM ABDO BACK WALL COMP: CPT | Mod: 26

## 2025-06-20 PROCEDURE — 84132 ASSAY OF SERUM POTASSIUM: CPT

## 2025-06-20 PROCEDURE — 83880 ASSAY OF NATRIURETIC PEPTIDE: CPT

## 2025-06-20 PROCEDURE — 82435 ASSAY OF BLOOD CHLORIDE: CPT

## 2025-06-20 PROCEDURE — 99284 EMERGENCY DEPT VISIT MOD MDM: CPT

## 2025-06-20 PROCEDURE — 81003 URINALYSIS AUTO W/O SCOPE: CPT

## 2025-06-20 PROCEDURE — 93970 EXTREMITY STUDY: CPT

## 2025-06-20 PROCEDURE — 36415 COLL VENOUS BLD VENIPUNCTURE: CPT

## 2025-06-20 PROCEDURE — 93970 EXTREMITY STUDY: CPT | Mod: 26

## 2025-06-20 PROCEDURE — 82330 ASSAY OF CALCIUM: CPT

## 2025-06-20 PROCEDURE — 83605 ASSAY OF LACTIC ACID: CPT

## 2025-06-20 PROCEDURE — 85018 HEMOGLOBIN: CPT

## 2025-06-20 PROCEDURE — 83735 ASSAY OF MAGNESIUM: CPT

## 2025-06-20 PROCEDURE — 82947 ASSAY GLUCOSE BLOOD QUANT: CPT

## 2025-06-20 PROCEDURE — 84295 ASSAY OF SERUM SODIUM: CPT

## 2025-06-20 NOTE — ED PROVIDER NOTE - NSFOLLOWUPCLINICS_GEN_ALL_ED_FT
[facility] [place]  Urolog  [address] [place] [address]  Phone: [phone]  Fax:     [facility] [place]  Urolog[address] [address]  Phone: [phone]  Fax:     [facility] - [facility]  Urology  [address], 3rd & 4th [place] [address]  Phone: [phone]  Fax:

## 2025-06-20 NOTE — ED PROVIDER NOTE - NSFOLLOWUPINSTRUCTIONS_ED_ALL_ED_FT
You were seen in the emergency department for bilateral lower extremity swelling. We have evaluated you and determined that you do not require further hospital interventions.    Please follow up with your primary care provider as necessary to discuss the results of your stay in our department. [facility] will call you to schedule an appointment with a urologist.     If you start to experience worsening symptoms such as urinary retention, abdominal pain, abdominal distension, fever, chills  , please return to the emergency department for further evaluation.

## 2025-06-20 NOTE — ED PROVIDER NOTE - PHYSICAL EXAMINATION
VITAL SIGNS: I have reviewed nursing notes and confirm.  CONSTITUTIONAL:  in no acute distress.  SKIN: Skin exam is warm and dry, no acute rash. Jaundiced.   HEAD: Normocephalic; atraumatic.  EYES: PERRL, EOM intact; conjunctiva and sclera clear.  ENT: airway clear  NECK: Supple.    CARD: Regular rate and rhythm.  RESP: No wheezes,  no rales or rhonchi.   ABD:  soft; non-distended; non-tender;   EXT: Normal ROM. Non pitting peripheral edema, b/l calf tenderness

## 2025-06-20 NOTE — ED PROVIDER NOTE - NSPTACCESSSVCSAPPT_ED_ALL_ED
[facility] OB OFFICE VISIT NOTE  Inocencia Tucker  [id number]  02/10/2023      Chief Complaint: Routine Prenatal Visit (OB 38^5)    Inocencia Tucker is a 20 y.o. female   at 38w5d (DESHAWN  2024) by 2nd trimester US  presenting to [facility] for routine OB visit.     Used: [id number]  line to communicate in [place] with patient    Current Issues: none    Chronic Issues:   H/O Chlamydia during pregnancy - treated SAUNDRA 2023    Gestational History:  (date, GA, length labor, BW, sex, type, anesthesia, place, complications)  - G1: current    Review of Systems  Antepartum specific   - Fetal movements: yes  - Vaginal bleeding: no  - Vaginal discharge: no  - Loss of fluid: no  - Contractions: no  - Headaches: no  - Vision changes: no  - Edema: no    Physical Exam  Gen: in no acute distress  CVS: RRR, no r/g/m  Lungs: Nonlabored, CTABL  Abd: NT, gravid, +BS; Gross fetal movements  Ext: no pitting edema  FHT: 140s by doppler US  Fundal height: 37 cm  Cervix: posterior, 0cm dilated.     Current Medications:   Current Outpatient Medications   Medication Sig Dispense Refill    acetaminophen (TYLENOL) 500 MG tablet Take 2 tablets (1,000 mg total) by mouth every 8 (eight) hours as needed for Pain. 30 tablet 0    prenatal 21-iron fu-folic acid (PRENATAL COMPLETE) 14 mg iron- 400 mcg Tab Take 1 tablet by mouth once daily. 90 tablet 6     No current facility-administered medications for this visit.     Labs:  Recent Lab Results       None            Initial OB Labs  - Blood Type and Rh: O+  - Antibody Screen: neg  - CBC H/H: 12.3/36.6  - HIV: NR  - RPR: NR  - GC: ND  - CT: detected-treated-SAUNDRA done 23 with pap smear  - HBsAg: NR  - HCVAb: NR  - Rubella: immunized  - Varicella: immunized  - UA & Culture: pending  - Sickle Cell Screen: neg  - PAP: NIL  - Influenza vaccine date: N/A  - BTL desired: not desired    15-20 Weeks Lab  - Quad Screen: Normal Risk    28 Week Lab(23)  - 1H GTT: 66  - Rhogam: not indicated  -  Date of Tdap: 12/20/23  - CBC H/H: 12.3/36.9  - RPR: NR  - BTL consent: Does not desire at this time    37 Week Lab Ordered 2/2/24  - CBC H/H: not collected  - RPR: not collected  - GBS Culture: No growth  - HIV: not collected  - Cervical GC: Negative  - Cervical check: 0cm dilated, posterior position; cephalic presentation    Imaging:   Anatomy scan 11/9/23: A viable lowery pregnancy is visualized in cephalic presentation. Estimated fetal weight is at the 45th percentile with an abdominal circumference at the 55th percentile. No fetal abnormalities are noted and previous anatomic survey was normal. Amniotic fluid volume is normal. Placenta is posterior. A simple ovarian cyst is still present in the posterior cul de sac measuring 5.4 x 3.7 x 4cm (stable).      U/S 9/7/23: Single intrauterine fetus identified in cephalic  position.  Posterior low lying placenta.  Cystic lesion near the left adnexa measures 59 x 50 x 40 mm.  This measured 54 x 49 x 40 mm on prior ultrasound.    Initial U/S 6/30/23: Single live intrauterine pregnancy with ultrasound age by crown-rump length 6 weeks 6 days.       Assessment:   1. 38 weeks gestation of pregnancy        -OB Protocol   - H/O Chlamydia infection during pregnancy. Treated with Azithromycin twice; once outpatient and once in clinic supervised. SAUNDRA negative on 11/9/2023  - PNVs  - Urine dip reviewed   - Routine labs reviewed; CBC, HIV, RPR ordered  - Mother plans to both breast and bottle feed  - Postpartum contraception discussion: Pending - considering nexplanon  - Preeclampsia/ROM/labor/fever/decreased FM with C precautions discussed, voiced understanding       Follow Up:  Return to clinic in 1 weeks for routine prenatal care and NST    Miranda C Ekwueme  [facility]       Specialty Care (immediate)...

## 2025-06-20 NOTE — ED ADULT TRIAGE NOTE - CHIEF COMPLAINT QUOTE
recent admission and DC on Wednesday. coming in for right foot swelling on Wednesday, on Thursday her entire left leg was swollen. now having swelling in BL legs and abd distention. pmh of uterine cancer s/p hysterectomy with mets to liver and pelvis. endorses weight increase of 12lb over the past week. endorses abd pain that is worse with having BM. pt is also endorsing urinary retention.

## 2025-06-20 NOTE — ED PROVIDER NOTE - RAPID ASSESSMENT
59-year-old female past medical history of GERD, hypothyroidism, HTN, WINNIE BSO refractory metastatic uterine cancer presenting with lower extremity edema.  Patient was recently admitted to [facility] where she was found to have portal vein thrombus started on Eliquis.  Patient was discharged 2 days ago.  Since discharge patient has noted worsening bilateral lower extremity edema right greater than left.  Patient also noting abdominal wall edema.  Patient with 10 pounds weight gain.  Denies fevers.    **Patient was rapidly assessed by me, Otto Casper PA-C. A limited history was obtained. The patient will be seen and further examined/worked up in the main ED and their care will be completed by the main ED team. Receiving team will follow up on labs, analgesia, any clinical imaging, and perform reassessment and disposition of the patient as clinically indicated. All decisions regarding the progression of care will be made at their discretion. 59-year-old female past medical history of GERD, hypothyroidism, HTN, WINNIE BSO refractory metastatic uterine cancer presenting with lower extremity edema.  Patient was recently admitted to [facility] where she was found to have portal vein thrombus started on Eliquis.  Patient was discharged 2 days ago.  Since discharge patient has noted worsening bilateral lower extremity edema right greater than left.  Patient also noting abdominal wall edema.  Patient with 10 pounds weight gain.  Denies fevers.    **Patient was rapidly assessed by me, Otto Casper PA-C. A limited history was obtained. The patient will be seen and further examined/worked up in the main ED and their care will be completed by the main ED team. Receiving team will follow up on labs, analgesia, any clinical imaging, and perform reassessment and disposition of the patient as clinically indicated. All decisions regarding the progression of care will be made at their discretion.    Attending MD Das: This patient was seen and orders were placed by the PA as per our department's QPA model.  I was not consulted in regards to this patient although I was present and available in the Emergency Department to the PA.  Patient was to be sent to main ED for full medical evaluation and receiving team was to follow up on any labs, analgesia, clinical imaging ordered by the PA.  Any reassessment and disposition decisions were to be made by receiving team as clinically indicated, all decisions regarding the progression of care to be made at their discretion.  I did not perform a comprehensive history and physical on this patient.

## 2025-06-20 NOTE — ED PROVIDER NOTE - PROGRESS NOTE DETAILS
Frederic MORALEZ PGY1: Patient's US studies unremarkable for DVT, left hydro unchanged. Bedside bladder myers shows 800cc post void. Will order espana. Frederic DO PGY1: patient with draining espana at bedside, currently 425 cc drained. Agree with plan for dc. Stated understanding of results and management. Have upcoming appt with [facility]. Will dc with espana and urology f/u

## 2025-06-20 NOTE — ED PROVIDER NOTE - CLINICAL SUMMARY MEDICAL DECISION MAKING FREE TEXT BOX
Patient is a 59-year-old female with a past medical history of GERD, hypothyroidism, HTN, WINNIE BSO refractory metastatic uterine cancer presenting with lower extremity edema.  Patient was recently admitted to [facility] where she was found to have portal vein thrombus started on Eliquis.  Patient was discharged 2 days ago.  Since discharge patient has noted worsening bilateral lower extremity edema right greater than left.  Patient also noting abdominal wall edema. Reports sensation of incomplete bladder emptying. Stated medical adherence to PO Eliquis. Denies fever, chills, chest pain, n/v/d, abd pain. PE remarkable for well appearing patient. Abd soft, mildy distended, nt. Suprapubic ttp. no flank pain. Bilateral non pitting edema of extremities w/ calf tenderness. Concerns for DVT, ascites, urinary retention. Will order dvt study, us kidney, bladder scan, chemistries to eval.

## 2025-06-21 NOTE — ED ADULT NURSE NOTE - NSICDXPASTSURGICALHX_GEN_ALL_CORE_FT
PAST SURGICAL HISTORY:  H/O abdominal hysterectomy 1/2021 @ [facility]    H/O ovarian cystectomy 2004    History of cholecystectomy 217

## 2025-06-21 NOTE — ED ADULT NURSE NOTE - NSFALLUNIVINTERV_ED_ALL_ED
Bed/Stretcher in lowest position, wheels locked, appropriate side rails in place/Call bell, personal items and telephone in reach/Instruct patient to call for assistance before getting out of bed/chair/stretcher/Non-slip footwear applied when patient is off stretcher/[place] to call system/Physically safe environment - no spills, clutter or unnecessary equipment/Purposeful proactive rounding/Room/bathroom lighting operational, light cord in reach

## 2025-06-21 NOTE — ED ADULT NURSE REASSESSMENT NOTE - NS ED NURSE REASSESS COMMENT FT1
pt found to be retaining urine as per bladder scan found to have 460ml of urine. pt adivsed of floey placement and ok with plan. espana placed, sterile procedures followed, 2nd RN at bedside to verify sterility. mpt tolerated procedure well. espana draining to gravity. 10ml water placed to inflate balloon. stat lock placed on right thing.

## 2025-06-21 NOTE — ED ADULT NURSE NOTE - OBJECTIVE STATEMENT
59-year-old female past medical history of GERD, hypothyroidism, HTN, WINNIE BSO refractory metastatic uterine cancer presenting with lower extremity edema.  Patient was recently admitted to [facility] where she was found to have portal vein thrombus started on Eliquis.  Patient was discharged 2 days ago.  Since discharge patient has noted worsening bilateral lower extremity edema right greater than left.  Patient also noting abdominal wall edema.  Patient with 10 pounds weight gain.  Denies fevers

## 2025-06-21 NOTE — ED ADULT NURSE REASSESSMENT NOTE - NS ED NURSE REASSESS COMMENT FT1
pt espana cath switched over to leg bag to provide drainage at home. pt ok with plan and able to teach back.

## 2025-06-23 ENCOUNTER — NON-APPOINTMENT (OUTPATIENT)
Age: 60
End: 2025-06-23

## 2025-06-24 ENCOUNTER — APPOINTMENT (OUTPATIENT)
Dept: GERIATRICS | Facility: CLINIC | Age: 60
End: 2025-06-24

## 2025-06-25 ENCOUNTER — NON-APPOINTMENT (OUTPATIENT)
Age: 60
End: 2025-06-25

## 2025-06-25 ENCOUNTER — RESULT REVIEW (OUTPATIENT)
Age: 60
End: 2025-06-25

## 2025-06-25 ENCOUNTER — APPOINTMENT (OUTPATIENT)
Dept: HEMATOLOGY ONCOLOGY | Facility: CLINIC | Age: 60
End: 2025-06-25
Payer: COMMERCIAL

## 2025-06-25 ENCOUNTER — APPOINTMENT (OUTPATIENT)
Dept: GERIATRICS | Facility: CLINIC | Age: 60
End: 2025-06-25
Payer: COMMERCIAL

## 2025-06-25 VITALS
HEART RATE: 125 BPM | DIASTOLIC BLOOD PRESSURE: 65 MMHG | BODY MASS INDEX: 23.62 KG/M2 | SYSTOLIC BLOOD PRESSURE: 108 MMHG | WEIGHT: 136.69 LBS | RESPIRATION RATE: 16 BRPM | TEMPERATURE: 97 F | OXYGEN SATURATION: 96 %

## 2025-06-25 PROCEDURE — 99214 OFFICE O/P EST MOD 30 MIN: CPT

## 2025-06-25 PROCEDURE — 99215 OFFICE O/P EST HI 40 MIN: CPT | Mod: 95

## 2025-06-26 ENCOUNTER — RESULT REVIEW (OUTPATIENT)
Age: 60
End: 2025-06-26

## 2025-06-26 ENCOUNTER — APPOINTMENT (OUTPATIENT)
Dept: RADIATION ONCOLOGY | Facility: CLINIC | Age: 60
End: 2025-06-26
Payer: COMMERCIAL

## 2025-06-26 ENCOUNTER — APPOINTMENT (OUTPATIENT)
Dept: HEMATOLOGY ONCOLOGY | Facility: CLINIC | Age: 60
End: 2025-06-26

## 2025-06-26 ENCOUNTER — TRANSCRIPTION ENCOUNTER (OUTPATIENT)
Age: 60
End: 2025-06-26

## 2025-06-26 VITALS
BODY MASS INDEX: 23.87 KG/M2 | HEART RATE: 88 BPM | SYSTOLIC BLOOD PRESSURE: 118 MMHG | DIASTOLIC BLOOD PRESSURE: 72 MMHG | RESPIRATION RATE: 16 BRPM | TEMPERATURE: 96.44 F | OXYGEN SATURATION: 97 % | WEIGHT: 138.12 LBS

## 2025-06-26 PROCEDURE — 99204 OFFICE O/P NEW MOD 45 MIN: CPT

## 2025-06-26 RX ORDER — LOSARTAN POTASSIUM 50 MG/1
50 TABLET, FILM COATED ORAL
Refills: 0 | Status: ACTIVE | COMMUNITY
Start: 2025-06-26

## 2025-06-26 RX ORDER — ROSUVASTATIN CALCIUM 10 MG/1
10 TABLET, FILM COATED ORAL
Refills: 0 | Status: ACTIVE | COMMUNITY
Start: 2025-06-26

## 2025-06-26 RX ORDER — APIXABAN 5 MG/1
5 TABLET, FILM COATED ORAL
Refills: 0 | Status: ACTIVE | COMMUNITY
Start: 2025-06-26

## 2025-06-27 ENCOUNTER — NON-APPOINTMENT (OUTPATIENT)
Age: 60
End: 2025-06-27

## 2025-06-27 ENCOUNTER — APPOINTMENT (OUTPATIENT)
Dept: UROLOGY | Facility: CLINIC | Age: 60
End: 2025-06-27
Payer: COMMERCIAL

## 2025-06-27 VITALS — SYSTOLIC BLOOD PRESSURE: 133 MMHG | HEART RATE: 101 BPM | DIASTOLIC BLOOD PRESSURE: 75 MMHG

## 2025-06-27 PROCEDURE — 99203 OFFICE O/P NEW LOW 30 MIN: CPT

## 2025-06-27 RX ORDER — METHADONE HYDROCHLORIDE 5 MG/1
5 TABLET ORAL TWICE DAILY
Qty: 7 | Refills: 0 | Status: ACTIVE | COMMUNITY
Start: 2025-06-25 | End: 1900-01-01

## 2025-06-29 NOTE — H&P ADULT - PROBLEM SELECTOR PLAN 7
Levothyroxine increased to 75 mcg around 2 weeks ago. Suspect suboptimal po absorption at this time due to potential bowel edema.  Can to 50 mcg IV daily while in-patient then resume 75 mcg po daily for discharge with repeat TFTs in 4 weeks as outpatient. Levothyroxine increased to 75 mcg around 2 weeks ago. Suspect suboptimal po absorption at this time.  Can give 50 mcg IV daily while in-patient then resume 75 mcg po daily for discharge with repeat TFTs in 4 weeks as outpatient.

## 2025-06-29 NOTE — ED ADULT NURSE REASSESSMENT NOTE - NS ED NURSE REASSESS COMMENT FT1
Contacted ACP Hattie Reddy for eliquis. Pt family member also requesting to speak to ACP. ACP aware, per ACP, eliquis discontinued on EMR.

## 2025-06-29 NOTE — ED ADULT TRIAGE NOTE - CHIEF COMPLAINT QUOTE
right lower back pain, on eliquis, endorses rectal bleeding   hx of uterine CA right lower back pain x2 hrs, on eliquis, endorses rectal bleeding   hx of uterine CA

## 2025-06-29 NOTE — H&P ADULT - MOUTH
10/26/22                            Harinder Curtis  [address] [place] [address]    To Whom It May Concern:    This is to certify Harinder Curtis was evaluated with Indio Hutson PA-C on 10/26/22 and can return to regular work on 10/31/22.           Electronically signed by:  Indio Hutson PA-C  [facility] [address]  [address]  [place] [address]  Dept Phone: [phone]      dry

## 2025-06-29 NOTE — H&P ADULT - NSHPLABSRESULTS_GEN_ALL_CORE
6/12/25 CT C/A/P:  * Increase in size and number of hepatic metastases since 1/17/2025. Lobulated soft tissue along the posterior right hepatic lobe has also increased in size, which may represent a serosal implant.  * Left pelvic mass and right perirectal mass, which have increased in size since 1/17/2025, and are broadly inseparable from the sigmoid colon and the rectum, respectively. There is associated wall thickening of the sigmoid colon and the rectum, which may represent serosal spread of disease, with a component of proctocolitis not excluded. Question tumor thrombus in the right pelvis associated with the perirectal mass.  * Moderate left hydroureteronephrosis to the level of the left pelvic mass, which has increased since 1/17/2025. Fullness of the right renal collecting system.  * Nonocclusive thrombus in the right portal vein and occluded left portal vein, new since 1/17/2025.  * Increased lymphadenopathy.  * New small volume of ascites.  * Lung nodules in the lung bases have increased in size and number since 4/16/2025, and are suspicious for metastases. Labs:  Chronic anemia  transaminitis    6/12/25 CT C/A/P:  * Increase in size and number of hepatic metastases since 1/17/2025. Lobulated soft tissue along the posterior right hepatic lobe has also increased in size, which may represent a serosal implant.  * Left pelvic mass and right perirectal mass, which have increased in size since 1/17/2025, and are broadly inseparable from the sigmoid colon and the rectum, respectively. There is associated wall thickening of the sigmoid colon and the rectum, which may represent serosal spread of disease, with a component of proctocolitis not excluded. Question tumor thrombus in the right pelvis associated with the perirectal mass.  * Moderate left hydroureteronephrosis to the level of the left pelvic mass, which has increased since 1/17/2025. Fullness of the right renal collecting system.  * Nonocclusive thrombus in the right portal vein and occluded left portal vein, new since 1/17/2025.  * Increased lymphadenopathy.  * New small volume of ascites.  * Lung nodules in the lung bases have increased in size and number since 4/16/2025, and are suspicious for metastases.

## 2025-06-29 NOTE — H&P ADULT - HISTORY OF PRESENT ILLNESS
Patient is a 59-year-old female with a past medical history of GERD, hypothyroidism, HTN, WINNIE BSO refractory metastatic uterine cancer presenting with lower extremity edema.  Patient was recently admitted to [facility] where she was found to have portal vein thrombus started on Eliquis.  Patient was discharged 2 days ago.  Since discharge patient has noted worsening bilateral lower extremity edema right greater than left.  Patient also noting abdominal wall edema. Reports sensation of incomplete bladder emptying. Stated medical adherence to PO Eliquis. Denies fever, chills, chest pain, n/v/d, abd pain. no flank pain.     Patient diagnosed with stage III MMT in 1/2021. She saw gyn and had imaging. She was seen by Dr. Bowen, who did the surgery. Final path showed STAGE III MMT. She was treated with Carbo/ Taxol- s/p one cycle , had reaction and switched to Carbo/ doxil x one does. She transferred her care to Dr. Nichols at [place].and then Carbo/ Abraxane/ Herceptin x 6 completed, last dose was in 8/2021  She was started on Herceptin maintenance, last dose was in Sept, 2022.  CT imaging 10/2022 showed hepatic lesion concerning for met.  Patient was enrolled in a phase II IMMUNOGEN study, and got Mirvetuximab. After four cycles, scan from 1/10/23 showed POD.  Pt started clinical trial with Sacituzumab, total 14 cycle, with progression of disease.  Started treatment with Enhertu.  She has been experiencing pain in her abdomen and pelvis, like a strong pulling sensation. Pain goes as high as 8/10 in intensity.   Following with palliative care.  Scans have shown disease progression and sent for palliative RT.  Pain regimen is PRN oxycodone 5mg q4hrs, she is using a dose 5-6 times per day. This provides adequate pain relief for ~4 hours. On gabapentin 100mg qHS. She is applying lidocaine 4% to her rectum twice daily which is helpful.  Appetite is stable, when the pain is severe she finds it difficult to eat.  Passing soft stool. Taken off a bowel regimen while inpatient due to diarrhea.  She has a urinary catheter for retention 2/2 obstruction.  Generalized weakness and fatigue, she requires a one person assist to ambulate to the bathroom.  Energy level is low. Mood is stable, finds herself irritable when she is in pain.    Patient is a 59-year-old female with a past medical history of GERD, hypothyroidism, HTN, WINNIE BSO refractory metastatic uterine cancer presenting with lower extremity edema.  Patient was recently admitted to [facility] where she was found to have portal vein thrombus started on Eliquis.  Patient was discharged 2 days ago.  Since discharge patient has noted worsening bilateral lower extremity edema right greater than left.  Patient also noting abdominal wall edema. Reports sensation of incomplete bladder emptying. Stated medical adherence to PO Eliquis. Denies fever, chills, chest pain, n/v/d, abd pain. no flank pain.     Patient diagnosed with stage III MMT in 1/2021. She saw gyn and had imaging. She was seen by Dr. Bowen, who did the surgery. Final path showed STAGE III MMT. She was treated with Carbo/ Taxol- s/p one cycle , had reaction and switched to Carbo/ doxil x one does. She transferred her care to Dr. Nichols at [place].and then Carbo/ Abraxane/ Herceptin x 6 completed, last dose was in 8/2021  She was started on Herceptin maintenance, last dose was in Sept, 2022.  CT imaging 10/2022 showed hepatic lesion concerning for met.  Patient was enrolled in a phase II IMMUNOGEN study, and got Mirvetuximab. After four cycles, scan from 1/10/23 showed POD.  Pt started clinical trial with Sacituzumab, total 14 cycle, with progression of disease.  Started treatment with Enhertu.  She has been experiencing pain in her abdomen and pelvis, like a strong pulling sensation. Pain goes as high as 8/10 in intensity.   Following with palliative care.  Scans have shown disease progression and sent for palliative RT.  Pain regimen is PRN oxycodone 5mg q4hrs, she is using a dose 5-6 times per day. This provides adequate pain relief for ~4 hours. On gabapentin 100mg qHS. She is applying lidocaine 4% to her rectum twice daily which is helpful.  Appetite is stable, when the pain is severe she finds it difficult to eat.  Passing soft stool. Taken off a bowel regimen while inpatient recently due to diarrhea.  She has a urinary catheter for retention 2/2 obstruction.  Generalized weakness and fatigue, she requires a one person assist to ambulate to the bathroom.  Energy level is low. Mood is stable, finds herself irritable when she is in pain.    Patient is a 59-year-old female with a past medical history of GERD, hypothyroidism, HTN, WINNIE BSO refractory metastatic uterine cancer presenting with lower extremity edema.  Patient was recently admitted to [facility] where she was found to have portal vein thrombus started on Eliquis.  Patient was discharged 2 days ago.  Since discharge patient has noted worsening bilateral lower extremity edema right greater than left.  Patient also noting abdominal wall edema. Reports sensation of incomplete bladder emptying. Stated medical adherence to PO Eliquis. Denies fever, chills, chest pain, n/v/d, abd pain. no flank pain.     Patient diagnosed with stage III MMT in 1/2021. She saw gyn and had imaging. She was seen by Dr. Bowen, who did the surgery. Final path showed STAGE III MMT. She was treated with Carbo/ Taxol- s/p one cycle , had reaction and switched to Carbo/ doxil x one does. She transferred her care to Dr. Nichols at [place].and then Carbo/ Abraxane/ Herceptin x 6 completed, last dose was in 8/2021  She was started on Herceptin maintenance, last dose was in Sept, 2022.  CT imaging 10/2022 showed hepatic lesion concerning for met.  Patient was enrolled in a phase II IMMUNOGEN study, and got Mirvetuximab. After four cycles, scan from 1/10/23 showed POD.  Pt started clinical trial with Sacituzumab, total 14 cycle, with progression of disease.  Started treatment with Enhertu.  She has been experiencing pain in her abdomen and pelvis, like a strong pulling sensation. Pain goes as high as 8/10 in intensity.   Following with palliative care.  Scans have shown disease progression and sent for palliative RT.  Pain regimen is PRN oxycodone 5mg q4hrs, she is using a dose 5-6 times per day. This provides adequate pain relief for ~4 hours. On gabapentin 100mg qHS. She is applying lidocaine 4% to her rectum twice daily which is helpful.  Appetite is stable, when the pain is severe she finds it difficult to eat.  Passing soft stool. Taken off a bowel regimen while inpatient recently due to diarrhea. Now complains of some rectal bleeding.  She has a urinary catheter for retention 2/2 obstruction.  Generalized weakness and fatigue, she requires a one person assist to ambulate to the bathroom.  Energy level is low. Mood is stable, finds herself irritable when she is in pain.

## 2025-06-29 NOTE — ED PROVIDER NOTE - ATTENDING CONTRIBUTION TO CARE
Darrin Orr DO: I have personally performed a face to face medical and diagnostic evaluation of the patient. I have discussed with and reviewed the Resident's and/or ACP's and/or Medical/PA/NP student's note and agree with the History, ROS, Physical Exam and MDM unless otherwise indicated. A brief summary of my personal evaluation and impression can be found below.     59-year-old female with history of urine cancer s/p polo bso w./ refractory metastatic dz, on chemotherapy with disease progression with a history of liver metastases pelvic masses, perirectal mass, history of recently diagnosed portal venous thrombosis started on Eliquis presents to ED for some rectal bleeding and sudden onset lower back pain.  Patient denies fever or chills, denies any trauma.  States she has had intermittent blood in stool.  Patient was also found to have a right foot injury/area of erythema which the daughter believes is from wearing sandals however has been progressing erythema, radiation oncology marked the foot and has noticed progression of erythema beyond the demarknation.  Patient denies any stool incontinence and patient is currently on a Parikh catheter.  Patient complains only of significant back pain.    CONSTITUTIONAL: Uncomfortable appearing  SKIN: Right foot deeply erythematous, no significant warmth to touch but area of darker erythema between 1st and 2nd toe of right foot with blistering with proximal extension of erythema to ankle.  HEAD: NCAT  EYES: EOMI, PERRLA, no scleral icterus, conjunctiva pink  NECK: Supple; non tender. Full ROM.  CARD: RRR  RESP: No respiratory distress  ABD: mild distended, no abdominal tenderness  MSK: As above, no calf tenderness to palpation.  Bilateral lower extremity edema right versus left.  Lower spinous bony tenderness to palpation, right-sided paravertebral tenderness to palpation.  PSYCH: Cooperative, appropriate.   rectal: No change in sensation in perirectal area, minimal blood on rectal exam,    Multiple complaints with broad differential, back pain concerning for possible metastatic extension of disease with pathologic fracture given indwelling Parikh catheter concern for ascending urinary tract infection/nephrolithiasis.  Also considering further extension of venous thrombosis will patient is on AC, intra-abdominal bleed less likely but considered with recent onset of Eliquis and active cancer.  Patient does have perirectal mass which is likely explanation of rectal bleeding in the setting of recent Eliquis initiation.  Other source of GI bleed considered but GI bleed is not brisk at this time will get serial CBCs.  Patient will need CT imaging of spine to evaluate for pathologic fracture, CT abdomen pelvis to evaluate for intra-abdominal pathology.  Right lower extremity concerning for cellulitis and given overlying bulla and history of worsening by family consideration for worsening cellulitis, gas containing infection also possible will evaluate x-ray.  Podiatry consult.  Patient will need to be admitted given multiple medical complaints, patient is currently having severe pain will give pain control cover with broad-spectrum antibiotics including gas-forming organisms although necrotizing infection is still not top of differential. Darrin Orr DO: I have personally performed a face to face medical and diagnostic evaluation of the patient. I have discussed with and reviewed the Resident's and/or ACP's and/or Medical/PA/NP student's note and agree with the History, ROS, Physical Exam and MDM unless otherwise indicated. A brief summary of my personal evaluation and impression can be found below.     59-year-old female with history of urine cancer s/p polo bso w./ refractory metastatic dz, on chemotherapy with disease progression with a history of liver metastases pelvic masses, perirectal mass, history of recently diagnosed portal venous thrombosis started on Eliquis presents to ED for some rectal bleeding and sudden onset lower back pain.  Patient denies fever or chills, denies any trauma.  States she has had intermittent blood in stool.  Patient was also found to have a right foot injury/area of erythema which the daughter believes is from wearing sandals however has been progressing erythema, radiation oncology marked the foot and has noticed progression of erythema beyond the demarknation.  Patient denies any stool incontinence and patient is currently on a Parikh catheter.  Patient complains only of significant back pain.    CONSTITUTIONAL: Uncomfortable appearing  SKIN: Right foot deeply erythematous, no significant warmth to touch but area of darker erythema between 1st and 2nd toe of right foot with blistering with proximal extension of erythema to ankle.  HEAD: NCAT  EYES: EOMI, PERRLA, no scleral icterus, conjunctiva pink  NECK: Supple; non tender. Full ROM.  CARD: RRR  RESP: No respiratory distress  ABD: mild distended, no abdominal tenderness  MSK: As above, no calf tenderness to palpation.  Bilateral lower extremity edema right versus left.  Lower spinous bony tenderness to palpation, right-sided paravertebral tenderness to palpation.  PSYCH: Cooperative, appropriate.   rectal: No change in sensation in perirectal area, minimal blood on rectal exam,    Multiple complaints with broad differential, back pain concerning for possible metastatic extension of disease with pathologic fracture given indwelling Parikh catheter concern for ascending urinary tract infection/nephrolithiasis.  Also considering further extension of venous thrombosis will patient is on AC, intra-abdominal bleed less likely but considered with recent onset of Eliquis and active cancer.  Patient does have perirectal mass which is likely explanation of rectal bleeding in the setting of recent Eliquis initiation.  Other source of GI bleed considered but GI bleed is not brisk at this time will get serial CBCs.  Patient will need CT imaging of spine to evaluate for pathologic fracture, CT abdomen pelvis to evaluate for intra-abdominal pathology.  Right lower extremity concerning for cellulitis and given overlying bulla and history of worsening by family consideration for worsening cellulitis, gas containing infection also possible will evaluate x-ray.  Podiatry consult.    Patient will need to be admitted given multiple medical complaints, patient is currently having severe pain will give pain control cover with broad-spectrum antibiotics including gas-forming organisms although necrotizing infection is still not top of differential. ZR

## 2025-06-29 NOTE — ED ADULT NURSE NOTE - NSFALLHARMRISKINTERV_ED_ALL_ED

## 2025-06-29 NOTE — CONSULT NOTE ADULT - SUBJECTIVE AND OBJECTIVE BOX
Patient is a 59y old  Female who presents with a chief complaint of     HPI: Multiple complaints with broad differential, back pain concerning for possible metastatic extension of disease with pathologic fracture given indwelling Parikh catheter concern for ascending urinary tract infection/nephrolithiasis.  Also considering further extension of venous thrombosis will patient is on AC, intra-abdominal bleed less likely but considered with recent onset of Eliquis and active cancer.  Patient does have perirectal mass which is likely explanation of rectal bleeding in the setting of recent Eliquis initiation.  Other source of GI bleed considered but GI bleed is not brisk at this time will get serial CBCs.  Patient will need CT imaging of spine to evaluate for pathologic fracture, CT abdomen pelvis to evaluate for intra-abdominal pathology.  Right lower extremity concerning for cellulitis and given overlying bulla and history of worsening by family consideration for worsening cellulitis, gas containing infection also possible will evaluate x-ray.         PAST MEDICAL & SURGICAL HISTORY:  Endometriosis      Hypothyroid      Acid reflux      UTI (urinary tract infection)      Uterine cancer  1/2021      H/O ovarian cystectomy  2004      History of cholecystectomy  217      H/O abdominal hysterectomy  1/2021 @ [facility]          MEDICATIONS  (STANDING):    MEDICATIONS  (PRN):      Allergies    metronidazole (Hives; Rash)  Bactrim (Rash)    Intolerances        VITALS:    Vital Signs Last 24 Hrs  T(C): 37.3 (29 Jun 2025 02:50), Max: 37.3 (29 Jun 2025 02:50)  T(F): 99.1 (29 Jun 2025 02:50), Max: 99.1 (29 Jun 2025 02:50)  HR: 83 (29 Jun 2025 03:30) (83 - 96)  BP: 142/72 (29 Jun 2025 03:30) (142/72 - 174/84)  BP(mean): 100 (29 Jun 2025 03:30) (100 - 118)  RR: 20 (29 Jun 2025 03:30) (20 - 22)  SpO2: 99% (29 Jun 2025 03:30) (94% - 99%)    Parameters below as of 29 Jun 2025 03:30  Patient On (Oxygen Delivery Method): room air        LABS:                          10.0   10.68 )-----------( 472      ( 29 Jun 2025 03:06 )             32.0       06-29    132[L]  |  95[L]  |  11  ----------------------------<  105[H]  4.1   |  21[L]  |  0.55    Ca    9.4      29 Jun 2025 03:06    TPro  6.6  /  Alb  3.4  /  TBili  0.5  /  DBili  x   /  AST  133[H]  /  ALT  43  /  AlkPhos  585[H]  06-29      CAPILLARY BLOOD GLUCOSE          PT/INR - ( 29 Jun 2025 03:06 )   PT: 17.4 sec;   INR: 1.53 ratio         PTT - ( 29 Jun 2025 03:06 )  PTT:28.4 sec    LOWER EXTREMITY PHYSICAL EXAM:    Vascular: DP/PT 2/4, B/L, CFT <3 seconds B/L, Temperature gradient warm to warm R, warm to cool L.   Neuro: Epicritic sensation intact to the level of digits, B/L.  Musculoskeletal/Ortho: Unremarkable  Skin: Right foot between metatarsal 1 and metatarsal 2 elevated, hardened, darkened area of dermis, no fluctuance, no crepitus, no drainage, no open wounds. Right foot erythema from hallux to dorsal midfoot. Left foot no open wounds, no acute signs of infection.       RADIOLOGY & ADDITIONAL STUDIES:      ******PRELIMINARY REPORT******      ******PRELIMINARY REPORT******         ACC: [id number] EXAM:  XR FOOT COMP MIN 3 VIEWS RT   ORDERED BY:   MERLE CAMILO     ACC: [id number] EXAM:  XR ANKLE COMP MIN 3 VIEWS RT   ORDERED BY:   MERLE CAMILO     PROCEDURE DATE:  06/29/2025    ******PRELIMINARY REPORT******      ******PRELIMINARY REPORT******           INTERPRETATION:  CLINICAL INDICATION: Right foot swelling.  TECHNIQUE: 3 views right ankle, 3 views right foot.    COMPARISON: None available.    FINDINGS:    No acute fracture.  No dislocation.  Joint spaces are maintained.  Ankle soft tissue swelling.    IMPRESSION:  No acute fracture or dislocation.        ******PRELIMINARY REPORT******      ******PRELIMINARY REPORT******       KALPESH LINDSEY MD; Resident Radiologist  This document is a PRELIMINARY interpretation and is pending final   attending approval. Jun 29 2025  3:47AM

## 2025-06-29 NOTE — H&P ADULT - ASSESSMENT
59-year-old female with a past medical history of GERD, hypothyroidism, HTN, WINNIE BSO refractory metastatic uterine cancer presenting with lower extremity edema.  Patient was recently admitted to [facility] where she was found to have portal vein thrombus started on Eliquis.  Patient was discharged 2 days ago.  Since discharge patient has noted worsening bilateral lower extremity edema right greater than left.  Patient also noting abdominal wall edema. Reports sensation of incomplete bladder emptying. Stated medical adherence to PO Eliquis.

## 2025-06-29 NOTE — H&P ADULT - PROBLEM SELECTOR PLAN 8
Seen by urology  Continue to monitor for potential need for nephrostomy tube if any obstruction, MYRA, or infection.

## 2025-06-29 NOTE — ED PROVIDER NOTE - CARE PLAN
1 Principal Discharge DX:	Uterine cancer  Secondary Diagnosis:	Acute cystitis  Secondary Diagnosis:	Cellulitis

## 2025-06-29 NOTE — H&P ADULT - RESPIRATORY
clear to auscultation bilaterally/no respiratory distress/no use of accessory muscles/airway patent/breath sounds equal

## 2025-06-29 NOTE — ED ADULT NURSE NOTE - ED STAT RN HANDOFF DETAILS
Report endorsed to onckevyn Knutson RN. Safety checks completed this shift. Safety rounds completed hourly.  IV sites checked Q2+remains WDL. Medications administered as ordered with no signs/symptoms of adverse reactions. Fall & skin precautions in place. Any issues endorsed to oncoming RN for follow up.

## 2025-06-29 NOTE — H&P ADULT - PROBLEM SELECTOR PLAN 1
r/o DVT with LE doppler  Likely related to underlying malignancy with additional evidence of abdominal edema as well  Oncology follow-up

## 2025-06-29 NOTE — ED PROVIDER NOTE - PROGRESS NOTE DETAILS
Attending Felix Tan:  Patient signed out to me, hemodynam stable, here with rectal bleeding and acute back pain w/o hx of trauma w/o concern for cord compression from prior team. Hx is c/b metastatic cancer. Of note, patient with RIGHT foot erythema, blistering but with soft compartment c/f ssti, s/p coverage with broad spectrum abxs including clindamycin. Pending CTs for anticipated admission. Attending Felix Tan:  h/h noted to be <6 but on vbg, hgb 10.1. Patient is hemodynam stable. already admitted with H&P. /74, Hr 92. attempted to reach provider Dr. Royer Donnelly but no answer on teams. Message left. Will have nursing redraw stat lab cbc, cmp. Hold off on prbc.

## 2025-06-29 NOTE — ED ADULT NURSE NOTE - OBJECTIVE STATEMENT
59y F BIB self accompanied by family p/w sudden onset lower back pain approx 3hours PTA and rectal bleeding. Pt is axo4, ambulates independently at baseline. PMH of urine cancer, on chemotherapy (last given 4weeks ago), history of recently diagnosed portal venous thrombosis started on Eliquis. States she has had intermittent blood in stool for the last 3weeks.  Patient was also found to have a right foot injury/area of erythema which the daughter believes is from wearing sandals however has been progressing erythema, radiation oncology marked the foot and has noticed progression of erythema beyond the demarkation.  Patient denies any stool incontinence and patient is currently on a Parikh catheter last placed a week ago in the hospital. Patient complains only of significant back pain. Denies headache, dizziness, vision changes, chest pain, shortness of breath, abdominal pain, nausea, vomiting, diarrhea, fevers, chills, hematuria, recent illness travel or fall. Patient undressed and placed into gown, call bell in hand and side rails up with bed in lowest position for safety. blanket provided. Comfort and safety provided. Placed on cardiac monitor, NSR.

## 2025-06-29 NOTE — H&P ADULT - PROBLEM SELECTOR PLAN 3
Oncology follow-up  Seen by radiation oncology for palliative radiation  No additional chemo at this time.  Palliative care  Pain control

## 2025-06-29 NOTE — ED ADULT NURSE REASSESSMENT NOTE - NS ED NURSE REASSESS COMMENT FT1
Received handoff from CHRISTINA Lua in green. Introduced self to pt, VSS, NSR on CM, A&Ox4. Parikh catheter was placed today 06/29/25, UA sent to lab. Pt awaiting bed, updated on POC,  &niece at bedside. No other complaints at this time, safety & comfort maintained.

## 2025-06-29 NOTE — ED ADULT NURSE REASSESSMENT NOTE - NS ED NURSE REASSESS COMMENT FT1
Pt had 3 bowel movements. Noted with small streaks of blood in toilet. No acute bleeding, no hemorrhage noted.

## 2025-06-29 NOTE — H&P ADULT - NSHPSOCIALHISTORY_GEN_ALL_CORE
Patient is , lives with her  in a private house. They have one adult daughter.  She is [employer], very meditative.  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

## 2025-06-29 NOTE — CONSULT NOTE ADULT - ASSESSMENT
59F presents with right foot erythema  - Patient seen and evaluated  - Afebrile, WBC 10.68  - Right foot between metatarsal 1 and metatarsal 2 elevated, hardened, darkened area of dermis, no fluctuance, no crepitus, no drainage, no open wounds. Right foot erythema from hallux to dorsal midfoot. Left foot no open wounds, no acute signs of infection.   - Right foot xray: no gas, no OM (prelim)  - No culture obtained secondary to no open wounds  - Extensive discussion with patient and her . Pt states the dark discoloration began last week after she wore rubber thong slippers due to the hot weather. She again wore the rubber slippers a few days ago noticed the discoloration darkening, with no drainage and no blister formation. She applied bacitracin on the area which hardened the area. Pt states no fluctuance was noted.  - Additional dark discoloration is noted at the hands and previous IV insertions  - Area appears to be a reaction to the rubber slippers and possibly topical abx  - No fluctuance, crepitus, drainage, no serous/serosanguinous blister was noted over the discoloration, no incision and drainage was completed at this time as concern for post incision is high 2/2 immunocompromise  - No acute podiatric surgical intervention at this time.   - Pt stable for discharge from podiatric standpoint. Recommend d/c on Augmentin 875 BID x 14 days.  - Pt to follow up with Dr. Hunt [phone] (Address: [id number] [place] [address]) within 1 week of discharge    - Admission per ED  - If pt is admitted  - Recommend continue IV abx  - Recommend ID consult  - Recommend dermatology consult  - Please reconsult podiatry as needed  - Discussed with attending

## 2025-06-30 NOTE — CONSULT NOTE ADULT - SUBJECTIVE AND OBJECTIVE BOX
**** INCOMPLETE NOTE ****      HPI:  Patient is a 59-year-old female with a past medical history of GERD, hypothyroidism, HTN, WINNIE BSO refractory metastatic uterine cancer presenting with lower extremity edema.  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  Patient was discharged 2 days ago.  Since discharge patient has noted worsening bilateral lower extremity edema right greater than left.  Patient also noting abdominal wall edema. Reports sensation of incomplete bladder emptying. Stated medical adherence to PO Eliquis. Denies fever, chills, chest pain, n/v/d, abd pain. no flank pain.     Patient diagnosed with stage III MMT in 1/2021. She saw gyn and had imaging. She was seen by Dr. Bowen, who did the surgery. Final path showed STAGE III MMT. She was treated with Carbo/ Taxol- s/p one cycle , had reaction and switched to Carbo/ doxil x one does. She transferred her care to Dr. Nichols at [place].and then Carbo/ Abraxane/ Herceptin x 6 completed, last dose was in 8/2021  She was started on Herceptin maintenance, last dose was in Sept, 2022.  CT imaging 10/2022 showed hepatic lesion concerning for met.  Patient was enrolled in a phase II IMMUNOGEN study, and got Mirvetuximab. After four cycles, scan from 1/10/23 showed POD.  Pt started clinical trial with Sacituzumab, total 14 cycle, with progression of disease.  Started treatment with Enhertu.  She has been experiencing pain in her abdomen and pelvis, like a strong pulling sensation. Pain goes as high as 8/10 in intensity.   Following with palliative care.  Scans have shown disease progression and sent for palliative RT.  Pain regimen is PRN oxycodone 5mg q4hrs, she is using a dose 5-6 times per day. This provides adequate pain relief for ~4 hours. On gabapentin 100mg qHS. She is applying lidocaine 4% to her rectum twice daily which is helpful.  Appetite is stable, when the pain is severe she finds it difficult to eat.  Passing soft stool. Taken off a bowel regimen while inpatient recently due to diarrhea. Now complains of some rectal bleeding.  She has a urinary catheter for retention 2/2 obstruction.  Generalized weakness and fatigue, she requires a one person assist to ambulate to the bathroom.  Energy level is low. Mood is stable, finds herself irritable when she is in pain.    (29 Jun 2025 11:46)      PAST MEDICAL & SURGICAL HISTORY:  Endometriosis    Hypothyroid    Acid reflux    UTI (urinary tract infection)    Uterine cancer  1/2021    H/O ovarian cystectomy  2004    History of cholecystectomy  217    H/O abdominal hysterectomy  1/2021 @ [facility]          Allergies  metronidazole (Hives; Rash)  Bactrim (Rash)        MEDICATIONS  (STANDING):  amoxicillin  875 milliGRAM(s)/clavulanate 1 Tablet(s) Oral two times a day  famotidine    Tablet 20 milliGRAM(s) Oral daily  gabapentin 100 milliGRAM(s) Oral at bedtime  HYDROmorphone  Injectable 0.5 milliGRAM(s) IV Push Once  levothyroxine Injectable 50 MICROGram(s) IV Push at bedtime  losartan 50 milliGRAM(s) Oral daily  polyethylene glycol 3350 17 Gram(s) Oral two times a day  rosuvastatin 10 milliGRAM(s) Oral at bedtime  senna 2 Tablet(s) Oral at bedtime    MEDICATIONS  (PRN):  acetaminophen     Tablet .. 650 milliGRAM(s) Oral every 6 hours PRN Temp greater or equal to 38C (100.4F), Mild Pain (1 - 3)  aluminum hydroxide/magnesium hydroxide/simethicone Suspension 30 milliLiter(s) Oral every 4 hours PRN Dyspepsia  lidocaine 5% Ointment 1 Application(s) Topical daily PRN for severe pain  melatonin 3 milliGRAM(s) Oral at bedtime PRN Insomnia  ondansetron Injectable 4 milliGRAM(s) IV Push every 8 hours PRN Nausea and/or Vomiting  oxyCODONE    IR 5 milliGRAM(s) Oral every 4 hours PRN Severe Pain (7 - 10)      Social History:        Family History   IBD (  ) Yes   (  ) No  GI Malignancy (  )  Yes    (  ) No    Health Management  Last Colonoscopy -      Advanced Directives: (     ) None    (      ) DNR    (     ) DNI    (     ) Health Care Proxy:     Review of Systems:  see HPI- remainder 10 point ROS negative    Vital Signs Last 24 Hrs  T(C): 36.7 (30 Jun 2025 12:04), Max: 37 (30 Jun 2025 04:49)  T(F): 98.1 (30 Jun 2025 12:04), Max: 98.6 (30 Jun 2025 04:49)  HR: 86 (30 Jun 2025 12:04) (80 - 103)  BP: 123/73 (30 Jun 2025 12:04) (123/73 - 159/77)  BP(mean): --  RR: 18 (30 Jun 2025 12:04) (16 - 20)  SpO2: 98% (30 Jun 2025 12:04) (96% - 100%)    Parameters below as of 30 Jun 2025 12:04  Patient On (Oxygen Delivery Method): room air        PHYSICAL EXAM:    Constitutional:   Neck:   Respiratory:   Cardiovascular:   Gastrointestinal:   Extremities:   Vascular:   Neurological:   Psychiatric:   Skin:         LABS:                        9.7    12.88 )-----------( 397      ( 30 Jun 2025 07:18 )             31.1   Hemoglobin: 9.4 g/dL (06.29.25 @ 22:13)   Hemoglobin: 9.5: History verified. g/dL (06.29.25 @ 13:22)     Hemoglobin: 5.8: Specimen integrity verified. g/dL (06.29.25 @ 08:58)   Hemoglobin: 10.0 g/dL (06.29.25 @ 03:06)     06-30    135  |  96  |  9   ----------------------------<  91  4.0   |  22  |  0.52    Ca    9.3      30 Jun 2025 07:13    TPro  6.2  /  Alb  3.0[L]  /  TBili  0.4  /  DBili  x   /  AST  130[H]  /  ALT  36  /  AlkPhos  615[H]  06-30    PT/INR - ( 29 Jun 2025 03:06 )   PT: 17.4 sec;   INR: 1.53 ratio         PTT - ( 29 Jun 2025 03:06 )  PTT:28.4 sec      RADIOLOGY & ADDITIONAL TESTS:    ACC: [id number] EXAM:  CT ABDOMEN AND PELVIS IC   ORDERED BY: MERLE CAMILO     ACC: [id number] EXAM:  CT CHEST IC   ORDERED BY: MERLE CAMILO   PROCEDURE DATE:  06/29/2025        INTERPRETATION:  CLINICAL INFORMATION: Metastatic cancer with back pain    COMPARISON: CTA chest 4/16/2025, CT abdomen pelvis 6/12/2025.    CONTRAST/COMPLICATIONS:  IV Contrast: Omnipaque 350  80 cc administered   20 cc discarded  Oral Contrast: NONE  .    PROCEDURE:  CT of the Chest, Abdomen and Pelvis was performed.  Sagittal and coronal reformats were performed.      FINDINGS:    CHEST:    LUNGS AND LARGE AIRWAYS: Patent central airways. Diffuse metastatic   lesions throughout both lungs increased in size and number compared to   prior.  PLEURA: New pleural metastases. For reference a right pleural mass   measures 2.1 x 1.1 cm (301:78).  VESSELS: Within normal limits.  HEART: Heart size is normal. No pericardial effusion.  MEDIASTINUM AND EMILY: New hilar lymphadenopathy with multiple largelow   density right hilar nodes. For reference the largest node measures 2.0 x   2.6 cm (301:49). There is mass effect on the pulmonary vasculature   without evidence of invasion.  CHEST WALL AND LOWER NECK: Diffuse supraclavicular and cervical   lymphadenopathy for reference a left supraclavicular node measures 2.1 x   1.8 cm (301:11).    ABDOMEN AND PELVIS:    LIVER: Diffuse heterogenous lesions throughout the liver, increased in   size and number compared to prior. 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  BILE DUCTS: Normal caliber.  GALLBLADDER: Not visualized.  SPLEEN: Within normal limits.  PANCREAS: Within normal limits.  ADRENALS: Within normal limits.  KIDNEYS/URETERS: Moderate left hydronephrosis the of a 5.4 x 4.1 cm   heterogenous mass with associated clips. Moderate left hydronephrosis to   the level of multiple bulky right iliac lymph nodes. Mild right   urothelial thickening/enhancement. Symmetric renal enhancement.    BLADDER: Parikh catheter. Bladder wall thickening versus underdistention.  REPRODUCTIVE ORGANS: Hysterectomy.    BOWEL: No bowel obstruction. Appendix is not visualized. Redemonstrated   wall thickening of the sigmoid colon and rectum at the sites of masses   described below. Large centrally necrotic mass along the right side of   the mesorectum appears to directly invade the adjacent rectal wall, lower   rectum, with the mass measuring up to 4.6 cm x 4.4 cm. Other smaller   masses are seen within the mesorectal and anterior peritoneal reflection;   also consistent with metastatic disease.  PERITONEUM/RETROPERITONEUM: 5.4 x 4.1 cm heterogenous mass within the   pelvis with associated clips. Mass is inseparable from the sigmoid colon.   Right perirectal mass measuring4.2 x 4.7 cm that is inseparable from   from and has mass effect on the rectum. There is associated rectal wall   thickening. Similar mesenteric lymphadenopathy. Similar mesenteric   lymphadenopathy. Small ascites.  VESSELS: Compression and tumor thrombus within the right common iliac   vein (301:184).  LYMPH NODES: Diffuse lymphadenopathy along the pelvic sidewall, iliac   chain, and throughout the retroperitoneum. For reference previously   described left common iliac node measures 2.0 x 2.4 cm(301:160).  ABDOMINAL WALL: Small ventral wall hernia containing bowel.  BONES: Degenerative changes. See dedicated spine report for findings of   the spine.      IMPRESSION:    1.  Moderate bilateral hydronephrosis to the level of enlarged iliac   chain nodes on the right and pelvic mass on the left.  2.  Bladder wall thickening versus underdistention. Correlate for   cystitis.  3.  Mild right urothelial thickening/enhancement which may be related to   ascending infection or metastatic disease.  4.  Additional findings related to worsening diffuse metastatic disease,   as above.     HPI:    59F w/ PMHx of GERD, hypothyroidism, HTN, WINNIE BSO refractory metastatic uterine cancer on chemotherapy (last chemo 5/21/25), with disease progression, now perirectal mass, liver mets, left pelvic mass, and b/l lung nodules at bases, presenting w/ LE edema and also noting rectal bleeding (x ~3 months) initially only small amount of blood w/ BM now no BM x 4 days and has been passing darrel blood. No Rectal pain. Takes chronic narcotic analgesics and baseline Miralax QD. Has been on PO Eliquis for PV tumor thrombus. NO transfusion requirement. Last dose of Eliquis this AM    Pt requires chronic narcotic analgesics for pain management, no nausea or vomiting  NO personal or FH of GI malignancy  Last colonoscopy ~2021 (Dr Wolf, [facility]) - reported negative per pt    Had episode  of EPEC on 6/15/25 - had presented w/ diarrhea  Plan for outpt Rad-Onc for palliative XRT      GI asked to evaluate for rectal bleeding as pt on AC for PV (tumor) thrombus       PAST MEDICAL & SURGICAL HISTORY:  Endometriosis    Hypothyroid    Acid reflux    UTI (urinary tract infection)    Uterine cancer  1/2021    H/O ovarian cystectomy  2004    History of cholecystectomy  217    H/O abdominal hysterectomy  1/2021 @ [facility]          Allergies  metronidazole (Hives; Rash)  Bactrim (Rash)        MEDICATIONS  (STANDING):  amoxicillin  875 milliGRAM(s)/clavulanate 1 Tablet(s) Oral two times a day  famotidine    Tablet 20 milliGRAM(s) Oral daily  gabapentin 100 milliGRAM(s) Oral at bedtime  HYDROmorphone  Injectable 0.5 milliGRAM(s) IV Push Once  levothyroxine Injectable 50 MICROGram(s) IV Push at bedtime  losartan 50 milliGRAM(s) Oral daily  polyethylene glycol 3350 17 Gram(s) Oral two times a day  rosuvastatin 10 milliGRAM(s) Oral at bedtime  senna 2 Tablet(s) Oral at bedtime    MEDICATIONS  (PRN):  acetaminophen     Tablet .. 650 milliGRAM(s) Oral every 6 hours PRN Temp greater or equal to 38C (100.4F), Mild Pain (1 - 3)  aluminum hydroxide/magnesium hydroxide/simethicone Suspension 30 milliLiter(s) Oral every 4 hours PRN Dyspepsia  lidocaine 5% Ointment 1 Application(s) Topical daily PRN for severe pain  melatonin 3 milliGRAM(s) Oral at bedtime PRN Insomnia  ondansetron Injectable 4 milliGRAM(s) IV Push every 8 hours PRN Nausea and/or Vomiting  oxyCODONE    IR 5 milliGRAM(s) Oral every 4 hours PRN Severe Pain (7 - 10)      Social History:        Family History   IBD (  ) Yes   ( X ) No  GI Malignancy (  )  Yes    ( X ) No        Advanced Directives: (  X   ) None    (      ) DNR    (     ) DNI    (     ) Health Care Proxy:     Review of Systems:  see HPI- remainder 10 point ROS negative    Vital Signs Last 24 Hrs  T(C): 36.7 (30 Jun 2025 12:04), Max: 37 (30 Jun 2025 04:49)  T(F): 98.1 (30 Jun 2025 12:04), Max: 98.6 (30 Jun 2025 04:49)  HR: 86 (30 Jun 2025 12:04) (80 - 103)  BP: 123/73 (30 Jun 2025 12:04) (123/73 - 159/77)  BP(mean): --  RR: 18 (30 Jun 2025 12:04) (16 - 20)  SpO2: 98% (30 Jun 2025 12:04) (96% - 100%)    Parameters below as of 30 Jun 2025 12:04  Patient On (Oxygen Delivery Method): room air        PHYSICAL EXAM:    Constitutional: chronically ill appearing So  woman, family at bedside. Alert and appropriate. NAD  Neck: no JVD  Respiratory: bl air entry, no inc WOB  Cardiovascular: S1S2 regular  Gastrointestinal: softly distended,  enlarged palpable liver- mildly tender to palpation right abdomen, no rebound or rigidity  Extremities: +LE edema  Neurological: no focal asymmetry  Psychiatric: calm, cooperative  Skin: anicteric        LABS:                        9.7    12.88 )-----------( 397      ( 30 Jun 2025 07:18 )             31.1   Hemoglobin: 9.4 g/dL (06.29.25 @ 22:13)   Hemoglobin: 9.5: History verified. g/dL (06.29.25 @ 13:22)     Hemoglobin: 5.8: Specimen integrity verified. g/dL (06.29.25 @ 08:58)  *?lab error; no transfusion given**  Hemoglobin: 10.0 g/dL (06.29.25 @ 03:06)     06-30    135  |  96  |  9   ----------------------------<  91  4.0   |  22  |  0.52    Ca    9.3      30 Jun 2025 07:13    TPro  6.2  /  Alb  3.0[L]  /  TBili  0.4  /  DBili  x   /  AST  130[H]  /  ALT  36  /  AlkPhos  615[H]  06-30    PT/INR - ( 29 Jun 2025 03:06 )   PT: 17.4 sec;   INR: 1.53 ratio         PTT - ( 29 Jun 2025 03:06 )  PTT:28.4 sec      RADIOLOGY & ADDITIONAL TESTS:    ACC: [id number] EXAM:  CT ABDOMEN AND PELVIS IC   ORDERED BY: MERLE CAMILO     ACC: [id number] EXAM:  CT CHEST IC   ORDERED BY: MERLE CAMILO   PROCEDURE DATE:  06/29/2025        INTERPRETATION:  CLINICAL INFORMATION: Metastatic cancer with back pain    COMPARISON: CTA chest 4/16/2025, CT abdomen pelvis 6/12/2025.    CONTRAST/COMPLICATIONS:  IV Contrast: Omnipaque 350  80 cc administered   20 cc discarded  Oral Contrast: NONE  .    PROCEDURE:  CT of the Chest, Abdomen and Pelvis was performed.  Sagittal and coronal reformats were performed.      FINDINGS:    CHEST:    LUNGS AND LARGE AIRWAYS: Patent central airways. Diffuse metastatic   lesions throughout both lungs increased in size and number compared to   prior.  PLEURA: New pleural metastases. For reference a right pleural mass   measures 2.1 x 1.1 cm (301:78).  VESSELS: Within normal limits.  HEART: Heart size is normal. No pericardial effusion.  MEDIASTINUM AND EMILY: New hilar lymphadenopathy with multiple largelow   density right hilar nodes. For reference the largest node measures 2.0 x   2.6 cm (301:49). There is mass effect on the pulmonary vasculature   without evidence of invasion.  CHEST WALL AND LOWER NECK: Diffuse supraclavicular and cervical   lymphadenopathy for reference a left supraclavicular node measures 2.1 x   1.8 cm (301:11).    ABDOMEN AND PELVIS:    LIVER: Diffuse heterogenous lesions throughout the liver, increased in   size and number compared to prior. 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  BILE DUCTS: Normal caliber.  GALLBLADDER: Not visualized.  SPLEEN: Within normal limits.  PANCREAS: Within normal limits.  ADRENALS: Within normal limits.  KIDNEYS/URETERS: Moderate left hydronephrosis the of a 5.4 x 4.1 cm   heterogenous mass with associated clips. Moderate left hydronephrosis to   the level of multiple bulky right iliac lymph nodes. Mild right   urothelial thickening/enhancement. Symmetric renal enhancement.    BLADDER: Parikh catheter. Bladder wall thickening versus underdistention.  REPRODUCTIVE ORGANS: Hysterectomy.    BOWEL: No bowel obstruction. Appendix is not visualized. Redemonstrated   wall thickening of the sigmoid colon and rectum at the sites of masses   described below. Large centrally necrotic mass along the right side of   the mesorectum appears to directly invade the adjacent rectal wall, lower   rectum, with the mass measuring up to 4.6 cm x 4.4 cm. Other smaller   masses are seen within the mesorectal and anterior peritoneal reflection;   also consistent with metastatic disease.  PERITONEUM/RETROPERITONEUM: 5.4 x 4.1 cm heterogenous mass within the   pelvis with associated clips. Mass is inseparable from the sigmoid colon.   Right perirectal mass measuring4.2 x 4.7 cm that is inseparable from   from and has mass effect on the rectum. There is associated rectal wall   thickening. Similar mesenteric lymphadenopathy. Similar mesenteric   lymphadenopathy. Small ascites.  VESSELS: Compression and tumor thrombus within the right common iliac   vein (301:184).  LYMPH NODES: Diffuse lymphadenopathy along the pelvic sidewall, iliac   chain, and throughout the retroperitoneum. For reference previously   described left common iliac node measures 2.0 x 2.4 cm(301:160).  ABDOMINAL WALL: Small ventral wall hernia containing bowel.  BONES: Degenerative changes. See dedicated spine report for findings of   the spine.      IMPRESSION:    1.  Moderate bilateral hydronephrosis to the level of enlarged iliac   chain nodes on the right and pelvic mass on the left.  2.  Bladder wall thickening versus underdistention. Correlate for   cystitis.  3.  Mild right urothelial thickening/enhancement which may be related to   ascending infection or metastatic disease.  4.  Additional findings related to worsening diffuse metastatic disease,   as above.

## 2025-06-30 NOTE — PROGRESS NOTE ADULT - PROBLEM SELECTOR PLAN 1
RLE duplex negative for DVT   likely due to low albumin/poor nutrition   abx given for ?cellulitis - will continue to monitor   if no improvement, can consider diuretics  onc f/u

## 2025-06-30 NOTE — CONSULT NOTE ADULT - ASSESSMENT
59F w/ PMHx of GERD, hypothyroidism, HTN, WINNIE BSO refractory metastatic uterine cancer on chemotherapy (last chemo 5/21/25), with disease progression, now perirectal mass, liver mets, left pelvic mass, and b/l lung nodules at bases, presenting w/ LE edema and also noting rectal bleeding (x ~3 months) initially only small amount of blood w/ BM now no BM x 4 days and has been passing darrel blood. No Rectal pain. Takes chronic narcotic analgesics and baseline Miralax QD. Has been on PO Eliquis for PV tumor thrombus. NO transfusion requirement. Last dose of Eliquis this AM (6/30/25)    Last colonoscopy ~2021 (Dr Wolf, [facility]) - reported negative per pt    #metastatic Uterine CA w/ Progression of disease  b/l lungs, pleura, hilar LAD, cupraclavicular and cervical LAD, diffuse liver lesion, tumor thrombus obliterates Lt. portal vein and it's branches, heterogeneous Left renal mass and bulky Rt iliac nodes, mesorecta mass invading rectum + additional smaller masses within mesorectum and anterior peritoneal reflection, diffuse pelvic LAD,  mesenteric LAD, tumor thrombus in Right common iliac vein  #constipation - large fecal burden on on CT  #rectal bleeding - likely 2/2 rectal tumor invasion given clinical / radiologic findings; Hgb/HD stable  #PV Tumor thrombus    RECS:  -rec Moviprep x 1 L to liquify/loosen stool for ease of passage  -recommend addition of PO Movantik 25 mg daily for suspected component of narcotic associated constipation  -no GI objection to ongoing AC; pt has remained HD/Hgb stable without transfusion requirement  -no plans for endoluminal evaluation at this time; if develops HD instability with ongoing rectal bleeding then we can consider flex sig w/ hemospray for suspected tumor bleeding but this is of limited use given the very temporary achievable hemostasis i/s/o malignant/tumor bleeding  -continue baseline H2 blocker rx  -ok for PO diet as tolerated  -rec Palliative follow up     d/w pt and family (per pt request) at bedside  d/w Medicine ACP    Tushar Alberto PA-C  [facility]  After hours and weekend coverage [phone]

## 2025-06-30 NOTE — PROGRESS NOTE ADULT - SUBJECTIVE AND OBJECTIVE BOX
[facility] Division of Hospital Medicine  Marbellasara Martinez,   [employer]    Patient is a 59y old  Female who presents with a chief complaint of LE edema (30 Jun 2025 14:12)        SUBJECTIVE / OVERNIGHT EVENTS: rectal bleeding - just blood per patient. no stool x 3 days. no nausea but does feel some distention. rectal pain improves from 10/10 to 5/10 with oxycodone.       MEDICATIONS  (STANDING):  amoxicillin  875 milliGRAM(s)/clavulanate 1 Tablet(s) Oral two times a day  famotidine    Tablet 20 milliGRAM(s) Oral daily  gabapentin 100 milliGRAM(s) Oral at bedtime  HYDROmorphone  Injectable 0.5 milliGRAM(s) IV Push Once  levothyroxine Injectable 50 MICROGram(s) IV Push at bedtime  losartan 50 milliGRAM(s) Oral daily  polyethylene glycol 3350 17 Gram(s) Oral two times a day  polyethylene glycol/electrolyte Solution 500 milliLiter(s) Oral once  rosuvastatin 10 milliGRAM(s) Oral at bedtime  senna 2 Tablet(s) Oral at bedtime    MEDICATIONS  (PRN):  acetaminophen     Tablet .. 650 milliGRAM(s) Oral every 6 hours PRN Temp greater or equal to 38C (100.4F), Mild Pain (1 - 3)  aluminum hydroxide/magnesium hydroxide/simethicone Suspension 30 milliLiter(s) Oral every 4 hours PRN Dyspepsia  lidocaine 5% Ointment 1 Application(s) Topical daily PRN for severe pain  melatonin 3 milliGRAM(s) Oral at bedtime PRN Insomnia  ondansetron Injectable 4 milliGRAM(s) IV Push every 8 hours PRN Nausea and/or Vomiting  oxyCODONE    IR 5 milliGRAM(s) Oral every 4 hours PRN Severe Pain (7 - 10)      Vital Signs Last 24 Hrs  T(C): 36.7 (30 Jun 2025 12:04), Max: 37 (30 Jun 2025 04:49)  T(F): 98.1 (30 Jun 2025 12:04), Max: 98.6 (30 Jun 2025 04:49)  HR: 86 (30 Jun 2025 12:04) (80 - 95)  BP: 123/73 (30 Jun 2025 12:04) (123/73 - 159/77)  BP(mean): --  RR: 18 (30 Jun 2025 12:04) (16 - 20)  SpO2: 98% (30 Jun 2025 12:04) (96% - 100%)    Parameters below as of 30 Jun 2025 12:04  Patient On (Oxygen Delivery Method): room air      CAPILLARY BLOOD GLUCOSE        I&O's Summary      PHYSICAL EXAM:  GENERAL: NAD, breathing not labored  EYES: conjunctiva and sclera clear  NECK: supple, No JVD  CHEST/LUNG: CTA b/l  HEART: S1 S2 RRR  ABDOMEN: +BS Soft, NT/ND  EXTREMITIES:  2+ DP Pulses, No c/c. no LE edema  NEUROLOGY: AAOx3, no facial droop, moving all extremities     LABS:                        9.7    12.88 )-----------( 397      ( 30 Jun 2025 07:18 )             31.1     06-30    135  |  96  |  9   ----------------------------<  91  4.0   |  22  |  0.52    Ca    9.3      30 Jun 2025 07:13    TPro  6.2  /  Alb  3.0[L]  /  TBili  0.4  /  DBili  x   /  AST  130[H]  /  ALT  36  /  AlkPhos  615[H]  06-30    PT/INR - ( 29 Jun 2025 03:06 )   PT: 17.4 sec;   INR: 1.53 ratio         PTT - ( 29 Jun 2025 03:06 )  PTT:28.4 sec      Urinalysis Basic - ( 30 Jun 2025 07:13 )    Color: x / Appearance: x / SG: x / pH: x  Gluc: 91 mg/dL / Ketone: x  / Bili: x / Urobili: x   Blood: x / Protein: x / Nitrite: x   Leuk Esterase: x / RBC: x / WBC x   Sq Epi: x / Non Sq Epi: x / Bacteria: x        RADIOLOGY & ADDITIONAL TESTS:    Imaging Personally Reviewed:  Consultant(s) Notes Reviewed:    Care Discussed with Consultants/Other Providers:

## 2025-06-30 NOTE — PROGRESS NOTE ADULT - TIME BILLING
reviewing documentation, reviewing and interpreting labs/imaging, interviewing and examining patient, discussing plan of care with patient and family at bedside, documentation, coordinating care with ACP. reviewing documentation, reviewing and interpreting labs/imaging, interviewing and examining patient, discussing plan of care with patient and family at bedside, documentation, coordinating care with ACP..

## 2025-06-30 NOTE — PROGRESS NOTE ADULT - PROBLEM SELECTOR PLAN 4
holding eliquis for now - d/w patient and family   monitor h/h   will restart pending GI evaluation and orthostatics

## 2025-06-30 NOTE — CONSULT NOTE ADULT - NS ATTEND AMEND GEN_ALL_CORE FT
Agree w above. 59 year old female with refractory metastatic uterine cancer on chemotherapy, with disease progression with perirectal mass, liver mets, left pelvic mass, and b/l lung nodules at bases, noted with 3 months of bright red blood per rectum. Patient with history of chronic constipation and noted no BMs for 4 days and has been passing darrel blood. No transfusion requirement during admission. Rectal bleeding most likely from tumor invasion into rectum. Limited utility in endoscopic intervention. Rec bowel regimen to treat constipation. GI will follow. Palliative care.

## 2025-06-30 NOTE — PROGRESS NOTE ADULT - PROBLEM SELECTOR PLAN 3
Oncology follow-up  Seen by radiation oncology for palliative radiation  No additional chemo at this time.  Pain control with oxycodone prn - if worsened pain, will consult palliative for help with pain management (seen in the past)

## 2025-06-30 NOTE — PATIENT PROFILE ADULT - FALL HARM RISK - HARM RISK INTERVENTIONS

## 2025-07-01 NOTE — PROGRESS NOTE ADULT - PROBLEM SELECTOR PLAN 1
RLE duplex negative for DVT   likely due to low albumin/poor nutrition   abx given for ?cellulitis - will continue to monitor   if no improvement, can consider diuretics  onc f/u RLE duplex negative for DVT   likely due to low albumin/poor nutrition   abx given for ?cellulitis - improving - would only continue for 7 days  if no improvement, can consider diuretics  onc f/u

## 2025-07-01 NOTE — PROGRESS NOTE ADULT - SUBJECTIVE AND OBJECTIVE BOX
**** INCOMPLETE NOTE ****    INTERVAL HPI/OVERNIGHT EVENTS:  drank 1/2 Moviprep early AM - had few small pieces of firm stool stool (brown) along with passage of darrel blood - no clots or melena  still with generalized abdominal pain, no nausea or vomiting  sl less distended    MEDICATIONS  (STANDING):  amoxicillin  875 milliGRAM(s)/clavulanate 1 Tablet(s) Oral two times a day  famotidine    Tablet 20 milliGRAM(s) Oral daily  gabapentin 100 milliGRAM(s) Oral at bedtime  levothyroxine Injectable 50 MICROGram(s) IV Push at bedtime  losartan 50 milliGRAM(s) Oral daily  naloxegol 25 milliGRAM(s) Oral daily  polyethylene glycol 3350 17 Gram(s) Oral two times a day  rosuvastatin 10 milliGRAM(s) Oral at bedtime  senna 2 Tablet(s) Oral at bedtime    MEDICATIONS  (PRN):  acetaminophen     Tablet .. 650 milliGRAM(s) Oral every 6 hours PRN Temp greater or equal to 38C (100.4F), Mild Pain (1 - 3)  aluminum hydroxide/magnesium hydroxide/simethicone Suspension 30 milliLiter(s) Oral every 4 hours PRN Dyspepsia  lidocaine 5% Ointment 1 Application(s) Topical daily PRN for severe pain  melatonin 3 milliGRAM(s) Oral at bedtime PRN Insomnia  ondansetron Injectable 4 milliGRAM(s) IV Push every 8 hours PRN Nausea and/or Vomiting  oxyCODONE    IR 5 milliGRAM(s) Oral every 4 hours PRN Severe Pain (7 - 10)      Allergies    metronidazole (Hives; Rash)  Bactrim (Rash)    Intolerances        Review of Systems:      Vital Signs Last 24 Hrs  T(C): 36.6 (01 Jul 2025 05:04), Max: 36.7 (30 Jun 2025 12:04)  T(F): 97.9 (01 Jul 2025 05:04), Max: 98.1 (30 Jun 2025 12:04)  HR: 83 (01 Jul 2025 05:04) (83 - 93)  BP: 120/74 (01 Jul 2025 05:04) (120/74 - 150/81)  BP(mean): --  RR: 18 (01 Jul 2025 05:04) (18 - 18)  SpO2: 94% (01 Jul 2025 05:04) (94% - 98%)    Parameters below as of 01 Jul 2025 05:04  Patient On (Oxygen Delivery Method): room air        PHYSICAL EXAM:      LABS:                        9.4    10.76 )-----------( 377      ( 01 Jul 2025 07:06 )             30.8     07-01    137  |  99  |  8   ----------------------------<  121[H]  3.8   |  23  |  0.47[L]    Ca    9.0      01 Jul 2025 07:06    TPro  6.0  /  Alb  2.8[L]  /  TBili  0.6  /  DBili  x   /  AST  147[H]  /  ALT  42  /  AlkPhos  695[H]  07-01      Urinalysis Basic - ( 01 Jul 2025 07:06 )    Color: x / Appearance: x / SG: x / pH: x  Gluc: 121 mg/dL / Ketone: x  / Bili: x / Urobili: x   Blood: x / Protein: x / Nitrite: x   Leuk Esterase: x / RBC: x / WBC x   Sq Epi: x / Non Sq Epi: x / Bacteria: x      LIVER FUNCTIONS - ( 01 Jul 2025 07:06 )  Alb: 2.8 g/dL / Pro: 6.0 g/dL / ALK PHOS: 695 U/L / ALT: 42 U/L / AST: 147 U/L / GGT: x             RADIOLOGY & ADDITIONAL TESTS:     INTERVAL HPI/OVERNIGHT EVENTS:  drank 1/2 Moviprep early AM - had few small pieces of firm stool stool (brown) along with passage of darrel blood - no clots or melena  still with generalized abdominal pain, no nausea or vomiting  sl less distended    MEDICATIONS  (STANDING):  amoxicillin  875 milliGRAM(s)/clavulanate 1 Tablet(s) Oral two times a day  famotidine    Tablet 20 milliGRAM(s) Oral daily  gabapentin 100 milliGRAM(s) Oral at bedtime  levothyroxine Injectable 50 MICROGram(s) IV Push at bedtime  losartan 50 milliGRAM(s) Oral daily  naloxegol 25 milliGRAM(s) Oral daily  polyethylene glycol 3350 17 Gram(s) Oral two times a day  rosuvastatin 10 milliGRAM(s) Oral at bedtime  senna 2 Tablet(s) Oral at bedtime    MEDICATIONS  (PRN):  acetaminophen     Tablet .. 650 milliGRAM(s) Oral every 6 hours PRN Temp greater or equal to 38C (100.4F), Mild Pain (1 - 3)  aluminum hydroxide/magnesium hydroxide/simethicone Suspension 30 milliLiter(s) Oral every 4 hours PRN Dyspepsia  lidocaine 5% Ointment 1 Application(s) Topical daily PRN for severe pain  melatonin 3 milliGRAM(s) Oral at bedtime PRN Insomnia  ondansetron Injectable 4 milliGRAM(s) IV Push every 8 hours PRN Nausea and/or Vomiting  oxyCODONE    IR 5 milliGRAM(s) Oral every 4 hours PRN Severe Pain (7 - 10)      Allergies  metronidazole (Hives; Rash)  Bactrim (Rash)      Review of Systems:  see HPI- remainder 10 point ROS negative    Vital Signs Last 24 Hrs  T(C): 36.6 (01 Jul 2025 05:04), Max: 36.7 (30 Jun 2025 12:04)  T(F): 97.9 (01 Jul 2025 05:04), Max: 98.1 (30 Jun 2025 12:04)  HR: 83 (01 Jul 2025 05:04) (83 - 93)  BP: 120/74 (01 Jul 2025 05:04) (120/74 - 150/81)  BP(mean): --  RR: 18 (01 Jul 2025 05:04) (18 - 18)  SpO2: 94% (01 Jul 2025 05:04) (94% - 98%)    Parameters below as of 01 Jul 2025 05:04  Patient On (Oxygen Delivery Method): room air    PHYSICAL EXAM:  Constitutional: chronically ill appearing So  woman, spouse at bedside, daughter on speakerphone (per pt request) Alert and appropriate. NAD  Neck: no JVD  Respiratory: bl air entry, no inc WOB  Cardiovascular: S1S2 regular  Gastrointestinal: softly distended - sl decreased from prior exam,  enlarged palpable liver- mildly tender to palpation right abdomen, no rebound or rigidity  Extremities: +LE edema  Neurological: no focal asymmetry  Psychiatric: calm, cooperative  Skin: anicteric    LABS:                        9.4    10.76 )-----------( 377      ( 01 Jul 2025 07:06 )             30.8     07-01    137  |  99  |  8   ----------------------------<  121[H]  3.8   |  23  |  0.47[L]    Ca    9.0      01 Jul 2025 07:06    TPro  6.0  /  Alb  2.8[L]  /  TBili  0.6  /  DBili  x   /  AST  147[H]  /  ALT  42  /  AlkPhos  695[H]  07-01      RADIOLOGY & ADDITIONAL TESTS:

## 2025-07-01 NOTE — PROGRESS NOTE ADULT - TIME BILLING
reviewing documentation, reviewing and interpreting labs/imaging, interviewing and examining patient, discussing plan of care with patient and family at bedside, documentation, coordinating care with ACP..

## 2025-07-01 NOTE — PROGRESS NOTE ADULT - SUBJECTIVE AND OBJECTIVE BOX
[facility] Division of Hospital Medicine  Marbella Martinez DO  [employer]    Patient is a 59y old  Female who presents with a chief complaint of LE edema (01 Jul 2025 10:30)        SUBJECTIVE / OVERNIGHT EVENTS:      MEDICATIONS  (STANDING):  amoxicillin  875 milliGRAM(s)/clavulanate 1 Tablet(s) Oral two times a day  famotidine    Tablet 20 milliGRAM(s) Oral daily  gabapentin 100 milliGRAM(s) Oral at bedtime  HYDROmorphone  Injectable 0.2 milliGRAM(s) IV Push once  levothyroxine Injectable 50 MICROGram(s) IV Push at bedtime  losartan 50 milliGRAM(s) Oral daily  naloxegol 25 milliGRAM(s) Oral daily  polyethylene glycol 3350 17 Gram(s) Oral two times a day  rosuvastatin 10 milliGRAM(s) Oral at bedtime  senna 2 Tablet(s) Oral at bedtime    MEDICATIONS  (PRN):  acetaminophen     Tablet .. 650 milliGRAM(s) Oral every 6 hours PRN Temp greater or equal to 38C (100.4F), Mild Pain (1 - 3)  aluminum hydroxide/magnesium hydroxide/simethicone Suspension 30 milliLiter(s) Oral every 4 hours PRN Dyspepsia  lidocaine 5% Ointment 1 Application(s) Topical daily PRN for severe pain  melatonin 3 milliGRAM(s) Oral at bedtime PRN Insomnia  ondansetron Injectable 4 milliGRAM(s) IV Push every 8 hours PRN Nausea and/or Vomiting  oxyCODONE    IR 7.5 milliGRAM(s) Oral every 4 hours PRN Severe Pain (7 - 10)      Vital Signs Last 24 Hrs  T(C): 36.6 (01 Jul 2025 05:04), Max: 36.7 (30 Jun 2025 20:35)  T(F): 97.9 (01 Jul 2025 05:04), Max: 98 (30 Jun 2025 20:35)  HR: 83 (01 Jul 2025 05:04) (83 - 93)  BP: 120/74 (01 Jul 2025 05:04) (120/74 - 150/81)  BP(mean): --  RR: 18 (01 Jul 2025 13:05) (18 - 18)  SpO2: 94% (01 Jul 2025 05:04) (94% - 98%)    Parameters below as of 01 Jul 2025 05:04  Patient On (Oxygen Delivery Method): room air      CAPILLARY BLOOD GLUCOSE        I&O's Summary    30 Jun 2025 07:01  -  01 Jul 2025 07:00  --------------------------------------------------------  IN: 0 mL / OUT: 2000 mL / NET: -2000 mL      PHYSICAL EXAM:  GENERAL: in acute distress due to rectal pain  EYES: conjunctiva and sclera clear  NECK: supple, No JVD  CHEST/LUNG: CTA b/l  HEART: S1 S2 RRR  ABDOMEN: +BS Soft, NT/ND  EXTREMITIES:  2+ DP Pulses, No c/c. 1+ b/l LE edema  NEUROLOGY: AAOx3, no facial droop, moving all extremities     LABS:                        9.4    10.76 )-----------( 377      ( 01 Jul 2025 07:06 )             30.8     07-01    137  |  99  |  8   ----------------------------<  121[H]  3.8   |  23  |  0.47[L]    Ca    9.0      01 Jul 2025 07:06    TPro  6.0  /  Alb  2.8[L]  /  TBili  0.6  /  DBili  x   /  AST  147[H]  /  ALT  42  /  AlkPhos  695[H]  07-01          Urinalysis Basic - ( 01 Jul 2025 07:06 )    Color: x / Appearance: x / SG: x / pH: x  Gluc: 121 mg/dL / Ketone: x  / Bili: x / Urobili: x   Blood: x / Protein: x / Nitrite: x   Leuk Esterase: x / RBC: x / WBC x   Sq Epi: x / Non Sq Epi: x / Bacteria: x        RADIOLOGY & ADDITIONAL TESTS:    Imaging Personally Reviewed:  Consultant(s) Notes Reviewed:    Care Discussed with Consultants/Other Providers:   [facility] Division of Hospital Medicine  Marbella Martinez DO  [employer]    Patient is a 59y old  Female who presents with a chief complaint of LE edema (01 Jul 2025 10:30)        SUBJECTIVE / OVERNIGHT EVENTS:      MEDICATIONS  (STANDING):  amoxicillin  875 milliGRAM(s)/clavulanate 1 Tablet(s) Oral two times a day  famotidine    Tablet 20 milliGRAM(s) Oral daily  gabapentin 100 milliGRAM(s) Oral at bedtime  HYDROmorphone  Injectable 0.2 milliGRAM(s) IV Push once  levothyroxine Injectable 50 MICROGram(s) IV Push at bedtime  losartan 50 milliGRAM(s) Oral daily  naloxegol 25 milliGRAM(s) Oral daily  polyethylene glycol 3350 17 Gram(s) Oral two times a day  rosuvastatin 10 milliGRAM(s) Oral at bedtime  senna 2 Tablet(s) Oral at bedtime    MEDICATIONS  (PRN):  acetaminophen     Tablet .. 650 milliGRAM(s) Oral every 6 hours PRN Temp greater or equal to 38C (100.4F), Mild Pain (1 - 3)  aluminum hydroxide/magnesium hydroxide/simethicone Suspension 30 milliLiter(s) Oral every 4 hours PRN Dyspepsia  lidocaine 5% Ointment 1 Application(s) Topical daily PRN for severe pain  melatonin 3 milliGRAM(s) Oral at bedtime PRN Insomnia  ondansetron Injectable 4 milliGRAM(s) IV Push every 8 hours PRN Nausea and/or Vomiting  oxyCODONE    IR 7.5 milliGRAM(s) Oral every 4 hours PRN Severe Pain (7 - 10)      Vital Signs Last 24 Hrs  T(C): 36.6 (01 Jul 2025 05:04), Max: 36.7 (30 Jun 2025 20:35)  T(F): 97.9 (01 Jul 2025 05:04), Max: 98 (30 Jun 2025 20:35)  HR: 83 (01 Jul 2025 05:04) (83 - 93)  BP: 120/74 (01 Jul 2025 05:04) (120/74 - 150/81)  BP(mean): --  RR: 18 (01 Jul 2025 13:05) (18 - 18)  SpO2: 94% (01 Jul 2025 05:04) (94% - 98%)    Parameters below as of 01 Jul 2025 05:04  Patient On (Oxygen Delivery Method): room air      CAPILLARY BLOOD GLUCOSE        I&O's Summary    30 Jun 2025 07:01  -  01 Jul 2025 07:00  --------------------------------------------------------  IN: 0 mL / OUT: 2000 mL / NET: -2000 mL      PHYSICAL EXAM:  GENERAL: in acute distress due to rectal pain  EYES: conjunctiva and sclera clear  NECK: supple, No JVD  CHEST/LUNG: CTA b/l  HEART: S1 S2 RRR  ABDOMEN: +BS Soft, ND, mild lower abdominal tenderness, no rebound or guarding  EXTREMITIES:  2+ DP Pulses, No c/c. 1+ b/l LE edema  NEUROLOGY: AAOx3, no facial droop, moving all extremities     LABS:                        9.4    10.76 )-----------( 377      ( 01 Jul 2025 07:06 )             30.8     07-01    137  |  99  |  8   ----------------------------<  121[H]  3.8   |  23  |  0.47[L]    Ca    9.0      01 Jul 2025 07:06    TPro  6.0  /  Alb  2.8[L]  /  TBili  0.6  /  DBili  x   /  AST  147[H]  /  ALT  42  /  AlkPhos  695[H]  07-01          Urinalysis Basic - ( 01 Jul 2025 07:06 )    Color: x / Appearance: x / SG: x / pH: x  Gluc: 121 mg/dL / Ketone: x  / Bili: x / Urobili: x   Blood: x / Protein: x / Nitrite: x   Leuk Esterase: x / RBC: x / WBC x   Sq Epi: x / Non Sq Epi: x / Bacteria: x        RADIOLOGY & ADDITIONAL TESTS:    Imaging Personally Reviewed:  Consultant(s) Notes Reviewed:    Care Discussed with Consultants/Other Providers:   [facility] Division of Hospital Medicine  Marbella Martinez,   [employer]    Patient is a 59y old  Female who presents with a chief complaint of LE edema (01 Jul 2025 10:30)        SUBJECTIVE / OVERNIGHT EVENTS: c/o severe rectal pain not controlled with oxycodone 5mg now. had only small amount of stool but still have brbpr      MEDICATIONS  (STANDING):  amoxicillin  875 milliGRAM(s)/clavulanate 1 Tablet(s) Oral two times a day  famotidine    Tablet 20 milliGRAM(s) Oral daily  gabapentin 100 milliGRAM(s) Oral at bedtime  HYDROmorphone  Injectable 0.2 milliGRAM(s) IV Push once  levothyroxine Injectable 50 MICROGram(s) IV Push at bedtime  losartan 50 milliGRAM(s) Oral daily  naloxegol 25 milliGRAM(s) Oral daily  polyethylene glycol 3350 17 Gram(s) Oral two times a day  rosuvastatin 10 milliGRAM(s) Oral at bedtime  senna 2 Tablet(s) Oral at bedtime    MEDICATIONS  (PRN):  acetaminophen     Tablet .. 650 milliGRAM(s) Oral every 6 hours PRN Temp greater or equal to 38C (100.4F), Mild Pain (1 - 3)  aluminum hydroxide/magnesium hydroxide/simethicone Suspension 30 milliLiter(s) Oral every 4 hours PRN Dyspepsia  lidocaine 5% Ointment 1 Application(s) Topical daily PRN for severe pain  melatonin 3 milliGRAM(s) Oral at bedtime PRN Insomnia  ondansetron Injectable 4 milliGRAM(s) IV Push every 8 hours PRN Nausea and/or Vomiting  oxyCODONE    IR 7.5 milliGRAM(s) Oral every 4 hours PRN Severe Pain (7 - 10)      Vital Signs Last 24 Hrs  T(C): 36.6 (01 Jul 2025 05:04), Max: 36.7 (30 Jun 2025 20:35)  T(F): 97.9 (01 Jul 2025 05:04), Max: 98 (30 Jun 2025 20:35)  HR: 83 (01 Jul 2025 05:04) (83 - 93)  BP: 120/74 (01 Jul 2025 05:04) (120/74 - 150/81)  BP(mean): --  RR: 18 (01 Jul 2025 13:05) (18 - 18)  SpO2: 94% (01 Jul 2025 05:04) (94% - 98%)    Parameters below as of 01 Jul 2025 05:04  Patient On (Oxygen Delivery Method): room air      CAPILLARY BLOOD GLUCOSE        I&O's Summary    30 Jun 2025 07:01  -  01 Jul 2025 07:00  --------------------------------------------------------  IN: 0 mL / OUT: 2000 mL / NET: -2000 mL      PHYSICAL EXAM:  GENERAL: in acute distress due to rectal pain  EYES: conjunctiva and sclera clear  NECK: supple, No JVD  CHEST/LUNG: CTA b/l  HEART: S1 S2 RRR  ABDOMEN: +BS Soft, ND, mild lower abdominal tenderness, no rebound or guarding  EXTREMITIES:  2+ DP Pulses, No c/c. 1+ b/l LE edema  NEUROLOGY: AAOx3, no facial droop, moving all extremities     LABS:                        9.4    10.76 )-----------( 377      ( 01 Jul 2025 07:06 )             30.8     07-01    137  |  99  |  8   ----------------------------<  121[H]  3.8   |  23  |  0.47[L]    Ca    9.0      01 Jul 2025 07:06    TPro  6.0  /  Alb  2.8[L]  /  TBili  0.6  /  DBili  x   /  AST  147[H]  /  ALT  42  /  AlkPhos  695[H]  07-01          Urinalysis Basic - ( 01 Jul 2025 07:06 )    Color: x / Appearance: x / SG: x / pH: x  Gluc: 121 mg/dL / Ketone: x  / Bili: x / Urobili: x   Blood: x / Protein: x / Nitrite: x   Leuk Esterase: x / RBC: x / WBC x   Sq Epi: x / Non Sq Epi: x / Bacteria: x        RADIOLOGY & ADDITIONAL TESTS:    Imaging Personally Reviewed:  Consultant(s) Notes Reviewed:    Care Discussed with Consultants/Other Providers:

## 2025-07-01 NOTE — PROGRESS NOTE ADULT - PROBLEM SELECTOR PLAN 3
Oncology follow-up  Seen by radiation oncology for palliative radiation  No additional chemo at this time.  Pain control with oxycodone prn - if worsened pain, will consult palliative for help with pain management (seen in the past) Oncology follow-up  Seen by radiation oncology for palliative radiation  No additional chemo at this time.  Pain control with oxycodone prn - will increase dose to 7.5mg q4 prn. will give dilaudid 0.2mg iv x 1 now   if pain still not controlled, will consult palliative for help with pain management (seen in the past) Oncology follow-up outpatient   Seen by radiation oncology for palliative radiation  No additional chemo at this time.  Pain control with oxycodone prn - will increase dose to 7.5mg q4 prn. will give dilaudid 0.2mg iv x 1 now   if pain still not controlled, will consult palliative for help with pain management (seen in the past)

## 2025-07-01 NOTE — PROGRESS NOTE ADULT - NS ATTEND AMEND GEN_ALL_CORE FT
Agree w above. Metastatic uterine cancer with invasion to rectum presents with rectal bleeding. CT with large fecal burden. Rec bowel regimen for constipation including Movantik. Palliative care eval.

## 2025-07-01 NOTE — PROGRESS NOTE ADULT - PROBLEM SELECTOR PLAN 4
holding eliquis for now - d/w patient and family   monitor h/h   will restart pending GI evaluation and orthostatics restarting eliquis - d/w GI   monitor h/h

## 2025-07-02 ENCOUNTER — TRANSCRIPTION ENCOUNTER (OUTPATIENT)
Age: 60
End: 2025-07-02

## 2025-07-02 NOTE — DIETITIAN INITIAL EVALUATION ADULT - PERTINENT LABORATORY DATA
07-02    136  |  96  |  9   ----------------------------<  114[H]  4.0   |  23  |  0.49[L]    Ca    9.2      02 Jul 2025 10:53    TPro  6.1  /  Alb  3.0[L]  /  TBili  0.6  /  DBili  x   /  AST  180[H]  /  ALT  47[H]  /  AlkPhos  722[H]  07-02

## 2025-07-02 NOTE — PROGRESS NOTE ADULT - PROBLEM SELECTOR PLAN 3
Oncology follow-up outpatient   Seen by radiation oncology for palliative radiation  No additional chemo at this time.  Pain control with oxycodone prn - 7.5mg q4 prn  >      > 7/2/25: consulted placed for palliative team as pain still uncontrolled, f/u on their recs

## 2025-07-02 NOTE — DIETITIAN INITIAL EVALUATION ADULT - PERTINENT MEDS FT
MEDICATIONS  (STANDING):  amoxicillin  875 milliGRAM(s)/clavulanate 1 Tablet(s) Oral two times a day  apixaban 5 milliGRAM(s) Oral two times a day  famotidine    Tablet 20 milliGRAM(s) Oral daily  gabapentin 100 milliGRAM(s) Oral at bedtime  levothyroxine Injectable 50 MICROGram(s) IV Push at bedtime  losartan 50 milliGRAM(s) Oral daily  naloxegol 25 milliGRAM(s) Oral daily  polyethylene glycol 3350 17 Gram(s) Oral two times a day  rosuvastatin 10 milliGRAM(s) Oral at bedtime  senna 2 Tablet(s) Oral at bedtime  sodium chloride 0.9%. 1000 milliLiter(s) (50 mL/Hr) IV Continuous <Continuous>    MEDICATIONS  (PRN):  acetaminophen     Tablet .. 650 milliGRAM(s) Oral every 6 hours PRN Temp greater or equal to 38C (100.4F), Mild Pain (1 - 3)  aluminum hydroxide/magnesium hydroxide/simethicone Suspension 30 milliLiter(s) Oral every 4 hours PRN Dyspepsia  lidocaine 5% Ointment 1 Application(s) Topical daily PRN for severe pain  melatonin 3 milliGRAM(s) Oral at bedtime PRN Insomnia  ondansetron Injectable 4 milliGRAM(s) IV Push every 8 hours PRN Nausea and/or Vomiting  oxyCODONE    IR 7.5 milliGRAM(s) Oral every 4 hours PRN Severe Pain (7 - 10)

## 2025-07-02 NOTE — DIETITIAN INITIAL EVALUATION ADULT - PROBLEM SELECTOR PLAN 7
Levothyroxine increased to 75 mcg around 2 weeks ago. Suspect suboptimal po absorption at this time.  Can give 50 mcg IV daily while in-patient then resume 75 mcg po daily for discharge with repeat TFTs in 4 weeks as outpatient.

## 2025-07-02 NOTE — DIETITIAN INITIAL EVALUATION ADULT - OTHER INFO
Weight: Pt reports weight relatively stable ~ 130lbs. Weight per previous RD note on 6/14 noted as 128 lbs. Current dosing weight is 132lbs.

## 2025-07-02 NOTE — DIETITIAN INITIAL EVALUATION ADULT - ADD RECOMMEND
1) Continue Regular lactovegetarian diet 2) Continue Ensure Plus High Protein 3x daily to supplement 3) Obtain/honor food preferences as able. 4) RD to remain available and follow-up as medically appropriate.

## 2025-07-02 NOTE — DIETITIAN INITIAL EVALUATION ADULT - REASON FOR ADMISSION
LE edema    Chart reviewed, events noted. This is a "59F h/o GERD, hypothyroidism, HTN, WINNIE BSO refractory metastatic uterine cancer presenting with lower extremity edema, rectal pain with rectal bleeding."

## 2025-07-02 NOTE — DIETITIAN INITIAL EVALUATION ADULT - NS FNS DIET ORDER
Diet, Regular:   Lacto Veg (Accepts Milk & Milk Products)  Supplement Feeding Modality:  Oral  Ensure Enlive Cans or Servings Per Day:  1       Frequency:  Three Times a day (07-01-25 @ 12:55) [Active]

## 2025-07-02 NOTE — DIETITIAN INITIAL EVALUATION ADULT - ORAL INTAKE PTA/DIET HISTORY
Pt reports fair PO intake PTA, consumes regular diet. Lactovegetarian. NKFA. Pt denies chewing/swallowing difficulty, nausea, vomiting, diarrhea, constipation, admitted with abdominal pain, distension.

## 2025-07-02 NOTE — PROGRESS NOTE ADULT - PROBLEM SELECTOR PLAN 1
RLE duplex negative for DVT   likely due to low albumin/poor nutrition   on augmentin  if no improvement, can consider diuretics  onc f/u  >     > 7/2/25 : worsening gray areas in right foot > called podiatry team for re-eval. Consider vascular eval.  labs today pending - F/U CBC, BMP

## 2025-07-02 NOTE — DIETITIAN INITIAL EVALUATION ADULT - NSICDXPASTSURGICALHX_GEN_ALL_CORE_FT
PAST SURGICAL HISTORY:  H/O abdominal hysterectomy 1/2021 @ [facility]    H/O ovarian cystectomy 2004    History of cholecystectomy 217

## 2025-07-02 NOTE — PROGRESS NOTE ADULT - SUBJECTIVE AND OBJECTIVE BOX
[facility] Division of Hospital Medicine  Mychal Aguilar MD M-F, 8A-5P: MS Teams  Other Times: HIC Extension / HIC Pager      Patient is a 59y old  Female who presents with a chief complaint of LE edema (01 Jul 2025 13:05)      SUBJECTIVE / OVERNIGHT EVENTS:  Patient was examined today. She is complaining of lower abd pain, rectal pain especially with bowel movements.     ADDITIONAL REVIEW OF SYSTEMS:    MEDICATIONS  (STANDING):  amoxicillin  875 milliGRAM(s)/clavulanate 1 Tablet(s) Oral two times a day  apixaban 5 milliGRAM(s) Oral two times a day  famotidine    Tablet 20 milliGRAM(s) Oral daily  gabapentin 100 milliGRAM(s) Oral at bedtime  levothyroxine Injectable 50 MICROGram(s) IV Push at bedtime  losartan 50 milliGRAM(s) Oral daily  naloxegol 25 milliGRAM(s) Oral daily  polyethylene glycol 3350 17 Gram(s) Oral two times a day  rosuvastatin 10 milliGRAM(s) Oral at bedtime  senna 2 Tablet(s) Oral at bedtime  sodium chloride 0.9%. 1000 milliLiter(s) (50 mL/Hr) IV Continuous <Continuous>    MEDICATIONS  (PRN):  acetaminophen     Tablet .. 650 milliGRAM(s) Oral every 6 hours PRN Temp greater or equal to 38C (100.4F), Mild Pain (1 - 3)  aluminum hydroxide/magnesium hydroxide/simethicone Suspension 30 milliLiter(s) Oral every 4 hours PRN Dyspepsia  lidocaine 5% Ointment 1 Application(s) Topical daily PRN for severe pain  melatonin 3 milliGRAM(s) Oral at bedtime PRN Insomnia  ondansetron Injectable 4 milliGRAM(s) IV Push every 8 hours PRN Nausea and/or Vomiting  oxyCODONE    IR 7.5 milliGRAM(s) Oral every 4 hours PRN Severe Pain (7 - 10)      CAPILLARY BLOOD GLUCOSE          I&O's Summary    01 Jul 2025 07:01  -  02 Jul 2025 07:00  --------------------------------------------------------  IN: 0 mL / OUT: 900 mL / NET: -900 mL        Daily     Daily     PHYSICAL EXAM:  Vital Signs Last 24 Hrs  T(C): 36.6 (02 Jul 2025 11:47), Max: 37 (02 Jul 2025 04:35)  T(F): 97.9 (02 Jul 2025 11:47), Max: 98.6 (02 Jul 2025 04:35)  HR: 103 (02 Jul 2025 11:47) (94 - 103)  BP: 128/74 (02 Jul 2025 11:47) (128/74 - 165/87)  BP(mean): --  RR: 18 (02 Jul 2025 11:47) (18 - 18)  SpO2: 96% (02 Jul 2025 11:47) (96% - 99%)    Parameters below as of 02 Jul 2025 11:47  Patient On (Oxygen Delivery Method): room air      CONSTITUTIONAL: NAD,   EYES: PERRLA; conjunctiva and sclera clear  ENMT: Moist oral mucosa,  RESPIRATORY: Normal respiratory effort; lungs are clear to auscultation bilaterally  CARDIOVASCULAR: Regular rate and rhythm, normal S1 and S2, no murmur/rub/gallop;   No lower extremity edema; Peripheral pulses are 2+ bilaterally  ABDOMEN: mild tenderness to palpation in lower abd, normoactive bowel sounds,  MUSCULOSKELETAL:  moving all extremities   PSYCH: A+O to person, place, and time; affect appropriate  NEUROLOGY: CN 2-12 are intact and symmetric; no gross sensory/motor deficits   SKIN: right foot erythema, edema, gray discoloration areas    LABS:                        10.1   17.29 )-----------( 394      ( 02 Jul 2025 10:53 )             32.5     07-02    136  |  96  |  9   ----------------------------<  114[H]  4.0   |  23  |  0.49[L]    Ca    9.2      02 Jul 2025 10:53    TPro  6.1  /  Alb  3.0[L]  /  TBili  0.6  /  DBili  x   /  AST  180[H]  /  ALT  47[H]  /  AlkPhos  722[H]  07-02    LIVER FUNCTIONS - ( 02 Jul 2025 10:53 )  Alb: 3.0 g/dL / Pro: 6.1 g/dL / ALK PHOS: 722 U/L / ALT: 47 U/L / AST: 180 U/L / GGT: x                 Urinalysis Basic - ( 02 Jul 2025 10:53 )    Color: x / Appearance: x / SG: x / pH: x  Gluc: 114 mg/dL / Ketone: x  / Bili: x / Urobili: x   Blood: x / Protein: x / Nitrite: x   Leuk Esterase: x / RBC: x / WBC x   Sq Epi: x / Non Sq Epi: x / Bacteria: x        Culture - Urine (collected 29 Jun 2025 20:12)  Source: Catheterized Catheterized  Final Report (30 Jun 2025 19:09):    <10,000 CFU/mL Normal Urogenital Yessica      SARS-CoV-2: NotDetec (15 Rashawn 2025 13:16)      RADIOLOGY & ADDITIONAL TESTS:  Results Reviewed:   Imaging Personally Reviewed:  Electrocardiogram Personally Reviewed:    COORDINATION OF CARE:  Care Discussed with Consultants/Other Providers [Y/N]:  Prior or Outpatient Records Reviewed [Y/N]:

## 2025-07-02 NOTE — DIETITIAN INITIAL EVALUATION ADULT - ENERGY INTAKE
Poor (<50%) In-house pt reports fair intake, per nursing flow sheets pt consuming 26-50% of meals. Denies any nausea. Drinking Ensure Shakes. Encourage PO intake of protein-rich foods. Obtain food preferences, honor as able.

## 2025-07-03 NOTE — CONSULT NOTE ADULT - ATTENDING COMMENTS
The lesions on the legs appear to resolving/deflated bullae. I suspect this is due to shearing forces and/or underlying edema. No treatment is required at this time. Please inform dermatology if new lesions should develop.     The patient also notes itch on the ankle which appears eczematous in nature. This is likely LSC. May initiate triamcinolone for this. Will sign off. Please reconsult with any new concerns.      Darrick Hou MD, PharmD, MPH  Co-Director, Inpatient Dermatology Consultation Service, [facility], [facility]

## 2025-07-03 NOTE — CONSULT NOTE ADULT - SUBJECTIVE AND OBJECTIVE BOX
INCOMPLETE    HPI:  Patient is a 59-year-old female with a past medical history of GERD, hypothyroidism, HTN, WINNIE BSO refractory metastatic uterine cancer presenting with lower extremity edema.  Patient was recently admitted to [facility] where she was found to have portal vein thrombus started on Eliquis.  Patient was discharged 2 days ago.  Since discharge patient has noted worsening bilateral lower extremity edema right greater than left.  Patient also noting abdominal wall edema. Reports sensation of incomplete bladder emptying. Stated medical adherence to PO Eliquis. Denies fever, chills, chest pain, n/v/d, abd pain. no flank pain.     DERM HPI:   Rash developed last week after wearing rubber flip flops and putting on bacitracin.     Patient diagnosed with stage III MMT in 1/2021. She saw gyn and had imaging. She was seen by Dr. Bowen, who did the surgery. Final path showed STAGE III MMT. She was treated with Carbo/ Taxol- s/p one cycle , had reaction and switched to Carbo/ doxil x one does. She transferred her care to Dr. Nichols at [place].and then Carbo/ Abraxane/ Herceptin x 6 completed, last dose was in 8/2021  She was started on Herceptin maintenance, last dose was in Sept, 2022.  CT imaging 10/2022 showed hepatic lesion concerning for met.  Patient was enrolled in a phase II IMMUNOGEN study, and got Mirvetuximab. After four cycles, scan from 1/10/23 showed POD.  Pt started clinical trial with Sacituzumab, total 14 cycle, with progression of disease.  Started treatment with Enhertu.  She has been experiencing pain in her abdomen and pelvis, like a strong pulling sensation. Pain goes as high as 8/10 in intensity.   Following with palliative care.  Scans have shown disease progression and sent for palliative RT.  Pain regimen is PRN oxycodone 5mg q4hrs, she is using a dose 5-6 times per day. This provides adequate pain relief for ~4 hours. On gabapentin 100mg qHS. She is applying lidocaine 4% to her rectum twice daily which is helpful.  Appetite is stable, when the pain is severe she finds it difficult to eat.  Passing soft stool. Taken off a bowel regimen while inpatient recently due to diarrhea. Now complains of some rectal bleeding.  She has a urinary catheter for retention 2/2 obstruction.  Generalized weakness and fatigue, she requires a one person assist to ambulate to the bathroom.  Energy level is low. Mood is stable, finds herself irritable when she is in pain.    (29 Jun 2025 11:46)      PAST MEDICAL & SURGICAL HISTORY:  Endometriosis      Hypothyroid      Acid reflux      UTI (urinary tract infection)      Uterine cancer  1/2021      H/O ovarian cystectomy  2004      History of cholecystectomy  217      H/O abdominal hysterectomy  1/2021 @ [facility]        ROS:  As above    MEDICATIONS  (STANDING):  amoxicillin  875 milliGRAM(s)/clavulanate 1 Tablet(s) Oral two times a day  apixaban 5 milliGRAM(s) Oral two times a day  famotidine    Tablet 20 milliGRAM(s) Oral daily  gabapentin 100 milliGRAM(s) Oral at bedtime  levothyroxine Injectable 50 MICROGram(s) IV Push at bedtime  losartan 50 milliGRAM(s) Oral daily  naloxegol 12.5 milliGRAM(s) Oral daily  polyethylene glycol 3350 17 Gram(s) Oral two times a day  rosuvastatin 10 milliGRAM(s) Oral at bedtime  senna 2 Tablet(s) Oral at bedtime  sodium chloride 0.9%. 1000 milliLiter(s) (50 mL/Hr) IV Continuous <Continuous>    MEDICATIONS  (PRN):  acetaminophen     Tablet .. 650 milliGRAM(s) Oral every 6 hours PRN Temp greater or equal to 38C (100.4F), Mild Pain (1 - 3)  aluminum hydroxide/magnesium hydroxide/simethicone Suspension 30 milliLiter(s) Oral every 4 hours PRN Dyspepsia  lidocaine 5% Ointment 1 Application(s) Topical daily PRN for severe pain  melatonin 3 milliGRAM(s) Oral at bedtime PRN Insomnia  ondansetron Injectable 4 milliGRAM(s) IV Push every 8 hours PRN Nausea and/or Vomiting  oxyCODONE    IR 7.5 milliGRAM(s) Oral every 4 hours PRN Severe Pain (7 - 10)  simethicone 80 milliGRAM(s) Chew every 6 hours PRN Gas      Allergies    metronidazole (Hives; Rash)  Bactrim (Rash)    Intolerances        SOCIAL HISTORY:    FAMILY HISTORY:  Family history of diabetes mellitus    Family history of hypertension    Family history of hyperlipidemia    Family history of breast cancer in sister        Vital Signs Last 24 Hrs  T(C): 36.7 (03 Jul 2025 12:30), Max: 36.9 (02 Jul 2025 19:51)  T(F): 98.1 (03 Jul 2025 12:30), Max: 98.5 (02 Jul 2025 19:51)  HR: 109 (03 Jul 2025 12:30) (100 - 109)  BP: 112/74 (03 Jul 2025 12:30) (109/72 - 121/75)  BP(mean): --  RR: 18 (03 Jul 2025 12:30) (18 - 18)  SpO2: 97% (03 Jul 2025 12:30) (97% - 97%)    Parameters below as of 03 Jul 2025 12:30  Patient On (Oxygen Delivery Method): room air      PHYSICAL EXAM:   The patient was alert and in no apparent distress.  There was no visible lymphadenopathy.  Conjunctiva were non injected  There was no clubbing or edema of extremities.    Of note on skin exam:     LABS:                        10.8   14.26 )-----------( 357      ( 03 Jul 2025 07:10 )             36.2     07-03    129[L]  |  92[L]  |  10  ----------------------------<  155[H]  4.5   |  24  |  0.54    Ca    8.9      03 Jul 2025 10:41    TPro  5.9[L]  /  Alb  2.8[L]  /  TBili  0.6  /  DBili  x   /  AST  223[H]  /  ALT  56[H]  /  AlkPhos  780[H]  07-03      Urinalysis Basic - ( 03 Jul 2025 10:41 )    Color: x / Appearance: x / SG: x / pH: x  Gluc: 155 mg/dL / Ketone: x  / Bili: x / Urobili: x   Blood: x / Protein: x / Nitrite: x   Leuk Esterase: x / RBC: x / WBC x   Sq Epi: x / Non Sq Epi: x / Bacteria: x        RADIOLOGY & ADDITIONAL STUDIES: HPI:  Patient is a 59-year-old female with a past medical history of GERD, hypothyroidism, HTN, WINNIE BSO refractory metastatic uterine cancer presenting with lower extremity edema.  Patient was recently admitted to [facility] where she was found to have portal vein thrombus started on Eliquis.  Patient was discharged 2 days ago.  Since discharge patient has noted worsening bilateral lower extremity edema right greater than left.  Patient also noting abdominal wall edema. Reports sensation of incomplete bladder emptying. Stated medical adherence to PO Eliquis. Denies fever, chills, chest pain, n/v/d, abd pain. no flank pain.     DERM HPI:  Per pt, rash developed about 2 weeks ago around when started eliquis. Does endorse wearing flip flops however says that shes been wearing the same shoes for 1 month. Showed the rash to her oncologist who prescribed bacitracin. Pt used a few times. Rash is not itchy or bothersome. Also with another rash on R ankle that is itchy.     Patient diagnosed with stage III MMT in 1/2021. She saw gyn and had imaging. She was seen by Dr. Bowen, who did the surgery. Final path showed STAGE III MMT. She was treated with Carbo/ Taxol- s/p one cycle , had reaction and switched to Carbo/ doxil x one does. She transferred her care to Dr. Nichols at [place].and then Carbo/ Abraxane/ Herceptin x 6 completed, last dose was in 8/2021  She was started on Herceptin maintenance, last dose was in Sept, 2022.  CT imaging 10/2022 showed hepatic lesion concerning for met.  Patient was enrolled in a phase II IMMUNOGEN study, and got Mirvetuximab. After four cycles, scan from 1/10/23 showed POD.  Pt started clinical trial with Sacituzumab, total 14 cycle, with progression of disease.  Started treatment with Enhertu.  She has been experiencing pain in her abdomen and pelvis, like a strong pulling sensation. Pain goes as high as 8/10 in intensity.   Following with palliative care.  Scans have shown disease progression and sent for palliative RT.  Pain regimen is PRN oxycodone 5mg q4hrs, she is using a dose 5-6 times per day. This provides adequate pain relief for ~4 hours. On gabapentin 100mg qHS. She is applying lidocaine 4% to her rectum twice daily which is helpful.  Appetite is stable, when the pain is severe she finds it difficult to eat.  Passing soft stool. Taken off a bowel regimen while inpatient recently due to diarrhea. Now complains of some rectal bleeding.  She has a urinary catheter for retention 2/2 obstruction.  Generalized weakness and fatigue, she requires a one person assist to ambulate to the bathroom.  Energy level is low. Mood is stable, finds herself irritable when she is in pain.    (29 Jun 2025 11:46)      PAST MEDICAL & SURGICAL HISTORY:  Endometriosis      Hypothyroid      Acid reflux      UTI (urinary tract infection)      Uterine cancer  1/2021      H/O ovarian cystectomy  2004      History of cholecystectomy  217      H/O abdominal hysterectomy  1/2021 @ [facility]        ROS:  As above    MEDICATIONS  (STANDING):  amoxicillin  875 milliGRAM(s)/clavulanate 1 Tablet(s) Oral two times a day  apixaban 5 milliGRAM(s) Oral two times a day  famotidine    Tablet 20 milliGRAM(s) Oral daily  gabapentin 100 milliGRAM(s) Oral at bedtime  levothyroxine Injectable 50 MICROGram(s) IV Push at bedtime  losartan 50 milliGRAM(s) Oral daily  naloxegol 12.5 milliGRAM(s) Oral daily  polyethylene glycol 3350 17 Gram(s) Oral two times a day  rosuvastatin 10 milliGRAM(s) Oral at bedtime  senna 2 Tablet(s) Oral at bedtime  sodium chloride 0.9%. 1000 milliLiter(s) (50 mL/Hr) IV Continuous <Continuous>    MEDICATIONS  (PRN):  acetaminophen     Tablet .. 650 milliGRAM(s) Oral every 6 hours PRN Temp greater or equal to 38C (100.4F), Mild Pain (1 - 3)  aluminum hydroxide/magnesium hydroxide/simethicone Suspension 30 milliLiter(s) Oral every 4 hours PRN Dyspepsia  lidocaine 5% Ointment 1 Application(s) Topical daily PRN for severe pain  melatonin 3 milliGRAM(s) Oral at bedtime PRN Insomnia  ondansetron Injectable 4 milliGRAM(s) IV Push every 8 hours PRN Nausea and/or Vomiting  oxyCODONE    IR 7.5 milliGRAM(s) Oral every 4 hours PRN Severe Pain (7 - 10)  simethicone 80 milliGRAM(s) Chew every 6 hours PRN Gas      Allergies    metronidazole (Hives; Rash)  Bactrim (Rash)    Intolerances        SOCIAL HISTORY:    FAMILY HISTORY:  Family history of diabetes mellitus    Family history of hypertension    Family history of hyperlipidemia    Family history of breast cancer in sister        Vital Signs Last 24 Hrs  T(C): 36.7 (03 Jul 2025 12:30), Max: 36.9 (02 Jul 2025 19:51)  T(F): 98.1 (03 Jul 2025 12:30), Max: 98.5 (02 Jul 2025 19:51)  HR: 109 (03 Jul 2025 12:30) (100 - 109)  BP: 112/74 (03 Jul 2025 12:30) (109/72 - 121/75)  BP(mean): --  RR: 18 (03 Jul 2025 12:30) (18 - 18)  SpO2: 97% (03 Jul 2025 12:30) (97% - 97%)    Parameters below as of 03 Jul 2025 12:30  Patient On (Oxygen Delivery Method): room air      PHYSICAL EXAM:   The patient was alert and in no apparent distress.  There was no visible lymphadenopathy.  Conjunctiva were non injected  3+ pitting edema b/l    Of note on skin exam:   Dorsal R foot between 1st and 2nd toes with brown hyperpigmented plaque, no scale, non tender  LABS:                        10.8   14.26 )-----------( 357      ( 03 Jul 2025 07:10 )             36.2     07-03    129[L]  |  92[L]  |  10  ----------------------------<  155[H]  4.5   |  24  |  0.54    Ca    8.9      03 Jul 2025 10:41    TPro  5.9[L]  /  Alb  2.8[L]  /  TBili  0.6  /  DBili  x   /  AST  223[H]  /  ALT  56[H]  /  AlkPhos  780[H]  07-03      Urinalysis Basic - ( 03 Jul 2025 10:41 )    Color: x / Appearance: x / SG: x / pH: x  Gluc: 155 mg/dL / Ketone: x  / Bili: x / Urobili: x   Blood: x / Protein: x / Nitrite: x   Leuk Esterase: x / RBC: x / WBC x   Sq Epi: x / Non Sq Epi: x / Bacteria: x        RADIOLOGY & ADDITIONAL STUDIES: HPI:  Patient is a 59-year-old female with a past medical history of GERD, hypothyroidism, HTN, WINNIE BSO refractory metastatic uterine cancer presenting with lower extremity edema.  Patient was recently admitted to [facility] where she was found to have portal vein thrombus started on Eliquis.  Patient was discharged 2 days ago.  Since discharge patient has noted worsening bilateral lower extremity edema right greater than left.  Patient also noting abdominal wall edema. Reports sensation of incomplete bladder emptying. Stated medical adherence to PO Eliquis. Denies fever, chills, chest pain, n/v/d, abd pain. no flank pain.     DERM HPI:  Per pt, rash developed about 2 weeks ago around when started eliquis. Does endorse wearing flip flops however says that shes been wearing the same shoes for 1 month. Showed the rash to her oncologist who prescribed bacitracin. Pt used a few times. Rash is not itchy or bothersome. Also with another rash on R ankle that is itchy.     Patient diagnosed with stage III MMT in 1/2021. She saw gyn and had imaging. She was seen by Dr. Bowen, who did the surgery. Final path showed STAGE III MMT. She was treated with Carbo/ Taxol- s/p one cycle , had reaction and switched to Carbo/ doxil x one does. She transferred her care to Dr. Nichols at [place].and then Carbo/ Abraxane/ Herceptin x 6 completed, last dose was in 8/2021  She was started on Herceptin maintenance, last dose was in Sept, 2022.  CT imaging 10/2022 showed hepatic lesion concerning for met.  Patient was enrolled in a phase II IMMUNOGEN study, and got Mirvetuximab. After four cycles, scan from 1/10/23 showed POD.  Pt started clinical trial with Sacituzumab, total 14 cycle, with progression of disease.  Started treatment with Enhertu.  She has been experiencing pain in her abdomen and pelvis, like a strong pulling sensation. Pain goes as high as 8/10 in intensity.   Following with palliative care.  Scans have shown disease progression and sent for palliative RT.  Pain regimen is PRN oxycodone 5mg q4hrs, she is using a dose 5-6 times per day. This provides adequate pain relief for ~4 hours. On gabapentin 100mg qHS. She is applying lidocaine 4% to her rectum twice daily which is helpful.  Appetite is stable, when the pain is severe she finds it difficult to eat.  Passing soft stool. Taken off a bowel regimen while inpatient recently due to diarrhea. Now complains of some rectal bleeding.  She has a urinary catheter for retention 2/2 obstruction.  Generalized weakness and fatigue, she requires a one person assist to ambulate to the bathroom.  Energy level is low. Mood is stable, finds herself irritable when she is in pain.    (29 Jun 2025 11:46)      PAST MEDICAL & SURGICAL HISTORY:  Endometriosis      Hypothyroid      Acid reflux      UTI (urinary tract infection)      Uterine cancer  1/2021      H/O ovarian cystectomy  2004      History of cholecystectomy  217      H/O abdominal hysterectomy  1/2021 @ [facility]        ROS:  As above    MEDICATIONS  (STANDING):  amoxicillin  875 milliGRAM(s)/clavulanate 1 Tablet(s) Oral two times a day  apixaban 5 milliGRAM(s) Oral two times a day  famotidine    Tablet 20 milliGRAM(s) Oral daily  gabapentin 100 milliGRAM(s) Oral at bedtime  levothyroxine Injectable 50 MICROGram(s) IV Push at bedtime  losartan 50 milliGRAM(s) Oral daily  naloxegol 12.5 milliGRAM(s) Oral daily  polyethylene glycol 3350 17 Gram(s) Oral two times a day  rosuvastatin 10 milliGRAM(s) Oral at bedtime  senna 2 Tablet(s) Oral at bedtime  sodium chloride 0.9%. 1000 milliLiter(s) (50 mL/Hr) IV Continuous <Continuous>    MEDICATIONS  (PRN):  acetaminophen     Tablet .. 650 milliGRAM(s) Oral every 6 hours PRN Temp greater or equal to 38C (100.4F), Mild Pain (1 - 3)  aluminum hydroxide/magnesium hydroxide/simethicone Suspension 30 milliLiter(s) Oral every 4 hours PRN Dyspepsia  lidocaine 5% Ointment 1 Application(s) Topical daily PRN for severe pain  melatonin 3 milliGRAM(s) Oral at bedtime PRN Insomnia  ondansetron Injectable 4 milliGRAM(s) IV Push every 8 hours PRN Nausea and/or Vomiting  oxyCODONE    IR 7.5 milliGRAM(s) Oral every 4 hours PRN Severe Pain (7 - 10)  simethicone 80 milliGRAM(s) Chew every 6 hours PRN Gas      Allergies    metronidazole (Hives; Rash)  Bactrim (Rash)    Intolerances        SOCIAL HISTORY:    FAMILY HISTORY:  Family history of diabetes mellitus    Family history of hypertension    Family history of hyperlipidemia    Family history of breast cancer in sister        Vital Signs Last 24 Hrs  T(C): 36.7 (03 Jul 2025 12:30), Max: 36.9 (02 Jul 2025 19:51)  T(F): 98.1 (03 Jul 2025 12:30), Max: 98.5 (02 Jul 2025 19:51)  HR: 109 (03 Jul 2025 12:30) (100 - 109)  BP: 112/74 (03 Jul 2025 12:30) (109/72 - 121/75)  BP(mean): --  RR: 18 (03 Jul 2025 12:30) (18 - 18)  SpO2: 97% (03 Jul 2025 12:30) (97% - 97%)    Parameters below as of 03 Jul 2025 12:30  Patient On (Oxygen Delivery Method): room air      PHYSICAL EXAM:   The patient was alert and in no apparent distress.  There was no visible lymphadenopathy.  Conjunctiva were non injected  3+ pitting edema b/l    Of note on skin exam:   Dorsal R foot between 1st and 2nd toes with brown hyperpigmented plaque, no scale, non tender  LABS:                        10.8   14.26 )-----------( 357      ( 03 Jul 2025 07:10 )             36.2     07-03    129[L]  |  92[L]  |  10  ----------------------------<  155[H]  4.5   |  24  |  0.54    Ca    8.9      03 Jul 2025 10:41    TPro  5.9[L]  /  Alb  2.8[L]  /  TBili  0.6  /  DBili  x   /  AST  223[H]  /  ALT  56[H]  /  AlkPhos  780[H]  07-03      Urinalysis Basic - ( 03 Jul 2025 10:41 )    Color: x / Appearance: x / SG: x / pH: x  Gluc: 155 mg/dL / Ketone: x  / Bili: x / Urobili: x   Blood: x / Protein: x / Nitrite: x   Leuk Esterase: x / RBC: x / WBC x   Sq Epi: x / Non Sq Epi: x / Bacteria: x        RADIOLOGY & ADDITIONAL STUDIES: no additional relevant studies

## 2025-07-03 NOTE — CONSULT NOTE ADULT - ASSESSMENT
59F h/o GERD, hypothyroidism, HTN, WINNIE BSO refractory metastatic uterine cancer presenting with lower extremity edema, rectal pain with rectal bleeding. Palliative care called for goc.

## 2025-07-03 NOTE — PROGRESS NOTE ADULT - PROBLEM SELECTOR PLAN 1
RLE duplex negative for DVT   likely due to low albumin/poor nutrition   >>on augmentin > consider stopping after derm recs.   if no improvement, can consider diuretics  onc f/u  >     > 7/2/25 : worsening gray areas in right foot > called podiatry team for re-eval. Consider vascular eval. Also consulted dermatology team, F/U on their eval/recs - pending.   Monitor CBC, BMP.

## 2025-07-03 NOTE — PROGRESS NOTE ADULT - SUBJECTIVE AND OBJECTIVE BOX
[facility] Division of Hospital Medicine  Mychal Aguilar MD M-F, 8A-5P: MS Teams  Other Times: HIC Extension / HIC Pager      Patient is a 59y old  Female who presents with a chief complaint of LE edema (03 Jul 2025 13:55)      SUBJECTIVE / OVERNIGHT EVENTS:  Patient was examined today. Patient still complaining of rectal pain worse with defecation, she wants to talk to GI again for treatment options, still with right foot edema and discoloration, no other acute complaint on ROS.    ADDITIONAL REVIEW OF SYSTEMS:    MEDICATIONS  (STANDING):  amoxicillin  875 milliGRAM(s)/clavulanate 1 Tablet(s) Oral two times a day  apixaban 5 milliGRAM(s) Oral two times a day  famotidine    Tablet 20 milliGRAM(s) Oral daily  gabapentin 100 milliGRAM(s) Oral at bedtime  levothyroxine Injectable 50 MICROGram(s) IV Push at bedtime  losartan 50 milliGRAM(s) Oral daily  naloxegol 12.5 milliGRAM(s) Oral daily  polyethylene glycol 3350 17 Gram(s) Oral two times a day  rosuvastatin 10 milliGRAM(s) Oral at bedtime  senna 2 Tablet(s) Oral at bedtime  sodium chloride 0.9%. 1000 milliLiter(s) (50 mL/Hr) IV Continuous <Continuous>    MEDICATIONS  (PRN):  acetaminophen     Tablet .. 650 milliGRAM(s) Oral every 6 hours PRN Temp greater or equal to 38C (100.4F), Mild Pain (1 - 3)  aluminum hydroxide/magnesium hydroxide/simethicone Suspension 30 milliLiter(s) Oral every 4 hours PRN Dyspepsia  lidocaine 5% Ointment 1 Application(s) Topical daily PRN for severe pain  melatonin 3 milliGRAM(s) Oral at bedtime PRN Insomnia  ondansetron Injectable 4 milliGRAM(s) IV Push every 8 hours PRN Nausea and/or Vomiting  oxyCODONE    IR 7.5 milliGRAM(s) Oral every 4 hours PRN Severe Pain (7 - 10)  simethicone 80 milliGRAM(s) Chew every 6 hours PRN Gas      CAPILLARY BLOOD GLUCOSE          I&O's Summary    02 Jul 2025 07:01  -  03 Jul 2025 07:00  --------------------------------------------------------  IN: 600 mL / OUT: 1000 mL / NET: -400 mL    03 Jul 2025 07:01  -  03 Jul 2025 14:26  --------------------------------------------------------  IN: 0 mL / OUT: 300 mL / NET: -300 mL        Daily     Daily     PHYSICAL EXAM:  Vital Signs Last 24 Hrs  T(C): 36.7 (03 Jul 2025 12:30), Max: 36.9 (02 Jul 2025 19:51)  T(F): 98.1 (03 Jul 2025 12:30), Max: 98.5 (02 Jul 2025 19:51)  HR: 109 (03 Jul 2025 12:30) (100 - 109)  BP: 112/74 (03 Jul 2025 12:30) (109/72 - 121/75)  BP(mean): --  RR: 18 (03 Jul 2025 12:30) (18 - 18)  SpO2: 97% (03 Jul 2025 12:30) (97% - 97%)    Parameters below as of 03 Jul 2025 12:30  Patient On (Oxygen Delivery Method): room air      CONSTITUTIONAL: NAD,   EYES: PERRLA; conjunctiva and sclera clear  ENMT: Moist oral mucosa,  RESPIRATORY: Normal respiratory effort; lungs are clear to auscultation bilaterally  CARDIOVASCULAR: Regular rate and rhythm, normal S1 and S2, no murmur/rub/gallop;   No lower extremity edema; Peripheral pulses are 2+ bilaterally  ABDOMEN: mild tenderness to palpation in lower abd, normoactive bowel sounds,  MUSCULOSKELETAL:  moving all extremities   PSYCH: A+O to person, place, and time; affect appropriate  NEUROLOGY: CN 2-12 are intact and symmetric; no gross sensory/motor deficits   SKIN: right foot erythema, edema, gray discoloration areas        LABS:                        10.8   14.26 )-----------( 357      ( 03 Jul 2025 07:10 )             36.2     07-03    129[L]  |  92[L]  |  10  ----------------------------<  155[H]  4.5   |  24  |  0.54    Ca    8.9      03 Jul 2025 10:41    TPro  5.9[L]  /  Alb  2.8[L]  /  TBili  0.6  /  DBili  x   /  AST  223[H]  /  ALT  56[H]  /  AlkPhos  780[H]  07-03    LIVER FUNCTIONS - ( 03 Jul 2025 10:41 )  Alb: 2.8 g/dL / Pro: 5.9 g/dL / ALK PHOS: 780 U/L / ALT: 56 U/L / AST: 223 U/L / GGT: x                 Urinalysis Basic - ( 03 Jul 2025 10:41 )    Color: x / Appearance: x / SG: x / pH: x  Gluc: 155 mg/dL / Ketone: x  / Bili: x / Urobili: x   Blood: x / Protein: x / Nitrite: x   Leuk Esterase: x / RBC: x / WBC x   Sq Epi: x / Non Sq Epi: x / Bacteria: x        SARS-CoV-2: NotDetec (15 Rashawn 2025 13:16)      RADIOLOGY & ADDITIONAL TESTS:  Results Reviewed:   Imaging Personally Reviewed:  Electrocardiogram Personally Reviewed:    COORDINATION OF CARE:  Care Discussed with Consultants/Other Providers [Y/N]:  Prior or Outpatient Records Reviewed [Y/N]:

## 2025-07-03 NOTE — CONSULT NOTE ADULT - ASSESSMENT
___________________________  #Favor Allergic Contact Dermatitis   - Steroid ointment      The patient's chart was reviewed in addition to being seen and examined at bedside with the dermatology attending . _______________ .  Recommendations were communicated with the primary team.  Please page [phone] with a 10-digit call-back number for further related questions.    _______________ Thank you for allowing us to participate in the care of this patient.  Please alert Dermatology for any new or worsening developments.    Taylor Jain MD  Resident Physician, PGY-3  [facility] Dermatology  Pager: [phone]  Office: [phone] ___________________________  #Favor edema bulla   - morphogy of rash - flaccid bulla  - favor flaccid edema bulla given current CHF  exacerbation and leg swelling  - Advised that pt may devlop more blisters while edema is improving  - STOP bacitracin    #Atopic dermatitis, R ankle  - Please provide pt with triamcinolone 0.1% ointment to AA on R ankle, 2 weeks on/ 1 week    The patient's chart was reviewed in addition to being seen and examined at bedside with the dermatology attending Dr. Hou_ .  Recommendations were communicated with the primary team.  Please page [phone] with a 10-digit call-back number for further related questions.    _______________ Thank you for allowing us to participate in the care of this patient.  Please alert Dermatology for any new or worsening developments.    Taylor Jain MD  Resident Physician, PGY-3  [facility] Dermatology  Pager: [phone]  Office: [phone] ___________________________  #Favor edema bulla   - morphology of rash - flaccid bulla  - favor flaccid edema bulla given current CHF  exacerbation and leg swelling  - Advised that pt may develop more blisters while edema is improving  - STOP bacitracin    #Eczematous dermatitis, R ankle  - Please provide pt with triamcinolone 0.1% ointment to AA on R ankle, 2 weeks on/ 1 week    The patient's chart was reviewed in addition to being seen and examined at bedside with the dermatology attending Dr. Hou_ .  Recommendations were communicated with the primary team.  Please page [phone] with a 10-digit call-back number for further related questions.    _______________ Thank you for allowing us to participate in the care of this patient.  Please alert Dermatology for any new or worsening developments.    Taylor Jain MD  Resident Physician, PGY-3  [facility] Dermatology  Pager: [phone]  Office: [phone]

## 2025-07-03 NOTE — CONSULT NOTE ADULT - PROBLEM SELECTOR RECOMMENDATION 2
pt on oxycodone prn - 7.5mg q4 prn   pt required 5 doses in 24 hrs  pt does not want to adjust dosage of meds as she feels the current dose works well.  offered dosing for mod and severe pain but tp declined

## 2025-07-03 NOTE — CONSULT NOTE ADULT - PROBLEM SELECTOR RECOMMENDATION 4
[facility] ambulatory encounter  FAMILY PRACTICE ANNUAL PHYSICAL EXAM      CHIEF COMPLAINT:  Establish Care (Prior PCP Reva ) and Physical       HISTORY OF PRESENT ILLNESS:    Melo Sinclair is a pleasant 62year old female who presents today for establish care and routine physical.    Significant h/o allergies and sees specialist.  Few small concerns, reports father's side history of dementia and she is concerned if anything can be done to prevent it including certain medication, reports taking unisom for sleep and asking if this is ok? She denies any concern about her memory. Also reports many of her sisters having thyroid problem, would like to be checked. Fasting for labs today, wants to have cholesterol, mildly elevated in past, Bg was borderline high but normal A1C in 2015, would like to be checked, reports frequent urination and thirsty all the time, also has h/o frequent UTI, last episode 2 months ago, would like to be checked. PROBLEM LIST:    Patient Active Problem List   Diagnosis   â¢ Allergic rhinitis   â¢ Sleep disorder   â¢ Hyperlipidemia   â¢ Prediabetes   â¢ Environmental allergies   â¢ Extrinsic asthma   â¢ Bee sting allergy   â¢ Acute rhinosinusitis   â¢ Food allergy       REVIEW OF SYSTEMS:    Constitutional: Negative for fever, chills, rigors, night sweats, fatigue, change in appetite, acute weight loss, or weight gain. HEENT: Negative for eye redness, drainage or  Acute change in vision. No ear pain, tinnitus or acute drainage but history in past due to allergy. No sore throat, or painful swallowing. Respiratory: Negative for cough,stridor or shortness of breath. Positive for wheezing sometimes with allergy associated. Cardiovascular: Negative for chest pain, chest pressure or palpitations. No chest pain at rest or with exertion. No orthopnea.   Gastrointestinal: Negative for nausea, vomiting, diarrhea, constipation, abdominal pain, bloody in the stool, black or tarry stools. Genitourinary: Negative for dysuria,hematuria or flank pain. Negative for vaginal itching or discharge. Musculoskeletal: Negative for joint swelling or joint pain. Neurologic: Negative for change in sensory or motor function. Negative for headache, change in gait, vision or speech. No weakness, numbness or paresthesias. No dizziness at rest or with exertion. Endocrine: Negative for heat or cold intolerance, weight loss or gain. Hematological: Negative for bleeding, bruising or lymphadenopathy. Psychiatric: Negative for change in affect, anxiety, depression, mentation  But sleep disturbance and takes unisom for that. Past Medical History:   Diagnosis Date   â¢ Allergy    â¢ RAD (reactive airway disease)      Past Surgical History:   Procedure Laterality Date   â¢  SECTION, LOW TRANSVERSE      x2   â¢ HYSTERECTOMY     â¢ LEG SURGERY     â¢ ROTATOR CUFF REPAIR Right 2015     ALLERGIES:   Allergen Reactions   â¢ Cat Dander SHORTNESS OF BREATH   â¢ Dog Dander SHORTNESS OF BREATH   â¢ Dust ANAPHYLAXIS   â¢ Horse Allergy ANAPHYLAXIS   â¢ Mold SHORTNESS OF BREATH   â¢ Biaxin [Clarithromycin] RASH     Current Outpatient Prescriptions   Medication Sig Dispense Refill   â¢ fluticasone (ARNUITY ELLIPTA) 200 MCG/ACT inhaler Inhale 1 puff into the lungs daily. 30 each 3   â¢ fexofenadine (ALLEGRA) 180 MG tablet Take 1 tablet by mouth daily. Please call office to schedule appointment before further refills 30 tablet 0   â¢ olopatadine (PATANASE) 0.6 % nasal spray Spray 1 spray in each nostril 2 times daily as needed (for nasal congestion). Please call office for appointment 1 Bottle 0   â¢ azelastine (ASTELIN) 0.1 % nasal spray Spray 1 spray in each nostril 2 times daily. 30 mL 3   â¢ mometasone (NASONEX) 50 MCG/ACT nasal spray Spray 1-2 sprays in each nostril daily. 1 Bottle 3   â¢ albuterol 108 (90 Base) MCG/ACT inhaler Inhale 2 puffs into the lungs every 4 hours as needed for Shortness of Breath or Wheezing.  Please call "office to schedule appointment 1 Inhaler 0   â¢ olopatadine (PATADAY) 0.2 % ophthalmic solution Place 1 drop into both eyes daily. 1 Bottle 1   â¢ EPINEPHrine (EPIPEN 2-JORGE) 0.3 MG/0.3ML Solution Auto-injector Inject 0.3 mLs into the muscle once as needed for Anaphylaxis (per the anaphylaxis action plan). 1 each 1   â¢ Spacer/Aero Chamber [employer] Use with QVAR inhaler 1 Device 0   â¢ albuterol (VENTOLIN) (2.5 MG/3ML) 0.083% nebulizer solution Take 3 mLs by nebulization every 6 hours as needed for Wheezing. 90 mL 0     No current facility-administered medications for this visit. Social History     Social History   â¢ Marital status:      Spouse name: N/A   â¢ Number of children: N/A   â¢ Years of education: N/A     Occupational History   â¢ Not on file. Social History Main Topics   â¢ Smoking status: Former Smoker     Quit date: 11/23/1983   â¢ Smokeless tobacco: Never Used   â¢ Alcohol use 0.0 oz/week      Comment: Occasional   â¢ Drug use: No   â¢ Sexual activity: Yes     Partners: Male     Birth control/ protection: Surgical     Other Topics Concern   â¢ Not on file     Social History Narrative   â¢ No narrative on file     Family History   Problem Relation Age of Onset   â¢ Dementia/Alzheimers Father    â¢ Hypertension Father    â¢ Thyroid Sister    â¢ Heart disease Mother          PHYSICAL EXAM:    Visit Vitals  /88 (BP Location: [facility], Patient Position: Sitting, Cuff Size: Large Adult)   Pulse 68   Resp 18   Ht 5' 4.5"" (1.638 m)   Wt 88.9 kg   BMI 33.12 kg/mÂ²       General Appearance:  Well developed, well nourished, well appearing female in no acute distress. Easily on and off the table and talking in full sentences. HEENT:  Normocephalic, atraumatic. PERRLA, EOMI and full. Conjunctivae normal. No scleral icterus. External ears normal, hearing grossly intact, bilateral tympanic membranes are gray with no effusions and normal landmarks. Nasal mucosa is pink without polyps or lesions. No rhinorrhea.  " Oropharynx moist with no masses or lesions, tonsils 1+ with no pharyngeal exudates. Lips, teeth, and gums normal.  Neck: Supple, trachea midline with no anterior, posterior, supraclavicular or submandibular lymphadenopathy. No palpable thyromegaly. No carotid bruit or jugular venous distention. Respiratory:  Lungs are clear to auscultation bilaterally throughout all lobes. No wheezing, rhonchi, or crackles. No stridor. No increased work of breathing, retractions or accessory muscle use. Cardiovascular:  Normal rate and rhythm. Normal S1 and S2. No S3-S4, murmur, gallop, click or rub. No clubbing or cyanosis appreciated. Capillary refill time less than 2 seconds. Gastrointestinal:  Soft, nontender, nondistended, with no rebound guarding or rigidity. No hepatosplenomegaly or masses. Abdominal aorta nonpalpable. Bowel sounds 2+. Genitourinary: deferred  Musculoskeletal: Moves upper and lower extremities strength 5 out of 5. No edema, no tenderness, no deformities. No lower extremity edema appreciated. Bilateral radial, posterior tibial and dorsalis pedis pulses normal.    Integument:  Well hydrated with normal skin turgor, no rash or lesions. Lymphatic:  No lymphadenopathy noted. Neurologic:  Alert & oriented x 3, CN II-XII intact, Normal smooth coordinated gait. Deep tendon reflexes +2 and symmetric bilaterally, sensation intact. Normal motor function, no focal deficits noted. Psychiatric:  Affect normal. Speech is normal and non-pressured. Memory, cognition, and judgment appear intact. Behavior appropriate. LABORATORY DATA:    All pertinent laboratory results were reviewed. IMAGING STUDIES:    N/A    ASSESSMENT:    1. Annual physical exam    - COMPREHENSIVE METABOLIC PANEL; Future    2. Hyperlipidemia, unspecified hyperlipidemia type    - LIPID PANEL WITH REFLEX; Future    3. Prediabetes    - COMPREHENSIVE METABOLIC PANEL; Future  - GLYCOHEMOGLOBIN; Future    4.  Seasonal allergic rhinitis due to pollen, unspecified chronicity    - CBC & AUTO DIFFERENTIAL; Future    5. Screening for endocrine, nutritional, metabolic and immunity disorder    - COMPREHENSIVE METABOLIC PANEL; Future  - CBC & AUTO DIFFERENTIAL; Future  - THYROID STIMULATING HORMONE; Future    6. Frequent urination    - URINALYSIS & REFLEX MICRO WITH CULTURE IF INDICATED; Future    7. Need for hepatitis C screening test    - HEPATITIS C ANTIBODY WITH REFLEX; Future       ASSESSMENT/PLAN:    Reassuring exam, labs as above. Discussed health maintenance, including regular aerobic exercise, low fat diet, and periodic exams. Screening mammogram ordered as above, per NCI breast risk calculator her 6 yrs  risk is 2.1% in comparison to  average woman risk is 1.7%. Follow up as needed      COUNSELING:    Aspirin 81 mg Woman Age 49-69 when benefits exceed risks - not needed    Colon Cancer Screen age 52-65 (A Grade) 77-80 ( C Grade) - up to date  Breast cancer screen age 40-49(C grade) age 55-75 ( B grade) -up to date  Cervical cancer screening - not needed due to hysterectomy for benign reasons. Cholesterol Screen age 39 and Older at increased risk of Coronary Heart Disease (CHD)  (A) or Age 23-37 if increased risk of CHD  (B)  (Risk factors for CHD include diabetes, history of previous CHD or atherosclerosis, family history of cardiovascular disease, tobacco use, hypertension, and obesity (body mass index > 30 kg/m)   ordered  Counseling for diet for adults with hyperlipidemia or CV(cardiovascular) risk factors -done  Hepatitis C screen adults born 65-69 -ordered        Instructions provided as documented in the after visit summary. Pt requiring full supportive care with all ADL's

## 2025-07-03 NOTE — CONSULT NOTE ADULT - PROBLEM SELECTOR RECOMMENDATION 9
as per primary team-  Oncology follow-up outpatient   Seen by radiation oncology for palliative radiation  No additional chemo at this time.  Pain control with oxycodone prn - 7.5mg q4 prn

## 2025-07-03 NOTE — CONSULT NOTE ADULT - SUBJECTIVE AND OBJECTIVE BOX
Time and date of service: 07-03-25 @ 11:15     services offered but pt and  declined.  Ami and Mauricio (pts dtr) on the phone as request of pt to translate.     HPI:  Patient is a 59-year-old female with a past medical history of GERD, hypothyroidism, HTN, WINNIE BSO refractory metastatic uterine cancer presenting with lower extremity edema.  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  Patient was discharged 2 days ago.  Since discharge patient has noted worsening bilateral lower extremity edema right greater than left.  Patient also noting abdominal wall edema. Reports sensation of incomplete bladder emptying. Stated medical adherence to PO Eliquis. Denies fever, chills, chest pain, n/v/d, abd pain. no flank pain.     Patient diagnosed with stage III MMT in 1/2021. She saw gyn and had imaging. She was seen by Dr. Bowen, who did the surgery. Final path showed STAGE III MMT. She was treated with Carbo/ Taxol- s/p one cycle , had reaction and switched to Carbo/ doxil x one does. She transferred her care to Dr. Nichols at [place].and then Carbo/ Abraxane/ Herceptin x 6 completed, last dose was in 8/2021  She was started on Herceptin maintenance, last dose was in Sept, 2022.  CT imaging 10/2022 showed hepatic lesion concerning for met.  Patient was enrolled in a phase II IMMUNOGEN study, and got Mirvetuximab. After four cycles, scan from 1/10/23 showed POD.  Pt started clinical trial with Sacituzumab, total 14 cycle, with progression of disease.  Started treatment with Enhertu.  She has been experiencing pain in her abdomen and pelvis, like a strong pulling sensation. Pain goes as high as 8/10 in intensity.   Following with palliative care.  Scans have shown disease progression and sent for palliative RT.  Pain regimen is PRN oxycodone 5mg q4hrs, she is using a dose 5-6 times per day. This provides adequate pain relief for ~4 hours. On gabapentin 100mg qHS. She is applying lidocaine 4% to her rectum twice daily which is helpful.  Appetite is stable, when the pain is severe she finds it difficult to eat.  Passing soft stool. Taken off a bowel regimen while inpatient recently due to diarrhea. Now complains of some rectal bleeding.  She has a urinary catheter for retention 2/2 obstruction.  Generalized weakness and fatigue, she requires a one person assist to ambulate to the bathroom.  Energy level is low. Mood is stable, finds herself irritable when she is in pain.    (29 Jun 2025 11:46)    PERTINENT PM/SXH:   No pertinent past medical history    Endometriosis    Hypothyroid    Acid reflux    UTI (urinary tract infection)    Uterine cancer      No significant past surgical history    H/O ovarian cystectomy    History of cholecystectomy    H/O abdominal hysterectomy      FAMILY HISTORY:  Family history of diabetes mellitus    Family history of hypertension    Family history of hyperlipidemia    Family history of breast cancer in sister      Family Hx substance abuse [ ]yes [x ]no    ITEMS NOT CHECKED ARE NOT PRESENT    SOCIAL HISTORY:   Significant other/partner[x ]  Children[x ]  Anabaptism/Spirituality:  Substance hx:  [ ]   Tobacco hx:  [ ]   Alcohol hx: [ ]   Home Opioid hx:  [ ] I-Stop Reference No:  Living Situation: [ ]Home  [ ]Long term care  [ ]Rehab [ ]Other    ADVANCE DIRECTIVES:    DNR/MOLST  [ ]  Living Will  [ ]   DECISION MAKER(s):  [ ] Health Care Proxy(s)  [ x] Surrogate(s)  [ ] Guardian           Name(s): Phone Number(s):Doorman [phone]    BASELINE (I)ADL(s) (prior to admission):  [place]: [ ]Total  [x ] Moderate [ ]Dependent    Allergies    metronidazole (Hives; Rash)  Bactrim (Rash)    Intolerances    MEDICATIONS  (STANDING):  amoxicillin  875 milliGRAM(s)/clavulanate 1 Tablet(s) Oral two times a day  apixaban 5 milliGRAM(s) Oral two times a day  famotidine    Tablet 20 milliGRAM(s) Oral daily  gabapentin 100 milliGRAM(s) Oral at bedtime  levothyroxine Injectable 50 MICROGram(s) IV Push at bedtime  losartan 50 milliGRAM(s) Oral daily  naloxegol 12.5 milliGRAM(s) Oral daily  polyethylene glycol 3350 17 Gram(s) Oral two times a day  rosuvastatin 10 milliGRAM(s) Oral at bedtime  senna 2 Tablet(s) Oral at bedtime  sodium chloride 0.9%. 1000 milliLiter(s) (50 mL/Hr) IV Continuous <Continuous>    MEDICATIONS  (PRN):  acetaminophen     Tablet .. 650 milliGRAM(s) Oral every 6 hours PRN Temp greater or equal to 38C (100.4F), Mild Pain (1 - 3)  aluminum hydroxide/magnesium hydroxide/simethicone Suspension 30 milliLiter(s) Oral every 4 hours PRN Dyspepsia  lidocaine 5% Ointment 1 Application(s) Topical daily PRN for severe pain  melatonin 3 milliGRAM(s) Oral at bedtime PRN Insomnia  ondansetron Injectable 4 milliGRAM(s) IV Push every 8 hours PRN Nausea and/or Vomiting  oxyCODONE    IR 7.5 milliGRAM(s) Oral every 4 hours PRN Severe Pain (7 - 10)  simethicone 80 milliGRAM(s) Chew every 6 hours PRN Gas    PRESENT SYMPTOMS: [ ]Unable to self-report  [ ] CPOT [ ] PAINADs [ ] RDOS  Source if other than patient:  [ ]Family   [ ]Team     Pain: [x ]yes [ ]no  QOL impact -   Location -     rectal               Aggravating factors - gas, passing bm  Quality - sharp  Radiation -  Timing- intermittent  Severity (0-10 scale):  Minimal acceptable level (0-10 scale):     CPOT:    https://www.sccm.org/getattachment/czc97u35-7a4o-2u2f-4j2x-8956s2706k6p/Critical-Care-Pain-Observation-Tool-(CPOT)    PAIN AD Score:   http://geriatrictoolkit.[facility]/cog/painad.pdf (press ctrl +  left click to view)    Dyspnea:                           [ ]Mild [ ]Moderate [ ]Severe      RDOS:  0 to 2  minimal or no respiratory distress   3  mild distress  4 to 6 moderate distress  >7 severe distress  https://homecareinformation.net/handouts/hen/Respiratory_Distress_Observation_Scale.pdf (Ctrl +  left click to view)     Anxiety:                             [ ]Mild [ ]Moderate [ ]Severe  Fatigue:                             [ ]Mild [ ]Moderate [ ]Severe  Nausea:                            [ ]Mild [ ]Moderate [ ]Severe  Loss of appetite:               [ ]Mild [ ]Moderate [ ]Severe  Constipation:                    [ ]Mild [ ]Moderate [ ]Severe    PCSSQ[Palliative Care Spiritual Screening Question]   Severity (0-10):  Score of 4 or > indicate consideration of Chaplaincy referral.  Chaplaincy Referral: [ ] yes [ ] refused [ ] following [ x] Deferred     Caregiver [place]? : [ ] yes [ ] no [ x] Deferred [ ] Declined             Social work referral [ ] Patient & Family Centered Care Referral [ ]     Anticipatory Grief present?:  [ ] yes [ ] no  [ x] Deferred                  Social work referral [ ] Chaplaincy Referral[ ]      Other Symptoms:  [x ]All other review of systems negative     Palliative Performance Status Version 2:     40-50    %    http://[facility].org/files/news/palliative_performance_scale_ppsv2.pdf  PHYSICAL EXAM:  Vital Signs Last 24 Hrs  T(C): 36.7 (03 Jul 2025 04:33), Max: 36.9 (02 Jul 2025 19:51)  T(F): 98.1 (03 Jul 2025 04:33), Max: 98.5 (02 Jul 2025 19:51)  HR: 109 (03 Jul 2025 04:33) (100 - 109)  BP: 109/72 (03 Jul 2025 04:33) (109/72 - 128/74)  BP(mean): --  RR: 18 (03 Jul 2025 04:33) (18 - 18)  SpO2: 97% (03 Jul 2025 04:33) (96% - 97%)    Parameters below as of 03 Jul 2025 04:33  Patient On (Oxygen Delivery Method): room air     I&O's Summary    02 Jul 2025 07:01  -  03 Jul 2025 07:00  --------------------------------------------------------  IN: 600 mL / OUT: 1000 mL / NET: -400 mL      GENERAL: [ ]Cachexia    [x ]Alert  [x ]Oriented x   [ ]Lethargic  [ ]Unarousable  [x ]Verbal  [ ]Non-Verbal  Behavioral:   [ ] Anxiety  [ ] Delirium [ ] Agitation [ ] Other  HEENT:  x ]Normal   [ ]Dry mouth   [ ]ET Tube/Trach  [ ]Oral lesions  PULMONARY:   [x ]Clear [ ]Tachypnea  [ ]Audible excessive secretions   [ ]Rhonchi        [ ]Right [ ]Left [ ]Bilateral  [ ]Crackles        [ ]Right [ ]Left [ ]Bilateral  [ ]Wheezing     [ ]Right [ ]Left [ ]Bilateral  [ ]Diminished breath sounds [ ]right [ ]left [ ]bilateral  CARDIOVASCULAR:    [x ]Regular [ ]Irregular [ ]Tachy  [ ]Conner [ ]Murmur [ ]Other  GASTROINTESTINAL:  [x ]Soft  [ ]Distended   [ x]+BS  [x ]Non tender [ ]Tender  [ ]Other [ ]PEG [ ]OGT/ NGT  Last BM:  GENITOURINARY:  [x]Normal [ ] Incontinent   [ ]Oliguria/Anuria   [ ]Parikh  MUSCULOSKELETAL:   [x ]Normal   [ ]Weakness  [ ]Bed/Wheelchair bound [ ]Edema  NEUROLOGIC:   [ x]No focal deficits  [ ]Cognitive impairment  [ ]Dysphagia [ ]Dysarthria [ ]Paresis [ ]Other   SKIN:   [ ]Normal  [ ]Rash  [ ]Other  [ ]Pressure ulcer(s)       Present on admission [ ]y [ ]n    CRITICAL CARE:  [ ] Shock Present  [ ]Septic [ ]Cardiogenic [ ]Neurologic [ ]Hypovolemic  [ ]  Vasopressors [ ]  Inotropes   [ ]Respiratory failure present [ ]Mechanical ventilation [ ]Non-invasive ventilatory support [ ]High flow    [ ]Acute  [ ]Chronic [ ]Hypoxic  [ ]Hypercarbic [ ]Other  [ ]Other organ failure     LABS:                        10.8   14.26 )-----------( 357      ( 03 Jul 2025 07:10 )             36.2   07-03    129[L]  |  92[L]  |  10  ----------------------------<  155[H]  4.5   |  24  |  0.54    Ca    8.9      03 Jul 2025 10:41    TPro  5.9[L]  /  Alb  2.8[L]  /  TBili  0.6  /  DBili  x   /  AST  223[H]  /  ALT  56[H]  /  AlkPhos  780[H]  07-03      Urinalysis Basic - ( 03 Jul 2025 10:41 )    Color: x / Appearance: x / SG: x / pH: x  Gluc: 155 mg/dL / Ketone: x  / Bili: x / Urobili: x   Blood: x / Protein: x / Nitrite: x   Leuk Esterase: x / RBC: x / WBC x   Sq Epi: x / Non Sq Epi: x / Bacteria: x      RADIOLOGY & ADDITIONAL STUDIES:   < from: CT Thoracic Spine Reform w/ IV Cont (06.29.25 @ 05:11) >  ACC: [id number] EXAM:  CT REFORM SPINE L IC   ORDERED BY: MERLE CAMILO     ACC: [id number] EXAM:  CT REFORM SPINE T  IC   ORDERED BY: MERLE CAMILO     ACC: [id number] EXAM:  CT CERVICAL SPINE   ORDERED BY: MERLE CAMILO     PROCEDURE DATE:  06/29/2025          INTERPRETATION:  Three examinations were performed:  1.  CT cervical spine without intravenous contrast  2.  CT thoracic spine without intravenous contrast  3.  CT lumbar spine without intravenous contrast      CLINICAL INFORMATION:    Metastatic ca, sudden onset back pain MCT    TECHNIQUE:  Contiguous axial 1 mm sections were obtained through the   cervical using a single helical acquisition. Contiguous axial 1 mm   sections were obtained through the thoracic and lumbar spine as a second   helical acquisition. Sagittal and coronal images were reconstructed each   this data set.    DOSE INFORMATION:   This scan was performed using automatic exposure   control (radiation dose reduction software) to obtain a diagnostic image   quality scan with patient dose as low as reasonably achievable.   Total   DLP for this examination is estimated at 998 mGy-cm.    COMPARISON:   Concurrent body CT    FINDINGS:    OSSEOUS STRUCTURES    Cervical vertebral alignment is intact.  Thoracic and lumbar vertebral   alignment demonstrates shallow idiopathic scoliosis. This is dextroconvex   in the upper thoracic spine and levoconvex near the thoracic lumbar   junction..  Cervical thoracic and lumbar vertebral body heights are   maintained.  Vertebral bodies and posterior elements appear intact   without destructive bone lesion.    No osseous expansion or epidural   disease is found.  Paraspinal soft tissues appear intact.    INTERVERTEBRAL DISCS    Cervical thoracicand lumbar intervertebral discs maintain intact disc   heights. Small marginal osteophytes are evident in the mid cervical spine.    In the cervical spine disc material and ventral canal osteophytes result   in deformity of the ventral thecal sac most prominently at C5-C6 to the   left of midline. This likely contacts and may mildly deform the ventral   cord surface. There is lesser involvement at the adjacent intervertebral   disc levels, absent above and below. Degenerative foraminal compromise  from disc material and uncovertebral joint osteophytes is also low-grade.    In the thoracic spine no significant degenerative central canal or   foraminal compromise is recognized.    In the lumbar spine no significant degenerative central canal compromise   is recognized. Degenerative foraminal compromise from disc bulging and   facet arthropathy is mild-to-moderate at L4-L5, elsewhere low-grade are   absent. At L5 there is sclerosis at the base of each inferior   articulating facet with suggestion of incomplete spondylolysis on the   left.    ADDITIONAL FINDINGS:   Enlarged lymph nodes are present. Pulmonary   nodules are present.. Hepatic metastases are present. Adrenal metastases   are present. Left-sided pelvic mass is present.      IMPRESSION:    1.  CERVICAL SPINE:  No acute cervical vertebral fracture.   No cervical   metastasis. Low-grade cervical degenerative disc disease    2.  THORACIC SPINE:  No acute thoracic vertebral fracture.  No thoracic   metastasis. No significant thoracic degenerative disc disease    3.  LUMBAR SPINE:  No acute lumbar vertebral fracture.   No lumbar   metastasis. No significant lumbar degenerative disc disease    4. See separate body CT report for additional findings    --- End of Report ---           KALPESH LINDSEY MD; Resident Radiologist  This document has been electronically signed.  JACKSON SMILEY MD; Attending Radiologist  This document has been electronically signed. Jun 29 2025  8:13AM    < end of copied text >    < from: CT Abdomen and Pelvis w/ IV Cont (06.29.25 @ 05:10) >  ACC: [id number] EXAM:  CT ABDOMEN AND PELVIS IC   ORDERED BY: MERLE CAMILO     ACC: [id number] EXAM:  CT CHEST IC   ORDERED BY: MERLE CAMILO     PROCEDURE DATE:  06/29/2025          INTERPRETATION:  CLINICAL INFORMATION: Metastatic cancer with back pain    COMPARISON: CTA chest 4/16/2025, CT abdomen pelvis 6/12/2025.    CONTRAST/COMPLICATIONS:  IV Contrast: Omnipaque 350  80 cc administered   20 cc discarded  Oral Contrast: NONE  .    PROCEDURE:  CT of the Chest, Abdomen and Pelvis was performed.  Sagittal and coronal reformats were performed.      FINDINGS:    CHEST:    LUNGS AND LARGE AIRWAYS: Patent central airways. Diffuse metastatic   lesions throughout both lungs increased in size and number compared to   prior.  PLEURA: New pleural metastases. For reference a right pleural mass   measures 2.1 x 1.1 cm (301:78).  VESSELS: Within normal limits.  HEART: Heart size is normal. No pericardial effusion.  MEDIASTINUM AND EMILY: New hilar lymphadenopathy with multiple largelow   density right hilar nodes. For reference the largest node measures 2.0 x   2.6 cm (301:49). There is mass effect on the pulmonary vasculature   without evidence of invasion.  CHEST WALL AND LOWER NECK: Diffuse supraclavicular and cervical   lymphadenopathy for reference a left supraclavicular node measures 2.1 x   1.8 cm (301:11).    ABDOMEN AND PELVIS:    LIVER: Diffuse heterogenous lesions throughout the liver, increased in   size and number compared to prior. 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  BILE DUCTS: Normal caliber.  GALLBLADDER: Not visualized.  SPLEEN: Within normal limits.  PANCREAS: Within normal limits.  ADRENALS: Within normal limits.  KIDNEYS/URETERS: Moderate left hydronephrosis the of a 5.4 x 4.1 cm   heterogenous mass with associated clips. Moderate left hydronephrosis to   the level of multiple bulky right iliac lymph nodes. Mild right   urothelial thickening/enhancement. Symmetric renal enhancement.    BLADDER: Parikh catheter. Bladder wall thickening versus underdistention.  REPRODUCTIVE ORGANS: Hysterectomy.    BOWEL: No bowel obstruction. Appendix is not visualized. Redemonstrated   wall thickening of the sigmoid colon and rectum at the sites of masses   described below. Large centrally necrotic mass along the right side of   the mesorectum appears to directly invade the adjacent rectal wall, lower   rectum, with the mass measuring up to 4.6 cm x 4.4 cm. Other smaller   masses are seen within the mesorectal and anterior peritoneal reflection;   also consistent with metastatic disease.  PERITONEUM/RETROPERITONEUM: 5.4 x 4.1 cm heterogenous mass within the   pelvis with associated clips. Mass is inseparable from the sigmoid colon.   Right perirectal mass measuring4.2 x 4.7 cm that is inseparable from   from and has mass effect on the rectum. There is associated rectal wall   thickening. Similar mesenteric lymphadenopathy. Similar mesenteric   lymphadenopathy. Small ascites.  VESSELS: Compression and tumor thrombus within the right common iliac   vein (301:184).  LYMPH NODES: Diffuse lymphadenopathy along the pelvic sidewall, iliac   chain, and throughout the retroperitoneum. For reference previously   described left common iliac node measures 2.0 x 2.4 cm(301:160).  ABDOMINAL WALL: Small ventral wall hernia containing bowel.  BONES: Degenerative changes. See dedicated spine report for findings of   the spine.      IMPRESSION:    1.  Moderate bilateral hydronephrosis to the level of enlarged iliac   chain nodes on the right and pelvic mass on the left.  2.  Bladder wall thickening versus underdistention. Correlate for   cystitis.  3.  Mild right urothelial thickening/enhancement which may be related to   ascending infection or metastatic disease.  4.  Additional findings related to worsening diffuse metastatic disease,   as above.          --- End of Report ---           KALPESH LINDSEY MD; Resident Radiologist  This document has been electronically signed.  NAT LORENZO MD; Attending Radiologist  This document has been electronically signed. Jun 29 2025  8:32AM    < end of copied text >      PROTEIN CALORIE MALNUTRITION PRESENT: [ ]mild [ ]moderate [ ]severe [ ]underweight [ ]morbid obesity  https://www.andeal.org/vault/2440/web/files/ONC/Table_Clinical%20Characteristics%20to%20Document%20Malnutrition-White%20JV%20et%20al%202012.pdf    Height (cm): 165.1 (06-29-25 @ 02:37), 165.1 (06-20-25 @ 18:46), 165.1 (06-13-25 @ 18:06)  Weight (kg): 59.9 (06-29-25 @ 02:37), 62 (06-25-25 @ 09:00), 63.5 (06-20-25 @ 18:46)  BMI (kg/m2): 22 (06-29-25 @ 02:37), 22.7 (06-25-25 @ 09:00), 23.3 (06-20-25 @ 18:46)    [ ]PPSV2 < or = to 30% [ ]significant weight loss  [ ]poor nutritional intake  [ ]anasarca[ ]Artificial Nutrition      Other REFERRALS:  [ ]Hospice  [ ]Child Life  [ ]Social Work  [ ]Case management [ ]Holistic Therapy     Goals of Care Document:

## 2025-07-03 NOTE — PROGRESS NOTE ADULT - PROBLEM SELECTOR PLAN 3
Oncology follow-up outpatient   Seen by radiation oncology for palliative radiation  No additional chemo at this time.  Pain control with oxycodone prn - 7.5mg q4 prn  >      > 7/2/25: consulted placed for palliative team as pain still uncontrolled, f/u on their recs  - 7/3/25> patient requested for GI re-eval to discuss options, called GI team they defer to palliative for further management.

## 2025-07-03 NOTE — CONSULT NOTE ADULT - NS ATTEST RISK PROBLEM GEN_ALL_CORE FT
1. Number and complexity of problems addressed for this patient:    1.1 Moderate (At least 1)  [ ] 1 or more chronic illnesses with exacerbation, progression, or side effects of treatment  [ ] 2 or more stable chronic illnesses  [x ] 1 undiagnosed new problem with uncertain prognosis  [ ] 1 acute illness with systemic symptoms  [ ] 1 acute complicated injury  1.2 High (At least 1)   [x ] 1 or more chronic illnesses with severe exacerbation, progression, or side effects of treatment  [ ] 1 acute or chronic illnesses or injuries that may pose a threat to life or bodily function    2. Amount and/or Complexity of Data that was Reviewed and Analyzed for this case:       Moderate (1 out of 3)       High (2 out of 3)  2.1. (Any combination of 3 of the following)   [ ] Prior External notes were reviewed  [ ] Each test result was reviewed (see "LABS" and "RADIOLOGY & ADDITIONAL STUDIES" above)  [ x] The following tests were ordered and/or reviewed (Only count 1 point for ordering or reviewing a unique test):  	[x ]CBC  	[x ] Chemistry   	[ x] Imaging   	[ ] Other:   [ ] Assessment requiring an independent historian   		Name of historian and relationship:   2.2  [ ] Personally review and interpretation of  image or testing   2.3  [ ] Discussion of management or test interpretation with external physician/other qualified health care professional\appropriate source (not separately reported)    3. Risk of Complications and/or Morbidity or Mortality of for this Patient’s Management:  3.1 Moderate risk of morbidity from additional diagnostic testing or treatment (At least 1):   [ x] Prescription drug management   [ ] Decision regarding minor surgery, treatment, or procedure with identified patient or procedure risk factors  [ ] Decision regarding elective major surgery, treatment, or procedure without identified patient or procedure risk factors   [ ] Diagnosis or treatment significantly limited by social determinants of health   [ ] Other:   3.2 High risk of morbidity from additional diagnostic testing or treatment (At least 1):   [x] Drug therapy requiring intensive monitoring for toxicity   [ ] Decision regarding elective major surgery, treatment, or procedure with identified patient or procedure risk factors   [ ] Decision regarding emergency major surgery, treatment, or procedure   [ ] Decision regarding hospitalization or escalation of hospital-level of care  [ ] Decision not to resuscitate, not to intubate, or to de-escalate care because of poor prognosis   [ ] Decision to proceed or not with artificial nutrition   [ ] Parenteral controlled substance  [ ] Other:

## 2025-07-03 NOTE — CONSULT NOTE ADULT - NSCONSULTADDITIONALINFOA_GEN_ALL_CORE
Adelina Marin, ANP-BC  Please contact me via Teams  between 6am-2pm. If not answering, please call the palliative care pager [phone]    After 2pm and on weekends, please see the contact information below:    In the event of newly developing, evolving, or worsening symptoms between 2pm and 5pm please contact the Palliative Medicine via extension [phone] for assistance.  After 5pm please contact team via pager (if the patient is at [facility] #[phone] or if the patient is at [facility] #[phone]) The Geriatric and Palliative Medicine service has coverage 24 hours a day/ 7 days a week to provide medical recommendations regarding symptom management needs via telephone

## 2025-07-04 NOTE — PROGRESS NOTE ADULT - SUBJECTIVE AND OBJECTIVE BOX
Seven Corea M.D.  Division of Hospital Medicine   Available via MS Teams    Patient is a 59y old  Female who presents with a chief complaint of LE edema (03 Jul 2025 14:26)      SUBJECTIVE / OVERNIGHT EVENTS:  Seen and examined at the bedside.    Has bilateral lower extremity swelling.    Right foot discoloration is much better according to patient's daughter at bedside.    Reports rectal pain.    Echocardiogram ordered.    Started on low-dose spironolactone 25 mg daily.  Apparently patient has sulfa allergy.      ADDITIONAL REVIEW OF SYSTEMS:    MEDICATIONS  (STANDING):  amoxicillin  875 milliGRAM(s)/clavulanate 1 Tablet(s) Oral two times a day  apixaban 5 milliGRAM(s) Oral two times a day  famotidine    Tablet 20 milliGRAM(s) Oral daily  gabapentin 100 milliGRAM(s) Oral at bedtime  levothyroxine Injectable 50 MICROGram(s) IV Push at bedtime  losartan 50 milliGRAM(s) Oral daily  naloxegol 12.5 milliGRAM(s) Oral daily  polyethylene glycol 3350 17 Gram(s) Oral two times a day  rosuvastatin 10 milliGRAM(s) Oral at bedtime  senna 2 Tablet(s) Oral at bedtime  spironolactone 25 milliGRAM(s) Oral daily  triamcinolone 0.1% Ointment 1 Application(s) Topical two times a day    MEDICATIONS  (PRN):  acetaminophen     Tablet .. 650 milliGRAM(s) Oral every 6 hours PRN Temp greater or equal to 38C (100.4F), Mild Pain (1 - 3)  aluminum hydroxide/magnesium hydroxide/simethicone Suspension 30 milliLiter(s) Oral every 4 hours PRN Dyspepsia  lidocaine 5% Ointment 1 Application(s) Topical daily PRN for severe pain  melatonin 3 milliGRAM(s) Oral at bedtime PRN Insomnia  ondansetron Injectable 4 milliGRAM(s) IV Push every 8 hours PRN Nausea and/or Vomiting  oxyCODONE    IR 7.5 milliGRAM(s) Oral every 4 hours PRN Severe Pain (7 - 10)  simethicone 80 milliGRAM(s) Chew every 6 hours PRN Gas      CAPILLARY BLOOD GLUCOSE        I&O's Summary    03 Jul 2025 07:01  -  04 Jul 2025 07:00  --------------------------------------------------------  IN: 200 mL / OUT: 650 mL / NET: -450 mL    04 Jul 2025 07:01  -  04 Jul 2025 12:27  --------------------------------------------------------  IN: 240 mL / OUT: 0 mL / NET: 240 mL        PHYSICAL EXAM:  Vital Signs Last 24 Hrs  T(C): 36.7 (04 Jul 2025 04:48), Max: 36.7 (03 Jul 2025 12:30)  T(F): 98.1 (04 Jul 2025 04:48), Max: 98.1 (03 Jul 2025 12:30)  HR: 115 (04 Jul 2025 04:48) (109 - 115)  BP: 121/78 (04 Jul 2025 04:48) (112/74 - 126/79)  BP(mean): --  RR: 18 (04 Jul 2025 04:48) (18 - 18)  SpO2: 97% (04 Jul 2025 04:48) (96% - 97%)    Parameters below as of 04 Jul 2025 04:48  Patient On (Oxygen Delivery Method): room air        CONSTITUTIONAL: NAD, well-developed  RESPIRATORY: Normal respiratory effort; lungs are clear to auscultation bilaterally  CARDIOVASCULAR: Regular rate and rhythm, normal S1 and S2, no murmur/rub/gallop; No lower extremity edema; Peripheral pulses are 2+ bilaterally  ABDOMEN: Nontender to palpation, normoactive bowel sounds, no rebound/guarding; No hepatosplenomegaly  MUSCLOSKELETAL: BLE edema. R foot discoloration  PSYCH: A+O to person, place, and time; affect appropriate    LABS:                        10.1   20.70 )-----------( 331      ( 04 Jul 2025 07:09 )             32.5     07-04    128[L]  |  91[L]  |  14  ----------------------------<  93  4.1   |  19[L]  |  0.72    Ca    9.2      04 Jul 2025 07:06    TPro  6.2  /  Alb  2.7[L]  /  TBili  0.9  /  DBili  x   /  AST  237[H]  /  ALT  66[H]  /  AlkPhos  814[H]  07-04          Urinalysis Basic - ( 04 Jul 2025 07:06 )    Color: x / Appearance: x / SG: x / pH: x  Gluc: 93 mg/dL / Ketone: x  / Bili: x / Urobili: x   Blood: x / Protein: x / Nitrite: x   Leuk Esterase: x / RBC: x / WBC x   Sq Epi: x / Non Sq Epi: x / Bacteria: x          RADIOLOGY & ADDITIONAL TESTS:  Results Reviewed:   Imaging Personally Reviewed:  Electrocardiogram Personally Reviewed:    COORDINATION OF CARE:  Care Discussed with Consultants/Other Providers [Y/N]:  Prior or Outpatient Records Reviewed [Y/N]:

## 2025-07-04 NOTE — PROGRESS NOTE ADULT - PROBLEM SELECTOR PLAN 1
RLE duplex negative for DVT   Large Inguinal Lymph nodes on CT. Uterine cancer.    Trial of diuretics.   Check TTE.   Leg elevation.   R foot discoloration- Improved. On Augmentin

## 2025-07-05 NOTE — PROGRESS NOTE ADULT - SUBJECTIVE AND OBJECTIVE BOX
Seven Corea M.D.  Division of Hospital Medicine   Available via MS Teams    Patient is a 59y old  Female who presents with a chief complaint of LE edema (03 Jul 2025 14:26)      SUBJECTIVE / OVERNIGHT EVENTS:  Seen and examined at the bedside.    Has bilateral lower extremity swelling.    Right foot discoloration is much better according to patient's daughter at bedside.    Reports rectal pain.    Echocardiogram ordered.    Started on low-dose spironolactone 25 mg daily.  Apparently patient has sulfa allergy.      ADDITIONAL REVIEW OF SYSTEMS:    MEDICATIONS  (STANDING):  amoxicillin  875 milliGRAM(s)/clavulanate 1 Tablet(s) Oral two times a day  apixaban 5 milliGRAM(s) Oral two times a day  famotidine    Tablet 20 milliGRAM(s) Oral daily  gabapentin 100 milliGRAM(s) Oral at bedtime  levothyroxine Injectable 50 MICROGram(s) IV Push at bedtime  losartan 50 milliGRAM(s) Oral daily  naloxegol 12.5 milliGRAM(s) Oral daily  polyethylene glycol 3350 17 Gram(s) Oral two times a day  rosuvastatin 10 milliGRAM(s) Oral at bedtime  senna 2 Tablet(s) Oral at bedtime  spironolactone 25 milliGRAM(s) Oral daily  triamcinolone 0.1% Ointment 1 Application(s) Topical two times a day    MEDICATIONS  (PRN):  acetaminophen     Tablet .. 650 milliGRAM(s) Oral every 6 hours PRN Temp greater or equal to 38C (100.4F), Mild Pain (1 - 3)  aluminum hydroxide/magnesium hydroxide/simethicone Suspension 30 milliLiter(s) Oral every 4 hours PRN Dyspepsia  lidocaine 5% Ointment 1 Application(s) Topical daily PRN for severe pain  melatonin 3 milliGRAM(s) Oral at bedtime PRN Insomnia  ondansetron Injectable 4 milliGRAM(s) IV Push every 8 hours PRN Nausea and/or Vomiting  oxyCODONE    IR 7.5 milliGRAM(s) Oral every 4 hours PRN Severe Pain (7 - 10)  simethicone 80 milliGRAM(s) Chew every 6 hours PRN Gas      CAPILLARY BLOOD GLUCOSE        I&O's Summary      PHYSICAL EXAM:  ICU Vital Signs Last 24 Hrs  T(C): 36.6 (05 Jul 2025 20:38), Max: 36.8 (05 Jul 2025 04:21)  T(F): 97.8 (05 Jul 2025 20:38), Max: 98.2 (05 Jul 2025 04:21)  HR: 108 (05 Jul 2025 20:38) (100 - 113)  BP: 96/61 (05 Jul 2025 20:38) (96/61 - 105/71)  BP(mean): --  ABP: --  ABP(mean): --  RR: 18 (05 Jul 2025 20:38) (18 - 18)  SpO2: 96% (05 Jul 2025 20:38) (96% - 97%)    O2 Parameters below as of 05 Jul 2025 20:38  Patient On (Oxygen Delivery Method): room air                CONSTITUTIONAL: NAD, well-developed  RESPIRATORY: Normal respiratory effort; lungs are clear to auscultation bilaterally  CARDIOVASCULAR: Regular rate and rhythm, normal S1 and S2, no murmur/rub/gallop; No lower extremity edema; Peripheral pulses are 2+ bilaterally  ABDOMEN: Nontender to palpation, normoactive bowel sounds, no rebound/guarding; No hepatosplenomegaly  MUSCLOSKELETAL: BLE edema. R foot discoloration  PSYCH: A+O to person, place, and time; affect appropriate    LABS:                                   9.6    19.88 )-----------( 350      ( 05 Jul 2025 10:56 )             30.3       07-05    127[L]  |  89[L]  |  21  ----------------------------<  121[H]  4.7   |  20[L]  |  0.85    Ca    9.2      05 Jul 2025 10:56  Mg     2.3     07-05    TPro  6.2  /  Alb  2.7[L]  /  TBili  0.9  /  DBili  x   /  AST  237[H]  /  ALT  66[H]  /  AlkPhos  814[H]  07-04                             11   TPro  6.2  /  Alb  2.7[L]  /  TBili  0.9  /  DBili  x   /  AST  237[H]  /  ALT  66[H]  /  AlkPhos  814[H]  07-04          Urinalysis Basic - ( 04 Jul 2025 07:06 )    Color: x / Appearance: x / SG: x / pH: x  Gluc: 93 mg/dL / Ketone: x  / Bili: x / Urobili: x   Blood: x / Protein: x / Nitrite: x   Leuk Esterase: x / RBC: x / WBC x   Sq Epi: x / Non Sq Epi: x / Bacteria: x          RADIOLOGY & ADDITIONAL TESTS:  Results Reviewed:   Imaging Personally Reviewed:  Electrocardiogram Personally Reviewed:    COORDINATION OF CARE:  Care Discussed with Consultants/Other Providers [Y/N]:  Prior or Outpatient Records Reviewed [Y/N]:

## 2025-07-06 NOTE — PHYSICAL THERAPY INITIAL EVALUATION ADULT - ADDITIONAL COMMENTS
Pt lives with spouse in private home with 13 steps to bedroom. PTA, pt was independent with all mobility without use of assistive device.

## 2025-07-06 NOTE — PHYSICAL THERAPY INITIAL EVALUATION ADULT - IMPAIRED TRANSFERS: SIT/STAND, REHAB EVAL
Breath sounds clear and equal bilaterally.
endurance decreased/impaired balance/impaired postural control/decreased strength

## 2025-07-06 NOTE — PHYSICAL THERAPY INITIAL EVALUATION ADULT - PLANNED THERAPY INTERVENTIONS, PT EVAL
GOAL: Pt will perform 1 flight of stairs with U HR independently within 3-4weeks./bed mobility training/gait training/transfer training

## 2025-07-06 NOTE — PHYSICAL THERAPY INITIAL EVALUATION ADULT - PERTINENT HX OF CURRENT PROBLEM, REHAB EVAL
59F h/o GERD, hypothyroidism, HTN, WINNIE BSO refractory metastatic uterine cancer presenting with lower extremity edema, rectal pain with rectal bleeding.

## 2025-07-06 NOTE — PHYSICAL THERAPY INITIAL EVALUATION ADULT - GAIT DISTANCE, PT EVAL
[facility] Call Center    Phone Message    May a detailed message be left on voicemail: yes     Reason for Call: Requesting Results   Name/type of test: Endoscopy  Date of test: 10/23/2020  Was test done at a location other than [facility] (Please fill in the location if not [facility])?: No      Action Taken: Message routed to:  [facility] ([facility]): Gastro    Travel Screening: Not Applicable                                                                         x 2/50 feet

## 2025-07-06 NOTE — PROGRESS NOTE ADULT - PROBLEM SELECTOR PLAN 1
RLE duplex negative for DVT   Large Inguinal Lymph nodes on CT. Uterine cancer.    Trial of diuretics- Spironolactone Dc today d/t hyponatremia worsening  Check TTE.   Leg elevation.   R foot discoloration- Improved. Rx w course of Augmentin- Dc on 7/6

## 2025-07-06 NOTE — PROGRESS NOTE ADULT - SUBJECTIVE AND OBJECTIVE BOX
Seven Corea M.D.  Division of Hospital Medicine   Available via MS Teams    Patient is a 59y old  Female who presents with a chief complaint of LE edema (05 Jul 2025 21:43)      SUBJECTIVE / OVERNIGHT EVENTS:  Patient is feeling better today.    Right foot discoloration has improved.    Bilateral lower extremity edema likely dependent.    Hyponatremia worsened today 125.  Spironolactone discontinued.    Will check urine studies serum osmolality.  Discussed with patient's daughter at bedside.  Enquiring whether patient can get radiation inpatient.  Seen by PT OT and recommended no skilled needs.    ADDITIONAL REVIEW OF SYSTEMS:    MEDICATIONS  (STANDING):  apixaban 5 milliGRAM(s) Oral two times a day  famotidine    Tablet 20 milliGRAM(s) Oral daily  gabapentin 100 milliGRAM(s) Oral at bedtime  levothyroxine Injectable 50 MICROGram(s) IV Push at bedtime  losartan 50 milliGRAM(s) Oral daily  naloxegol 12.5 milliGRAM(s) Oral daily  polyethylene glycol 3350 17 Gram(s) Oral two times a day  prochlorperazine   Tablet 10 milliGRAM(s) Oral three times a day  rosuvastatin 10 milliGRAM(s) Oral at bedtime  senna 2 Tablet(s) Oral at bedtime  triamcinolone 0.1% Ointment 1 Application(s) Topical two times a day    MEDICATIONS  (PRN):  acetaminophen     Tablet .. 650 milliGRAM(s) Oral every 6 hours PRN Temp greater or equal to 38C (100.4F), Mild Pain (1 - 3)  aluminum hydroxide/magnesium hydroxide/simethicone Suspension 30 milliLiter(s) Oral every 4 hours PRN Dyspepsia  lidocaine 5% Ointment 1 Application(s) Topical daily PRN for severe pain  melatonin 3 milliGRAM(s) Oral at bedtime PRN Insomnia  ondansetron Injectable 4 milliGRAM(s) IV Push every 8 hours PRN Nausea and/or Vomiting  oxyCODONE    IR 7.5 milliGRAM(s) Oral every 4 hours PRN Severe Pain (7 - 10)  simethicone 80 milliGRAM(s) Chew every 6 hours PRN Gas      CAPILLARY BLOOD GLUCOSE        I&O's Summary    05 Jul 2025 07:01  -  06 Jul 2025 07:00  --------------------------------------------------------  IN: 480 mL / OUT: 0 mL / NET: 480 mL        PHYSICAL EXAM:  Vital Signs Last 24 Hrs  T(C): 36.7 (06 Jul 2025 13:51), Max: 36.7 (06 Jul 2025 00:03)  T(F): 98 (06 Jul 2025 13:51), Max: 98.1 (06 Jul 2025 00:03)  HR: 110 (06 Jul 2025 13:51) (98 - 110)  BP: 99/63 (06 Jul 2025 13:51) (96/61 - 109/69)  BP(mean): --  RR: 18 (06 Jul 2025 13:51) (18 - 18)  SpO2: 97% (06 Jul 2025 13:51) (96% - 98%)    Parameters below as of 06 Jul 2025 13:51  Patient On (Oxygen Delivery Method): room air        CONSTITUTIONAL: NAD, well-developed  RESPIRATORY: Normal respiratory effort; lungs are clear to auscultation bilaterally  CARDIOVASCULAR: Regular rate and rhythm, normal S1 and S2, no murmur/rub/gallop; No lower extremity edema; Peripheral pulses are 2+ bilaterally  ABDOMEN: Nontender to palpation, normoactive bowel sounds, no rebound/guarding; No hepatosplenomegaly  MUSCLOSKELETAL:BLE Eedema. R foot discolration  PSYCH: A+O to person, place, and time; affect appropriate    LABS:                        8.8    16.98 )-----------( 264      ( 06 Jul 2025 07:24 )             27.6     07-06    125[L]  |  89[L]  |  24[H]  ----------------------------<  92  4.5   |  19[L]  |  0.86    Ca    9.0      06 Jul 2025 07:15  Mg     2.4     07-06            Urinalysis Basic - ( 06 Jul 2025 07:15 )    Color: x / Appearance: x / SG: x / pH: x  Gluc: 92 mg/dL / Ketone: x  / Bili: x / Urobili: x   Blood: x / Protein: x / Nitrite: x   Leuk Esterase: x / RBC: x / WBC x   Sq Epi: x / Non Sq Epi: x / Bacteria: x          RADIOLOGY & ADDITIONAL TESTS:  Results Reviewed:   Imaging Personally Reviewed:  Electrocardiogram Personally Reviewed:    COORDINATION OF CARE:  Care Discussed with Consultants/Other Providers [Y/N]:  Prior or Outpatient Records Reviewed [Y/N]:

## 2025-07-06 NOTE — PHYSICAL THERAPY INITIAL EVALUATION ADULT - [id number]_GEN_A_CORE
Pt will require rolling walker for discharge due to decreased balance and ability to perform mrADLs/rolling walker

## 2025-07-07 ENCOUNTER — RESULT REVIEW (OUTPATIENT)
Age: 60
End: 2025-07-07

## 2025-07-07 NOTE — PROGRESS NOTE ADULT - PROBLEM SELECTOR PLAN 1
RLE duplex negative for DVT   Large inguinal lymph nodes on CT. Known metastatic uterine cancer.    Trial of diuretics- Spironolactone held due to hyponatremia worsening  TTE with normal EF.   Leg elevation.   R foot discoloration- Improved. Rx w course of Augmentin- Dc on 7/6

## 2025-07-07 NOTE — PROGRESS NOTE ADULT - SUBJECTIVE AND OBJECTIVE BOX
Time and date of service: 07-07-25 @ 11:24      SUBJECTIVE AND OBJECTIVE: pt resting in bed.  at bedside.  Indication for Geriatrics and Palliative Care Services/INTERVAL HPI:    OVERNIGHT EVENTS: required 5 doses of Oxy 7.5 mg po in 24 hrs.  Reports rectal bleeding with bms    DNR on chart:  Allergies    metronidazole (Hives; Rash)  Bactrim (Rash)    Intolerances    MEDICATIONS  (STANDING):  apixaban 5 milliGRAM(s) Oral two times a day  famotidine    Tablet 20 milliGRAM(s) Oral daily  gabapentin 100 milliGRAM(s) Oral at bedtime  levothyroxine Injectable 50 MICROGram(s) IV Push at bedtime  losartan 50 milliGRAM(s) Oral daily  naloxegol 12.5 milliGRAM(s) Oral daily  polyethylene glycol 3350 17 Gram(s) Oral two times a day  prochlorperazine   Tablet 10 milliGRAM(s) Oral three times a day  rosuvastatin 10 milliGRAM(s) Oral at bedtime  senna 2 Tablet(s) Oral at bedtime  sodium chloride 1 Gram(s) Oral three times a day  triamcinolone 0.1% Ointment 1 Application(s) Topical two times a day    MEDICATIONS  (PRN):  acetaminophen     Tablet .. 650 milliGRAM(s) Oral every 6 hours PRN Temp greater or equal to 38C (100.4F), Mild Pain (1 - 3)  aluminum hydroxide/magnesium hydroxide/simethicone Suspension 30 milliLiter(s) Oral every 4 hours PRN Dyspepsia  lidocaine 5% Ointment 1 Application(s) Topical daily PRN for severe pain  melatonin 3 milliGRAM(s) Oral at bedtime PRN Insomnia  ondansetron Injectable 4 milliGRAM(s) IV Push every 8 hours PRN Nausea and/or Vomiting  oxyCODONE    IR 7.5 milliGRAM(s) Oral every 4 hours PRN Severe Pain (7 - 10)  simethicone 80 milliGRAM(s) Chew every 6 hours PRN Gas        ITEMS UNCHECKED ARE NOT PRESENT    PRESENT SYMPTOMS: [ ]Unable to self-report - see [ ] CPOT [ ] PAINADS [ ] RDOS  Source if other than patient:  [ ]Family   [ ]Team     Pain:  [x ]yes [ ]no  QOL impact -   Location -       rectal abd              Aggravating factors - bms  Quality - sore sharp  Radiation - none  Timing- intermittent  Severity (0-10 scale):  Minimal acceptable level (0-10 scale):     CPOT:    https://www.[email or web address]/getattachment/[email or web address]/Critical-Care-Pain-Observation-Tool-(CPOT)    PAIN AD Score:	  http://geriatrictoolkit.[email or web address]/painad.pdf (Ctrl + left click to view)    Dyspnea:                           [ ]Mild [ ]Moderate [ ]Severe    RDOS:  0 to 2  minimal or no respiratory distress   3  mild distress  4 to 6 moderate distress  >7 severe distress  https://homecareinformation.net/handouts/hen/Respiratory_Distress_Observation_Scale.pdf (Ctrl +  left click to view)     Anxiety:                             [ ]Mild [ ]Moderate [ ]Severe  Fatigue:                             [x ]Mild [ ]Moderate [ ]Severe  Nausea:                             [ ]Mild [ ]Moderate [ ]Severe  Loss of appetite:               [x ]Mild [ ]Moderate [ ]Severe  Constipation:                    [ ]Mild [ ]Moderate [ ]Severe    PCSSQ[Palliative Care Spiritual Screening Question]   Severity (0-10):  Score of 4 or > indicate consideration of Chaplaincy referral.  Chaplaincy Referral: [ ] yes [ ] refused [ ] following [x ] Deferred     Caregiver [place]? : [ ] yes [ ] no [ x] Deferred [ ] Declined             Social work referral [ ] Patient & Family Centered Care Referral [ ]     Anticipatory Grief present?:  [ ] yes [ ] no  [ x] Deferred                  Social work referral [ ] Chaplaincy Referral[ ]      Other Symptoms:  [ x]All other review of systems negative     Palliative Performance Status Version 2:    50   %      http://npcrc.org/files/news/palliative_performance_scale_ppsv2.pdf  PHYSICAL EXAM:  Vital Signs Last 24 Hrs  T(C): 36.4 (07 Jul 2025 05:05), Max: 36.7 (06 Jul 2025 13:51)  T(F): 97.5 (07 Jul 2025 05:05), Max: 98 (06 Jul 2025 13:51)  HR: 99 (07 Jul 2025 05:05) (88 - 110)  BP: 104/67 (07 Jul 2025 05:05) (99/63 - 108/67)  BP(mean): --  RR: 18 (07 Jul 2025 05:05) (18 - 18)  SpO2: 98% (07 Jul 2025 05:05) (97% - 98%)    Parameters below as of 07 Jul 2025 05:05  Patient On (Oxygen Delivery Method): room air     I&O's Summary    07 Jul 2025 07:01  -  07 Jul 2025 11:23  --------------------------------------------------------  IN: 240 mL / OUT: 0 mL / NET: 240 mL       GENERAL: [ ]Cachexia    x]Alert  [x ]Oriented x   [ ]Lethargic  [ ]Unarousable  [x ]Verbal  [ ]Non-Verbal  Behavioral:   [ ]Anxiety  [ ]Delirium [ ]Agitation [ ]Other  HEENT:  x[ ]Normal   [ ]Dry mouth   [ ]ET Tube/Trach  [ ]Oral lesions  PULMONARY:   [ x]Clear [ ]Tachypnea  [ ]Audible excessive secretions   [ ]Rhonchi        [ ]Right [ ]Left [ ]Bilateral  [ ]Crackles        [ ]Right [ ]Left [ ]Bilateral  [ ]Wheezing     [ ]Right [ ]Left [ ]Bilateral  [ ]Diminished BS [ ] Right [ ]Left [ ]Bilateral  CARDIOVASCULAR:    [x ]Regular [ ]Irregular [ ]Tachy  [ ]Conner [ ]Murmur [ ]Other  GASTROINTESTINAL:  [ x]Soft  [ ]Distended   [ x]+BS  [ ]Non tender [ ]Tender  [ ]Other [ ]PEG [ ]OGT/ NGT   Last BM:   GENITOURINARY:  [x ]Normal [ ]Incontinent   [ ]Oliguria/Anuria   [ ]Parikh  MUSCULOSKELETAL:   [ x]Normal   [ ]Weakness  [ ]Bed/Wheelchair bound [ ]Edema  NEUROLOGIC:   [x ]No focal deficits  [ ] Cognitive impairment  [ ] Dysphagia [ ]Dysarthria [ ] Paresis [ ]Other   SKIN:   [ x]Normal  [ ]Rash  [ ]Other  [ ]Pressure ulcer(s) [ ]y [ ]n present on admission    CRITICAL CARE:  [ ]Shock Present  [ ]Septic [ ]Cardiogenic [ ]Neurologic [ ]Hypovolemic  [ ]Vasopressors [ ]Inotropes  [ ]Respiratory failure present [ ]Mechanical Ventilation [ ]Non-invasive ventilatory support [ ]High-Flow   [ ]Acute  [ ]Chronic [ ]Hypoxic  [ ]Hypercarbic [ ]Other  [ ]Other organ failure     LABS:                        8.9    18.66 )-----------( 255      ( 07 Jul 2025 07:13 )             28.6   07-07    127[L]  |  91[L]  |  22  ----------------------------<  101[H]  4.5   |  17[L]  |  0.75    Ca    9.4      07 Jul 2025 07:15  Mg     2.4     07-06        Urinalysis Basic - ( 07 Jul 2025 07:15 )    Color: x / Appearance: x / SG: x / pH: x  Gluc: 101 mg/dL / Ketone: x  / Bili: x / Urobili: x   Blood: x / Protein: x / Nitrite: x   Leuk Esterase: x / RBC: x / WBC x   Sq Epi: x / Non Sq Epi: x / Bacteria: x      RADIOLOGY & ADDITIONAL STUDIES:  < from: CT Abdomen and Pelvis w/ IV Cont (06.29.25 @ 05:10) >  ACC: [id number] EXAM:  CT ABDOMEN AND PELVIS IC   ORDERED BY: MERLE CAMILO     ACC: [id number] EXAM:  CT CHEST IC   ORDERED BY: MERLE CAMILO     PROCEDURE DATE:  06/29/2025          INTERPRETATION:  CLINICAL INFORMATION: Metastatic cancer with back pain    COMPARISON: CTA chest 4/16/2025, CT abdomen pelvis 6/12/2025.    CONTRAST/COMPLICATIONS:  IV Contrast: Omnipaque 350  80 cc administered   20 cc discarded  Oral Contrast: NONE  .    PROCEDURE:  CT of the Chest, Abdomen and Pelvis was performed.  Sagittal and coronal reformats were performed.      FINDINGS:    CHEST:    LUNGS AND LARGE AIRWAYS: Patent central airways. Diffuse metastatic   lesions throughout both lungs increased in size and number compared to   prior.  PLEURA: New pleural metastases. For reference a right pleural mass   measures 2.1 x 1.1 cm (301:78).  VESSELS: Within normal limits.  HEART: Heart size is normal. No pericardial effusion.  MEDIASTINUM AND EMILY: New hilar lymphadenopathy with multiple largelow   density right hilar nodes. For reference the largest node measures 2.0 x   2.6 cm (301:49). There is mass effect on the pulmonary vasculature   without evidence of invasion.  CHEST WALL AND LOWER NECK: Diffuse supraclavicular and cervical   lymphadenopathy for reference a left supraclavicular node measures 2.1 x   1.8 cm (301:11).    ABDOMEN AND PELVIS:    LIVER: Diffuse heterogenous lesions throughout the liver, increased in   size and number compared to prior. 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  BILE DUCTS: Normal caliber.  GALLBLADDER: Not visualized.  SPLEEN: Within normal limits.  PANCREAS: Within normal limits.  ADRENALS: Within normal limits.  KIDNEYS/URETERS: Moderate left hydronephrosis the of a 5.4 x 4.1 cm   heterogenous mass with associated clips. Moderate left hydronephrosis to   the level of multiple bulky right iliac lymph nodes. Mild right   urothelial thickening/enhancement. Symmetric renal enhancement.    BLADDER: Parikh catheter. Bladder wall thickening versus underdistention.  REPRODUCTIVE ORGANS: Hysterectomy.    BOWEL: No bowel obstruction. Appendix is not visualized. Redemonstrated   wall thickening of the sigmoid colon and rectum at the sites of masses   described below. Large centrally necrotic mass along the right side of   the mesorectum appears to directly invade the adjacent rectal wall, lower   rectum, with the mass measuring up to 4.6 cm x 4.4 cm. Other smaller   masses are seen within the mesorectal and anterior peritoneal reflection;   also consistent with metastatic disease.  PERITONEUM/RETROPERITONEUM: 5.4 x 4.1 cm heterogenous mass within the   pelvis with associated clips. Mass is inseparable from the sigmoid colon.   Right perirectal mass measuring4.2 x 4.7 cm that is inseparable from   from and has mass effect on the rectum. There is associated rectal wall   thickening. Similar mesenteric lymphadenopathy. Similar mesenteric   lymphadenopathy. Small ascites.  VESSELS: Compression and tumor thrombus within the right common iliac   vein (301:184).  LYMPH NODES: Diffuse lymphadenopathy along the pelvic sidewall, iliac   chain, and throughout the retroperitoneum. For reference previously   described left common iliac node measures 2.0 x 2.4 cm(301:160).  ABDOMINAL WALL: Small ventral wall hernia containing bowel.  BONES: Degenerative changes. See dedicated spine report for findings of   the spine.      IMPRESSION:    1.  Moderate bilateral hydronephrosis to the level of enlarged iliac   chain nodes on the right and pelvic mass on the left.  2.  Bladder wall thickening versus underdistention. Correlate for   cystitis.  3.  Mild right urothelial thickening/enhancement which may be related to   ascending infection or metastatic disease.  4.  Additional findings related to worsening diffuse metastatic disease,   as above.          --- End of Report ---    < end of copied text >      Protein Calorie Malnutrition Present: [ ]mild [ ]moderate [ ]severe [ ]underweight [ ]morbid obesity  https://www.andeal.org/vault/2440/web/files/ONC/Table_Clinical%20Characteristics%20to%20Document%20Malnutrition-White%20JV%20et%20al%202012.pdf    Height (cm): 165.1 (06-29-25 @ 02:37), 165.1 (06-20-25 @ 18:46), 165.1 (06-13-25 @ 18:06)  Weight (kg): 59.9 (06-29-25 @ 02:37), 62 (06-25-25 @ 09:00), 63.5 (06-20-25 @ 18:46)  BMI (kg/m2): 22 (06-29-25 @ 02:37), 22.7 (06-25-25 @ 09:00), 23.3 (06-20-25 @ 18:46)    [ ]PPSV2 < or = 30%  [ ]significant weight loss [ ]poor nutritional intake [ ]anasarca[ ]Artificial Nutrition    Other REFERRALS:  [ ]Hospice  [ ]Child Life  [ ]Social Work  [ ]Case management [ ]Holistic Therapy

## 2025-07-07 NOTE — PROGRESS NOTE ADULT - PROBLEM SELECTOR PLAN 1
as per primary team-  Oncology follow-up outpatient   Seen by radiation oncology for palliative radiation  No additional chemo at this time.  Pain control with oxycodone prn - 7.5mg q4 prn.

## 2025-07-07 NOTE — PROGRESS NOTE ADULT - PROBLEM SELECTOR PLAN 3
Patient previously seen by outpatient radiation oncology - considering palliative RT.   Family requesting inpatient palliative RT - consulted radiation oncology  Palliative assisting with symptom management - continue oxycodone PRN with bowel regimen

## 2025-07-07 NOTE — PROGRESS NOTE ADULT - SUBJECTIVE AND OBJECTIVE BOX
[facility] Division of Hospital Medicine  Valarie Koenig DO MS Teams PREFERRED      SUBJECTIVE / OVERNIGHT EVENTS: No acute events overnight. Patient having BMs - +rectal bleeding.  ADDITIONAL REVIEW OF SYSTEMS:    MEDICATIONS  (STANDING):  apixaban 5 milliGRAM(s) Oral two times a day  famotidine    Tablet 20 milliGRAM(s) Oral daily  gabapentin 100 milliGRAM(s) Oral at bedtime  levothyroxine Injectable 50 MICROGram(s) IV Push at bedtime  losartan 50 milliGRAM(s) Oral daily  naloxegol 12.5 milliGRAM(s) Oral daily  polyethylene glycol 3350 17 Gram(s) Oral two times a day  prochlorperazine   Tablet 10 milliGRAM(s) Oral three times a day  rosuvastatin 10 milliGRAM(s) Oral at bedtime  senna 2 Tablet(s) Oral at bedtime  sodium chloride 1 Gram(s) Oral three times a day  triamcinolone 0.1% Ointment 1 Application(s) Topical two times a day    MEDICATIONS  (PRN):  acetaminophen     Tablet .. 650 milliGRAM(s) Oral every 6 hours PRN Temp greater or equal to 38C (100.4F), Mild Pain (1 - 3)  aluminum hydroxide/magnesium hydroxide/simethicone Suspension 30 milliLiter(s) Oral every 4 hours PRN Dyspepsia  lidocaine 5% Ointment 1 Application(s) Topical daily PRN for severe pain  melatonin 3 milliGRAM(s) Oral at bedtime PRN Insomnia  ondansetron Injectable 4 milliGRAM(s) IV Push every 8 hours PRN Nausea and/or Vomiting  oxyCODONE    IR 7.5 milliGRAM(s) Oral every 4 hours PRN Severe Pain (7 - 10)  simethicone 80 milliGRAM(s) Chew every 6 hours PRN Gas      I&O's Summary    07 Jul 2025 07:01  -  07 Jul 2025 18:14  --------------------------------------------------------  IN: 480 mL / OUT: 0 mL / NET: 480 mL        PHYSICAL EXAM:  Vital Signs Last 24 Hrs  T(C): 37.1 (07 Jul 2025 12:49), Max: 37.1 (07 Jul 2025 12:49)  T(F): 98.8 (07 Jul 2025 12:49), Max: 98.8 (07 Jul 2025 12:49)  HR: 99 (07 Jul 2025 12:49) (88 - 99)  BP: 152/82 (07 Jul 2025 12:49) (102/63 - 152/82)  BP(mean): --  RR: 18 (07 Jul 2025 12:49) (18 - 18)  SpO2: 99% (07 Jul 2025 12:49) (98% - 99%)    Parameters below as of 07 Jul 2025 12:49  Patient On (Oxygen Delivery Method): room air    CONSTITUTIONAL: NAD   EYES: Conjunctiva and sclera clear  ENMT: Moist oral mucosa, no pharyngeal injection or exudates   NECK: Supple, midline  RESPIRATORY: Normal respiratory effort; lungs are clear to auscultation bilaterally  CARDIOVASCULAR: Regular rate and rhythm, normal S1 and S2.  +2 BL LE Edema.  ABDOMEN: Nontender to palpation, no rebound/guarding  PSYCH: A+O to person, place, and time; affect appropriate        LABS:                        8.9    18.66 )-----------( 255      ( 07 Jul 2025 07:13 )             28.6     07-07    127[L]  |  91[L]  |  22  ----------------------------<  101[H]  4.5   |  17[L]  |  0.75    Ca    9.4      07 Jul 2025 07:15  Mg     2.4     07-06            Urinalysis Basic - ( 07 Jul 2025 07:15 )    Color: x / Appearance: x / SG: x / pH: x  Gluc: 101 mg/dL / Ketone: x  / Bili: x / Urobili: x   Blood: x / Protein: x / Nitrite: x   Leuk Esterase: x / RBC: x / WBC x   Sq Epi: x / Non Sq Epi: x / Bacteria: x          RADIOLOGY & ADDITIONAL TESTS:  Results Reviewed:   Imaging Personally Reviewed:  Electrocardiogram Personally Reviewed:    COORDINATION OF CARE:  Care Discussed with Consultants/Other Providers [Y/N]: Y  Prior or Outpatient Records Reviewed [Y/N]: Y

## 2025-07-07 NOTE — PROGRESS NOTE ADULT - TIME BILLING
Symptom assessment and management, supportive counseling, coordination of care, discussion and coordination with team this excludes teaching and separately reported services.    Adelina Marin, ANP-BC  Please contact me via Teams  between 6am-2pm. If not answering, please call the palliative care pager [phone]    After 2pm and on weekends, please see the contact information below:    In the event of newly developing, evolving, or worsening symptoms between 2pm and 5pm please contact the Palliative Medicine via extension [phone] for assistance.  After 5pm please contact team via pager (if the patient is at [facility] #[phone] or if the patient is at [facility] #[phone]) The Geriatric and Palliative Medicine service has coverage 24 hours a day/ 7 days a week to provide medical recommendations regarding symptom management needs via telephone

## 2025-07-08 ENCOUNTER — APPOINTMENT (OUTPATIENT)
Dept: GERIATRICS | Facility: CLINIC | Age: 60
End: 2025-07-08

## 2025-07-08 ENCOUNTER — TRANSCRIPTION ENCOUNTER (OUTPATIENT)
Age: 60
End: 2025-07-08

## 2025-07-08 RX ORDER — ROSUVASTATIN CALCIUM 20 MG/1
1 TABLET, FILM COATED ORAL
Refills: 0 | DISCHARGE

## 2025-07-08 NOTE — PROGRESS NOTE ADULT - PROBLEM SELECTOR PLAN 6
c/w statin      # Hyponatremia: Check urine osm, serum osm, urine Na. Strict Is/Os. Monitor BMP.
c/w statin
c/w statin
c/w statin      # Hyponatremia: Check urine osm, serum osm, urine Na. Strict Is/Os. Monitor BMP.
c/w statin
c/w statin      # Hyponatremia: Check urine osm, serum osm, urine Na. Strict Is/Os. Monitor BMP.
Goal BP < 140/90 c/w losartan
c/w statin      # Hyponatremia: Reviewed urine osm, serum osm, urine Na. ?hypothyroidism, SIADH. Strict Is/Os. Monitor BMP. Start salt tablets. Liberalize diet.
c/w statin      # Hyponatremia: Check urine osm, serum osm, urine Na. Strict Is/Os. Monitor BMP.

## 2025-07-08 NOTE — PROGRESS NOTE ADULT - PROBLEM SELECTOR PROBLEM 3
Uterine carcinoma
Uterine carcinoma
Right foot infection
Uterine carcinoma
Functional quadriplegia
Uterine carcinoma

## 2025-07-08 NOTE — PROGRESS NOTE ADULT - PROBLEM SELECTOR PROBLEM 6
Hyperlipidemia
Essential hypertension
Hyperlipidemia

## 2025-07-08 NOTE — PROGRESS NOTE ADULT - NSPROGADDITIONALINFOA_GEN_ALL_CORE
Case and plan discussed with ACP: Artur KING
Time-based billing (NON-critical care).     53 minutes spent on total encounter. The necessity of the time spent during the encounter on this date of service was due to:     - Reviewing, and interpreting labs, testing, and imaging.  - Independently obtaining a review of systems and performing a physical exam  - Reviewing prior records and where necessary, outpatient records.  - Counselling and educating regarding interpretation of aforementioned items and plan of care to patient and/or family.      d/w ACP
Time-based billing (NON-critical care).     55 minutes spent on total encounter. The necessity of the time spent during the encounter on this date of service was due to:     - Reviewing, and interpreting labs, testing, and imaging.  - Independently obtaining a review of systems and performing a physical exam  - Reviewing prior records and where necessary, outpatient records.  - Counselling and educating regarding interpretation of aforementioned items and plan of care to patient and/or family.      d/w ACP
Case and plan discussed with ACP: Vita TREVIÑO

## 2025-07-08 NOTE — PROGRESS NOTE ADULT - PROBLEM SELECTOR PLAN 5
Goal BP < 140/90 c/w losartan
Continue Eliquis   Monitor H/H
Goal BP < 140/90 c/w losartan

## 2025-07-08 NOTE — PROGRESS NOTE ADULT - ASSESSMENT
59F h/o GERD, hypothyroidism, HTN, WINNIE BSO refractory metastatic uterine cancer presenting with lower extremity edema, rectal pain with rectal bleeding. 
59F w/ PMHx of GERD, hypothyroidism, HTN, WINNIE BSO refractory metastatic uterine cancer on chemotherapy (last chemo 5/21/25), with disease progression, now perirectal mass, liver mets, left pelvic mass, and b/l lung nodules at bases, presenting w/ LE edema and also noting rectal bleeding (x ~3 months) initially only small amount of blood w/ BM now no BM x 4 days and has been passing darrel blood. No Rectal pain. Takes chronic narcotic analgesics and baseline Miralax QD. Has been on PO Eliquis for PV tumor thrombus. NO transfusion requirement. Last dose of Eliquis this AM (6/30/25)    Last colonoscopy ~2021 (Dr Wolf, [facility]) - reported negative per pt    #metastatic Uterine CA w/ Progression of disease  b/l lungs, pleura, hilar LAD, cupraclavicular and cervical LAD, diffuse liver lesion, tumor thrombus obliterates Lt. portal vein and it's branches, heterogeneous Left renal mass and bulky Rt iliac nodes, mesorecta mass invading rectum + additional smaller masses within mesorectum and anterior peritoneal reflection, diffuse pelvic LAD,  mesenteric LAD, tumor thrombus in Right common iliac vein  #constipation - large fecal burden on on CT  #rectal bleeding - likely 2/2 rectal tumor invasion given clinical / radiologic findings; Hgb/HD stable  #PV Tumor thrombus    RECS:  -PO Movantik 25 mg daily for suspected component of narcotic associated constipation  -rec complete remainder of Moviprep to liquify/loosen stool for ease of passage and continue Miralax BID for baseline bowel regimen (along w/ Movantik)  -no GI objection to ongoing AC; pt has remained HD/Hgb stable without transfusion requirement  -no plans for endoluminal evaluation at this time; if develops HD instability with ongoing rectal bleeding then we can consider flex sig w/ hemospray for suspected tumor bleeding but this is of limited use given the very temporary achievable hemostasis i/s/o malignant/tumor bleeding. d/w pt and family; all questions answered  -continue baseline H2 blocker rx  -ok for PO diet as tolerated  -rec Palliative follow up; family reiterating that their primary focus its to "keep pt comfortable"    d/w pt and family (per pt request) at bedside  d/w Medicine ACP    Will sign off care at this time. Please e-mail [email or web address] if there are any additional questions or concerns, during weekdays from 8 am to 5 pm.   Please call answering service for on-call GI fellow ([phone]) after 5pm and before 8am, and on weekends.    Tushar Alberto PA-C  [facility]  After hours and weekend coverage [phone]  
59F h/o GERD, hypothyroidism, HTN, WINNIE BSO refractory metastatic uterine cancer presenting with lower extremity edema, rectal pain with rectal bleeding. 
59F h/o GERD, hypothyroidism, HTN, WINNIE BSO refractory metastatic uterine cancer presenting with lower extremity edema, rectal pain with rectal bleeding. Palliative care called for pain.
59F h/o GERD, hypothyroidism, HTN, WINNIE BSO refractory metastatic uterine cancer presenting with lower extremity edema, rectal pain with rectal bleeding. 
59F h/o GERD, hypothyroidism, HTN, WINNIE BSO refractory metastatic uterine cancer presenting with lower extremity edema, rectal pain with rectal bleeding.

## 2025-07-08 NOTE — PROGRESS NOTE ADULT - PROBLEM SELECTOR PROBLEM 2
Pain, neoplasm-related
Right foot infection
Extremity edema
Right foot infection

## 2025-07-08 NOTE — PROGRESS NOTE ADULT - PROBLEM SELECTOR PLAN 9
Per outpatient urology records- "Mild right and moderate left hydronephrosis, similar to CT on 6/12/2025."  Parikh placed 06/2025 as OP.   Continue to monitor for potential need for nephrostomy tube if any obstruction, MYRA, or infection.    7/8- Care plan discussed with  at bedside and daughter via phone.

## 2025-07-08 NOTE — PROGRESS NOTE ADULT - PROBLEM SELECTOR PLAN 2
see above
Likely due to large inguinal lymph nodes on CT, in setting of known metastatic uterine cancer.    RLE duplex negative for DVT   TTE with normal EF.   Trial of diuretics- Spironolactone held due to hyponatremia worsening  Continue leg elevation.
see above
pt on oxycodone prn - 7.5mg q4 prn   pt required 5 doses in 24 hrs  pt does not want to adjust dosage of meds as she feels the current dose works well.  offered dosing for mod and severe pain but pt declined.  educated pt and  on long acting pain meds.  Questions answered and emotional support provided.  Pt continues to decline but able to teach back information provided. Pt values the time where she can extend the need for pain meds to 5 -6 hrs so she feels its better to take the meds as needed. brenden
See above
see above

## 2025-07-08 NOTE — PROGRESS NOTE ADULT - PROBLEM SELECTOR PLAN 1
Initial Hgb 5.9 - s/p 1u pRBC  GI recs appreciated- No objections to AC; rectal bleeding likely due to bleeding from tumor. In event of hemodynamic instability, consider flex sig w/ hemospray for suspected tumor bleeding but this is of limited use given the very temporary achievable hemostasis i/s/o malignant/tumor bleeding.

## 2025-07-08 NOTE — CONSULT NOTE ADULT - SUBJECTIVE AND OBJECTIVE BOX
* Note: RAd onc consulted on:  06/17/25- note in [place], saw Dr. Dao rad onc outpatient at [facility], note in [employer] 06/26/25,  consulted again at [facility] on 7/7/25.     HPI:  Patient is a 59-year-old female s/p WINNIE BSO refractory metastatic uterine cancer presenting with lower extremity edema, right foot infection, and rectal bleed and increased stool output likely from rectal mass.    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  Patient was discharged then did see Dr. Dao 6/26/25 for outpatient RT consideration.  Since discharge patient has noted worsening bilateral lower extremity edema right greater than left.  Patient also noting abdominal wall edema. Reports sensation of incomplete bladder emptying. Stated medical adherence to PO Eliquis. Denies fever, chills, chest pain, n/v/d, abd pain. no flank pain.     Patient diagnosed with stage III MMT in 1/2021. She saw gyn and had imaging. She was seen by Dr. Bowen, who did the surgery. Final path showed STAGE III MMT. She was treated with Carbo/ Taxol- s/p one cycle , had reaction and switched to Carbo/ doxil x one does. She transferred her care to Dr. Nichols at [place].and then Carbo/ Abraxane/ Herceptin x 6 completed, last dose was in 8/2021  She was started on Herceptin maintenance, last dose was in Sept, 2022.  CT imaging 10/2022 showed hepatic lesion concerning for met.  Patient was enrolled in a phase II IMMUNOGEN study, and got Mirvetuximab. After four cycles, scan from 1/10/23 showed POD.  Pt started clinical trial with Sacituzumab, total 14 cycle, with progression of disease.    When she saw rad onc Dr. Dao 06/26: They discussed a  possible role for palliative radiation to relieve the rectal pain.  Options discussed 2: single fraction treatment or fractionated treatment over the course of a week.  the patient and her family will discuss and get back to us.      Since that time patient was then admitted to [facility] since June 29, 2025.     Hgb trend below; stable.    Hemoglobin: 7.9 g/dL (07.08.25 @ 07:26)    Hemoglobin: 8.9 g/dL (07.07.25 @ 07:13)    Hemoglobin: 8.8 g/dL (07.06.25 @ 07:24)    Hemoglobin: 9.7 g/dL (06.30.25 @ 07:18)            < from: CT Abdomen and Pelvis w/ IV Cont (06.29.25 @ 05:10) >  BOWEL: No bowel obstruction. Appendix is not visualized. Redemonstrated   wall thickening of the sigmoid colon and rectum at the sites of masses   described below. Large centrally necrotic mass along the right side of   the mesorectum appears to directly invade the adjacent rectal wall, lower   rectum, with the mass measuring up to 4.6 cm x 4.4 cm. Other smaller   masses are seen within the mesorectal and anterior peritoneal reflection;   also consistent with metastatic disease.  PERITONEUM/RETROPERITONEUM: 5.4 x 4.1 cm heterogenous mass within the   pelvis with associated clips. Mass is inseparable from the sigmoid colon.   Right perirectal mass measuring4.2 x 4.7 cm that is inseparable from   from and has mass effect on the rectum. There is associated rectal wall   thickening. Similar mesenteric lymphadenopathy. Similar mesenteric   lymphadenopathy. Small ascites.  VESSELS: Compression and tumor thrombus within the right common iliac   vein (301:184).  LYMPH NODES: Diffuse lymphadenopathy along the pelvic sidewall, iliac   chain, and throughout the retroperitoneum. For reference previously   described left common iliac node measures 2.0 x 2.4 cm(301:160).  ABDOMINAL WALL: Small ventral wall hernia containing bowel.  BONES: Degenerative changes. See dedicated spine report for findings of   the spine.      IMPRESSION:    1.  Moderate bilateral hydronephrosis to the level of enlarged iliac   chain nodes on the right and pelvic mass on the left.  2.  Bladder wall thickening versus underdistention. Correlate for   cystitis.  3.  Mild right urothelial thickening/enhancement which may be related to   ascending infection or metastatic disease.  4.  Additional findings related to worsening diffuse metastatic disease,     < end of copied text >            Allergies    metronidazole (Hives; Rash)  Bactrim (Rash)    Intolerances        ROS: [  ] Fever  [  ] Chills  [  ]Chest Pain [  ] SOB  [  ]Cough [  ] N/V  [  ] Diarrhea [  ]Constipation [  ]Other ROS:  [  ] ROS otherwise negative    PAST MEDICAL & SURGICAL HISTORY:  No pertinent past medical history    Endometriosis    Hypothyroid    Acid reflux    UTI (urinary tract infection)    Uterine cancer  1/2021    No significant past surgical history    H/O ovarian cystectomy  2004    History of cholecystectomy  217    H/O abdominal hysterectomy  1/2021 @ [facility]        FAMILY HISTORY:  Family history of diabetes mellitus    Family history of hypertension    Family history of hyperlipidemia    Family history of breast cancer in sister        MEDICATIONS  (STANDING):  apixaban 5 milliGRAM(s) Oral two times a day  famotidine    Tablet 20 milliGRAM(s) Oral daily  gabapentin 100 milliGRAM(s) Oral at bedtime  levothyroxine Injectable 50 MICROGram(s) IV Push at bedtime  losartan 50 milliGRAM(s) Oral daily  naloxegol 12.5 milliGRAM(s) Oral daily  polyethylene glycol 3350 17 Gram(s) Oral two times a day  prochlorperazine   Tablet 10 milliGRAM(s) Oral three times a day  rosuvastatin 10 milliGRAM(s) Oral at bedtime  senna 2 Tablet(s) Oral at bedtime  sodium chloride 1 Gram(s) Oral three times a day  triamcinolone 0.1% Ointment 1 Application(s) Topical two times a day    MEDICATIONS  (PRN):  acetaminophen     Tablet .. 650 milliGRAM(s) Oral every 6 hours PRN Temp greater or equal to 38C (100.4F), Mild Pain (1 - 3)  aluminum hydroxide/magnesium hydroxide/simethicone Suspension 30 milliLiter(s) Oral every 4 hours PRN Dyspepsia  lidocaine 5% Ointment 1 Application(s) Topical daily PRN for severe pain  melatonin 3 milliGRAM(s) Oral at bedtime PRN Insomnia  ondansetron Injectable 4 milliGRAM(s) IV Push every 8 hours PRN Nausea and/or Vomiting  oxyCODONE    IR 7.5 milliGRAM(s) Oral every 4 hours PRN Severe Pain (7 - 10)  simethicone 80 milliGRAM(s) Chew every 6 hours PRN Gas      PHYSICAL EXAM  Vital Signs Last 24 Hrs  T(C): 36.3 (08 Jul 2025 04:37), Max: 37.1 (07 Jul 2025 12:49)  T(F): 97.4 (08 Jul 2025 04:37), Max: 98.8 (07 Jul 2025 12:49)  HR: 111 (08 Jul 2025 04:37) (99 - 112)  BP: 118/70 (08 Jul 2025 04:37) (116/71 - 152/82)  BP(mean): --  RR: 18 (08 Jul 2025 04:37) (18 - 18)  SpO2: 98% (08 Jul 2025 04:37) (98% - 99%)    Parameters below as of 08 Jul 2025 04:37  Patient On (Oxygen Delivery Method): room air        General: appears stated age,  no acute distress, + espana catheter  HEENT: NC/AT; EOMI, PERRL, sclera nonicteric; external ears normal; no rhinorrhea or epistaxis; mucous membranes moist; oropharynx clear and without erythema  CV: NR, RR; no appreciable r/m/g  Lungs: CTAB, no increased work of breathing  Abdomen: Bowel sounds present; soft, NTND  MSK: Vertebral spine non-tender to palpation, Right foot edema  Neuro: AAOx3; cranial nerves II-XII intact; strength 5/5 in upper and lower extremities; sensation to light touch in tact bilaterally.      IMAGING/LABS/PATHOLOGY: I have personally reviewed the relevant labs, pathology, and imaging as noted in the HPI.  In addition,                          7.9    17.93 )-----------( 348      ( 08 Jul 2025 07:26 )             25.4     07-08    131[L]  |  95[L]  |  19  ----------------------------<  77  4.8   |  17[L]  |  0.73    Ca    9.4      08 Jul 2025 07:23          ASSESSMENT/PLAN    AMRITA AMBROSIO is a 59y woman with h/o refractory metastatic uterine cancer , s/p multiple lines of chemotherapy, with scans showing progressive disease, necrotic mass  in the meso rectum/pelvis, with blood stool output, right foot swelling/infection, came to [facility] after recent [facility] hospitalization c/o abdominal pains/ back pains, and rectal bleeding.  Had seen Dr. Dao outpatient on 6/26/25 for consideration of RT and d/w patient options of two single fraction courses, vs a hypofractionated course of palliative RT over one week.  Patient came to [facility] for care and did not follow up.  As there is no RT offered at [facility], the options now remain as: transfer of care to [facility] for palliative RT vs. if able to be soon discharged,  can follow up with Dr. Dao as was planned prior to this admission.  d/w Dr. Leavitt today.     * Note: RAd onc consulted on:  06/17/25- note in [place], saw Dr. Dao rad onc outpatient at [facility], note in [employer] 06/26/25,  consulted again at [facility] on 7/7/25.     HPI:  Patient is a 59-year-old female s/p WINNIE BSO refractory metastatic uterine cancer presenting with lower extremity edema, right foot infection, and rectal bleed and increased stool output likely from rectal mass.    Patient was recently admitted to [facility] where she was found to have portal vein thrombus started on Eliquis.  Patient was discharged then did see Dr. Dao 6/26/25 for outpatient RT consideration.  Since discharge patient has noted worsening bilateral lower extremity edema right greater than left.  Patient also noting abdominal wall edema. Reports sensation of incomplete bladder emptying. Stated medical adherence to PO Eliquis. Denies fever, chills, chest pain, n/v/d, abd pain. no flank pain.     Patient diagnosed with stage III MMT in 1/2021. She saw gyn and had imaging. She was seen by Dr. Bowen, who did the surgery. Final path showed STAGE III MMT. She was treated with Carbo/ Taxol- s/p one cycle , had reaction and switched to Carbo/ doxil x one does. She transferred her care to Dr. Nichols at [place].and then Carbo/ Abraxane/ Herceptin x 6 completed, last dose was in 8/2021  She was started on Herceptin maintenance, last dose was in Sept, 2022.  CT imaging 10/2022 showed hepatic lesion concerning for met.  Patient was enrolled in a phase II IMMUNOGEN study, and got Mirvetuximab. After four cycles, scan from 1/10/23 showed POD.  Pt started clinical trial with Sacituzumab, total 14 cycle, with progression of disease.    When she saw rad onc Dr. Dao 06/26: They discussed a  possible role for palliative radiation to relieve the rectal pain.  Options discussed 2: single fraction treatment or fractionated treatment over the course of a week.  the patient and her family will discuss and get back to us.      Since that time patient was then admitted to [facility] since June 29, 2025.     Hgb trend below; stable.    Hemoglobin: 7.9 g/dL (07.08.25 @ 07:26)    Hemoglobin: 8.9 g/dL (07.07.25 @ 07:13)    Hemoglobin: 8.8 g/dL (07.06.25 @ 07:24)    Hemoglobin: 9.7 g/dL (06.30.25 @ 07:18)            < from: CT Abdomen and Pelvis w/ IV Cont (06.29.25 @ 05:10) >  BOWEL: No bowel obstruction. Appendix is not visualized. Redemonstrated   wall thickening of the sigmoid colon and rectum at the sites of masses   described below. Large centrally necrotic mass along the right side of   the mesorectum appears to directly invade the adjacent rectal wall, lower   rectum, with the mass measuring up to 4.6 cm x 4.4 cm. Other smaller   masses are seen within the mesorectal and anterior peritoneal reflection;   also consistent with metastatic disease.  PERITONEUM/RETROPERITONEUM: 5.4 x 4.1 cm heterogenous mass within the   pelvis with associated clips. Mass is inseparable from the sigmoid colon.   Right perirectal mass measuring4.2 x 4.7 cm that is inseparable from   from and has mass effect on the rectum. There is associated rectal wall   thickening. Similar mesenteric lymphadenopathy. Similar mesenteric   lymphadenopathy. Small ascites.  VESSELS: Compression and tumor thrombus within the right common iliac   vein (301:184).  LYMPH NODES: Diffuse lymphadenopathy along the pelvic sidewall, iliac   chain, and throughout the retroperitoneum. For reference previously   described left common iliac node measures 2.0 x 2.4 cm(301:160).  ABDOMINAL WALL: Small ventral wall hernia containing bowel.  BONES: Degenerative changes. See dedicated spine report for findings of   the spine.      IMPRESSION:    1.  Moderate bilateral hydronephrosis to the level of enlarged iliac   chain nodes on the right and pelvic mass on the left.  2.  Bladder wall thickening versus underdistention. Correlate for   cystitis.  3.  Mild right urothelial thickening/enhancement which may be related to   ascending infection or metastatic disease.  4.  Additional findings related to worsening diffuse metastatic disease,     < end of copied text >            Allergies    metronidazole (Hives; Rash)  Bactrim (Rash)    Intolerances        ROS: [  ] Fever  [  ] Chills  [  ]Chest Pain [  ] SOB  [  ]Cough [  ] N/V  [  ] Diarrhea [  ]Constipation [  ]Other ROS:  [  ] ROS otherwise negative    PAST MEDICAL & SURGICAL HISTORY:  No pertinent past medical history    Endometriosis    Hypothyroid    Acid reflux    UTI (urinary tract infection)    Uterine cancer  1/2021    No significant past surgical history    H/O ovarian cystectomy  2004    History of cholecystectomy  217    H/O abdominal hysterectomy  1/2021 @ [facility]        FAMILY HISTORY:  Family history of diabetes mellitus    Family history of hypertension    Family history of hyperlipidemia    Family history of breast cancer in sister        MEDICATIONS  (STANDING):  apixaban 5 milliGRAM(s) Oral two times a day  famotidine    Tablet 20 milliGRAM(s) Oral daily  gabapentin 100 milliGRAM(s) Oral at bedtime  levothyroxine Injectable 50 MICROGram(s) IV Push at bedtime  losartan 50 milliGRAM(s) Oral daily  naloxegol 12.5 milliGRAM(s) Oral daily  polyethylene glycol 3350 17 Gram(s) Oral two times a day  prochlorperazine   Tablet 10 milliGRAM(s) Oral three times a day  rosuvastatin 10 milliGRAM(s) Oral at bedtime  senna 2 Tablet(s) Oral at bedtime  sodium chloride 1 Gram(s) Oral three times a day  triamcinolone 0.1% Ointment 1 Application(s) Topical two times a day    MEDICATIONS  (PRN):  acetaminophen     Tablet .. 650 milliGRAM(s) Oral every 6 hours PRN Temp greater or equal to 38C (100.4F), Mild Pain (1 - 3)  aluminum hydroxide/magnesium hydroxide/simethicone Suspension 30 milliLiter(s) Oral every 4 hours PRN Dyspepsia  lidocaine 5% Ointment 1 Application(s) Topical daily PRN for severe pain  melatonin 3 milliGRAM(s) Oral at bedtime PRN Insomnia  ondansetron Injectable 4 milliGRAM(s) IV Push every 8 hours PRN Nausea and/or Vomiting  oxyCODONE    IR 7.5 milliGRAM(s) Oral every 4 hours PRN Severe Pain (7 - 10)  simethicone 80 milliGRAM(s) Chew every 6 hours PRN Gas      PHYSICAL EXAM  Vital Signs Last 24 Hrs  T(C): 36.3 (08 Jul 2025 04:37), Max: 37.1 (07 Jul 2025 12:49)  T(F): 97.4 (08 Jul 2025 04:37), Max: 98.8 (07 Jul 2025 12:49)  HR: 111 (08 Jul 2025 04:37) (99 - 112)  BP: 118/70 (08 Jul 2025 04:37) (116/71 - 152/82)  BP(mean): --  RR: 18 (08 Jul 2025 04:37) (18 - 18)  SpO2: 98% (08 Jul 2025 04:37) (98% - 99%)    Parameters below as of 08 Jul 2025 04:37  Patient On (Oxygen Delivery Method): room air        General: appears stated age,  no acute distress, + espana catheter  HEENT: NC/AT; EOMI, PERRL, sclera nonicteric; external ears normal; no rhinorrhea or epistaxis; mucous membranes moist; oropharynx clear and without erythema  CV: NR, RR; no appreciable r/m/g  Lungs: CTAB, no increased work of breathing  Abdomen: Bowel sounds present; soft, NTND  MSK: Vertebral spine non-tender to palpation, Right foot edema  Neuro: AAOx3; cranial nerves II-XII intact; strength 5/5 in upper and lower extremities; sensation to light touch in tact bilaterally.      IMAGING/LABS/PATHOLOGY: I have personally reviewed the relevant labs, pathology, and imaging as noted in the HPI.  In addition,                          7.9    17.93 )-----------( 348      ( 08 Jul 2025 07:26 )             25.4     07-08    131[L]  |  95[L]  |  19  ----------------------------<  77  4.8   |  17[L]  |  0.73    Ca    9.4      08 Jul 2025 07:23          ASSESSMENT/PLAN    AMRITA AMBROSIO is a 59y woman with h/o refractory metastatic uterine cancer , s/p multiple lines of chemotherapy, with scans showing progressive disease, necrotic mass  in the meso rectum/pelvis, with blood stool output, right foot swelling/infection, came to [facility] after recent [facility] hospitalization c/o abdominal pains/ back pains, and rectal bleeding.  Had seen Dr. Dao outpatient on 6/26/25 for consideration of RT and d/w patient options of two single fraction courses, vs a hypofractionated course of palliative RT over one week.  Patient came to [facility] for care and did not follow up.    As there is no RT offered at [facility], the options now remain as: transfer of care to [facility] for palliative RT vs. if able to be soon discharged,  can follow up with Dr. Dao as was planned prior to this admission.  IF she will not be discharged by FRiday, please arrange transfer to [facility] this week.   d/w Dr. Leavitt today.

## 2025-07-08 NOTE — PROGRESS NOTE ADULT - SUBJECTIVE AND OBJECTIVE BOX
[facility] Division of Hospital Medicine  Valarie Koenig DO MS Teams PREFERRED      SUBJECTIVE / OVERNIGHT EVENTS: No acute events overnight. Patient without acute complaints.  ADDITIONAL REVIEW OF SYSTEMS:    MEDICATIONS  (STANDING):  apixaban 5 milliGRAM(s) Oral two times a day  famotidine    Tablet 20 milliGRAM(s) Oral daily  gabapentin 100 milliGRAM(s) Oral at bedtime  levothyroxine Injectable 50 MICROGram(s) IV Push at bedtime  losartan 50 milliGRAM(s) Oral daily  naloxegol 12.5 milliGRAM(s) Oral daily  polyethylene glycol 3350 17 Gram(s) Oral two times a day  prochlorperazine   Tablet 10 milliGRAM(s) Oral three times a day  rosuvastatin 10 milliGRAM(s) Oral at bedtime  senna 2 Tablet(s) Oral at bedtime  sodium chloride 1 Gram(s) Oral three times a day  triamcinolone 0.1% Ointment 1 Application(s) Topical two times a day    MEDICATIONS  (PRN):  acetaminophen     Tablet .. 650 milliGRAM(s) Oral every 6 hours PRN Temp greater or equal to 38C (100.4F), Mild Pain (1 - 3)  aluminum hydroxide/magnesium hydroxide/simethicone Suspension 30 milliLiter(s) Oral every 4 hours PRN Dyspepsia  lidocaine 5% Ointment 1 Application(s) Topical daily PRN for severe pain  melatonin 3 milliGRAM(s) Oral at bedtime PRN Insomnia  ondansetron Injectable 4 milliGRAM(s) IV Push every 8 hours PRN Nausea and/or Vomiting  oxyCODONE    IR 7.5 milliGRAM(s) Oral every 4 hours PRN Severe Pain (7 - 10)  simethicone 80 milliGRAM(s) Chew every 6 hours PRN Gas      I&O's Summary    07 Jul 2025 07:01  -  08 Jul 2025 07:00  --------------------------------------------------------  IN: 720 mL / OUT: 600 mL / NET: 120 mL    08 Jul 2025 07:01  -  08 Jul 2025 14:51  --------------------------------------------------------  IN: 440 mL / OUT: 700 mL / NET: -260 mL        PHYSICAL EXAM:  Vital Signs Last 24 Hrs  T(C): 36.5 (08 Jul 2025 14:19), Max: 36.5 (08 Jul 2025 14:19)  T(F): 97.7 (08 Jul 2025 14:19), Max: 97.7 (08 Jul 2025 14:19)  HR: 114 (08 Jul 2025 14:19) (111 - 114)  BP: 116/73 (08 Jul 2025 14:19) (116/71 - 118/70)  BP(mean): --  RR: 18 (08 Jul 2025 14:19) (18 - 18)  SpO2: 97% (08 Jul 2025 14:19) (97% - 98%)    Parameters below as of 08 Jul 2025 14:19  Patient On (Oxygen Delivery Method): room air    CONSTITUTIONAL: NAD   EYES: Conjunctiva and sclera clear  ENMT: Moist oral mucosa, no pharyngeal injection or exudates   NECK: Supple, midline  RESPIRATORY: Normal respiratory effort; lungs are clear to auscultation bilaterally  CARDIOVASCULAR: Regular rate and rhythm, normal S1 and S2.  +2 BL LE Edema.  ABDOMEN: Nontender to palpation, no rebound/guarding  PSYCH: A+O to person, place, and time; affect appropriate          LABS:                        7.9    17.93 )-----------( 348      ( 08 Jul 2025 07:26 )             25.4     07-08    131[L]  |  95[L]  |  19  ----------------------------<  77  4.8   |  17[L]  |  0.73    Ca    9.4      08 Jul 2025 07:23            Urinalysis Basic - ( 08 Jul 2025 07:23 )    Color: x / Appearance: x / SG: x / pH: x  Gluc: 77 mg/dL / Ketone: x  / Bili: x / Urobili: x   Blood: x / Protein: x / Nitrite: x   Leuk Esterase: x / RBC: x / WBC x   Sq Epi: x / Non Sq Epi: x / Bacteria: x          RADIOLOGY & ADDITIONAL TESTS:  Results Reviewed:   Imaging Personally Reviewed:  Electrocardiogram Personally Reviewed:    COORDINATION OF CARE:  Care Discussed with Consultants/Other Providers [Y/N]: Y  Prior or Outpatient Records Reviewed [Y/N]: Y

## 2025-07-08 NOTE — DISCHARGE NOTE PROVIDER - NSDCCPCAREPLAN_GEN_ALL_CORE_FT
PRINCIPAL DISCHARGE DIAGNOSIS  Diagnosis: Uterine cancer  Assessment and Plan of Treatment: You were admitted due to rectal bleeding, likely due to rectal mass.   You were transfused 1 unit of blood on initial presentation. Blood count remained stable during remainder of hospitalization on Eliquis.  You will be transferred to [facility] for palliative radiation treatment given rectal mass and pain.   You were evaluated by palliative care for symptoms - started on oxycodone 7.5mg every 4 hours as needed for severe pain. Also started on Movantik and Senna/Miralax to ensure regular bowel movements.      SECONDARY DISCHARGE DIAGNOSES  Diagnosis: Hypothyroidism  Assessment and Plan of Treatment: Due to elevated thyroid stimulating hormone and low free thyroxine, your home dose of levothyroxine was increased to 100mcg daily. Make sure to take it in the morning on an empty stomach, and do not take it with your other medications. Repeat thyroid function tests in 4-6 weeks.

## 2025-07-08 NOTE — PROGRESS NOTE ADULT - PROBLEM SELECTOR PLAN 8
[facility] [address]  [address] [address]  Dept: [phone]      EMTALA TRANSFER CONSENT    NAME Car Durant Standing 2011                              MRN [id number]    I have been informed of my rights regarding examination, treatment, and transfer   by Dr Amado Weiner DO    Benefits: Specialized equipment and/or services available at the receiving facility (Include comment)________________________(Pediatric surgery)    Risks: Potential for delay in receiving treatment, Potential deterioration of medical condition, Loss of IV, Increased discomfort during transfer      Consent for Transfer:  I acknowledge that my medical condition has been evaluated and explained to me by the emergency department physician or other qualified medical person and/or my attending physician, who has recommended that I be transferred to the service of  Accepting Physician: Dr Anna Bauer at [facility] Name, [address] : [address]  The above potential benefits of such transfer, the potential risks associated with such transfer, and the probable risks of not being transferred have been explained to me, and I fully understand them  The doctor has explained that, in my case, the benefits of transfer outweigh the risks  I agree to be transferred  I authorize the performance of emergency medical procedures and treatments upon me in both transit and upon arrival at the receiving facility  Additionally, I authorize the release of any and all medical records to the receiving facility and request they be transported with me, if possible  I understand that the safest mode of transportation during a medical emergency is an ambulance and that the Hospital advocates the use of this mode of transport   Risks of traveling to the receiving facility by car, including absence of medical control, life sustaining equipment, such as
oxygen, and medical personnel has been explained to me and I fully understand them  (ELINA CORRECT BOX BELOW)  [ x ]  I consent to the stated transfer and to be transported by ambulance/helicopter  [  ]  I consent to the stated transfer, but refuse transportation by ambulance and accept full responsibility for my transportation by car  I understand the risks of non-ambulance transfers and I exonerate the Hospital and its staff from any deterioration in my condition that results from this refusal     X___________________________________________    DATE  07/21/20  TIME________  Signature of patient or legally responsible individual signing on patient behalf           RELATIONSHIP TO PATIENT_________________________          Provider Certification    NAME Car De La Cruz 2011                              MRN [id number]    A medical screening exam was performed on the above named patient  Based on the examination:    Condition Necessitating Transfer The encounter diagnosis was Acute appendicitis      Patient Condition: The patient has been stabilized such that within reasonable medical probability, no material deterioration of the patient condition or the condition of the unborn child(shruti) is likely to result from the transfer    Reason for Transfer: Level of Care needed not available at this facility    Transfer Requirements: [address]   · Space available and qualified personnel available for treatment as acknowledged by  PACS  · Agreed to accept transfer and to provide appropriate medical treatment as acknowledged by       Dr Adiel Ashley  · Appropriate medical records of the examination and treatment of the patient are provided at the time of transfer   [address], [address] _______  · Transfer will be performed by qualified personnel from  [employer]  and appropriate transfer equipment as required, including the use of necessary and appropriate
life support measures  Provider Certification: I have examined the patient and explained the following risks and benefits of being transferred/refusing transfer to the patient/family:  General risk, such as traffic hazards, adverse weather conditions, rough terrain or turbulence, possible failure of equipment (including vehicle or aircraft), or consequences of actions of persons outside the control of the transport personnel, Unanticipated needs of medical equipment and personnel during transport, Risk of worsening condition, The possibility of a transport vehicle being unavailable      Based on these reasonable risks and benefits to the patient and/or the unborn child(shruti), and based upon the information available at the time of the patients examination, I certify that the medical benefits reasonably to be expected from the provision of appropriate medical treatments at another medical facility outweigh the increasing risks, if any, to the individuals medical condition, and in the case of labor to the unborn child, from effecting the transfer      X____________________________________________ DATE 07/21/20        TIME_______      ORIGINAL - SEND TO MEDICAL RECORDS   COPY - SEND WITH PATIENT DURING TRANSFER
Seen by urology  Continue to monitor for potential need for nephrostomy tube if any obstruction, MYRA, or infection.
Seen by urology  Continue to monitor for potential need for nephrostomy tube if any obstruction, MYRA, or infection.      - d/w family
Seen by urology  Continue to monitor for potential need for nephrostomy tube if any obstruction, MYRA, or infection.    7/7- Care plan discussed with  at bedside and daughter via phone.
Seen by urology  Continue to monitor for potential need for nephrostomy tube if any obstruction, MYRA, or infection.      - 7/2/25: discussed with patient's daughter, sister at bedside.  - 7/3/25: discussed with patient's  at bedside.    # Discharge planning > pending dermatology final recs, palliative final recs.
Levothyroxine increased to 75 mcg around 2 weeks prior to hospitalization.   TSH elevated with low free thyroxine.   Can give 50 mcg IV daily while in-patient then continue 100 mcg oral daily for discharge with repeat TFTs in 4 weeks as outpatient.
Seen by urology  Continue to monitor for potential need for nephrostomy tube if any obstruction, MYRA, or infection.
Seen by urology  Continue to monitor for potential need for nephrostomy tube if any obstruction, MYRA, or infection.      - d/w family
Seen by urology  Continue to monitor for potential need for nephrostomy tube if any obstruction, MYRA, or infection.      - d/w family
Seen by urology  Continue to monitor for potential need for nephrostomy tube if any obstruction, MYRA, or infection.      - 7/2/25: discussed with patient's daughter, sister at bedside.

## 2025-07-08 NOTE — PROGRESS NOTE ADULT - PROVIDER SPECIALTY LIST ADULT
Gastroenterology
Palliative Care
Internal Medicine

## 2025-07-08 NOTE — PROGRESS NOTE ADULT - PROBLEM SELECTOR PLAN 3
R foot discoloration- Improved. Rx w course of Augmentin- Dc on 7/6    Also seen by dermatology- Favor edema bulla due to leg swelling   Also eczematous dermatitis, R ankle  - Continue triamcinolone 0.1% ointment to AA on R ankle, 2 weeks on/1 week off

## 2025-07-08 NOTE — DISCHARGE NOTE PROVIDER - CARE PROVIDERS DIRECT ADDRESSES
[email or web address]

## 2025-07-08 NOTE — PROGRESS NOTE ADULT - PROBLEM SELECTOR PROBLEM 7
Hyperlipidemia
Hypothyroidism

## 2025-07-08 NOTE — DISCHARGE NOTE PROVIDER - HOSPITAL COURSE
HPI:  Patient is a 59-year-old female with a past medical history of GERD, hypothyroidism, HTN, WINNIE BSO refractory metastatic uterine cancer presenting with lower extremity edema.  Patient was recently admitted to [facility] where she was found to have portal vein thrombus started on Eliquis.  Patient was discharged 2 days ago.  Since discharge patient has noted worsening bilateral lower extremity edema right greater than left.  Patient also noting abdominal wall edema. Reports sensation of incomplete bladder emptying. Stated medical adherence to PO Eliquis. Denies fever, chills, chest pain, N/V/D, abd pain. no flank pain.     Patient diagnosed with stage III MMT in 1/2021. She saw Gyn and had imaging. She was seen by Dr. Bowen, who did the surgery. Final path showed STAGE III MMT. She was treated with Carbo/ Taxol- s/p one cycle , had reaction and switched to Carbo/ doxil x one does. She transferred her care to Dr. Nichols at [place].and then Carbo/ Abraxane/ Herceptin x 6 completed, last dose was in 8/2021  She was started on Herceptin maintenance, last dose was in Sept, 2022.  CT imaging 10/2022 showed hepatic lesion concerning for met.  Patient was enrolled in a phase II IMMUNOGEN study, and got Mirvetuximab. After four cycles, scan from 1/10/23 showed POD.  Pt started clinical trial with Sacituzumab, total 14 cycle, with progression of disease.  Started treatment with Enhertu.  She has been experiencing pain in her abdomen and pelvis, like a strong pulling sensation. Pain goes as high as 8/10 in intensity.   Following with palliative care.  Scans have shown disease progression and sent for palliative RT.  Pain regimen is PRN oxycodone 5mg q4hrs, she is using a dose 5-6 times per day. This provides adequate pain relief for ~4 hours. On gabapentin 100mg qHS. She is applying lidocaine 4% to her rectum twice daily which is helpful.  Appetite is stable, when the pain is severe she finds it difficult to eat.  Passing soft stool. Taken off a bowel regimen while inpatient recently due to diarrhea. Now complains of some rectal bleeding.  She has a urinary catheter for retention 2/2 obstruction.  Generalized weakness and fatigue, she requires a one person assist to ambulate to the bathroom.  Energy level is low. Mood is stable, finds herself irritable when she is in pain.    (29 Jun 2025 11:46)    Hospital Course:  For her lower extremity edema - RLE duplex obtained - negative for DVT. TTE showed normal E. Trialed spironolactone for diuretics - held due to hyponatremia. Hypontaremia improved with salt tablets and liberalized diet. She completed course of Augmentin for possible right foot cellulitis.     Important Medication Changes and Reason:    Active or Pending Issues Requiring Follow-up:    Advanced Directives:   [ ] Full code  [ ] DNR  [ ] Hospice    Discharge Diagnoses:         HPI:  Patient is a 59-year-old female with a past medical history of GERD, hypothyroidism, HTN, WINNIE BSO refractory metastatic uterine cancer presenting with lower extremity edema.  Patient was recently admitted to [facility] where she was found to have portal vein thrombus started on Eliquis.  Patient was discharged 2 days ago.  Since discharge patient has noted worsening bilateral lower extremity edema right greater than left.  Patient also noting abdominal wall edema. Reports sensation of incomplete bladder emptying. Stated medical adherence to PO Eliquis. Denies fever, chills, chest pain, N/V/D, abd pain. no flank pain.     Patient diagnosed with stage III MMT in 1/2021. She saw Gyn and had imaging. She was seen by Dr. Bowen, who did the surgery. Final path showed STAGE III MMT. She was treated with Carbo/ Taxol- s/p one cycle , had reaction and switched to Carbo/ doxil x one does. She transferred her care to Dr. Nichols at [place].and then Carbo/ Abraxane/ Herceptin x 6 completed, last dose was in 8/2021  She was started on Herceptin maintenance, last dose was in Sept, 2022.  CT imaging 10/2022 showed hepatic lesion concerning for met.  Patient was enrolled in a phase II IMMUNOGEN study, and got Mirvetuximab. After four cycles, scan from 1/10/23 showed POD.  Pt started clinical trial with Sacituzumab, total 14 cycle, with progression of disease.  Started treatment with Enhertu.  She has been experiencing pain in her abdomen and pelvis, like a strong pulling sensation. Pain goes as high as 8/10 in intensity.   Following with palliative care.  Scans have shown disease progression and sent for palliative RT.  Pain regimen is PRN oxycodone 5mg q4hrs, she is using a dose 5-6 times per day. This provides adequate pain relief for ~4 hours. On gabapentin 100mg qHS. She is applying lidocaine 4% to her rectum twice daily which is helpful.  Appetite is stable, when the pain is severe she finds it difficult to eat.  Passing soft stool. Taken off a bowel regimen while inpatient recently due to diarrhea. Now complains of some rectal bleeding.  She has a urinary catheter for retention 2/2 obstruction.  Generalized weakness and fatigue, she requires a one person assist to ambulate to the bathroom.  Energy level is low. Mood is stable, finds herself irritable when she is in pain.    (29 Jun 2025 11:46)    Hospital Course:  For her lower extremity edema - RLE duplex obtained - negative for DVT. TTE showed normal EF. Trialed spironolactone for diuresis - later held due to hyponatremia. Hyponatremia improved with salt tablets and liberalized diet. She also completed course of Augmentin for possible right foot cellulitis. Patient with large inguinal lymph nodes on CT, likely contributing to LE edema.     For rectal bleeding - she was evaluated by GI - likely due to bleeding from tumor. In event of hemodynamic instability, consider flex sig w/ hemospray for suspected tumor bleeding but this is of limited use given the very temporary achievable hemostasis i/s/o malignant/tumor bleeding. Patient transfused 1u pRBC on initial presentation. Hgb remained stable on Eliquis.     Patient noted with elevated TSH with low free thyroxine - levothyroxine increased to 100mcg daily - continued on IV while inpatient to ensure absorption.     Patient was followed by palliative care for pain control. Family requesting inpatient palliative RT  - agreeable to transfer to [facility] for RT. Patient hemodynamically stable for transfer.     Important Medication Changes and Reason:  Started on oxycodone 7.5mg Q4H PRN severe pain.   Started prochlorperazine 10mg TID for N/V  Started Movantik and bowel regimen (Miralax/Senna)  Levothyroxine increased to 100mcg daily  Discontinued home HCTZ due to hyponatremia     Active or Pending Issues Requiring Follow-up:  Follow-up PCP, oncology.     Advanced Directives:   [X] Full code  [ ] DNR  [ ] Hospice    Discharge Diagnoses:  Acute blood loss anemia 2/2 LGIB, 2/2 known metastatic uterine cancer with rectal mass   LE Edema  Right Foot Cellulitis  Hx portal vein thrombosis  HTN  HLD  Hyponatremia  Hypothyroidism.  Hydronephrosis.

## 2025-07-08 NOTE — PROGRESS NOTE ADULT - PROBLEM SELECTOR PROBLEM 1
Extremity edema
Anemia due to acute blood loss
Extremity edema
Extremity edema
Uterine carcinoma
Extremity edema

## 2025-07-08 NOTE — PROGRESS NOTE ADULT - PROBLEM SELECTOR PLAN 7
Levothyroxine increased to 75 mcg around 2 weeks ago. Suspect suboptimal po absorption at this time.  Can give 50 mcg IV daily while in-patient then resume 75 mcg po daily for discharge with repeat TFTs in 4 weeks as outpatient.
Levothyroxine increased to 75 mcg around 2 weeks ago. Suspect suboptimal po absorption at this time.  Can give 50 mcg IV daily while in-patient then resume 75 mcg po daily for discharge with repeat TFTs in 4 weeks as outpatient.
Levothyroxine increased to 75 mcg around 2 weeks ago. Suspect suboptimal oral absorption at this time.  Can give 50 mcg IV daily while in-patient then resume 75 mcg oral daily for discharge with repeat TFTs in 4 weeks as outpatient.
Levothyroxine increased to 75 mcg around 2 weeks ago. Suspect suboptimal po absorption at this time.  Can give 50 mcg IV daily while in-patient then resume 75 mcg po daily for discharge with repeat TFTs in 4 weeks as outpatient.
c/w statin      # Hyponatremia: Improving. Likely due to diuretics. Strict Is/Os. Monitor BMP. Continue salt tablets. Liberalize diet.
Levothyroxine increased to 75 mcg around 2 weeks ago. Suspect suboptimal po absorption at this time.  Can give 50 mcg IV daily while in-patient then resume 75 mcg po daily for discharge with repeat TFTs in 4 weeks as outpatient.

## 2025-07-08 NOTE — CHART NOTE - NSCHARTNOTEFT_GEN_A_CORE
Brief f/u visit from Geriatrics and Palliative Medicine Team for pain control. Chart reviewed noting pt used oxycodone 7.5 mg prn x5 in the past 24 hours. Discussed with pt and family for consideration of adding a long acting opiate to optimize pain control. Pt adamant in refusing starting a long acting agent as she is fearful of addiction, despite counseling. Advised the regimen will remain the same and that family can contact us if pt changes her mind.    Ivelisse Beasley MD  [facility]AP Team Consults  Please call if we can be of assistance, [phone]
Notified by RN patient is c/o palpitations and SOB. Patient seen and examined at bedside with family present, telephone  services deferred, daughter Ami provided interpretation. Patient states she was feeling like this prior to admission as well. Daughter also reports patient had right foot edema which is improving overall but it is now bilateral pitting edema today. Patient also w/ worsening leukocytosis 14->20 but has been afebrile. Denies chest pain, lightheadedness, dizziness, N/V, fevers or chills, cough, wheezing, BRBPR, dysuria        T(C): 36.6 (07-04-25 @ 16:08), Max: 36.9 (07-04-25 @ 13:08)  HR: 116 (07-04-25 @ 16:08) (109 - 120)  BP: 103/71 (07-04-25 @ 16:08) (103/71 - 121/78)  RR: 18 (07-04-25 @ 16:08) (18 - 18)  SpO2: 97% (07-04-25 @ 16:08) (96% - 97%)    CONSTITUTIONAL: no apparent distress  EYES: PERRLA and symmetric, non-icteric  RESP: No respiratory distress, no use of accessory muscles; CTA b/l, no WRR  CV: RRR, pulse regular, +S1S2, 1+bilateral ankle pitting edema  GI: Soft, NT, ND  SKIN: No rashes or ulcers noted; no subcutaneous nodules or induration palpable  PSYCH: Appropriate insight/judgment; A+O x 4, mood and affect appropriate      A:59F w/ refractory metastatic uterine cancer, lung mets on eliquis, with bilateral peripheral edema, palpitations, and SOB c/f volume overload versus progression of metastatic disease      #SOB  likely fluid overload vs progression of metastatic disease and lung mets  -f/u CXR  -SpO2 stable 96-97% room air, continue to monitor for O2 need  -TTE ordered  -c/w aldactone trial      #Palpitations  12 lead showed sinus tachycardia  -CXR obtained, stable from prior  --TTE ordered  -continue to monitor tachycardia    D/w attending Dr. Corea, RN      Almas Hamm PAJALEEL  [facility] Department of Medicine  Reachable on [place] Teams
Notified by RN, pt w/ concerns of taking eliquis due to recent rectal bleeding  Pt seen and examined at bedside, resting, NAD. Pt speaks some english but pt daughter in law at bedside translating  Pt and DIL state pt has been having rectal bleeding x2.5 days after recently dx w/ PVT and started on eliquis appx 2.5 weeks ago. Per pt and family, her last dose of eliquis was last night after which she had appx 10 BMs of straight bright red blood. Pt states she has had 3 episodes here with last BM at 5 pm.   Pt endorses some intermittent rectal pain and dull abdominal pain.   On assessment, pt w/ mild abdominal distention, mild epigastric tenderness. Pt states she is passing gas and having many BMs    Of note pt w/ hx of uterine ca w/ mets. CT A/Pw/ IV contrast done w/ results :    < from: CT Abdomen and Pelvis w/ IV Cont (06.29.25 @ 05:10) >    IMPRESSION:    1.  Moderate bilateral hydronephrosis to the level of enlarged iliac   chain nodes on the right and pelvic mass on the left.  2.  Bladder wall thickening versus underdistention. Correlate for   cystitis.  3.  Mild right urothelial thickening/enhancement which may be related to   ascending infection or metastatic disease.  4.  Additional findings related to worsening diffuse metastatic disease,   as above.    < end of copied text >    Vital Signs Last 24 Hrs  T(C): 36.6 (29 Jun 2025 21:17), Max: 37.3 (29 Jun 2025 02:50)  T(F): 97.8 (29 Jun 2025 21:17), Max: 99.1 (29 Jun 2025 02:50)  HR: 93 (29 Jun 2025 21:17) (79 - 103)  BP: 159/77 (29 Jun 2025 21:17) (142/72 - 174/84)  BP(mean): 105 (29 Jun 2025 10:21) (100 - 118)  RR: 20 (29 Jun 2025 21:17) (16 - 22)  SpO2: 100% (29 Jun 2025 21:17) (94% - 100%)    Parameters below as of 29 Jun 2025 21:17  Patient On (Oxygen Delivery Method): room air          Per chart review, pt noted to have hgb 5.8 this AM although ?error d/t hgb on vbg 10.1 and rpt cbc w/ hgb 9.4 without transfusion   Discussed above and whether to continue eliquis vs hold w/ HIC overnight, recs discussing w/ Dr. Donnelly who did admission  Discussed above w/ Dr. Donnelly--recommendations include:      >rpt CBC STAT, if hgb >8.5, c/w eliquis, if lower hold eliquis      >no further recommendations at this time   >endorsed to 5T ACP / RN to follow up stat CBC w/ recs above  >care remains ongoing      -Hattie Reddy PA-C  [id number]
Pt seen in bed with  at bedside.  Pt would like to continue with Oxy ir 7.5 mg q4h prn.  Reviewed dosing of Oxy Er with prn accessibility.  Pt does not want to transition to ER she continues to want to use the 7.5 mg prn q4h.  Palliative care will sign off as symptoms are managed well.  Please reconsult if necessary,      Adelina Marin, ANP-BC  Please contact me via Teams  between 6am-2pm. If not answering, please call the palliative care pager [phone]    After 2pm and on weekends, please see the contact information below:    In the event of newly developing, evolving, or worsening symptoms between 2pm and 5pm please contact the Palliative Medicine via extension [phone] for assistance.  After 5pm please contact team via pager (if the patient is at [facility] #[phone] or if the patient is at [facility] #[phone]) The Geriatric and Palliative Medicine service has coverage 24 hours a day/ 7 days a week to provide medical recommendations regarding symptom management needs via telephone

## 2025-07-08 NOTE — PROGRESS NOTE ADULT - PROBLEM SELECTOR PROBLEM 8
Hydronephrosis
Hydronephrosis
Hypothyroidism
Hydronephrosis

## 2025-07-08 NOTE — DISCHARGE NOTE PROVIDER - NSDCMRMEDTOKEN_GEN_ALL_CORE_FT
Eliquis Starter Pack for Treatment of DVT and PE 5 mg oral tablet: 1 tab(s) orally 2 times a day  famotidine 20 mg oral tablet: 1 tab(s) orally once a day  gabapentin 100 mg oral capsule: 1 cap(s) orally once a day (at bedtime)  levothyroxine 75 mcg (0.075 mg) oral tablet: 1 tab(s) orally once a day  losartan-hydrochlorothiazide 50 mg-12.5 mg oral tablet: 1 tab(s) orally once a day  methadone 5 mg oral tablet: 0.5 tab(s) orally 2 times a day  oxyCODONE 5 mg oral tablet: 1 tab(s) orally once a day as needed  rosuvastatin 10 mg oral tablet: 1 tab(s) orally once a day  Vitamin B-100 oral tablet: 1 tab(s) orally once a day  Vitamin D2 50 mcg (2000 intl units) oral capsule: 1 cap(s) orally once a day   acetaminophen 325 mg oral tablet: 2 tab(s) orally every 6 hours As needed Temp greater or equal to 38C (100.4F), Mild Pain (1 - 3)  apixaban 5 mg oral tablet: 1 tab(s) orally 2 times a day  famotidine 20 mg oral tablet: 1 tab(s) orally once a day  gabapentin 100 mg oral capsule: 1 cap(s) orally once a day (at bedtime)  losartan 50 mg oral tablet: 1 tab(s) orally once a day  naloxegol 12.5 mg oral tablet: 1 tab(s) orally once a day  oxyCODONE 7.5 mg oral tablet: 1 tab(s) orally every 4 hours As needed Severe Pain (7 - 10)  polyethylene glycol 3350 oral powder for reconstitution: 17 gram(s) orally 2 times a day  prochlorperazine 10 mg oral tablet: 1 tab(s) orally 3 times a day  rosuvastatin 10 mg oral tablet: 1 tab(s) orally once a day (at bedtime)  senna leaf extract oral tablet: 2 tab(s) orally once a day (at bedtime)  Vitamin B-100 oral tablet: 1 tab(s) orally once a day  Vitamin D2 50 mcg (2000 intl units) oral capsule: 1 cap(s) orally once a day

## 2025-07-08 NOTE — PROGRESS NOTE ADULT - PROBLEM SELECTOR PROBLEM 5
Essential hypertension
Essential hypertension
Portal vein thrombosis
Essential hypertension

## 2025-07-08 NOTE — DISCHARGE NOTE PROVIDER - NSDCCPGOAL_GEN_ALL_CORE_FT
Addended by: MADISYN CARDENAS on: 3/18/2022 08:24 AM     Modules accepted: Orders     To get better and follow your care plan as instructed.

## 2025-07-08 NOTE — PROGRESS NOTE ADULT - PROBLEM SELECTOR PLAN 4
Patient previously seen by outpatient radiation oncology - considering palliative RT.   Family requesting inpatient palliative RT - consulted radiation oncology-  transfer of care to [facility] for palliative RT  Family is agreeable to transfer   Palliative assisting with symptom management - continue oxycodone PRN with bowel regimen

## 2025-07-08 NOTE — PROGRESS NOTE ADULT - PROBLEM SELECTOR PROBLEM 4
Uterine carcinoma
Portal vein thrombosis
Portal vein thrombosis
Encounter for palliative care
Portal vein thrombosis

## 2025-07-08 NOTE — DISCHARGE NOTE PROVIDER - CARE PROVIDER_API CALL
Shweta Matos  Medical Oncology  [address] [place] [id number]  Phone: [phone]  Fax: [phone]  Established Patient  Follow Up Time: 2 weeks

## 2025-07-29 LAB
CORTICOSTEROID BINDING GLOBULIN RESULT: 2.3 MG/DL — SIGNIFICANT CHANGE UP
CORTIS F/TOTAL MFR SERPL: 29 % — SIGNIFICANT CHANGE UP
CORTIS SERPL-MCNC: 21 UG/DL — HIGH
CORTISOL, FREE RESULT: 6.1 UG/DL — HIGH

## 2025-07-31 LAB
CULTURE RESULTS: SIGNIFICANT CHANGE UP
CULTURE RESULTS: SIGNIFICANT CHANGE UP
SPECIMEN SOURCE: SIGNIFICANT CHANGE UP
SPECIMEN SOURCE: SIGNIFICANT CHANGE UP

## 2025-08-16 LAB
CULTURE RESULTS: SIGNIFICANT CHANGE UP
SPECIMEN SOURCE: SIGNIFICANT CHANGE UP